# Patient Record
Sex: FEMALE | Race: OTHER | HISPANIC OR LATINO | Employment: FULL TIME | URBAN - METROPOLITAN AREA
[De-identification: names, ages, dates, MRNs, and addresses within clinical notes are randomized per-mention and may not be internally consistent; named-entity substitution may affect disease eponyms.]

---

## 2020-12-14 ENCOUNTER — HOSPITAL ENCOUNTER (EMERGENCY)
Facility: HOSPITAL | Age: 35
Discharge: HOME | End: 2020-12-15
Attending: EMERGENCY MEDICINE | Admitting: EMERGENCY MEDICINE
Payer: COMMERCIAL

## 2020-12-14 ENCOUNTER — APPOINTMENT (EMERGENCY)
Dept: RADIOLOGY | Facility: HOSPITAL | Age: 35
End: 2020-12-14
Payer: COMMERCIAL

## 2020-12-14 VITALS
DIASTOLIC BLOOD PRESSURE: 92 MMHG | SYSTOLIC BLOOD PRESSURE: 144 MMHG | HEART RATE: 78 BPM | RESPIRATION RATE: 20 BRPM | WEIGHT: 170 LBS | OXYGEN SATURATION: 99 % | TEMPERATURE: 98.9 F

## 2020-12-14 DIAGNOSIS — R06.02 SHORTNESS OF BREATH: ICD-10-CM

## 2020-12-14 DIAGNOSIS — R07.89 ATYPICAL CHEST PAIN: Primary | ICD-10-CM

## 2020-12-14 DIAGNOSIS — U07.1 COVID-19: ICD-10-CM

## 2020-12-14 LAB
ALBUMIN SERPL BCP-MCNC: 3.8 G/DL (ref 3.5–5)
ALP SERPL-CCNC: 108 U/L (ref 46–116)
ALT SERPL W P-5'-P-CCNC: 29 U/L (ref 12–78)
ANION GAP SERPL CALCULATED.3IONS-SCNC: 7 MMOL/L (ref 4–13)
AST SERPL W P-5'-P-CCNC: 13 U/L (ref 5–45)
BASOPHILS # BLD AUTO: 0.04 THOUSANDS/ΜL (ref 0–0.1)
BASOPHILS NFR BLD AUTO: 0 % (ref 0–1)
BILIRUB SERPL-MCNC: 0.24 MG/DL (ref 0.2–1)
BUN SERPL-MCNC: 11 MG/DL (ref 5–25)
CALCIUM SERPL-MCNC: 9.2 MG/DL (ref 8.3–10.1)
CHLORIDE SERPL-SCNC: 107 MMOL/L (ref 100–108)
CO2 SERPL-SCNC: 27 MMOL/L (ref 21–32)
CREAT SERPL-MCNC: 0.69 MG/DL (ref 0.6–1.3)
D DIMER PPP FEU-MCNC: 0.47 UG/ML FEU
EOSINOPHIL # BLD AUTO: 0.06 THOUSAND/ΜL (ref 0–0.61)
EOSINOPHIL NFR BLD AUTO: 1 % (ref 0–6)
ERYTHROCYTE [DISTWIDTH] IN BLOOD BY AUTOMATED COUNT: 12.7 % (ref 11.6–15.1)
GFR SERPL CREATININE-BSD FRML MDRD: 115 ML/MIN/1.73SQ M
GLUCOSE SERPL-MCNC: 91 MG/DL (ref 65–140)
HCT VFR BLD AUTO: 37.3 % (ref 34.8–46.1)
HGB BLD-MCNC: 12.7 G/DL (ref 11.5–15.4)
IMM GRANULOCYTES # BLD AUTO: 0.03 THOUSAND/UL (ref 0–0.2)
IMM GRANULOCYTES NFR BLD AUTO: 0 % (ref 0–2)
LYMPHOCYTES # BLD AUTO: 2.66 THOUSANDS/ΜL (ref 0.6–4.47)
LYMPHOCYTES NFR BLD AUTO: 25 % (ref 14–44)
MCH RBC QN AUTO: 29.7 PG (ref 26.8–34.3)
MCHC RBC AUTO-ENTMCNC: 34 G/DL (ref 31.4–37.4)
MCV RBC AUTO: 87 FL (ref 82–98)
MONOCYTES # BLD AUTO: 0.94 THOUSAND/ΜL (ref 0.17–1.22)
MONOCYTES NFR BLD AUTO: 9 % (ref 4–12)
NEUTROPHILS # BLD AUTO: 7.05 THOUSANDS/ΜL (ref 1.85–7.62)
NEUTS SEG NFR BLD AUTO: 65 % (ref 43–75)
NRBC BLD AUTO-RTO: 0 /100 WBCS
PLATELET # BLD AUTO: 391 THOUSANDS/UL (ref 149–390)
PMV BLD AUTO: 10 FL (ref 8.9–12.7)
POTASSIUM SERPL-SCNC: 3.5 MMOL/L (ref 3.5–5.3)
PROT SERPL-MCNC: 7.5 G/DL (ref 6.4–8.2)
RBC # BLD AUTO: 4.27 MILLION/UL (ref 3.81–5.12)
SODIUM SERPL-SCNC: 141 MMOL/L (ref 136–145)
TROPONIN I SERPL-MCNC: <0.02 NG/ML
WBC # BLD AUTO: 10.78 THOUSAND/UL (ref 4.31–10.16)

## 2020-12-14 PROCEDURE — 84484 ASSAY OF TROPONIN QUANT: CPT | Performed by: EMERGENCY MEDICINE

## 2020-12-14 PROCEDURE — 93005 ELECTROCARDIOGRAM TRACING: CPT

## 2020-12-14 PROCEDURE — 36415 COLL VENOUS BLD VENIPUNCTURE: CPT | Performed by: EMERGENCY MEDICINE

## 2020-12-14 PROCEDURE — 99285 EMERGENCY DEPT VISIT HI MDM: CPT

## 2020-12-14 PROCEDURE — 71045 X-RAY EXAM CHEST 1 VIEW: CPT

## 2020-12-14 PROCEDURE — 99285 EMERGENCY DEPT VISIT HI MDM: CPT | Performed by: EMERGENCY MEDICINE

## 2020-12-14 PROCEDURE — 85025 COMPLETE CBC W/AUTO DIFF WBC: CPT | Performed by: EMERGENCY MEDICINE

## 2020-12-14 PROCEDURE — 80053 COMPREHEN METABOLIC PANEL: CPT | Performed by: EMERGENCY MEDICINE

## 2020-12-14 PROCEDURE — 85379 FIBRIN DEGRADATION QUANT: CPT | Performed by: EMERGENCY MEDICINE

## 2020-12-14 RX ORDER — OMEPRAZOLE 40 MG/1
CAPSULE, DELAYED RELEASE ORAL DAILY
COMMUNITY
End: 2021-06-17

## 2020-12-14 RX ORDER — ACETAMINOPHEN 325 MG/1
975 TABLET ORAL ONCE
Status: DISCONTINUED | OUTPATIENT
Start: 2020-12-14 | End: 2020-12-15 | Stop reason: HOSPADM

## 2020-12-14 RX ORDER — RIMEGEPANT SULFATE 75 MG/75MG
TABLET, ORALLY DISINTEGRATING ORAL
COMMUNITY
Start: 2020-07-14 | End: 2022-03-11

## 2020-12-14 RX ORDER — MULTIVITAMIN,THER AND MINERALS
1 TABLET ORAL
COMMUNITY
End: 2021-06-17

## 2020-12-14 RX ORDER — PREDNISONE 10 MG/1
TABLET ORAL
COMMUNITY
Start: 2020-12-11 | End: 2021-06-17

## 2020-12-15 LAB
ATRIAL RATE: 61 BPM
P AXIS: 44 DEGREES
PR INTERVAL: 158 MS
QRS AXIS: 15 DEGREES
QRSD INTERVAL: 86 MS
QT INTERVAL: 410 MS
QTC INTERVAL: 412 MS
T WAVE AXIS: 34 DEGREES
VENTRICULAR RATE: 61 BPM

## 2020-12-15 PROCEDURE — 93010 ELECTROCARDIOGRAM REPORT: CPT | Performed by: INTERNAL MEDICINE

## 2021-04-30 ENCOUNTER — TELEPHONE (OUTPATIENT)
Dept: GASTROENTEROLOGY | Facility: CLINIC | Age: 36
End: 2021-04-30

## 2021-05-07 ENCOUNTER — OFFICE VISIT (OUTPATIENT)
Dept: OBGYN CLINIC | Facility: CLINIC | Age: 36
End: 2021-05-07
Payer: COMMERCIAL

## 2021-05-07 VITALS
DIASTOLIC BLOOD PRESSURE: 66 MMHG | SYSTOLIC BLOOD PRESSURE: 124 MMHG | HEIGHT: 59 IN | WEIGHT: 178 LBS | BODY MASS INDEX: 35.88 KG/M2

## 2021-05-07 DIAGNOSIS — Z11.51 SCREENING FOR HPV (HUMAN PAPILLOMAVIRUS): ICD-10-CM

## 2021-05-07 DIAGNOSIS — N94.6 DYSMENORRHEA: ICD-10-CM

## 2021-05-07 DIAGNOSIS — R10.2 PELVIC PAIN IN FEMALE: ICD-10-CM

## 2021-05-07 DIAGNOSIS — Z12.4 SCREENING FOR MALIGNANT NEOPLASM OF CERVIX: ICD-10-CM

## 2021-05-07 DIAGNOSIS — Z01.419 WELL FEMALE EXAM WITH ROUTINE GYNECOLOGICAL EXAM: Primary | ICD-10-CM

## 2021-05-07 DIAGNOSIS — E28.2 PCOS (POLYCYSTIC OVARIAN SYNDROME): ICD-10-CM

## 2021-05-07 PROCEDURE — 99385 PREV VISIT NEW AGE 18-39: CPT | Performed by: OBSTETRICS & GYNECOLOGY

## 2021-05-07 RX ORDER — PANTOPRAZOLE SODIUM 40 MG/1
40 TABLET, DELAYED RELEASE ORAL DAILY
COMMUNITY
Start: 2021-01-13 | End: 2022-08-01 | Stop reason: SDUPTHER

## 2021-05-07 NOTE — PROGRESS NOTES
ASSESSMENT & PLAN: Jose Jacinto is a 37 y o  [id number] with normal gynecologic exam     1   Routine well woman exam done today  2    Pap and HPV:Pap with HPV was done today  Current ASCCP Guidelines reviewed  3   The patient declined STD testing  4  Contraception: bilateral tubal ligation  5  The following were reviewed in today's visit: adequate intake of calcium and vitamin D, exercise, healthy diet and PCOS and pelvic pain  6  Patient to return to office in 12 months for annual gyn exam   7  Pelvic pain -- tenderness on examination  No fullness palpated  Recommended pelvic US  Will follow up ultrasound results  8  PCOS   - irregular menses likely secondary to PCOS  Discussed that with her hx of PE's, she should not be on any estrogen containing medication  OCPs are typically first line treatment for PCOS however these are contraindicated in this patient  Discussed that progesterone may be used but is unlikely to regulate her menses  May help to reduce pain associated with ovulatory cycles  All questions have been answered to her satisfaction  CC:  Annual Gynecologic Examination    HPI: Jose Jacinto is a 37 y o  [id number] who presents for annual gynecologic examination  She has the following concerns:    - establish care  - no gyn concerns  She reports abnormal pap smear with her prior pregnancy but was lost to follow up  She reports hx of PCOS and conceived her children via IVF  She reports pelvic pain mainly on the R  She states that her prior gyn did recommend an ultrasound be performed  She reports irregular menses secondary to her PCOS and R pelvic pain that is sporadic  Patient's recent [address] significant for COVID infection in 11/2020 followed by dx of pericardial effusion and cyst for which she follows with [facility] cardiology  She has a PMH of lupus and pulmonary emboli  Health Maintenance:    She exercises 0 days per week  She wears her seatbelt routinely      She is practicing breast self awareness  She feels safe at home  Patients does follow a balanced diet  Past Medical History:   Diagnosis Date    Abnormal Pap smear of cervix     Chiari I malformation ([address]     COVID-19     Female infertility     IVF pregnancy    Fibromyalgia     Lupus ([address]     Pericardial cyst     Pituitary tumor     Polycystic ovary syndrome     Pulmonary edema     Pulmonary emboli (HCC)        Past Surgical History:   Procedure Laterality Date     SECTION      x 2    CHOLECYSTECTOMY      GASTRECTOMY SLEEVE LAPAROSCOPIC      GASTRIC BYPASS      POLYPECTOMY      TONSILLECTOMY      TUBAL LIGATION         Past OB/Gyn History:  Period Cycle (Days): 26  Period Duration (Days): 6 days  Period Pattern: Regular  Menstrual Flow: Heavy  Menstrual Control: Maxi pad, Tampon  Dysmenorrhea: (!) Severe  Dysmenorrhea Symptoms: Cramping, Headache, Throbbing, Other (Comment)(back pain)Patient's last menstrual period was 2021 (exact date)  History of sexually transmitted infection No  Patient is currently sexually active     Birth control:bilateral tubal ligation    Last Pap  Unavailable for review    Family History  Family History   Problem Relation Age of Onset    Heart disease Mother     Hypertension Mother     Heart attack Father     No Known Problems Brother     No Known Problems Daughter     No Known Problems Son     Heart disease Maternal Grandmother     Hypertension Maternal Grandmother     Diabetes Maternal Grandmother     Early death Maternal Grandfather     No Known Problems Paternal Grandmother     No Known Problems Paternal Grandfather     No Known Problems Brother        Social History:  Social History     Socioeconomic History    Marital status: Unknown     Spouse name: Not on file    Number of children: Not on file    Years of education: Not on file    Highest education level: Not on file   Occupational History    Not on file   Social Needs    Financial resource strain: Not on file    Food insecurity     Worry: Not on file     Inability: Not on file    Transportation needs     Medical: Not on file     Non-medical: Not on file   Tobacco Use    Smoking status: Never Smoker    Smokeless tobacco: Never Used   Substance and Sexual Activity    Alcohol use: Yes     Frequency: Never     Comment: occ  social rare    Drug use: Never    Sexual activity: Yes     Partners: Male     Birth control/protection: Female Sterilization   Lifestyle    Physical activity     Days per week: Not on file     Minutes per session: Not on file    Stress: Not on file   Relationships    Social connections     Talks on phone: Not on file     Gets together: Not on file     Attends Rastafari service: Not on file     Active member of club or organization: Not on file     Attends meetings of clubs or organizations: Not on file     Relationship status: Not on file    Intimate partner violence     Fear of current or ex partner: Not on file     Emotionally abused: Not on file     Physically abused: Not on file     Forced sexual activity: Not on file   Other Topics Concern    Not on file   Social History Narrative    Not on file     Presently lives with hsuband and children  Patient is   Patient is currently employed  Allergies: Allergies   Allergen Reactions    Codeine        Medications:    Current Outpatient Medications:     pantoprazole (PROTONIX) 40 mg tablet, Take 40 mg by mouth daily, Disp: , Rfl:     omeprazole (PriLOSEC) 40 MG capsule, Daily, Disp: , Rfl:     predniSONE 10 mg tablet, Six tablets day one then decrease by one tablet each day until off Indications: rebound headache, Disp: , Rfl:     Rimegepant Sulfate (Nurtec) 75 MG TBDP, Take by mouth, Disp: , Rfl:     Vitamins/Minerals TABS, Take 1 tablet by mouth, Disp: , Rfl:     Review of Systems:  Review of Systems   Constitutional: Negative for activity change, appetite change and unexpected weight change  Respiratory: Negative for cough and shortness of breath  Cardiovascular: Negative for chest pain  Gastrointestinal: Negative for abdominal pain, constipation, diarrhea, nausea and vomiting  Genitourinary: Negative for difficulty urinating, dyspareunia, frequency, menstrual problem, pelvic pain, vaginal bleeding, vaginal discharge and vaginal pain  Neurological: Negative for dizziness, weakness, light-headedness and headaches  Psychiatric/Behavioral: Negative  Physical Exam:  /66   Ht 4' 11" (1 499 m)   Wt 80 7 kg (178 lb)   LMP 05/01/2021 (Exact Date)   Breastfeeding No   BMI 35 95 kg/m²    Physical Exam  Constitutional:       General: She is not in acute distress  Appearance: Normal appearance  She is well-developed  She is not diaphoretic  Genitourinary:      Pelvic exam was performed with patient in the lithotomy position  Vulva, urethra, bladder, vagina and uterus normal       No vulval lesion, tenderness, ulcerations or rash noted  No posterior fourchette tenderness or injury present  No inguinal adenopathy present in the right or left side  No urethral prolapse or mass present  Bladder is not tender  No lesions in the vagina  Vaginal rugosity present  No vaginal discharge, erythema, tenderness or bleeding  Cervical pinkness present  No cervical motion tenderness, discharge, friability, lesion or polyp  Uterus is mobile  Uterus is not enlarged or tender  No uterine mass detected  Uterus is anteverted and regular  No right or left adnexal mass present  Right adnexa tender (generalized tenderness during bimanual exam)  Right adnexa not full  Left adnexa tender  Left adnexa not full  Genitourinary Comments: Urethra midline, no masses  Urethral meatus normal in appearance  Bladder nontender to palpation   Perineum normal in appearance, no lacerations, no ulcerations, no lesions visualized  Rectum:      No tenderness or external hemorrhoid  HENT:      Head: Normocephalic and atraumatic  Cardiovascular:      Rate and Rhythm: Normal rate and regular rhythm  Heart sounds: Normal heart sounds  No murmur  No friction rub  Pulmonary:      Effort: Pulmonary effort is normal  No respiratory distress  Breath sounds: Normal breath sounds  No wheezing or rales  Chest:      Breasts: Breasts are symmetrical          Right: Normal  No swelling, mass, skin change or tenderness  Left: Normal  No swelling, mass, skin change or tenderness  Abdominal:      Palpations: Abdomen is soft  There is no mass  Tenderness: There is no abdominal tenderness  There is no guarding  Musculoskeletal: Normal range of motion  General: No tenderness  Lymphadenopathy:      Lower Body: No right inguinal adenopathy  No left inguinal adenopathy  Neurological:      Mental Status: She is alert and oriented to person, place, and time  Skin:     General: Skin is warm and dry  Coloration: Skin is not pale  Findings: No erythema  Psychiatric:         Mood and Affect: Mood normal          Behavior: Behavior normal          Thought Content: Thought content normal          Judgment: Judgment normal    Vitals signs and nursing note reviewed

## 2021-05-10 LAB
CYTOLOGIST CVX/VAG CYTO: NORMAL
DX ICD CODE: NORMAL
OTHER STN SPEC: NORMAL
OTHER STN SPEC: NORMAL
PATH REPORT.FINAL DX SPEC: NORMAL
SL AMB NOTE:: NORMAL
SL AMB SPECIMEN ADEQUACY: NORMAL
SL AMB TEST METHODOLOGY: NORMAL

## 2021-05-10 NOTE — RESULT ENCOUNTER NOTE
Normal pap  No HPV performed (reflex instead of cotesting ordered)  Repeat pap in 3 years  [employer] message sent

## 2021-05-12 ENCOUNTER — TELEPHONE (OUTPATIENT)
Dept: GASTROENTEROLOGY | Facility: CLINIC | Age: 36
End: 2021-05-12

## 2021-05-12 ENCOUNTER — OFFICE VISIT (OUTPATIENT)
Dept: GASTROENTEROLOGY | Facility: CLINIC | Age: 36
End: 2021-05-12
Payer: COMMERCIAL

## 2021-05-12 DIAGNOSIS — K21.9 GASTROESOPHAGEAL REFLUX DISEASE WITHOUT ESOPHAGITIS: Primary | ICD-10-CM

## 2021-05-12 PROCEDURE — 99204 OFFICE O/P NEW MOD 45 MIN: CPT | Performed by: INTERNAL MEDICINE

## 2021-05-12 NOTE — TELEPHONE ENCOUNTER
----- Message from Cyndy Caraballo MD sent at 5/12/2021 10:05 AM EDT -----  Regarding: virtual visit  Tried to connect to the patient for virtual visit using [employer] but she did not connect  Looks like she may have connected using [employer]  I tried to use this, but [employer] teams would not connect   Tried calling patient at listed number, but she did not

## 2021-05-12 NOTE — TELEPHONE ENCOUNTER
Clari Veras spoke to pt whom was quite upset  I informed Clari Veras to add pt to the end of Dr Magdalena Clemons' schedule today

## 2021-05-12 NOTE — PROGRESS NOTES
[facility] - Outpatient Consultation  Clayton Jennings 37 y o  female MRN: [id number]  Encounter: [id number]       this was a 14 min telephone visit  My office door was closed and no one else was in the room  ASSESSMENT AND PLAN:      1  Gastroesophageal reflux disease without esophagitis     We discussed Bravo pH testing  We discussed placement of the pH probe and study general   This may or may not give us an answer as to why she has persistent symptoms  If she does have evidence of significant acid reflux, options might include laparoscopic fundoplication, stretta procedure or continued medication  She will need to hold her PPI for 7 days prior to the procedure and during the study itself     -96hr pH testing; Future  - EGD; Future    ______________________________________________________________________    HPI:  Patient with a history of prior sleeve gastrectomy for treatment of morbid obesity had severe reflux symptoms following this and eventually underwent conversion to Bhavya-en-Y gastric bypass but has had persistent heartburn and regurgitation  EGD previously revealed a small marginal ulcer and small hiatal hernia by her report  Has been on PPI  With incomplete resolution  Patient presents to discuss further evaluation with esophageal pH monitoring        REVIEW OF SYSTEMS:    ROS     Historical Information   Past Medical History:   Diagnosis Date    Abnormal Pap smear of cervix     [facility] malformation ([facility]     COVID-19     Female infertility     IVF pregnancy    Fibromyalgia     [facility]     Pericardial cyst     Pituitary tumor     Polycystic ovary syndrome     Pulmonary edema     Pulmonary emboli (HCC)      Past Surgical History:   Procedure Laterality Date     SECTION      x 2    CHOLECYSTECTOMY      GASTRECTOMY SLEEVE LAPAROSCOPIC      GASTRIC BYPASS      POLYPECTOMY      TONSILLECTOMY      TUBAL LIGATION       Social History   Social History     Substance and Sexual Activity   Alcohol Use Yes    Frequency: Never    Comment: occ  social rare     Social History     Substance and Sexual Activity   Drug Use Never     Social History     Tobacco Use   Smoking Status Never Smoker   Smokeless Tobacco Never Used     Family History   Problem Relation Age of Onset    Heart disease Mother     Hypertension Mother     Heart attack Father     No Known Problems Brother     No Known Problems Daughter     No Known Problems Son     Heart disease Maternal Grandmother     Hypertension Maternal Grandmother     Diabetes Maternal Grandmother     Early death Maternal Grandfather     No Known Problems Paternal Grandmother     No Known Problems Paternal Grandfather     No Known Problems Brother        Meds/Allergies       Current Outpatient Medications:     omeprazole (PriLOSEC) 40 MG capsule    pantoprazole (PROTONIX) 40 mg tablet    predniSONE 10 mg tablet    propranolol (INDERAL) 20 mg tablet    Rimegepant Sulfate (Nurtec) 75 MG TBDP    Vitamins/Minerals TABS    Allergies   Allergen Reactions    Codeine            Objective     Last menstrual period 05/01/2021, not currently breastfeeding  There is no height or weight on file to calculate BMI  PHYSICAL EXAM:      Physical Exam         Lab Results:   No visits with results within 1 Day(s) from this visit  Latest known visit with results is:   Office Visit on 05/07/2021   Component Date Value    Diagnosis: 05/07/2021 Comment     Specimen Adequacy 05/07/2021 Comment     Clinician Provided ICD10 05/07/2021 Comment     Performed by: 05/07/2021 Comment     MARQUITA BHATTI   05/07/2021    Note: 05/07/2021 Comment     Test Methodology: 05/07/2021 Comment     MARQUITA BHATTI   05/07/2021 Comment          Radiology Results:   No results found

## 2021-05-16 ENCOUNTER — HOSPITAL ENCOUNTER (OUTPATIENT)
Dept: RADIOLOGY | Facility: HOSPITAL | Age: 36
Discharge: HOME | End: 2021-05-16
Attending: OBSTETRICS & GYNECOLOGY
Payer: COMMERCIAL

## 2021-05-16 DIAGNOSIS — N94.6 DYSMENORRHEA: ICD-10-CM

## 2021-05-16 DIAGNOSIS — R10.2 PELVIC PAIN IN FEMALE: ICD-10-CM

## 2021-05-16 DIAGNOSIS — E28.2 PCOS (POLYCYSTIC OVARIAN SYNDROME): ICD-10-CM

## 2021-05-16 PROCEDURE — 76856 US EXAM PELVIC COMPLETE: CPT

## 2021-05-16 PROCEDURE — 76830 TRANSVAGINAL US NON-OB: CPT

## 2021-05-18 ENCOUNTER — TELEPHONE (OUTPATIENT)
Dept: GASTROENTEROLOGY | Facility: CLINIC | Age: 36
End: 2021-05-18

## 2021-05-18 NOTE — TELEPHONE ENCOUNTER
Pt is scheduled for 2 tests  A 24 and a 96 hour study  Per our conversation I will cancel out the 24 hour study and keep the 96 hour study  I will reach out to pt and inform her that only one test needs to be done  Thank you

## 2021-05-20 ENCOUNTER — TELEPHONE (OUTPATIENT)
Dept: OBGYN CLINIC | Facility: CLINIC | Age: 36
End: 2021-05-20

## 2021-05-20 NOTE — TELEPHONE ENCOUNTER
Pt calling requesting recent US results  Informed pt that results are still processing with radiology  Can take 1 to 2 weeks to receive results  Once results are reviewed by Dr Bishop Chase then a [employer] message or phone call will occur to update pt  Pt verbalized understanding

## 2021-05-24 ENCOUNTER — PATIENT MESSAGE (OUTPATIENT)
Dept: OBGYN CLINIC | Facility: CLINIC | Age: 36
End: 2021-05-24

## 2021-05-24 ENCOUNTER — TELEPHONE (OUTPATIENT)
Dept: OBGYN CLINIC | Facility: CLINIC | Age: 36
End: 2021-05-24

## 2021-05-24 NOTE — TELEPHONE ENCOUNTER
Called and spoke with patient  Advised patient "Retroverted uterus is a description of the position of the uterus and is within the range of normal  It means that her uterus is positioned towards her back  Despite her history of blood clots there are options for hormonal medication that do not increase her risk of clot but may help with her painful periods  If she wants to discuss these options, she should be seen for an appt"  Pt states she does not have time to come in for an appointment  She states she will deal with her irregular periods and make an appointment when she has time

## 2021-05-24 NOTE — TELEPHONE ENCOUNTER
Retroverted uterus is a description of the position of the uterus and is within the range of normal  It means that her uterus is positioned towards her back  Despite her history of blood clots there are options for hormonal medication that do not increase her risk of clot but may help with her painful periods  If she wants to discuss these options, she should be seen for an appt

## 2021-05-24 NOTE — TELEPHONE ENCOUNTER
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

## 2021-06-15 ENCOUNTER — TELEPHONE (OUTPATIENT)
Dept: GASTROENTEROLOGY | Facility: CLINIC | Age: 36
End: 2021-06-15

## 2021-06-16 ENCOUNTER — TELEPHONE (OUTPATIENT)
Dept: GASTROENTEROLOGY | Facility: CLINIC | Age: 36
End: 2021-06-16

## 2021-06-16 NOTE — TELEPHONE ENCOUNTER
Faxed urgent cardiac clearance to Dr Stefani Muro office [phone]  I lmom on Dr Andrew Kirk that I would be faxing urgent clearance and I would call back in about in hour to make sure that it was received  If she does not answer again it does state on message if need to talk to triage nurse today to call [phone] (main number) and ask for any available triage nurse

## 2021-06-16 NOTE — TELEPHONE ENCOUNTER
Called and spoke to pt to try to r/s procedure due to cardiac clearance not being able to be obtained  Pt informed that she does not need clearance as she just had one of these in January  I told her that when I spoke to Dr Brandon Wright office that she needed to be seen within 3 mos for clearance  Pt will be contacting Dr Brandon linder as she does not believe she evens needs clearance  She will then call back

## 2021-06-16 NOTE — TELEPHONE ENCOUNTER
I called and lmom for Jyoti Garcia at Dr Caesra Rizo office and informed to disregard cardiac clearance request

## 2021-06-16 NOTE — TELEPHONE ENCOUNTER
Called and spoke to Lucas Colón at [facility] whom reviewed pt's chart and states does not need cardiac clearance  Pt is ok to have BRAVO tomorrow  I lmom informing pt of this and if has any questions to please call me back

## 2021-06-16 NOTE — TELEPHONE ENCOUNTER
Received call back from Shania Cha, nurse at Dr Ynes Kwok office whom informed to fax to backline [phone] and that she does not believe that the doctor will clear her as has not been seen since January  She will see what she can do  Will call her in an hour or two to obtain status if pt needs to be rescheduled

## 2021-06-17 ENCOUNTER — ANESTHESIA (OUTPATIENT)
Dept: GASTROENTEROLOGY | Facility: AMBULATORY SURGERY CENTER | Age: 36
End: 2021-06-17

## 2021-06-17 ENCOUNTER — ANESTHESIA EVENT (OUTPATIENT)
Dept: GASTROENTEROLOGY | Facility: AMBULATORY SURGERY CENTER | Age: 36
End: 2021-06-17

## 2021-06-17 ENCOUNTER — HOSPITAL ENCOUNTER (OUTPATIENT)
Dept: GASTROENTEROLOGY | Facility: AMBULATORY SURGERY CENTER | Age: 36
Discharge: HOME | End: 2021-06-17
Payer: COMMERCIAL

## 2021-06-17 VITALS
HEART RATE: 83 BPM | OXYGEN SATURATION: 100 % | TEMPERATURE: 97.2 F | RESPIRATION RATE: 18 BRPM | SYSTOLIC BLOOD PRESSURE: 112 MMHG | WEIGHT: 175 LBS | DIASTOLIC BLOOD PRESSURE: 68 MMHG | HEIGHT: 59 IN | BODY MASS INDEX: 35.28 KG/M2

## 2021-06-17 DIAGNOSIS — K21.9 GASTROESOPHAGEAL REFLUX DISEASE WITHOUT ESOPHAGITIS: ICD-10-CM

## 2021-06-17 LAB
EXT PREGNANCY TEST URINE: NEGATIVE
EXT. CONTROL: NORMAL

## 2021-06-17 PROCEDURE — 43235 EGD DIAGNOSTIC BRUSH WASH: CPT | Performed by: INTERNAL MEDICINE

## 2021-06-17 PROCEDURE — 00731 ANES UPR GI NDSC PX NOS: CPT | Performed by: NURSE ANESTHETIST, CERTIFIED REGISTERED

## 2021-06-17 RX ORDER — PROPOFOL 10 MG/ML
INJECTION, EMULSION INTRAVENOUS AS NEEDED
Status: DISCONTINUED | OUTPATIENT
Start: 2021-06-17 | End: 2021-06-17

## 2021-06-17 RX ORDER — SODIUM CHLORIDE 9 MG/ML
20 INJECTION, SOLUTION INTRAVENOUS CONTINUOUS
Status: DISCONTINUED | OUTPATIENT
Start: 2021-06-17 | End: 2021-06-21 | Stop reason: HOSPADM

## 2021-06-17 RX ORDER — SODIUM CHLORIDE 9 MG/ML
30 INJECTION, SOLUTION INTRAVENOUS CONTINUOUS
Status: DISCONTINUED | OUTPATIENT
Start: 2021-06-17 | End: 2021-06-21 | Stop reason: HOSPADM

## 2021-06-17 RX ORDER — NORTRIPTYLINE HYDROCHLORIDE 25 MG/1
25 CAPSULE ORAL
COMMUNITY
Start: 2021-06-14 | End: 2021-06-22

## 2021-06-17 RX ADMIN — PROPOFOL 150 MG: 10 INJECTION, EMULSION INTRAVENOUS at 10:43

## 2021-06-17 RX ADMIN — PROPOFOL 150 MG: 10 INJECTION, EMULSION INTRAVENOUS at 10:47

## 2021-06-17 RX ADMIN — PROPOFOL 100 MG: 10 INJECTION, EMULSION INTRAVENOUS at 10:50

## 2021-06-17 RX ADMIN — SODIUM CHLORIDE: 9 INJECTION, SOLUTION INTRAVENOUS at 10:29

## 2021-06-17 NOTE — ANESTHESIA PREPROCEDURE EVALUATION
Procedure:  EGD    Relevant Problems   No relevant active problems        Physical Exam    Airway    Mallampati score: II  TM Distance: >3 FB  Neck ROM: full     Dental       Cardiovascular  Cardiovascular exam normal    Pulmonary  Pulmonary exam normal     Other Findings        Anesthesia Plan  ASA Score- 2     Anesthesia Type- IV sedation with anesthesia with ASA Monitors  Additional Monitors:   Airway Plan:           Plan Factors-Exercise tolerance (METS): >4 METS  Chart reviewed  Existing labs reviewed  Patient summary reviewed  Patient is not a current smoker  Patient not instructed to abstain from smoking on day of procedure  Patient did not smoke on day of surgery  Induction-     Postoperative Plan-     Informed Consent-   I personally reviewed this patient with the CRNA  Discussed and agreed on the Anesthesia Plan with the CRNA  Zoraida Sidhu

## 2021-06-17 NOTE — H&P
History and Physical - [facility]  Daniel Cheney 37 y o  female MRN: [id number]                  HPI: Daniel Cheney is a 37y o  year old female who presents for abdominal pain, s/p sleeve gastrectomy      REVIEW OF SYSTEMS: Per the HPI, and otherwise unremarkable      Historical Information   Past Medical History:   Diagnosis Date    Abnormal Pap smear of cervix     Anemia     gets iron infusions    Anxiety     Chiari I malformation (HCC)     Chronic pain disorder     during lupus flairs    COVID-19     Disease of thyroid gland     pt off meds    Female infertility     IVF pregnancy    Fibromyalgia     Fibromyalgia, primary     Gastric ulcer     GERD (gastroesophageal reflux disease)     Hiatal hernia     Lupus ([address]     Muscle weakness     Pericardial cyst     Pituitary tumor     Pleural effusion associated with pulmonary infection     Polycystic ovary syndrome     Pulmonary edema     Pulmonary emboli (HCC)     Spinal headache     with c section     Past Surgical History:   Procedure Laterality Date     SECTION      x 2    CHOLECYSTECTOMY      GASTRECTOMY SLEEVE LAPAROSCOPIC      GASTRIC BYPASS      POLYPECTOMY      TONSILLECTOMY      TUBAL LIGATION       Social History   Social History     Substance and Sexual Activity   Alcohol Use Yes    Comment: occ  social rare     Social History     Substance and Sexual Activity   Drug Use Never     Social History     Tobacco Use   Smoking Status Never Smoker   Smokeless Tobacco Never Used     Family History   Problem Relation Age of Onset    Heart disease Mother     Hypertension Mother     Heart attack Father     No Known Problems Brother     No Known Problems Daughter     No Known Problems Son     Heart disease Maternal Grandmother     Hypertension Maternal Grandmother     Diabetes Maternal Grandmother     Early death Maternal Grandfather     No Known Problems Paternal Grandmother     No Known Problems Paternal Grandfather     No Known Problems Brother        Meds/Allergies       Current Outpatient Medications:     nortriptyline (PAMELOR) 25 mg capsule    pantoprazole (PROTONIX) 40 mg tablet    Rimegepant Sulfate (Nurtec) 75 MG TBDP    Current Facility-Administered Medications:     sodium chloride 0 9 % infusion, 30 mL/hr, Intravenous, Continuous, Continue from Pre-op at 06/17/21 1037    sodium chloride 0 9 % infusion, 20 mL/hr, Intravenous, Continuous    Allergies   Allergen Reactions    Codeine Tremor    Morphine Tremor       Objective     /73   Pulse 71   Temp (!) 97 2 °F (36 2 °C) (Skin)   Resp 18   Ht 4' 11" (1 499 m)   Wt 79 4 kg (175 lb)   LMP 06/12/2021 (Exact Date)   SpO2 95%   Breastfeeding No   BMI 35 35 kg/m²       PHYSICAL EXAM    Gen: NAD  Head: NCAT  CV: RRR  CHEST: Clear  ABD: soft, NT/ND  EXT: no edema      ASSESSMENT/PLAN:  This is a 37y o  year old female with abd pain here for EGH/bravo pH test, and she is stable and optimized for her procedure

## 2021-06-17 NOTE — DISCHARGE INSTRUCTIONS
Upper Endoscopy   WHAT YOU NEED TO KNOW:   An upper endoscopy is also called an upper gastrointestinal (GI) endoscopy, or an esophagogastroduodenoscopy (EGD)  It is a procedure to examine the inside of your esophagus, stomach, and duodenum (first part of the small intestine) with a scope  You may feel bloated, gassy, or have some abdominal discomfort after your procedure  Your throat may be sore for 24 to 36 hours  You may burp or pass gas from air that is still inside your body  DISCHARGE INSTRUCTIONS:   Seek care immediately if:    You have sudden, severe abdominal pain   You have problems swallowing   You have a large amount of black, sticky bowel movements or blood in your bowel movements   You have sudden trouble breathing   You feel weak, lightheaded, or faint or your heart beats faster than normal for you  Contact your healthcare provider if:    You have a fever and chills   You have nausea or are vomiting   Your abdomen is bloated or feels full and hard   You have abdominal pain   You have black, sticky bowel movements or blood in your bowel movements   You have not had a bowel movement for 3 days after your procedure   You have rash or hives   You have questions or concerns about your procedure  Activity:    Do not lift, strain, or run for 24 hours after your procedure   Rest after your procedure  You have been given medicine to relax you  Do not drive or make important decisions until the day after your procedure  Return to your normal activity as directed   Relieve gas and discomfort from bloating by lying on your right side with a heating pad on your abdomen  You may need to take short walks to help the gas move out  Eat small meals until bloating is relieved  Follow up with your healthcare provider as directed: Write down your questions so you remember to ask them during your visits       If you take a blood thinner, please review the specific instructions from your endoscopist about when you should resume it  These can be found in the Recommendation and Your Medication list sections of this After Visit Summary  Gastroesophageal Reflux Disease   AMBULATORY CARE:   Gastroesophageal reflux disease (GERD)  is reflux that occurs more than twice a week for a few weeks  Reflux means acid and food in the stomach back up into the esophagus  It usually causes heartburn and other symptoms  GERD can cause other health problems over time if it is not treated  Signs and symptoms:   · Heartburn (burning pain in your chest)    · Pain after meals that spreads to your neck, jaw, or shoulder    · Pain that gets better when you change positions    · Bitter or acid taste in your mouth    · A dry cough    · Trouble swallowing or pain with swallowing    · Hoarseness or a sore throat    · Burping or hiccups    · Feeling full soon after you start eating    Call your local emergency number (911 in the [address] [facility]) if:   · You have severe chest pain and sudden trouble breathing  Seek care immediately if:   · You have trouble breathing after you vomit  · You have trouble swallowing, or pain with swallowing  · Your bowel movements are black, bloody, or tarry-looking  · Your vomit looks like coffee grounds or has blood in it  Call your doctor or gastroenterologist if:   · You feel full and cannot burp or vomit  · You vomit large amounts, or you vomit often  · You are losing weight without trying  · Your symptoms get worse or do not improve with treatment  · You have questions or concerns about your condition or care  Treatment for GERD:   · Medicines  are used to decrease stomach acid  Medicine may also be used to help your lower esophageal sphincter and stomach contract (tighten) more  · Surgery  is done to wrap the upper part of the stomach around the esophageal sphincter   This will strengthen the sphincter and prevent reflux  Manage GERD:   · Do not have foods or drinks that may increase heartburn  These include chocolate, peppermint, fried or fatty foods, drinks that contain caffeine, or carbonated drinks (soda)  Other foods include spicy foods, onions, tomatoes, and tomato-based foods  Do not have foods or drinks that can irritate your esophagus, such as citrus fruits, juices, and alcohol  · Do not eat large meals  When you eat a lot of food at one time, your stomach needs more acid to digest it  Eat 6 small meals each day instead of 3 large meals, and eat slowly  Do not eat meals 2 to 3 hours before bedtime  · Elevate the head of your bed  Place 6-inch blocks under the head of your bed frame  You may also use more than one pillow under your head and shoulders while you sleep  · Maintain a healthy weight  If you are overweight, weight loss may help relieve symptoms of GERD  · Do not smoke  Smoking weakens the lower esophageal sphincter and increases the risk of GERD  Ask your healthcare provider for information if you currently smoke and need help to quit  E-cigarettes or smokeless tobacco still contain nicotine  Talk to your healthcare provider before you use these products  · Do not wear clothing that is tight around your waist   Tight clothing can put pressure on your stomach and cause or worsen GERD symptoms  Follow up with your doctor or gastroenterologist as directed:  Write down your questions so you remember to ask them during your visits  © Copyright [address] Information is for End User's use only and may not be sold, redistributed or otherwise used for commercial purposes  All illustrations and images included in CareNotes® are the copyrighted property of [employer]  or [address] [place], [place] [address]suzie Carmona   The above information is an  only  It is not intended as medical advice for individual conditions or treatments   Talk to your doctor, nurse or pharmacist before following any medical regimen to see if it is safe and effective for you  Chronic Dysphagia   WHAT YOU NEED TO KNOW:   What is chronic dysphagia? Chronic dysphagia is trouble swallowing  It occurs when you have trouble moving food or liquid down your esophagus to your stomach  It may occur when you eat, drink, or any time you try to swallow  What causes chronic dysphagia? · Narrowing of your esophagus caused by acid reflux or tumors    · Nerve or muscle problems that slow the movement of food    · Brain injury, or conditions that affect the nervous system such as stroke, dementia, or Parkinson disease    · Radiation therapy or head and neck surgery    · Certain medicines such as antihistamines, diuretics, antidepressants, and blood pressure or antinausea medicines    What other signs and symptoms may occur with chronic dysphagia? · Drooling, coughing after swallowing, or spitting up food    · Hoarse or wet-sounding voice while eating or drinking     · Feeling like food is stuck in your throat or pressure in your chest after you eat    How is chronic dysphagia diagnosed? Your healthcare provider may ask if you only have trouble swallowing when you eat or drink, or any time you try to swallow  You may also need any of the following tests:  · A water swallow screening test  will show how well you swallow thinner liquids, such as water  Thinner liquids can make you choke more easily than thicker liquids  This test may show signs of dysphagia and aspiration (movement of liquid into your lungs)  It can be used to help healthcare providers decide if you need other tests  · Other swallow tests  may show which parts of your throat or esophagus are not working well  These tests may include x-rays of your throat and esophagus  You may be given a thick liquid called barium to help your esophagus show up better on x-rays  These tests may also show if the position of your head affects the way you swallow      · Endoscopy  is a procedure that may show narrowing or inflammation in your esophagus  · Manometry  measures the pressure within the esophagus and stomach  · pH monitoring  is used to check your throat for acid reflux  How is chronic dysphagia treated? Treatment will depend on the cause of your dysphagia  You may need medicine to reduce acid reflux or muscle spasms in your throat  You may also need any of the following:  · Diet changes  may reduce choking problems  Your healthcare provider may show you how to thicken liquids or soften foods to make them easier to swallow  · Swallowing therapy  can teach you different ways of swallowing by using different head and body positions  You may be taught exercises to strengthen the muscles that help you swallow  · Surgery  may be needed to widen your esophagus or treat other medical conditions that cause dysphagia  When should I contact my healthcare provider? · You lose weight without trying  · Your signs and symptoms get worse, or you have new signs or symptoms  · You have signs or symptoms of dehydration, such as increased thirst, dark yellow urine, or little or no urine  · You get colds often    · You have questions or concerns about your condition or care  When should I seek care immediately or call 911? · You cannot eat or drink liquids at all  · You have chest pain  · You have shortness of breath  CARE AGREEMENT:   You have the right to help plan your care  Learn about your health condition and how it may be treated  Discuss treatment options with your healthcare providers to decide what care you want to receive  You always have the right to refuse treatment  The above information is an  only  It is not intended as medical advice for individual conditions or treatments  Talk to your doctor, nurse or pharmacist before following any medical regimen to see if it is safe and effective for you    © [address] Information is for End User's use only and may not be sold, redistributed or otherwise used for commercial purposes  All illustrations and images included in CareNotes® are the copyrighted property of [employer]  or Aiden Grande  Hiatal Hernia   WHAT YOU NEED TO KNOW:   What is a hiatal hernia? A hiatal hernia is a condition that causes part of your stomach to bulge through the hiatus (small opening) in your diaphragm  The part of the stomach may move up and down, or it may get trapped above the diaphragm  What increases my risk for a hiatal hernia? The exact cause of a hiatal hernia is not known  You may have been born with a large hiatus  The following may increase your risk of a hiatal hernia:  · Obesity    · Older age    · Medical conditions such as diverticulosis or esophagitis    · Previous surgery of the esophagus or stomach or trauma such as from a motor vehicle accident    What are the types of hiatal hernia? · Type I (sliding hiatal hernia): A portion of the stomach slides in and out of the hiatus  This type is the most common and usually causes gastroesophageal reflux disease (GERD)  GERD occurs when the esophageal sphincter does not close properly and causes acid reflux  The esophageal sphincter is the lower muscle of the esophagus  · Type II (paraesophageal hiatal hernia):  Type II hiatal hernia forms when a part of the stomach squeezes through the hiatus and lies next to the esophagus  · Type III (combined):  Type III hiatal hernia is a combination of a sliding and a paraesophageal hiatal hernia  · Type IV (complex paraesophageal hiatal hernia): The whole stomach, the small and large bowels, spleen, pancreas, or liver is pushed up into the chest     What are the signs and symptoms of a hiatal hernia? The most common symptom is heartburn  This usually occurs after meals and spreads to your neck, jaw, or shoulder   You may have no signs or symptoms, or you may have any of the following:  · Abdominal pain, especially in the area just above your navel    · Bitter or acid taste in your mouth    · Trouble swallowing    · Coughing or hoarseness    · Chest pain or shortness of breath that occurs after eating    · Frequent burping or hiccups    · Uncomfortable feeling of fullness after eating    How is a hiatal hernia diagnosed? · An upper GI series test  includes x-rays of your esophagus, stomach, and your small intestines  It is also called a barium swallow test  You will be given barium (a chalky liquid) to drink before the pictures are taken  This liquid helps your stomach and intestines show up better on the x-rays  An upper GI series can show if you have an ulcer, a blocked intestine, or other problems  · An endoscopy  uses a scope to see the inside of your digestive tract  A scope is a long, bendable tube with a light on the end of it  A camera may be hooked to the scope to take pictures  How is a hiatal hernia treated? Treatment depends on the type of hiatal hernia you have and on your symptoms  You may not need any treatment  You may need any of the following:  · Medicines  may be given to relieve heartburn symptoms  These medicines help to decrease or block stomach acid  You may also be given medicines that help to tighten the esophageal sphincter  · Surgery  may be done when medicines cannot control your symptoms, or other problems are present  Your healthcare provider may also suggest surgery depending on the type of hernia you have  Your healthcare provider can put your stomach back into its normal location  He may make the hiatus (hole) smaller and anchor your stomach in your abdomen  Fundoplication is a surgery that wraps the upper part of the stomach around the esophageal sphincter to strengthen it  How can I manage symptoms? The following nutrition and lifestyle changes may be recommended to relieve symptoms of heartburn    · Avoid foods that make your symptoms worse   These may include spicy foods, fruit juices, alcohol, caffeine, chocolate, and mint  · Eat several small meals during the day  Small meals give your stomach less food to digest     · Avoid lying down and bending forward after you eat  Do not eat meals 2 to 3 hours before bedtime  This decreases your risk for reflux  · Maintain a healthy weight  If you are overweight, weight loss may help relieve your symptoms  · Sleep with your head elevated  at least 6 inches  · Do not smoke  Smoking can increase your symptoms of heartburn  When should I seek immediate care? · You have severe abdominal pain  · You try to vomit but nothing comes out (retching)  · You have severe chest pain and sudden trouble breathing  · Your bowel movements are black or bloody  · Your vomit looks like coffee grounds or has blood in it  When should I contact my healthcare provider? · Your symptoms are getting worse  · You have nausea, and you are vomiting  · You are losing weight without trying  · You have questions or concerns about your condition or care  CARE AGREEMENT:   You have the right to help plan your care  Learn about your health condition and how it may be treated  Discuss treatment options with your healthcare providers to decide what care you want to receive  You always have the right to refuse treatment  The above information is an  only  It is not intended as medical advice for individual conditions or treatments  Talk to your doctor, nurse or pharmacist before following any medical regimen to see if it is safe and effective for you  © Copyright [address] Information is for End User's use only and may not be sold, redistributed or otherwise used for commercial purposes   All illustrations and images included in CareNotes® are the copyrighted property of [employer]  or [address] [place] [address] [place]

## 2021-06-17 NOTE — ANESTHESIA POSTPROCEDURE EVALUATION
Post-Op Assessment Note    CV Status:  Stable  Pain Score: 0    Pain management: adequate     Mental Status:  Arousable   Hydration Status:  Stable   PONV Controlled:  None   Airway Patency:  Patent      Post Op Vitals Reviewed: Yes      Staff: CRNA         No complications documented      BP      Temp     Pulse     Resp      SpO2

## 2021-06-18 ENCOUNTER — TELEPHONE (OUTPATIENT)
Dept: GASTROENTEROLOGY | Facility: CLINIC | Age: 36
End: 2021-06-18

## 2021-06-18 DIAGNOSIS — R13.10 ODYNOPHAGIA: Primary | ICD-10-CM

## 2021-06-18 RX ORDER — LIDOCAINE HYDROCHLORIDE 20 MG/ML
15 SOLUTION OROPHARYNGEAL 3 TIMES DAILY PRN
Qty: 100 ML | Refills: 0 | Status: SHIPPED | OUTPATIENT
Start: 2021-06-18 | End: 2022-03-11

## 2021-06-22 ENCOUNTER — ANESTHESIA (OUTPATIENT)
Dept: GASTROENTEROLOGY | Facility: AMBULATORY SURGERY CENTER | Age: 36
End: 2021-06-22

## 2021-06-22 ENCOUNTER — HOSPITAL ENCOUNTER (OUTPATIENT)
Dept: GASTROENTEROLOGY | Facility: AMBULATORY SURGERY CENTER | Age: 36
Discharge: HOME | End: 2021-06-22
Payer: COMMERCIAL

## 2021-06-22 ENCOUNTER — ANESTHESIA EVENT (OUTPATIENT)
Dept: GASTROENTEROLOGY | Facility: AMBULATORY SURGERY CENTER | Age: 36
End: 2021-06-22

## 2021-06-22 VITALS
OXYGEN SATURATION: 100 % | BODY MASS INDEX: 35.28 KG/M2 | WEIGHT: 175 LBS | HEIGHT: 59 IN | RESPIRATION RATE: 18 BRPM | SYSTOLIC BLOOD PRESSURE: 116 MMHG | DIASTOLIC BLOOD PRESSURE: 82 MMHG | TEMPERATURE: 96.8 F | HEART RATE: 69 BPM

## 2021-06-22 DIAGNOSIS — K22.10 ULCER OF ESOPHAGUS WITHOUT BLEEDING: Primary | ICD-10-CM

## 2021-06-22 DIAGNOSIS — T18.108A: ICD-10-CM

## 2021-06-22 LAB
EXT PREGNANCY TEST URINE: NEGATIVE
EXT. CONTROL: NORMAL

## 2021-06-22 PROCEDURE — 00731 ANES UPR GI NDSC PX NOS: CPT | Performed by: NURSE ANESTHETIST, CERTIFIED REGISTERED

## 2021-06-22 PROCEDURE — 43247 EGD REMOVE FOREIGN BODY: CPT | Performed by: INTERNAL MEDICINE

## 2021-06-22 RX ORDER — GLYCOPYRROLATE 0.2 MG/ML
INJECTION INTRAMUSCULAR; INTRAVENOUS AS NEEDED
Status: DISCONTINUED | OUTPATIENT
Start: 2021-06-22 | End: 2021-06-22

## 2021-06-22 RX ORDER — SUCRALFATE ORAL 1 G/10ML
1 SUSPENSION ORAL 4 TIMES DAILY
Qty: 420 ML | Refills: 1 | Status: SHIPPED | OUTPATIENT
Start: 2021-06-22 | End: 2022-03-11

## 2021-06-22 RX ORDER — TOPIRAMATE 50 MG/1
TABLET, FILM COATED ORAL
COMMUNITY
Start: 2021-06-18 | End: 2022-03-11 | Stop reason: SDUPTHER

## 2021-06-22 RX ORDER — LIDOCAINE HYDROCHLORIDE 10 MG/ML
INJECTION, SOLUTION EPIDURAL; INFILTRATION; INTRACAUDAL; PERINEURAL AS NEEDED
Status: DISCONTINUED | OUTPATIENT
Start: 2021-06-22 | End: 2021-06-22

## 2021-06-22 RX ORDER — PROPOFOL 10 MG/ML
INJECTION, EMULSION INTRAVENOUS AS NEEDED
Status: DISCONTINUED | OUTPATIENT
Start: 2021-06-22 | End: 2021-06-22

## 2021-06-22 RX ORDER — SODIUM CHLORIDE 9 MG/ML
30 INJECTION, SOLUTION INTRAVENOUS CONTINUOUS
Status: DISCONTINUED | OUTPATIENT
Start: 2021-06-22 | End: 2021-06-26 | Stop reason: HOSPADM

## 2021-06-22 RX ADMIN — SODIUM CHLORIDE: 9 INJECTION, SOLUTION INTRAVENOUS at 12:19

## 2021-06-22 RX ADMIN — PROPOFOL 200 MG: 10 INJECTION, EMULSION INTRAVENOUS at 12:38

## 2021-06-22 RX ADMIN — PROPOFOL 50 MG: 10 INJECTION, EMULSION INTRAVENOUS at 12:41

## 2021-06-22 RX ADMIN — PROPOFOL 100 MG: 10 INJECTION, EMULSION INTRAVENOUS at 12:40

## 2021-06-22 RX ADMIN — LIDOCAINE HYDROCHLORIDE 80 MG: 10 INJECTION, SOLUTION EPIDURAL; INFILTRATION; INTRACAUDAL; PERINEURAL at 12:38

## 2021-06-22 RX ADMIN — GLYCOPYRROLATE 0.1 MG: 0.2 INJECTION INTRAMUSCULAR; INTRAVENOUS at 12:26

## 2021-06-22 NOTE — ANESTHESIA PREPROCEDURE EVALUATION
Procedure:  EGD    Relevant Problems   ANESTHESIA (within normal limits)      CARDIO (within normal limits)      MUSCULOSKELETAL (within normal limits)      PULMONARY (within normal limits)        Physical Exam    Airway    Mallampati score: I  TM Distance: >3 FB  Neck ROM: full     Dental   No notable dental hx     Cardiovascular  Rhythm: regular, Rate: normal, Cardiovascular exam normal    Pulmonary  Pulmonary exam normal Breath sounds clear to auscultation,     Other Findings        Anesthesia Plan  ASA Score- 2     Anesthesia Type- IV sedation with anesthesia with ASA Monitors  Additional Monitors:   Airway Plan:     Comment: Nasal cannula  Plan Factors-Exercise tolerance (METS): >4 METS  Chart reviewed  EKG reviewed  Imaging results reviewed  Existing labs reviewed  Patient summary reviewed  Patient is not a current smoker  Patient not instructed to abstain from smoking on day of procedure  Patient did not smoke on day of surgery  Obstructive sleep apnea risk education given perioperatively  Induction- intravenous  Postoperative Plan-     Informed Consent- Anesthetic plan and risks discussed with patient  I personally reviewed this patient with the CRNA  Discussed and agreed on the Anesthesia Plan with the CRNA  Rosette Booth

## 2021-06-22 NOTE — H&P
History and Physical -  [facility]  Sherre Runner 37 y o  female MRN: [id number]                  HPI: Sherre Runner is a 37y o  year old female who presents for EGD for removal of  Bravo capsule  Had a bravo placed for reflux symptoms   Has been having painful swallowing since  Also significant retrosternal pain since capsule insertion  Capsule has not been expelled spontaneously  REVIEW OF SYSTEMS: Per the HPI, and otherwise unremarkable      Historical Information   Past Medical History:   Diagnosis Date    Abnormal Pap smear of cervix     Anemia     gets iron infusions    Anxiety     Chiari I malformation (HCC)     Chronic pain disorder     during lupus flairs    COVID-19     Disease of thyroid gland     pt off meds    Female infertility     IVF pregnancy    Fibromyalgia     Fibromyalgia, primary     Gastric ulcer     GERD (gastroesophageal reflux disease)     Hiatal hernia     Lupus (HCC)     Muscle weakness     Pericardial cyst     Pituitary tumor     Pleural effusion associated with pulmonary infection     Polycystic ovary syndrome     Pulmonary edema     Pulmonary emboli (HCC)     Spinal headache     with c section     Past Surgical History:   Procedure Laterality Date     SECTION      x 2    CHOLECYSTECTOMY      GASTRECTOMY SLEEVE LAPAROSCOPIC      GASTRIC BYPASS      POLYPECTOMY      TONSILLECTOMY      TUBAL LIGATION       Social History   Social History     Substance and Sexual Activity   Alcohol Use Yes    Comment: occ  social rare     Social History     Substance and Sexual Activity   Drug Use Never     Social History     Tobacco Use   Smoking Status Never Smoker   Smokeless Tobacco Never Used     Family History   Problem Relation Age of Onset    Heart disease Mother     Hypertension Mother     Heart attack Father     No Known Problems Brother     No Known Problems Daughter     No Known Problems Son     Heart disease Maternal Grandmother     Hypertension Maternal Grandmother     Diabetes Maternal Grandmother     Early death Maternal Grandfather     No Known Problems Paternal Grandmother     No Known Problems Paternal Grandfather     No Known Problems Brother        Meds/Allergies       Current Outpatient Medications:     Lidocaine Viscous HCl (XYLOCAINE) 2 % mucosal solution    pantoprazole (PROTONIX) 40 mg tablet    Rimegepant Sulfate (Nurtec) 75 MG TBDP    topiramate (TOPAMAX) 50 MG tablet    Current Facility-Administered Medications:     sodium chloride 0 9 % infusion, 30 mL/hr, Intravenous, Continuous    Allergies   Allergen Reactions    Codeine Tremor    Morphine Tremor       Objective     /87   Pulse 70   Temp (!) 96 8 °F (36 °C) (Temporal)   Resp 18   Ht 4' 11" (1 499 m)   Wt 79 4 kg (175 lb)   LMP 06/12/2021 (Exact Date)   SpO2 99%   BMI 35 35 kg/m²       PHYSICAL EXAM    Gen: NAD  Head: NCAT  CV: RRR  CHEST: Clear  ABD: soft, NT/ND  EXT: no edema      ASSESSMENT/PLAN:  This is a 37y o  year old female here for EGD, and she is stable and optimized for her procedure

## 2021-06-22 NOTE — ANESTHESIA POSTPROCEDURE EVALUATION
Post-Op Assessment Note    CV Status:  Stable  Pain Score: 0    Pain management: adequate     Mental Status:  Sleepy   Hydration Status:  Stable   PONV Controlled:  None   Airway Patency:  Patent      Post Op Vitals Reviewed: Yes      Staff: CRNA         No complications documented

## 2021-06-22 NOTE — DISCHARGE INSTRUCTIONS
Allergic Esophagitis   WHAT YOU NEED TO KNOW:   What is allergic esophagitis? Allergic esophagitis is a condition that causes your esophagus to swell and narrow when your body reacts to allergens  An allergen is anything you are allergic to, such as certain foods, dust, or pollen  What increases the risk for allergic esophagitis? Anyone can get allergic esophagitis  The following increase the risk:  · Asthma or seasonal allergies    · Being male    · A family history of allergic esophagitis    What are the signs and symptoms of allergic esophagitis? · Trouble swallowing    · Throat pain during swallowing    · Food stuck in the esophagus    · Heartburn or chest pain    How is allergic esophagitis diagnosed? Your healthcare provider will ask about your symptoms and when they began  Tell him or her if you know certain foods cause your symptoms  Tell him or her if you have any medical conditions or a family history of allergic esophagitis  You may also need any of the following:  · Allergy tests  are used to see how your body reacts to certain allergens  The tests may show what is causing your allergic esophagitis  · A barium swallow x-ray  is used to take pictures inside your esophagus  You will swallow barium in a thick liquid before you have the x-ray  The barium helps any injuries show up better on the x-rays  · Endoscopy  is used to find any tissue changes  A scope is used to see inside the esophagus  A scope is a long, bendable tube with a light on the end  The scope is placed in your mouth and passed down your throat and esophagus  A camera may be hooked to the scope to take pictures  · A biopsy  is used to take tissue samples from your esophagus to be tested  The samples may also be checked for any other problems with your esophagus  How is allergic esophagitis treated? Allergic esophagitis may not go away completely  Treatment may help relieve your symptoms    · Steroid medicine  may help decrease swelling in your esophagus  You will swallow the medicine so it coats your esophagus  · Stomach acid medicine  helps keep heartburn symptoms under control  · Dilatation  is a procedure used when the esophagus narrows from swelling  An endoscope is placed into your mouth and down your throat  Tools on the endoscope press against the tissues to widen your esophagus  Dilatation can improve your symptoms but will not stop allergic esophagitis from happening  What food changes may be needed for allergic esophagitis? You may need to stop eating certain foods for a while to see if your symptoms improve  Start eating these foods again one at a time as directed  If certain foods cause your symptoms, do not eat them  Some common examples are dairy, nuts, eggs, and seafood  You may need to change what you eat to relieve your symptoms  You may need to see a dietitian to help you get the right amount of nutrients  Call your local emergency number [phone] in the [facility]) if:   · Food is stuck in your throat  · You have chest pain  When should I seek immediate care? · You vomit blood  · Your bowel movements are black and sticky  · You feel weak or dizzy  When should I call my doctor? · You have a fever  · You have white patches on your tongue and inside your mouth  · You lose weight without trying  · It is hard to swallow or it hurts to swallow, even after treatment  · You have questions or concerns about your condition or care  CARE AGREEMENT:   You have the right to help plan your care  Learn about your health condition and how it may be treated  Discuss treatment options with your healthcare providers to decide what care you want to receive  You always have the right to refuse treatment  The above information is an  only  It is not intended as medical advice for individual conditions or treatments   Talk to your doctor, nurse or pharmacist before following any medical regimen to see if it is safe and effective for you  © Copyright [address] Information is for End User's use only and may not be sold, redistributed or otherwise used for commercial purposes  All illustrations and images included in CareNotes® are the copyrighted property of [employer] D [employer] M [employer]  or Aiden Carmona   Gastroesophageal Reflux Disease   WHAT YOU NEED TO KNOW:   What is gastroesophageal reflux disease (GERD)? GERD is reflux that occurs more than twice a week for a few weeks  Reflux means acid and food in the stomach back up into the esophagus  It usually causes heartburn and other symptoms  GERD can cause other health problems over time if it is not treated  What causes GERD? GERD often occurs when the lower muscle (sphincter) of the esophagus does not close properly  The sphincter normally opens to let food into the stomach  It then closes to keep food and stomach acid in the stomach  If the sphincter does not close properly, stomach acid and food back up (reflux) into the esophagus  The following may increase your risk for GERD:  · Certain foods such as spicy foods, chocolate, foods that contain caffeine, peppermint, and fried foods    · Hiatal hernia    · Certain medicines such as calcium channel blockers (used to treat high blood pressure), allergy medicines, sedatives, or antidepressants    · Pregnancy or obesity    · Lying down after a meal    · Drinking alcohol or smoking    What are the signs and symptoms of GERD? · Heartburn (burning pain in your chest)    · Pain after meals that spreads to your neck, jaw, or shoulder    · Pain that gets better when you change positions    · Bitter or acid taste in your mouth    · A dry cough    · Trouble swallowing or pain with swallowing    · Hoarseness or a sore throat    · Burping or hiccups    · Feeling full soon after you start eating    How is GERD diagnosed?   Your healthcare provider will ask about your symptoms and when they started  Tell him or her about other medical conditions you have, your eating habits, and your activities  You may also need any of the following:  · The amount of acid  in your esophagus and stomach may be checked  A small probe is used to check the amount  · An endoscopy  is a procedure used to look at the inside of your esophagus and stomach  An endoscope is a bendable tube with a light and camera on the end  Your healthcare provider may remove a small sample of tissue and send it to a lab for tests  · X-ray  pictures may be taken of your stomach and intestines (bowel)  You may be given a chalky liquid to drink before the pictures are taken  This liquid helps your stomach and intestines show up better on the x-rays  · Pressure and function  tests of your esophagus can help find problems such as a hiatal hernia  How is GERD treated? · Medicines  are used to decrease stomach acid  Medicine may also be used to help your lower esophageal sphincter and stomach contract (tighten) more  · Surgery  is done to wrap the upper part of the stomach around the esophageal sphincter  This will strengthen the sphincter and prevent reflux  How can I manage GERD? · Do not have foods or drinks that may increase heartburn  These include chocolate, peppermint, fried or fatty foods, drinks that contain caffeine, or carbonated drinks (soda)  Other foods include spicy foods, onions, tomatoes, and tomato-based foods  Do not have foods or drinks that can irritate your esophagus, such as citrus fruits, juices, and alcohol  · Do not eat large meals  When you eat a lot of food at one time, your stomach needs more acid to digest it  Eat 6 small meals each day instead of 3 large ones, and eat slowly  Do not eat meals 2 to 3 hours before bedtime  · Elevate the head of your bed  Place 6-inch blocks under the head of your bed frame   You may also use more than one pillow under your head and shoulders while you sleep     · Maintain a healthy weight  If you are overweight, weight loss may help relieve symptoms of GERD  · Do not smoke  Smoking weakens the lower esophageal sphincter and increases the risk of GERD  Ask your healthcare provider for information if you currently smoke and need help to quit  E-cigarettes or smokeless tobacco still contain nicotine  Talk to your healthcare provider before you use these products  · Do not wear clothing that is tight around your waist   Tight clothing can put pressure on your stomach and cause or worsen GERD symptoms  Call your local emergency number ([phone] in the [address] [facility] [facility]) if:   · You have severe chest pain and sudden trouble breathing  When should I seek immediate care? · You have trouble breathing after you vomit  · You have trouble swallowing, or pain with swallowing  · Your bowel movements are black, bloody, or tarry-looking  · Your vomit looks like coffee grounds or has blood in it  When should I call my doctor? · You feel full and cannot burp or vomit  · You vomit large amounts, or you vomit often  · You are losing weight without trying  · Your symptoms get worse or do not improve with treatment  · You have questions or concerns about your condition or care  CARE AGREEMENT:   You have the right to help plan your care  Learn about your health condition and how it may be treated  Discuss treatment options with your healthcare providers to decide what care you want to receive  You always have the right to refuse treatment  The above information is an  only  It is not intended as medical advice for individual conditions or treatments  Talk to your doctor, nurse or pharmacist before following any medical regimen to see if it is safe and effective for you  © Copyright [address] Information is for End User's use only and may not be sold, redistributed or otherwise used for commercial purposes   All illustrations and images included in CareNotes® are the copyrighted property of A D A M , Inc  or Aiden Grande

## 2021-06-25 ENCOUNTER — TELEPHONE (OUTPATIENT)
Dept: GASTROENTEROLOGY | Facility: CLINIC | Age: 36
End: 2021-06-25

## 2021-06-25 NOTE — TELEPHONE ENCOUNTER
Pt called asking if she needs to f/u with you, or should she just f/u with her bariatric surgeon  Please advise so I can call her back   She is also asking if she needs to see you if it can be virtual?

## 2021-06-28 NOTE — TELEPHONE ENCOUNTER
It would probably be best to f/u with me at some point, but it depends on the final result of Bravo pH study    Will let her know the results, then decide if it makes sense for her to be seen

## 2021-07-13 ENCOUNTER — TELEPHONE (OUTPATIENT)
Dept: GASTROENTEROLOGY | Facility: CLINIC | Age: 36
End: 2021-07-13

## 2021-08-26 NOTE — TELEPHONE ENCOUNTER
PT S/P EGD WITH  PALMA CAPSULE YESTERDAY, C/O SHARP PAIN LEVEL 8-10 WITH SWALLOWING, EVEN WITH HER SALIVA  SHE HAS TRIED TYLENOL WITHOUT RELIEF 
Patient reports discomfort and pain when swallowing, she had her Bravo placed yesterday  She is eating soft diet but this pain happens even with soft foods or liquids  Told pt it's normal to have some discomfort like something is there and she can take tylenol as needed  She should present to ED if pain becomes severe  Offered lidocaine viscous but pt will try tylenol first  Will send to pharmacy in case needed 
normal...
normal...

## 2021-11-27 ENCOUNTER — APPOINTMENT (OUTPATIENT)
Dept: URGENT CARE | Facility: CLINIC | Age: 36
End: 2021-11-27

## 2021-11-27 DIAGNOSIS — Z02.1 PRE-EMPLOYMENT EXAMINATION: ICD-10-CM

## 2021-11-27 LAB — RUBV IGG SERPL IA-ACNC: >175 IU/ML

## 2021-11-27 PROCEDURE — 86762 RUBELLA ANTIBODY: CPT

## 2021-11-27 PROCEDURE — 86735 MUMPS ANTIBODY: CPT

## 2021-11-27 PROCEDURE — 86480 TB TEST CELL IMMUN MEASURE: CPT

## 2021-11-27 PROCEDURE — 86787 VARICELLA-ZOSTER ANTIBODY: CPT

## 2021-11-27 PROCEDURE — 86765 RUBEOLA ANTIBODY: CPT

## 2021-11-29 LAB
GAMMA INTERFERON BACKGROUND BLD IA-ACNC: 0.03 IU/ML
M TB IFN-G BLD-IMP: NEGATIVE
M TB IFN-G CD4+ BCKGRND COR BLD-ACNC: 0 IU/ML
M TB IFN-G CD4+ BCKGRND COR BLD-ACNC: 0.01 IU/ML
MITOGEN IGNF BCKGRD COR BLD-ACNC: >10 IU/ML

## 2021-11-30 ENCOUNTER — IMMUNIZATIONS (OUTPATIENT)
Dept: FAMILY MEDICINE CLINIC | Facility: HOSPITAL | Age: 36
End: 2021-11-30

## 2021-11-30 DIAGNOSIS — Z23 ENCOUNTER FOR IMMUNIZATION: Primary | ICD-10-CM

## 2021-11-30 LAB
MEV IGG SER QL: NORMAL
MUV IGG SER QL: NORMAL
VZV IGG SER IA-ACNC: NORMAL

## 2021-11-30 PROCEDURE — 91300 COVID-19 PFIZER VACC 0.3 ML: CPT

## 2021-11-30 PROCEDURE — 0001A COVID-19 PFIZER VACC 0.3 ML: CPT

## 2021-12-31 ENCOUNTER — OFFICE VISIT (OUTPATIENT)
Dept: URGENT CARE | Age: 36
End: 2021-12-31
Payer: COMMERCIAL

## 2021-12-31 VITALS
BODY MASS INDEX: 35.28 KG/M2 | OXYGEN SATURATION: 98 % | HEIGHT: 59 IN | WEIGHT: 175 LBS | TEMPERATURE: 97.9 F | HEART RATE: 78 BPM | RESPIRATION RATE: 18 BRPM

## 2021-12-31 DIAGNOSIS — J02.0 STREP PHARYNGITIS: ICD-10-CM

## 2021-12-31 DIAGNOSIS — B34.9 VIRAL SYNDROME: Primary | ICD-10-CM

## 2021-12-31 LAB — S PYO AG THROAT QL: POSITIVE

## 2021-12-31 PROCEDURE — 87880 STREP A ASSAY W/OPTIC: CPT

## 2021-12-31 PROCEDURE — G0382 LEV 3 HOSP TYPE B ED VISIT: HCPCS

## 2021-12-31 PROCEDURE — 87636 SARSCOV2 & INF A&B AMP PRB: CPT

## 2021-12-31 RX ORDER — PENICILLIN V POTASSIUM 500 MG/1
500 TABLET ORAL EVERY 12 HOURS
Qty: 20 TABLET | Refills: 0 | Status: SHIPPED | OUTPATIENT
Start: 2021-12-31 | End: 2022-01-05 | Stop reason: CLARIF

## 2022-01-05 ENCOUNTER — TELEPHONE (OUTPATIENT)
Dept: URGENT CARE | Age: 37
End: 2022-01-05

## 2022-01-05 DIAGNOSIS — J01.10 ACUTE NON-RECURRENT FRONTAL SINUSITIS: Primary | ICD-10-CM

## 2022-01-05 RX ORDER — AMOXICILLIN AND CLAVULANATE POTASSIUM 875; 125 MG/1; MG/1
1 TABLET, FILM COATED ORAL EVERY 12 HOURS SCHEDULED
Qty: 14 TABLET | Refills: 0 | Status: SHIPPED | OUTPATIENT
Start: 2022-01-05 | End: 2022-01-12

## 2022-01-05 NOTE — TELEPHONE ENCOUNTER
Patient called stating she now has a headache, sinus pressure and pain, a dry cough, and congestion  She is concerned she has a sinus infection  She started taking PCN on 12/31 for a positive rapid strep  She is requesting a change in medication to cover for a sinus infection  She is concerned she took the abx for 6 days and feels worse  I instructed patient to stop taking PCN and that I will send over Augmentin  Educated patient on GI risks with taking antibiotics  Educated patient on same drug class of PCN and Amoxicillin in Augmentin with additional clavulanate coverage  Patient's flu/COVID swab still pending from 12/31  She insists that she has had COVID in the past and that this is not COVID

## 2022-01-06 LAB
FLUAV RNA RESP QL NAA+PROBE: NEGATIVE
FLUBV RNA RESP QL NAA+PROBE: NEGATIVE
SARS-COV-2 RNA RESP QL NAA+PROBE: NEGATIVE

## 2022-01-08 ENCOUNTER — IMMUNIZATIONS (OUTPATIENT)
Dept: FAMILY MEDICINE CLINIC | Facility: HOSPITAL | Age: 37
End: 2022-01-08

## 2022-01-08 DIAGNOSIS — Z23 ENCOUNTER FOR IMMUNIZATION: Primary | ICD-10-CM

## 2022-01-08 PROCEDURE — 0002A COVID-19 PFIZER VACC 0.3 ML: CPT

## 2022-01-08 PROCEDURE — 91300 COVID-19 PFIZER VACC 0.3 ML: CPT

## 2022-02-11 ENCOUNTER — CLINICAL SUPPORT (OUTPATIENT)
Dept: URGENT CARE | Facility: MEDICAL CENTER | Age: 37
End: 2022-02-11
Payer: COMMERCIAL

## 2022-02-11 DIAGNOSIS — Z20.828 EXPOSURE TO SARS-ASSOCIATED CORONAVIRUS: Primary | ICD-10-CM

## 2022-02-11 LAB
SARS-COV-2 AG UPPER RESP QL IA: POSITIVE
VALID CONTROL: ABNORMAL

## 2022-02-11 PROCEDURE — 87811 SARS-COV-2 COVID19 W/OPTIC: CPT

## 2022-03-11 ENCOUNTER — OFFICE VISIT (OUTPATIENT)
Dept: INTERNAL MEDICINE CLINIC | Facility: CLINIC | Age: 37
End: 2022-03-11
Payer: COMMERCIAL

## 2022-03-11 VITALS
OXYGEN SATURATION: 98 % | RESPIRATION RATE: 17 BRPM | TEMPERATURE: 98 F | BODY MASS INDEX: 34.47 KG/M2 | HEART RATE: 80 BPM | HEIGHT: 59 IN | WEIGHT: 171 LBS

## 2022-03-11 DIAGNOSIS — E03.8 OTHER SPECIFIED HYPOTHYROIDISM: ICD-10-CM

## 2022-03-11 DIAGNOSIS — E55.9 VITAMIN D DEFICIENCY: ICD-10-CM

## 2022-03-11 DIAGNOSIS — Z86.69 HISTORY OF CHIARI MALFORMATION: ICD-10-CM

## 2022-03-11 DIAGNOSIS — U09.9 POST-COVID SYNDROME: ICD-10-CM

## 2022-03-11 DIAGNOSIS — Z98.84 HISTORY OF GASTRIC BYPASS: ICD-10-CM

## 2022-03-11 DIAGNOSIS — D35.2 PROLACTINOMA (HCC): ICD-10-CM

## 2022-03-11 DIAGNOSIS — L93.0 LUPUS ERYTHEMATOSUS, UNSPECIFIED FORM: ICD-10-CM

## 2022-03-11 DIAGNOSIS — N92.6 IRREGULAR PERIODS: ICD-10-CM

## 2022-03-11 DIAGNOSIS — Z13.220 SCREENING FOR HYPERLIPIDEMIA: ICD-10-CM

## 2022-03-11 DIAGNOSIS — M32.9 LUPUS (HCC): ICD-10-CM

## 2022-03-11 DIAGNOSIS — Z13.1 SCREENING FOR DIABETES MELLITUS: ICD-10-CM

## 2022-03-11 DIAGNOSIS — R51.9 NONINTRACTABLE EPISODIC HEADACHE, UNSPECIFIED HEADACHE TYPE: Primary | ICD-10-CM

## 2022-03-11 DIAGNOSIS — E03.9 HYPOTHYROIDISM, UNSPECIFIED TYPE: ICD-10-CM

## 2022-03-11 DIAGNOSIS — Q24.8 PERICARDIAL CYST: ICD-10-CM

## 2022-03-11 DIAGNOSIS — R79.89 ELEVATED FERRITIN: ICD-10-CM

## 2022-03-11 DIAGNOSIS — D50.9 IRON DEFICIENCY ANEMIA, UNSPECIFIED IRON DEFICIENCY ANEMIA TYPE: ICD-10-CM

## 2022-03-11 PROBLEM — Z86.018 HISTORY OF PROLACTINOMA: Status: ACTIVE | Noted: 2017-10-04

## 2022-03-11 PROCEDURE — 99205 OFFICE O/P NEW HI 60 MIN: CPT | Performed by: INTERNAL MEDICINE

## 2022-03-11 RX ORDER — TOPIRAMATE 50 MG/1
100 TABLET, FILM COATED ORAL
Qty: 60 TABLET | Refills: 5 | Status: SHIPPED | OUTPATIENT
Start: 2022-03-11 | End: 2022-07-26 | Stop reason: SDUPTHER

## 2022-03-11 NOTE — ASSESSMENT & PLAN NOTE
A diagnosis of lupus has been made on this patient previously currently she does not seem to be under any treatment for it    A sed rate has been requested she does not seem to have any significant symptoms of lupus at the present time

## 2022-03-11 NOTE — ASSESSMENT & PLAN NOTE
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

## 2022-03-11 NOTE — ASSESSMENT & PLAN NOTE
History of prolactinoma recommend assay of prolactin level referral on to endocrinology for further evaluation  She relates that change in her ovulatory cycle with heavier bleeding than in the past   Question whether not there may be other endocrine issues secondary to the prolactinoma

## 2022-03-11 NOTE — ASSESSMENT & PLAN NOTE
History of gastric bypass reviewed with the patient  Lab work has been requested including vitamin levels  She does not seem to be taking any gastric bypass vitamins    Will assess current vitamin status with blood work

## 2022-03-11 NOTE — ASSESSMENT & PLAN NOTE
Patient has chronic headaches secondary to [address] malformation  She is currently utilizing Topamax prescribed from previous neurologist for management of these headaches  She is on 100 mg at bedtime

## 2022-03-11 NOTE — ASSESSMENT & PLAN NOTE
History of elevated ferritin values question whether not she may actually have some time a hemochromatosis condition involving her pancreas and liver  Will obtain updated iron and ferritin values if they are elevated would consider further testing for hemochromatosis

## 2022-03-11 NOTE — PROGRESS NOTES
Assessment/Plan:    Other specified hypothyroidism  Patient is currently not on any thyroid replacement therapy she does indicate that she has been told in the past that she has an under active thyroid gland  A free T4 and TSH have been requested to clarify this condition further  Pericardial cyst  Will review of previous records to determine the size of the patient's pericardial cyst   Most likely she will require a cardiology consultation for follow-up of this condition  Headache  Patient has chronic headaches secondary to [address] malformation  She is currently utilizing Topamax prescribed from previous neurologist for management of these headaches  She is on 100 mg at bedtime  Lupus erythematosus  A diagnosis of lupus has been made on this patient previously currently she does not seem to be under any treatment for it  A sed rate has been requested she does not seem to have any significant symptoms of lupus at the present time    Elevated ferritin  History of elevated ferritin values question whether not she may actually have some time a hemochromatosis condition involving her pancreas and liver  Will obtain updated iron and ferritin values if they are elevated would consider further testing for hemochromatosis  History of Chiari malformation  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  She does have headaches secondary to the GRE for malformation  She does not wish to have any surgery on this condition currently she is using Topamax 100 mg daily at bedtime to help control her headaches  Will consider referral on to neuro surgery of headache intensity increases    History of gastric bypass  History of gastric bypass reviewed with the patient  Lab work has been requested including vitamin levels  She does not seem to be taking any gastric bypass vitamins    Will assess current vitamin status with blood work    History of prolactinoma  History of prolactinoma recommend assay of prolactin level referral on to endocrinology for further evaluation  She relates that change in her ovulatory cycle with heavier bleeding than in the past   Question whether not there may be other endocrine issues secondary to the prolactinoma  Irregular periods  Irregular periods refer on to Gynecology for further evaluation    Post-COVID syndrome  The patient refers to the post COVID time frame when she indicates she began experiencing shaky hot episodes with near-syncope she feels that the symptoms started to occur after COVID-19 infection  The exact cause of these is unclear at this point  She does have somewhat of a near syncopal sensation  Of the duration of the episodes are anywhere from several minutes to 30 or 40 minutes  She feels better when she gets into cool air or drinks a cool beverage  Will start with evaluation of blood work  Diagnoses and all orders for this visit:    Nonintractable episodic headache, unspecified headache type  -     topiramate (TOPAMAX) 50 MG tablet; Take 2 tablets (100 mg total) by mouth daily at bedtime    Irregular periods  -     Ambulatory Referral to Gynecology; Future    Prolactinoma [facility])  -     Ambulatory Referral to Endocrinology; Future  -     Prolactin; Future  -     Prolactin    Lupus (HCC)  -     Sedimentation rate, automated; Future  -     Sedimentation rate, automated    Iron deficiency anemia, unspecified iron deficiency anemia type  -     CBC and differential; Future  -     Ferritin; Future  -     Iron; Future  -     CBC and differential  -     Ferritin  -     Iron    Hypothyroidism, unspecified type  -     T4, free; Future  -     T4, free    Screening for diabetes mellitus  -     Comprehensive metabolic panel; Future  -     Comprehensive metabolic panel    Screening for hyperlipidemia  -     Lipid panel; Future  -     Lipid panel    Vitamin D deficiency  -     Vitamin D 25 hydroxy;  Future  -     Vitamin D 25 hydroxy        Subjective: Patient ID: Daniel Cheney is a 31 y o  female  This patient presents today to establish medical care with our practice  She has employee of [employer]  During today's visit with the patient we have reviewed her current symptoms and concerns as well as a review of her medical history  The patient has experienced 2 COVID infections and she indicates that since the COVID infection she has been experiencing episodes where she feels shaky hot and near syncopal   She feels better when she can get in to cool air or drink a cool beverage  She also indicates that the duration of these episodes could be anywhere from several minutes to 30 or 40 minutes  She occasionally has a mild aura when this is about to happen  These episodes can occur at nighttime as well as during the day  She denies any headache or change in vision associated with the episodes and also indicates that she has no palpitations or chest pain or shortness of breath associated with the episodes  On review of symptoms patient indicates that she has had irregular periods sometimes heavy and with severe cramping which she never used to experience  Past medical history is significant for status post gastric bypass surgery initially with sleeve and then subsequently surgical revision when the sleeve of moved out of position  A history of hypothyroid condition as well as prolactinoma iron deficiency anemia GRE malformation with headaches associated fibromyalgia , lupus, history of acid reflux, history of pericardial cyst    The patient denies any tobacco use and has an occasional glass of wine she has 2 children ages 11 and 8         The following portions of the patient's history were reviewed and updated as appropriate:   She  has a past medical history of Abnormal Pap smear of cervix, Anemia, Anxiety, Chiari I malformation ([address] ), Chronic pain disorder, COVID-19, Disease of thyroid gland, Female infertility, Fibromyalgia, Fibromyalgia, primary, Gastric ulcer, GERD (gastroesophageal reflux disease), Hiatal hernia, Lupus ([address] ), Muscle weakness, Pericardial cyst, Pituitary tumor, Pleural effusion associated with pulmonary infection, Polycystic ovary syndrome, Pulmonary edema, Pulmonary emboli ([address] ), and Spinal headache  She   Patient Active Problem List    Diagnosis Date Noted    Other specified hypothyroidism 2022    History of gastric bypass 2022    Irregular periods 2022    Post-COVID syndrome 2022    Elevated ferritin 10/29/2021    Pericardial cyst 12/10/2020    History of Chiari malformation 2020    Headache 10/04/2017    Lupus erythematosus 10/04/2017    History of prolactinoma 10/04/2017     She  has a past surgical history that includes Polypectomy;  section; Cholecystectomy; Tubal ligation; GASTRECTOMY SLEEVE LAPAROSCOPIC; Gastric bypass; and Tonsillectomy  Her family history includes Diabetes in her maternal grandmother; Early death in her maternal grandfather; Heart attack in her father; Heart disease in her maternal grandmother and mother; Hypertension in her maternal grandmother and mother; No Known Problems in her brother, brother, daughter, paternal grandfather, paternal grandmother, and son  She  reports that she has never smoked  She has never used smokeless tobacco  She reports current alcohol use  She reports that she does not use drugs  Current Outpatient Medications   Medication Sig Dispense Refill    topiramate (TOPAMAX) 50 MG tablet Take 2 tablets (100 mg total) by mouth daily at bedtime 60 tablet 5    pantoprazole (PROTONIX) 40 mg tablet Take 40 mg by mouth daily       No current facility-administered medications for this visit       Review of Systems   Endocrine: Positive for heat intolerance  Genitourinary: Positive for menstrual problem  Neurological: Positive for headaches           Objective:      Pulse 80   Temp 98 °F (36 7 °C) (Tympanic)   Resp 17   Ht 4' 11" (1 499 m)   Wt 77 6 kg (171 lb)   SpO2 98%   BMI 34 54 kg/m²          Physical Exam  Vitals reviewed  Constitutional:       General: She is not in acute distress  Appearance: Normal appearance  She is well-developed  She is not ill-appearing  HENT:      Head: Normocephalic  Right Ear: Hearing, tympanic membrane, ear canal and external ear normal       Left Ear: Hearing, tympanic membrane, ear canal and external ear normal       Nose: Nose normal  No mucosal edema  Mouth/Throat:      Mouth: Mucous membranes are moist       Pharynx: Oropharynx is clear  Uvula midline  Eyes:      General: Lids are normal       Conjunctiva/sclera: Conjunctivae normal       Pupils: Pupils are equal, round, and reactive to light  Neck:      Thyroid: No thyromegaly  Vascular: No carotid bruit or JVD  Cardiovascular:      Rate and Rhythm: Normal rate and regular rhythm  Heart sounds: Normal heart sounds  No murmur heard  Pulmonary:      Effort: Pulmonary effort is normal  No respiratory distress  Breath sounds: Normal breath sounds  No wheezing, rhonchi or rales  Abdominal:      General: Bowel sounds are normal  There is no distension  Palpations: Abdomen is soft  There is no mass  Tenderness: There is no abdominal tenderness  There is no guarding  Musculoskeletal:         General: No swelling or tenderness  Normal range of motion  Lymphadenopathy:      Cervical: No cervical adenopathy  Skin:     General: Skin is warm and dry  Coloration: Skin is not jaundiced  Neurological:      General: No focal deficit present  Mental Status: She is alert and oriented to person, place, and time  Deep Tendon Reflexes: Reflexes are normal and symmetric  Reflexes normal    Psychiatric:         Mood and Affect: Mood normal          Speech: Speech normal          Behavior: Behavior normal  Behavior is cooperative  Thought Content:  Thought content normal  Judgment: Judgment normal

## 2022-03-11 NOTE — ASSESSMENT & PLAN NOTE
The patient refers to the post COVID time frame when she indicates she began experiencing shaky hot episodes with near-syncope she feels that the symptoms started to occur after COVID-19 infection  The exact cause of these is unclear at this point  She does have somewhat of a near syncopal sensation  Of the duration of the episodes are anywhere from several minutes to 30 or 40 minutes  She feels better when she gets into cool air or drinks a cool beverage  Will start with evaluation of blood work

## 2022-03-11 NOTE — ASSESSMENT & PLAN NOTE
Will review of previous records to determine the size of the patient's pericardial cyst   Most likely she will require a cardiology consultation for follow-up of this condition

## 2022-03-11 NOTE — ASSESSMENT & PLAN NOTE
Patient is currently not on any thyroid replacement therapy she does indicate that she has been told in the past that she has an under active thyroid gland  A free T4 and TSH have been requested to clarify this condition further

## 2022-03-12 ENCOUNTER — APPOINTMENT (OUTPATIENT)
Dept: LAB | Facility: HOSPITAL | Age: 37
End: 2022-03-12
Attending: INTERNAL MEDICINE
Payer: COMMERCIAL

## 2022-03-12 DIAGNOSIS — E55.9 VITAMIN D DEFICIENCY: ICD-10-CM

## 2022-03-12 DIAGNOSIS — D35.2 BENIGN NEOPLASM OF PITUITARY GLAND AND CRANIOPHARYNGEAL DUCT (POUCH) (HCC): ICD-10-CM

## 2022-03-12 DIAGNOSIS — D35.3 BENIGN NEOPLASM OF PITUITARY GLAND AND CRANIOPHARYNGEAL DUCT (POUCH) (HCC): ICD-10-CM

## 2022-03-12 DIAGNOSIS — E03.9 MYXEDEMA HEART DISEASE: ICD-10-CM

## 2022-03-12 DIAGNOSIS — I51.9 MYXEDEMA HEART DISEASE: ICD-10-CM

## 2022-03-12 DIAGNOSIS — Z98.84 HISTORY OF BARIATRIC SURGERY: ICD-10-CM

## 2022-03-12 DIAGNOSIS — M32.9 SYSTEMIC LUPUS ERYTHEMATOSUS, UNSPECIFIED SLE TYPE, UNSPECIFIED ORGAN INVOLVEMENT STATUS (HCC): ICD-10-CM

## 2022-03-12 DIAGNOSIS — Z13.1 SCREENING FOR DIABETES MELLITUS: ICD-10-CM

## 2022-03-12 DIAGNOSIS — D50.9 IRON DEFICIENCY ANEMIA, UNSPECIFIED IRON DEFICIENCY ANEMIA TYPE: ICD-10-CM

## 2022-03-12 DIAGNOSIS — Z13.220 SCREENING FOR LIPOID DISORDERS: ICD-10-CM

## 2022-03-12 LAB
25(OH)D3 SERPL-MCNC: 26.2 NG/ML (ref 30–100)
ALBUMIN SERPL BCP-MCNC: 3.6 G/DL (ref 3.5–5)
ALP SERPL-CCNC: 98 U/L (ref 46–116)
ALT SERPL W P-5'-P-CCNC: 18 U/L (ref 12–78)
ANION GAP SERPL CALCULATED.3IONS-SCNC: 5 MMOL/L (ref 4–13)
AST SERPL W P-5'-P-CCNC: 11 U/L (ref 5–45)
BASOPHILS # BLD AUTO: 0.06 THOUSANDS/ΜL (ref 0–0.1)
BASOPHILS NFR BLD AUTO: 1 % (ref 0–1)
BILIRUB SERPL-MCNC: 0.46 MG/DL (ref 0.2–1)
BUN SERPL-MCNC: 7 MG/DL (ref 5–25)
CALCIUM SERPL-MCNC: 8.7 MG/DL (ref 8.3–10.1)
CHLORIDE SERPL-SCNC: 113 MMOL/L (ref 100–108)
CHOLEST SERPL-MCNC: 129 MG/DL
CO2 SERPL-SCNC: 24 MMOL/L (ref 21–32)
CREAT SERPL-MCNC: 0.59 MG/DL (ref 0.6–1.3)
EOSINOPHIL # BLD AUTO: 0.65 THOUSAND/ΜL (ref 0–0.61)
EOSINOPHIL NFR BLD AUTO: 11 % (ref 0–6)
ERYTHROCYTE [DISTWIDTH] IN BLOOD BY AUTOMATED COUNT: 13.2 % (ref 11.6–15.1)
ERYTHROCYTE [SEDIMENTATION RATE] IN BLOOD: 9 MM/HOUR (ref 0–19)
FERRITIN SERPL-MCNC: 179 NG/ML (ref 8–388)
FOLATE SERPL-MCNC: 17.9 NG/ML (ref 3.1–17.5)
GFR SERPL CREATININE-BSD FRML MDRD: 119 ML/MIN/1.73SQ M
GLUCOSE P FAST SERPL-MCNC: 94 MG/DL (ref 65–99)
HCT VFR BLD AUTO: 36.1 % (ref 34.8–46.1)
HDLC SERPL-MCNC: 48 MG/DL
HGB BLD-MCNC: 13 G/DL (ref 11.5–15.4)
IMM GRANULOCYTES # BLD AUTO: 0.01 THOUSAND/UL (ref 0–0.2)
IMM GRANULOCYTES NFR BLD AUTO: 0 % (ref 0–2)
IRON SERPL-MCNC: 91 UG/DL (ref 50–170)
LDLC SERPL CALC-MCNC: 71 MG/DL (ref 0–100)
LYMPHOCYTES # BLD AUTO: 2.12 THOUSANDS/ΜL (ref 0.6–4.47)
LYMPHOCYTES NFR BLD AUTO: 36 % (ref 14–44)
MCH RBC QN AUTO: 29.7 PG (ref 26.8–34.3)
MCHC RBC AUTO-ENTMCNC: 36 G/DL (ref 31.4–37.4)
MCV RBC AUTO: 83 FL (ref 82–98)
MONOCYTES # BLD AUTO: 0.4 THOUSAND/ΜL (ref 0.17–1.22)
MONOCYTES NFR BLD AUTO: 7 % (ref 4–12)
NEUTROPHILS # BLD AUTO: 2.62 THOUSANDS/ΜL (ref 1.85–7.62)
NEUTS SEG NFR BLD AUTO: 45 % (ref 43–75)
NONHDLC SERPL-MCNC: 81 MG/DL
NRBC BLD AUTO-RTO: 0 /100 WBCS
PLATELET # BLD AUTO: 340 THOUSANDS/UL (ref 149–390)
PMV BLD AUTO: 9.7 FL (ref 8.9–12.7)
POTASSIUM SERPL-SCNC: 3.8 MMOL/L (ref 3.5–5.3)
PROLACTIN SERPL-MCNC: 32.3 NG/ML
PROT SERPL-MCNC: 6.9 G/DL (ref 6.4–8.2)
RBC # BLD AUTO: 4.37 MILLION/UL (ref 3.81–5.12)
SODIUM SERPL-SCNC: 142 MMOL/L (ref 136–145)
T4 FREE SERPL-MCNC: 1.03 NG/DL (ref 0.76–1.46)
TRIGL SERPL-MCNC: 52 MG/DL
VIT B12 SERPL-MCNC: 499 PG/ML (ref 100–900)
WBC # BLD AUTO: 5.86 THOUSAND/UL (ref 4.31–10.16)

## 2022-03-12 PROCEDURE — 84439 ASSAY OF FREE THYROXINE: CPT

## 2022-03-12 PROCEDURE — 85025 COMPLETE CBC W/AUTO DIFF WBC: CPT

## 2022-03-12 PROCEDURE — 82728 ASSAY OF FERRITIN: CPT

## 2022-03-12 PROCEDURE — 84146 ASSAY OF PROLACTIN: CPT

## 2022-03-12 PROCEDURE — 83540 ASSAY OF IRON: CPT

## 2022-03-12 PROCEDURE — 82746 ASSAY OF FOLIC ACID SERUM: CPT

## 2022-03-12 PROCEDURE — 80061 LIPID PANEL: CPT

## 2022-03-12 PROCEDURE — 85652 RBC SED RATE AUTOMATED: CPT

## 2022-03-12 PROCEDURE — 82607 VITAMIN B-12: CPT

## 2022-03-12 PROCEDURE — 80053 COMPREHEN METABOLIC PANEL: CPT

## 2022-03-12 PROCEDURE — 36415 COLL VENOUS BLD VENIPUNCTURE: CPT

## 2022-03-12 PROCEDURE — 82306 VITAMIN D 25 HYDROXY: CPT

## 2022-04-26 ENCOUNTER — TELEPHONE (OUTPATIENT)
Dept: INTERNAL MEDICINE CLINIC | Facility: CLINIC | Age: 37
End: 2022-04-26

## 2022-04-26 NOTE — TELEPHONE ENCOUNTER
Patient called and asked for a prior auth for her migraine medication  She can be reached at [phone]    Thank you

## 2022-04-27 ENCOUNTER — HOSPITAL ENCOUNTER (EMERGENCY)
Facility: HOSPITAL | Age: 37
Discharge: HOME | End: 2022-04-28
Attending: EMERGENCY MEDICINE
Payer: COMMERCIAL

## 2022-04-27 VITALS
TEMPERATURE: 97.6 F | OXYGEN SATURATION: 99 % | HEART RATE: 85 BPM | RESPIRATION RATE: 18 BRPM | SYSTOLIC BLOOD PRESSURE: 128 MMHG | DIASTOLIC BLOOD PRESSURE: 84 MMHG

## 2022-04-27 DIAGNOSIS — G43.801 OTHER MIGRAINE WITH STATUS MIGRAINOSUS, NOT INTRACTABLE: Primary | ICD-10-CM

## 2022-04-27 PROCEDURE — 99283 EMERGENCY DEPT VISIT LOW MDM: CPT

## 2022-04-27 PROCEDURE — 96365 THER/PROPH/DIAG IV INF INIT: CPT

## 2022-04-27 PROCEDURE — 96367 TX/PROPH/DG ADDL SEQ IV INF: CPT

## 2022-04-27 PROCEDURE — 96375 TX/PRO/DX INJ NEW DRUG ADDON: CPT

## 2022-04-27 PROCEDURE — 99284 EMERGENCY DEPT VISIT MOD MDM: CPT | Performed by: EMERGENCY MEDICINE

## 2022-04-27 RX ORDER — MAGNESIUM SULFATE HEPTAHYDRATE 40 MG/ML
2 INJECTION, SOLUTION INTRAVENOUS ONCE
Status: COMPLETED | OUTPATIENT
Start: 2022-04-27 | End: 2022-04-27

## 2022-04-27 RX ORDER — DIPHENHYDRAMINE HYDROCHLORIDE 50 MG/ML
25 INJECTION INTRAMUSCULAR; INTRAVENOUS ONCE
Status: COMPLETED | OUTPATIENT
Start: 2022-04-27 | End: 2022-04-27

## 2022-04-27 RX ORDER — METOCLOPRAMIDE HYDROCHLORIDE 5 MG/ML
10 INJECTION INTRAMUSCULAR; INTRAVENOUS ONCE
Status: COMPLETED | OUTPATIENT
Start: 2022-04-27 | End: 2022-04-27

## 2022-04-27 RX ORDER — KETOROLAC TROMETHAMINE 30 MG/ML
30 INJECTION, SOLUTION INTRAMUSCULAR; INTRAVENOUS ONCE
Status: COMPLETED | OUTPATIENT
Start: 2022-04-27 | End: 2022-04-27

## 2022-04-27 RX ORDER — DEXAMETHASONE SODIUM PHOSPHATE 10 MG/ML
10 INJECTION, SOLUTION INTRAMUSCULAR; INTRAVENOUS ONCE
Status: COMPLETED | OUTPATIENT
Start: 2022-04-27 | End: 2022-04-27

## 2022-04-27 RX ADMIN — VALPROATE SODIUM 1000 MG: 100 INJECTION, SOLUTION INTRAVENOUS at 23:40

## 2022-04-27 RX ADMIN — SODIUM CHLORIDE 1000 ML: 0.9 INJECTION, SOLUTION INTRAVENOUS at 20:37

## 2022-04-27 RX ADMIN — DEXAMETHASONE SODIUM PHOSPHATE 10 MG: 10 INJECTION, SOLUTION INTRAMUSCULAR; INTRAVENOUS at 22:52

## 2022-04-27 RX ADMIN — MAGNESIUM SULFATE HEPTAHYDRATE 2 G: 40 INJECTION, SOLUTION INTRAVENOUS at 20:30

## 2022-04-27 RX ADMIN — METOCLOPRAMIDE HYDROCHLORIDE 10 MG: 5 INJECTION INTRAMUSCULAR; INTRAVENOUS at 20:24

## 2022-04-27 RX ADMIN — KETOROLAC TROMETHAMINE 30 MG: 30 INJECTION, SOLUTION INTRAMUSCULAR at 20:22

## 2022-04-27 RX ADMIN — DIPHENHYDRAMINE HYDROCHLORIDE 25 MG: 50 INJECTION, SOLUTION INTRAMUSCULAR; INTRAVENOUS at 20:24

## 2022-04-27 NOTE — Clinical Note
Lyle Sarabia was seen and treated in our emergency department on 4/27/2022  Diagnosis: Migraine headache    Laisha Search  may return to work on return date  She may return on this date: 04/29/2022         If you have any questions or concerns, please don't hesitate to call        Mariano Abernathy MD    ______________________________           _______________          _______________  Hospital Representative                              Date                                Time

## 2022-04-27 NOTE — ED PROVIDER NOTES
History  Chief Complaint   Patient presents with    Migraine     Pt c/o migraine since Sunday  Pt reports taking pain meds with no relief  +nausea     31 YOF with history of Chiari I malformation, pituitary microadenoma, migraines, who presents for evaluation of headache  Patient reports that her headache has been ongoing for the past 4 days  It is concentrated behind both of her eyes and around the back of her head  This is typical of her migraines but they do not usually last this long and are not usually this intense  She had a similar headache about 2 years ago  She has associated nausea, photophobia, and phonophobia  No vision changes, fever, numbness, or weakness  She has tried topamax, nurtec, tylenol, and motrin without relief  Prior to Admission Medications   Prescriptions Last Dose Informant Patient Reported? Taking?    Rimegepant Sulfate (NURTEC) 75 MG TBDP   No No   Sig: Take 75 mg by mouth daily as needed (migraine)   pantoprazole (PROTONIX) 40 mg tablet  Self Yes No   Sig: Take 40 mg by mouth daily   topiramate (TOPAMAX) 50 MG tablet   No No   Sig: Take 2 tablets (100 mg total) by mouth daily at bedtime      Facility-Administered Medications: None       Past Medical History:   Diagnosis Date    Abnormal Pap smear of cervix     Anemia     gets iron infusions    Anxiety     Chiari I malformation (HCC)     Chronic pain disorder     during lupus flairs    COVID-19     Disease of thyroid gland     pt off meds    Female infertility     IVF pregnancy    Fibromyalgia     Fibromyalgia, primary     Gastric ulcer     GERD (gastroesophageal reflux disease)     Hiatal hernia     Lupus (HCC)     Muscle weakness     Pericardial cyst     Pituitary tumor     Pleural effusion associated with pulmonary infection     Polycystic ovary syndrome     Pulmonary edema     Pulmonary emboli (HCC)     Spinal headache     with c section       Past Surgical History:   Procedure Laterality Date     SECTION      x 2    CHOLECYSTECTOMY      GASTRECTOMY SLEEVE LAPAROSCOPIC      GASTRIC BYPASS      POLYPECTOMY      TONSILLECTOMY      TUBAL LIGATION         Family History   Problem Relation Age of Onset    Heart disease Mother     Hypertension Mother     Heart attack Father     No Known Problems Brother     No Known Problems Daughter     No Known Problems Son     Heart disease Maternal Grandmother     Hypertension Maternal Grandmother     Diabetes Maternal Grandmother     Early death Maternal Grandfather     No Known Problems Paternal Grandmother     No Known Problems Paternal Grandfather     No Known Problems Brother      I have reviewed and agree with the history as documented  E-Cigarette/Vaping    E-Cigarette Use Never User      E-Cigarette/Vaping Substances    Nicotine No     THC No     CBD No     Flavoring No     Other No     Unknown No      Social History     Tobacco Use    Smoking status: Never Smoker    Smokeless tobacco: Never Used   Vaping Use    Vaping Use: Never used   Substance Use Topics    Alcohol use: Yes     Comment: occ  social rare    Drug use: Never        Review of Systems   Constitutional: Negative for chills and fever  Eyes: Positive for photophobia  Negative for visual disturbance  Respiratory: Negative for chest tightness and shortness of breath  Cardiovascular: Negative for chest pain  Gastrointestinal: Positive for nausea  Negative for abdominal pain, diarrhea and vomiting  Genitourinary: Negative for flank pain  Musculoskeletal: Negative for back pain and gait problem  Skin: Negative for pallor and rash  Neurological: Positive for headaches  Negative for syncope, weakness, light-headedness and numbness  All other systems reviewed and are negative        Physical Exam  ED Triage Vitals [22 1858]   Temperature Pulse Respirations Blood Pressure SpO2   97 6 °F (36 4 °C) 97 18 130/92 99 %      Temp Source Heart Rate Source Patient Position - Orthostatic VS BP Location FiO2 (%)   Temporal Monitor Lying Right arm --      Pain Score       10 - Worst Possible Pain             Orthostatic Vital Signs  Vitals:    04/27/22 1858 04/27/22 2149   BP: 130/92 128/84   Pulse: 97 85   Patient Position - Orthostatic VS: Lying        Physical Exam  Vitals and nursing note reviewed  Constitutional:       General: She is not in acute distress  Appearance: She is well-developed  She is not ill-appearing  HENT:      Head: Normocephalic and atraumatic  Nose: Nose normal       Mouth/Throat:      Mouth: Mucous membranes are moist    Eyes:      Extraocular Movements: Extraocular movements intact  Pupils: Pupils are equal, round, and reactive to light  Cardiovascular:      Rate and Rhythm: Normal rate and regular rhythm  Heart sounds: No murmur heard  No friction rub  No gallop  Pulmonary:      Effort: Pulmonary effort is normal       Breath sounds: Normal breath sounds  No wheezing, rhonchi or rales  Abdominal:      General: There is no distension  Palpations: Abdomen is soft  Tenderness: There is no abdominal tenderness  Musculoskeletal:         General: Normal range of motion  Cervical back: Normal range of motion and neck supple  No rigidity  Skin:     General: Skin is warm and dry  Coloration: Skin is not pale  Findings: No rash  Neurological:      General: No focal deficit present  Mental Status: She is alert and oriented to person, place, and time  Comments: CN 2-12 intact  5/5 strength and sensation intact to all 4 extremities  Gait normal  No ataxia      Psychiatric:         Behavior: Behavior normal          ED Medications  Medications   diphenhydrAMINE (BENADRYL) injection 25 mg (25 mg Intravenous Given 4/27/22 2024)   metoclopramide (REGLAN) injection 10 mg (10 mg Intravenous Given 4/27/22 2024)   ketorolac (TORADOL) injection 30 mg (30 mg Intravenous Given 4/27/22 2022) magnesium sulfate 2 g/50 mL IVPB (premix) 2 g (0 g Intravenous Stopped 4/27/22 2149)   sodium chloride 0 9 % bolus 1,000 mL (0 mL Intravenous Stopped 4/27/22 2137)   valproate (DEPACON) 1,000 mg in sodium chloride 0 9 % 50 mL for headache (0 g Intravenous Stopped 4/28/22 0000)   dexamethasone (PF) (DECADRON) injection 10 mg (10 mg Intravenous Given 4/27/22 2252)       Diagnostic Studies  Results Reviewed     None                 No orders to display         Procedures  Procedures      ED Course                                       MDM  Number of Diagnoses or Management Options  Other migraine with status migrainosus, not intractable: new and does not require workup  Diagnosis management comments: 31 YOF who presents for evaluation of headache  Normal neuro exam, no red flag signs or symptoms  Will treat symptomatically with migraine cocktail  Patient with minimal improvement after cocktail  Will trial depakote  Signed out to Dr Shana Boykin pending re-evaluation  On chart review, patient discharged  Amount and/or Complexity of Data Reviewed  Review and summarize past medical records: yes    Risk of Complications, Morbidity, and/or Mortality  Presenting problems: high  Diagnostic procedures: minimal  Management options: moderate    Patient Progress  Patient progress: stable      Disposition  Final diagnoses:   Other migraine with status migrainosus, not intractable     Time reflects when diagnosis was documented in both MDM as applicable and the Disposition within this note     Time User Action Codes Description Comment    4/27/2022 10:38 PM Eual Monday Add [G43 077] Other migraine with status migrainosus, not intractable       ED Disposition     ED Disposition Condition Date/Time Comment    Discharge Stable Thu Apr 28, 2022  1:03 AM Rikki Pavon discharge to home/self care              Follow-up Information     Follow up With Specialties Details Why Aurelia Britton MD Internal Medicine In 2 days  [address]  [address], [address] [place] [address] [facility] [address]      Majo Jensen MD Neurology  For headaches [address]  [phone]            Discharge Medication List as of 4/28/2022  1:03 AM      CONTINUE these medications which have NOT CHANGED    Details   pantoprazole (PROTONIX) 40 mg tablet Take 40 mg by mouth daily, Starting Wed 1/13/2021, Until Wed 4/6/2022, Historical Med      Rimegepant Sulfate (NURTEC) 75 MG TBDP Take 75 mg by mouth daily as needed (migraine), Starting Mon 4/25/2022, Normal      topiramate (TOPAMAX) 50 MG tablet Take 2 tablets (100 mg total) by mouth daily at bedtime, Starting Fri 3/11/2022, Normal           No discharge procedures on file  PDMP Review     None           ED Provider  Attending physically available and evaluated Willa Jorge I managed the patient along with the ED Attending      Electronically Signed by         Nurys Lucas MD  04/28/22 6804

## 2022-04-28 NOTE — DISCHARGE INSTRUCTIONS
Continue taking tylenol and motrin every 6 hours as needed for headaches  Follow up with neurology and your primary care physician  Please return to the emergency department if you develop worsening symptoms, severe headache, vomiting, or anything else concerning to you

## 2022-04-28 NOTE — ED ATTENDING ATTESTATION
4/27/2022  IElie DO, saw and evaluated the patient  I have discussed the patient with the resident/non-physician practitioner and agree with the resident's/non-physician practitioner's findings, Plan of Care, and MDM as documented in the resident's/non-physician practitioner's note, except where noted  All available labs and Radiology studies were reviewed  I was present for key portions of any procedure(s) performed by the resident/non-physician practitioner and I was immediately available to provide assistance  At this point I agree with the current assessment done in the Emergency Department  I have conducted an independent evaluation of this patient a history and physical is as follows:    31 yof with migraine  Started 3 days ago  Typical for previous migraine but hasnt had severe one like this in a while  + photophobia  No f/c, neck pain  Took her migraine medications  Diffuse in location  Normal neuro exam, normal vitals  A/P: migraine, typical for previous migraines   Plan for migraine cocktail    ED Course         Critical Care Time  Procedures

## 2022-04-30 ENCOUNTER — HOSPITAL ENCOUNTER (EMERGENCY)
Facility: HOSPITAL | Age: 37
Discharge: HOME | End: 2022-05-01
Attending: EMERGENCY MEDICINE
Payer: COMMERCIAL

## 2022-04-30 DIAGNOSIS — R51.9 HEADACHE: Primary | ICD-10-CM

## 2022-04-30 PROCEDURE — 99284 EMERGENCY DEPT VISIT MOD MDM: CPT

## 2022-05-01 ENCOUNTER — APPOINTMENT (EMERGENCY)
Dept: CT IMAGING | Facility: HOSPITAL | Age: 37
End: 2022-05-01
Payer: COMMERCIAL

## 2022-05-01 VITALS
SYSTOLIC BLOOD PRESSURE: 92 MMHG | HEIGHT: 59 IN | BODY MASS INDEX: 31.85 KG/M2 | WEIGHT: 158 LBS | TEMPERATURE: 97.8 F | DIASTOLIC BLOOD PRESSURE: 47 MMHG | HEART RATE: 73 BPM | RESPIRATION RATE: 18 BRPM | OXYGEN SATURATION: 100 %

## 2022-05-01 LAB
ALBUMIN SERPL BCP-MCNC: 3.5 G/DL (ref 3.5–5)
ALP SERPL-CCNC: 85 U/L (ref 46–116)
ALT SERPL W P-5'-P-CCNC: 17 U/L (ref 12–78)
ANION GAP SERPL CALCULATED.3IONS-SCNC: 9 MMOL/L (ref 4–13)
AST SERPL W P-5'-P-CCNC: 9 U/L (ref 5–45)
BASOPHILS # BLD AUTO: 0.09 THOUSANDS/ΜL (ref 0–0.1)
BASOPHILS NFR BLD AUTO: 1 % (ref 0–1)
BILIRUB SERPL-MCNC: 0.22 MG/DL (ref 0.2–1)
BILIRUB UR QL STRIP: NEGATIVE
BUN SERPL-MCNC: 14 MG/DL (ref 5–25)
CALCIUM SERPL-MCNC: 8.1 MG/DL (ref 8.3–10.1)
CHLORIDE SERPL-SCNC: 108 MMOL/L (ref 100–108)
CLARITY UR: CLEAR
CO2 SERPL-SCNC: 23 MMOL/L (ref 21–32)
COLOR UR: YELLOW
CREAT SERPL-MCNC: 0.66 MG/DL (ref 0.6–1.3)
EOSINOPHIL # BLD AUTO: 1.33 THOUSAND/ΜL (ref 0–0.61)
EOSINOPHIL NFR BLD AUTO: 13 % (ref 0–6)
ERYTHROCYTE [DISTWIDTH] IN BLOOD BY AUTOMATED COUNT: 13.2 % (ref 11.6–15.1)
GFR SERPL CREATININE-BSD FRML MDRD: 115 ML/MIN/1.73SQ M
GLUCOSE SERPL-MCNC: 96 MG/DL (ref 65–140)
GLUCOSE UR STRIP-MCNC: NEGATIVE MG/DL
HCT VFR BLD AUTO: 38.7 % (ref 34.8–46.1)
HGB BLD-MCNC: 12.6 G/DL (ref 11.5–15.4)
HGB UR QL STRIP.AUTO: NEGATIVE
IMM GRANULOCYTES # BLD AUTO: 0.02 THOUSAND/UL (ref 0–0.2)
IMM GRANULOCYTES NFR BLD AUTO: 0 % (ref 0–2)
KETONES UR STRIP-MCNC: NEGATIVE MG/DL
LEUKOCYTE ESTERASE UR QL STRIP: NEGATIVE
LYMPHOCYTES # BLD AUTO: 3.69 THOUSANDS/ΜL (ref 0.6–4.47)
LYMPHOCYTES NFR BLD AUTO: 36 % (ref 14–44)
MCH RBC QN AUTO: 29 PG (ref 26.8–34.3)
MCHC RBC AUTO-ENTMCNC: 32.6 G/DL (ref 31.4–37.4)
MCV RBC AUTO: 89 FL (ref 82–98)
MONOCYTES # BLD AUTO: 0.94 THOUSAND/ΜL (ref 0.17–1.22)
MONOCYTES NFR BLD AUTO: 9 % (ref 4–12)
NEUTROPHILS # BLD AUTO: 4.21 THOUSANDS/ΜL (ref 1.85–7.62)
NEUTS SEG NFR BLD AUTO: 41 % (ref 43–75)
NITRITE UR QL STRIP: NEGATIVE
NRBC BLD AUTO-RTO: 0 /100 WBCS
PH UR STRIP.AUTO: 7 [PH] (ref 4.5–8)
PLATELET # BLD AUTO: 372 THOUSANDS/UL (ref 149–390)
PMV BLD AUTO: 9.8 FL (ref 8.9–12.7)
POTASSIUM SERPL-SCNC: 4.1 MMOL/L (ref 3.5–5.3)
PROT SERPL-MCNC: 6.8 G/DL (ref 6.4–8.2)
PROT UR STRIP-MCNC: NEGATIVE MG/DL
RBC # BLD AUTO: 4.34 MILLION/UL (ref 3.81–5.12)
SODIUM SERPL-SCNC: 140 MMOL/L (ref 136–145)
SP GR UR STRIP.AUTO: 1.02 (ref 1–1.03)
TSH SERPL DL<=0.05 MIU/L-ACNC: 8.06 UIU/ML (ref 0.45–4.5)
UROBILINOGEN UR QL STRIP.AUTO: 1 E.U./DL
WBC # BLD AUTO: 10.28 THOUSAND/UL (ref 4.31–10.16)

## 2022-05-01 PROCEDURE — 36415 COLL VENOUS BLD VENIPUNCTURE: CPT | Performed by: EMERGENCY MEDICINE

## 2022-05-01 PROCEDURE — 96374 THER/PROPH/DIAG INJ IV PUSH: CPT

## 2022-05-01 PROCEDURE — 80053 COMPREHEN METABOLIC PANEL: CPT | Performed by: EMERGENCY MEDICINE

## 2022-05-01 PROCEDURE — 84443 ASSAY THYROID STIM HORMONE: CPT | Performed by: EMERGENCY MEDICINE

## 2022-05-01 PROCEDURE — 81003 URINALYSIS AUTO W/O SCOPE: CPT

## 2022-05-01 PROCEDURE — 70498 CT ANGIOGRAPHY NECK: CPT

## 2022-05-01 PROCEDURE — 85025 COMPLETE CBC W/AUTO DIFF WBC: CPT | Performed by: EMERGENCY MEDICINE

## 2022-05-01 PROCEDURE — 96375 TX/PRO/DX INJ NEW DRUG ADDON: CPT

## 2022-05-01 PROCEDURE — 99285 EMERGENCY DEPT VISIT HI MDM: CPT | Performed by: EMERGENCY MEDICINE

## 2022-05-01 PROCEDURE — G1004 CDSM NDSC: HCPCS

## 2022-05-01 PROCEDURE — 70496 CT ANGIOGRAPHY HEAD: CPT

## 2022-05-01 RX ORDER — NAPROXEN 250 MG/1
250 TABLET ORAL 2 TIMES DAILY WITH MEALS
COMMUNITY
End: 2022-05-04 | Stop reason: SDUPTHER

## 2022-05-01 RX ORDER — DIAZEPAM 5 MG/ML
2.5 INJECTION, SOLUTION INTRAMUSCULAR; INTRAVENOUS ONCE
Status: COMPLETED | OUTPATIENT
Start: 2022-05-01 | End: 2022-05-01

## 2022-05-01 RX ORDER — HALOPERIDOL 2 MG/1
2 TABLET ORAL 2 TIMES DAILY PRN
Qty: 10 TABLET | Refills: 0 | Status: SHIPPED | OUTPATIENT
Start: 2022-05-01 | End: 2022-05-04

## 2022-05-01 RX ORDER — DIAZEPAM 2 MG/1
2 TABLET ORAL 2 TIMES DAILY PRN
Qty: 20 TABLET | Refills: 0 | Status: SHIPPED | OUTPATIENT
Start: 2022-05-01 | End: 2022-05-04

## 2022-05-01 RX ORDER — HALOPERIDOL 5 MG/ML
2 INJECTION INTRAMUSCULAR ONCE
Status: COMPLETED | OUTPATIENT
Start: 2022-05-01 | End: 2022-05-01

## 2022-05-01 RX ORDER — HALOPERIDOL 5 MG/ML
5 INJECTION INTRAMUSCULAR ONCE
Status: DISCONTINUED | OUTPATIENT
Start: 2022-05-01 | End: 2022-05-01

## 2022-05-01 RX ADMIN — DIAZEPAM 2.5 MG: 10 INJECTION, SOLUTION INTRAMUSCULAR; INTRAVENOUS at 00:37

## 2022-05-01 RX ADMIN — HALOPERIDOL LACTATE 2 MG: 5 INJECTION, SOLUTION INTRAMUSCULAR at 00:37

## 2022-05-01 RX ADMIN — IOHEXOL 85 ML: 350 INJECTION, SOLUTION INTRAVENOUS at 01:35

## 2022-05-01 NOTE — DISCHARGE INSTRUCTIONS
Please make sure to follow-up with her primary care doctor in terms of reassessing her thyroid as your TSH was notably elevated  Please make sure to follow-up with neurology for further management of your chronic headaches  If you develop any worsening symptoms including intractable headache, visual changes, nausea, vomiting, gait instability, numbness or tingling, or any other concerning symptoms, please return to the emergency department as easily signs of worsening illness

## 2022-05-04 ENCOUNTER — OFFICE VISIT (OUTPATIENT)
Dept: INTERNAL MEDICINE CLINIC | Facility: CLINIC | Age: 37
End: 2022-05-04
Payer: COMMERCIAL

## 2022-05-04 VITALS
OXYGEN SATURATION: 99 % | HEART RATE: 75 BPM | TEMPERATURE: 97.7 F | BODY MASS INDEX: 32.34 KG/M2 | HEIGHT: 59 IN | WEIGHT: 160.4 LBS

## 2022-05-04 DIAGNOSIS — G44.229 CHRONIC TENSION-TYPE HEADACHE, NOT INTRACTABLE: Primary | ICD-10-CM

## 2022-05-04 DIAGNOSIS — M62.838 MUSCLE SPASM: ICD-10-CM

## 2022-05-04 DIAGNOSIS — E03.9 HYPOTHYROIDISM, UNSPECIFIED TYPE: ICD-10-CM

## 2022-05-04 PROBLEM — E03.8 OTHER SPECIFIED HYPOTHYROIDISM: Status: RESOLVED | Noted: 2022-03-11 | Resolved: 2022-05-04

## 2022-05-04 PROCEDURE — 99214 OFFICE O/P EST MOD 30 MIN: CPT | Performed by: INTERNAL MEDICINE

## 2022-05-04 RX ORDER — NAPROXEN 500 MG/1
250 TABLET ORAL 2 TIMES DAILY WITH MEALS
Qty: 30 TABLET | Refills: 0 | Status: SHIPPED | OUTPATIENT
Start: 2022-05-04 | End: 2022-05-04 | Stop reason: SDUPTHER

## 2022-05-04 RX ORDER — LEVOTHYROXINE SODIUM 0.05 MG/1
50 TABLET ORAL
Qty: 90 TABLET | Refills: 3 | Status: SHIPPED | OUTPATIENT
Start: 2022-05-04

## 2022-05-04 RX ORDER — NAPROXEN 500 MG/1
500 TABLET ORAL 2 TIMES DAILY WITH MEALS
Qty: 30 TABLET | Refills: 0 | Status: SHIPPED | OUTPATIENT
Start: 2022-05-04

## 2022-05-04 RX ORDER — CYCLOBENZAPRINE HCL 5 MG
5 TABLET ORAL
Qty: 10 TABLET | Refills: 0 | Status: SHIPPED | OUTPATIENT
Start: 2022-05-04 | End: 2022-07-15 | Stop reason: ALTCHOICE

## 2022-05-04 NOTE — ASSESSMENT & PLAN NOTE
TSH approaching 2 times normal value will initiate levothyroxine at 50 mcg daily follow-up blood work T4 TSH requested in 6 weeks  Proper dosing the medication reviewed with the patient today

## 2022-05-04 NOTE — PROGRESS NOTES
Assessment/Plan:    Hypothyroidism  TSH approaching 2 times normal value will initiate levothyroxine at 50 mcg daily follow-up blood work T4 TSH requested in 6 weeks  Proper dosing the medication reviewed with the patient today  Headache  Headaches which I believe are not migraine in nature but rather muscle tension as they come up the back of the neck and she has stiffness and decreased range of motion in her neck  CT scan performed in the ER shows no evidence of any brain tumors strokes or abnormal circulation in the brain  She does have Flattening and straightening of the cervical spine which I believe is consistent with muscle spasm of the neck  Will embark on muscle relaxer Flexeril 5 mg at bedtime and naproxen 500 mg twice a day  Physical therapy for massage and relaxation of the muscle spasm in the neck has also been requested  Muscle spasm  Muscle spasm of the neck will initiate for physical therapy follow-up in 2 weeks       Diagnoses and all orders for this visit:    Hypothyroidism, unspecified type  -     US thyroid; Future  -     levothyroxine (Synthroid) 50 mcg tablet; Take 1 tablet (50 mcg total) by mouth daily in the early morning  -     T4, free; Future  -     TSH, 3rd generation with Free T4 reflex; Future  -     T4, free  -     TSH, 3rd generation with Free T4 reflex    Muscle spasm  -     cyclobenzaprine (FLEXERIL) 5 mg tablet; Take 1 tablet (5 mg total) by mouth daily at bedtime  -     Discontinue: naproxen (NAPROSYN) 500 mg tablet; Take 0 5 tablets (250 mg total) by mouth 2 (two) times a day with meals  -     Ambulatory Referral to Physical Therapy; Future  -     naproxen (NAPROSYN) 500 mg tablet; Take 1 tablet (500 mg total) by mouth 2 (two) times a day with meals    Chronic tension-type headache, not intractable  -     Ambulatory Referral to Physical Therapy; Future        Subjective:      Patient ID: Yulia Alcala is a 31 y o  female      This pleasant 53-year-old female presents today for follow-up visit  She has been in the emergency room on 2 occasions for sit severe persistent headache  She has been evaluated and treated both times for presumed migraine headache  She continues to have headaches which seem to come up the back of her neck there associated with feeling woozy her neck feels tense and she has discomfort when she bends down and stands stands back up  She does not have any light sensitivity does not have any nausea sensation but does have loud noise sensitivity  She describes intermittent sharp stabbing pain in her neck and head posteriorly  During her last visit to the emergency room she did have a CTA scan which showed no aneurysmal enlargement of the arteries  Examination of the brain matter showed no evidence of tumor or infarction  Blood work performed during her most recent visit to the emergency room indicates a elevated TSH  She does have a history of hypothyroid condition in the past but currently has not been taking any thyroid medications supplementation  We discussed how this may be a factor adding to her headache syndrome and we have started her on thyroid supplementation today  After reviewing the patient's CT scan I see Flattening in straightening of the patient's cervical spine I believe this is consistent with her headaches most likely being from muscle spasm I believe she has muscle tension headaches  We reviewed this diagnosis and I have embarked on treatment specifically for this diagnosis  Will start her on Flexeril 5 mg at bedtime for muscle relaxation and I have started her on Naprosyn 500 mg twice a day  I have prescribed physical therapy to work on muscle spasm in the neck and across the trapezius muscles bilaterally        The following portions of the patient's history were reviewed and updated as appropriate:   She  has a past medical history of Abnormal Pap smear of cervix, Anemia, Anxiety, Chiari I malformation ([address] ), Chronic pain disorder, COVID-19, Disease of thyroid gland, Female infertility, Fibromyalgia, Fibromyalgia, primary, Gastric ulcer, GERD (gastroesophageal reflux disease), Hiatal hernia, Lupus ([address] ), Muscle weakness, Pericardial cyst, Pituitary tumor, Pleural effusion associated with pulmonary infection, Polycystic ovary syndrome, Pulmonary edema, Pulmonary emboli ([address] ), and Spinal headache  She   Patient Active Problem List    Diagnosis Date Noted    Hypothyroidism 2022    Muscle spasm 2022    History of gastric bypass 2022    Irregular periods 2022    Post-COVID syndrome 2022    Elevated ferritin 10/29/2021    Pericardial cyst 12/10/2020    History of Chiari malformation 2020    Headache 10/04/2017    Lupus erythematosus 10/04/2017    History of prolactinoma 10/04/2017     She  has a past surgical history that includes Polypectomy;  section; Cholecystectomy; Tubal ligation; GASTRECTOMY SLEEVE LAPAROSCOPIC; Gastric bypass; and Tonsillectomy  Her family history includes Diabetes in her maternal grandmother; Early death in her maternal grandfather; Heart attack in her father; Heart disease in her maternal grandmother and mother; Hypertension in her maternal grandmother and mother; No Known Problems in her brother, brother, daughter, paternal grandfather, paternal grandmother, and son  She  reports that she has never smoked  She has never used smokeless tobacco  She reports current alcohol use  She reports that she does not use drugs    Current Outpatient Medications   Medication Sig Dispense Refill    naproxen (NAPROSYN) 500 mg tablet Take 1 tablet (500 mg total) by mouth 2 (two) times a day with meals 30 tablet 0    Rimegepant Sulfate (NURTEC) 75 MG TBDP Take 75 mg by mouth daily as needed (migraine) 8 tablet 2    topiramate (TOPAMAX) 50 MG tablet Take 2 tablets (100 mg total) by mouth daily at bedtime 60 tablet 5    cyclobenzaprine (FLEXERIL) 5 mg tablet Take 1 tablet (5 mg total) by mouth daily at bedtime 10 tablet 0    levothyroxine (Synthroid) 50 mcg tablet Take 1 tablet (50 mcg total) by mouth daily in the early morning 90 tablet 3    pantoprazole (PROTONIX) 40 mg tablet Take 40 mg by mouth daily       No current facility-administered medications for this visit       Review of Systems   Constitutional: Positive for fatigue  Musculoskeletal: Positive for neck pain  Neurological: Positive for headaches  All other systems reviewed and are negative  Objective:      Pulse 75   Temp 97 7 °F (36 5 °C) (Tympanic)   Ht 4' 11" (1 499 m)   Wt 72 8 kg (160 lb 6 4 oz)   SpO2 99%   BMI 32 40 kg/m²          Physical Exam  Vitals reviewed  Constitutional:       General: She is not in acute distress  Appearance: Normal appearance  She is well-developed  She is not ill-appearing  HENT:      Head: Normocephalic  Right Ear: Hearing, tympanic membrane, ear canal and external ear normal       Left Ear: Hearing, tympanic membrane, ear canal and external ear normal       Nose: Nose normal  No mucosal edema  Mouth/Throat:      Pharynx: Uvula midline  Eyes:      General: Lids are normal       Conjunctiva/sclera: Conjunctivae normal       Pupils: Pupils are equal, round, and reactive to light  Neck:      Thyroid: No thyromegaly  Vascular: No carotid bruit or JVD  Cardiovascular:      Rate and Rhythm: Normal rate and regular rhythm  Heart sounds: Normal heart sounds  No murmur heard  Pulmonary:      Effort: Pulmonary effort is normal  No respiratory distress  Breath sounds: Normal breath sounds  No wheezing, rhonchi or rales  Abdominal:      General: Bowel sounds are normal       Palpations: Abdomen is soft  Musculoskeletal:         General: Normal range of motion        Comments: Muscles in the posterior neck region are tight with muscle tension across the trapezius muscles bilaterally   Lymphadenopathy:      Cervical: No cervical adenopathy  Skin:     General: Skin is warm and dry  Coloration: Skin is not jaundiced or pale  Neurological:      General: No focal deficit present  Mental Status: She is alert and oriented to person, place, and time  Deep Tendon Reflexes: Reflexes are normal and symmetric  Reflexes normal    Psychiatric:         Mood and Affect: Mood normal          Speech: Speech normal          Behavior: Behavior normal  Behavior is cooperative  Thought Content:  Thought content normal          Judgment: Judgment normal

## 2022-05-04 NOTE — ASSESSMENT & PLAN NOTE
Headaches which I believe are not migraine in nature but rather muscle tension as they come up the back of the neck and she has stiffness and decreased range of motion in her neck  CT scan performed in the ER shows no evidence of any brain tumors strokes or abnormal circulation in the brain  She does have Flattening and straightening of the cervical spine which I believe is consistent with muscle spasm of the neck  Will embark on muscle relaxer Flexeril 5 mg at bedtime and naproxen 500 mg twice a day  Physical therapy for massage and relaxation of the muscle spasm in the neck has also been requested

## 2022-05-06 ENCOUNTER — HOSPITAL ENCOUNTER (OUTPATIENT)
Dept: RADIOLOGY | Facility: HOSPITAL | Age: 37
Discharge: HOME | End: 2022-05-06
Attending: INTERNAL MEDICINE
Payer: COMMERCIAL

## 2022-05-06 DIAGNOSIS — E03.9 HYPOTHYROIDISM, UNSPECIFIED TYPE: ICD-10-CM

## 2022-05-06 PROCEDURE — 76536 US EXAM OF HEAD AND NECK: CPT

## 2022-05-17 ENCOUNTER — OFFICE VISIT (OUTPATIENT)
Dept: URGENT CARE | Age: 37
End: 2022-05-17
Payer: COMMERCIAL

## 2022-05-17 VITALS
OXYGEN SATURATION: 98 % | TEMPERATURE: 98.2 F | HEART RATE: 77 BPM | RESPIRATION RATE: 16 BRPM | DIASTOLIC BLOOD PRESSURE: 71 MMHG | SYSTOLIC BLOOD PRESSURE: 109 MMHG

## 2022-05-17 DIAGNOSIS — J06.9 URI WITH COUGH AND CONGESTION: Primary | ICD-10-CM

## 2022-05-17 PROCEDURE — 99213 OFFICE O/P EST LOW 20 MIN: CPT

## 2022-05-17 RX ORDER — BENZONATATE 200 MG/1
200 CAPSULE ORAL 3 TIMES DAILY PRN
Qty: 20 CAPSULE | Refills: 0 | Status: SHIPPED | OUTPATIENT
Start: 2022-05-17 | End: 2022-06-29 | Stop reason: ALTCHOICE

## 2022-05-17 NOTE — PROGRESS NOTES
[facility]        NAME: Nany Zhou is a 31 y o  female  : 1985    MRN: [id number]  DATE: May 17, 2022  TIME: 11:53 AM    Assessment and Plan   URI with cough and congestion [J06 9]  1  URI with cough and congestion  benzonatate (TESSALON) 200 MG capsule         Patient Instructions     Please take Tessalon every 8 hours as needed for cough  Follow up with PCP in 3-5 days  Proceed to  ER if symptoms worsen  Chief Complaint     Chief Complaint   Patient presents with    Headache     Cough, sore throat, runny nose, bilateral ear pain, for 3 days, home covid negative         History of Present Illness       Patient presenting for evaluation of bilateral ear pain, cough, congestion, headache and sore throat over the past 4 days  Patient describes her ear pain as intermittent, and states her headache is also intermittent and is exacerbated with coughing  She states her cough is mainly dry, but she has a productive cough at times  She states her phlegm is yellow  Patient states that she is vaccinated against COVID, and denies any recent sick contacts  She states that she took [employer]  home test, with negative results  Patient states that she has been taking Mucinex and over-the-counter cough and congestion medicine, with mild relief  She denies any fevers, chills, chest pain or shortness of breath  Patient states that she had a positive COVID test 3 months ago  Review of Systems   Review of Systems   Constitutional: Negative for chills and fever  HENT: Positive for ear pain, rhinorrhea and sore throat  Eyes: Negative for pain and visual disturbance  Respiratory: Positive for cough  Negative for shortness of breath  Cardiovascular: Negative for chest pain and palpitations  Gastrointestinal: Negative for abdominal pain and vomiting  Genitourinary: Negative for dysuria and hematuria  Musculoskeletal: Negative for arthralgias and back pain     Skin: Negative for color change and rash  Neurological: Positive for headaches  Negative for seizures and syncope  All other systems reviewed and are negative  Current Medications       Current Outpatient Medications:     benzonatate (TESSALON) 200 MG capsule, Take 1 capsule (200 mg total) by mouth as needed in the morning and 1 capsule (200 mg total) as needed at noon and 1 capsule (200 mg total) as needed in the evening for cough  , Disp: 20 capsule, Rfl: 0    cyclobenzaprine (FLEXERIL) 5 mg tablet, Take 1 tablet (5 mg total) by mouth daily at bedtime, Disp: 10 tablet, Rfl: 0    levothyroxine (Synthroid) 50 mcg tablet, Take 1 tablet (50 mcg total) by mouth daily in the early morning, Disp: 90 tablet, Rfl: 3    naproxen (NAPROSYN) 500 mg tablet, Take 1 tablet (500 mg total) by mouth 2 (two) times a day with meals, Disp: 30 tablet, Rfl: 0    Rimegepant Sulfate (NURTEC) 75 MG TBDP, Take 75 mg by mouth daily as needed (migraine), Disp: 8 tablet, Rfl: 2    topiramate (TOPAMAX) 50 MG tablet, Take 2 tablets (100 mg total) by mouth daily at bedtime, Disp: 60 tablet, Rfl: 5    pantoprazole (PROTONIX) 40 mg tablet, Take 40 mg by mouth daily, Disp: , Rfl:     Current Allergies     Allergies as of 05/17/2022 - Reviewed 05/17/2022   Allergen Reaction Noted    Codeine Tremor 12/14/2020    Morphine Tremor 06/16/2021            The following portions of the patient's history were reviewed and updated as appropriate: allergies, current medications, past family history, past medical history, past social history, past surgical history and problem list      Past Medical History:   Diagnosis Date    Abnormal Pap smear of cervix     Anemia     gets iron infusions    Anxiety     Chiari I malformation (HCC)     Chronic pain disorder     during lupus flairs    COVID-19     Disease of thyroid gland     pt off meds    Female infertility     IVF pregnancy    Fibromyalgia     Fibromyalgia, primary     Gastric ulcer     GERD (gastroesophageal reflux disease)     Hiatal hernia     Lupus (HCC)     Muscle weakness     Pericardial cyst     Pituitary tumor     Pleural effusion associated with pulmonary infection     Polycystic ovary syndrome     Pulmonary edema     Pulmonary emboli (HCC)     Spinal headache     with c section       Past Surgical History:   Procedure Laterality Date     SECTION      x 2    CHOLECYSTECTOMY      GASTRECTOMY SLEEVE LAPAROSCOPIC      GASTRIC BYPASS      POLYPECTOMY      TONSILLECTOMY      TUBAL LIGATION         Family History   Problem Relation Age of Onset    Heart disease Mother     Hypertension Mother     Heart attack Father     No Known Problems Brother     No Known Problems Daughter     No Known Problems Son     Heart disease Maternal Grandmother     Hypertension Maternal Grandmother     Diabetes Maternal Grandmother     Early death Maternal Grandfather     No Known Problems Paternal Grandmother     No Known Problems Paternal Grandfather     No Known Problems Brother          Medications have been verified  Objective   /71   Pulse 77   Temp 98 2 °F (36 8 °C)   Resp 16   SpO2 98%        Physical Exam     Physical Exam  Vitals and nursing note reviewed  Constitutional:       General: She is not in acute distress  Appearance: Normal appearance  She is not ill-appearing or toxic-appearing  HENT:      Head: Normocephalic and atraumatic  Right Ear: Tympanic membrane normal       Left Ear: Tympanic membrane normal       Nose: Nose normal  No congestion or rhinorrhea  Mouth/Throat:      Mouth: Mucous membranes are moist       Pharynx: Oropharynx is clear  No oropharyngeal exudate or posterior oropharyngeal erythema  Eyes:      Extraocular Movements: Extraocular movements intact  Conjunctiva/sclera: Conjunctivae normal       Pupils: Pupils are equal, round, and reactive to light  Cardiovascular:      Rate and Rhythm: Normal rate and regular rhythm  Pulses: Normal pulses  Heart sounds: Normal heart sounds  No murmur heard  No friction rub  No gallop  Pulmonary:      Effort: Pulmonary effort is normal  No respiratory distress  Breath sounds: Normal breath sounds  No stridor  No wheezing, rhonchi or rales  Chest:      Chest wall: No tenderness  Abdominal:      General: Abdomen is flat  Bowel sounds are normal       Palpations: Abdomen is soft  Tenderness: There is no abdominal tenderness  There is no guarding or rebound  Musculoskeletal:         General: No tenderness  Normal range of motion  Cervical back: Normal range of motion and neck supple  Skin:     General: Skin is warm and dry  Capillary Refill: Capillary refill takes less than 2 seconds  Neurological:      General: No focal deficit present  Mental Status: She is alert and oriented to person, place, and time     Psychiatric:         Mood and Affect: Mood normal          Behavior: Behavior normal

## 2022-05-18 ENCOUNTER — EVALUATION (OUTPATIENT)
Dept: PHYSICAL THERAPY | Age: 37
End: 2022-05-18
Payer: COMMERCIAL

## 2022-05-18 DIAGNOSIS — G44.229 CHRONIC TENSION-TYPE HEADACHE, NOT INTRACTABLE: ICD-10-CM

## 2022-05-18 DIAGNOSIS — M62.838 MUSCLE SPASM: Primary | ICD-10-CM

## 2022-05-18 PROCEDURE — 97162 PT EVAL MOD COMPLEX 30 MIN: CPT | Performed by: PHYSICAL THERAPIST

## 2022-05-18 PROCEDURE — 97140 MANUAL THERAPY 1/> REGIONS: CPT | Performed by: PHYSICAL THERAPIST

## 2022-05-18 NOTE — PROGRESS NOTES
PT Evaluation     Today's date: 2022  Patient name: Willa Jorge  : 1985  MRN: [id number]  Referring provider: Sheyla Esquivel, *  Dx:   Encounter Diagnosis     ICD-10-CM    1  Muscle spasm  M62 838 Ambulatory Referral to Physical Therapy   2  Chronic tension-type headache, not intractable  G44 [id number] Ambulatory Referral to Physical Therapy                  Assessment  Assessment details: PT IE: 2022  Patient reported onset of HA for years  Patient noted for the past 3 weeks she has intense HA and muscle spasms  Patient noted she went to the ED and they provided her a migraine cocktail  Patient noted she has constant neck pain, and she has constant cervical spine tightness and muscle guarding  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, which she 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 lifting activities with her 11year old  Plus, she noted she sits all day at work  Patient noted she feels she sits with a forward protracted cervical spine  Patient denies bilateral ue symptoms  Patient noted her cervical spine, bilateral UT muscles are tender, and tight  Patient noted the following activities aggravate her cervical spine pain: bending and looking down, prolonged sitting at work, sleep deprived, looking over either shoulder during functional activities, lifting activities  Patient noted she will get intermittent dizziness  Patient noted the following as pain reduction: pressure, and massaging  Patient noted she had trial of botox injection which made her head ache more intense  Patient noted she started a neurotec injection  Impairments: abnormal or restricted ROM, abnormal movement, activity intolerance, lacks appropriate home exercise program and pain with function  Understanding of Dx/Px/POC: excellent   Prognosis: good  Prognosis details: Patient is a 31y o  year old female seen for outpatient PT evaluation with muscle spasms and chronic tension type headache   Patient presents to PT IE with the following problems, concerns, deficits and impairments: cervical and bilateral UT pain, decreased cervical spine range of motion, decreased bilateral UE ROM and  strength, + TTP, + muscle guarding, decrease in postural awareness, functional limitations and decreased tolerance to activity  Patient would benefit from skilled PT services under the following PT treatment plan to address the above noted deficits: therapeutic exercises and activities to facilitate cervical spine and bilateral ue rom and strength, modalities, manual therapy techniques, Kinesio taping techniques, postural reeducation and strengthening, traction, IASTM techniques and a hep  Thank you for the referral      Goals  Short Term goals - 4 weeks  1  Patient will be independent HEP  2   Patient will report a 25 - 50% decrease in pain complaints  3   Increase strength 1/2 grade  4   Increase ROM 5-10 degrees  Long Term goals - 8 weeks  1  Patient will report elimination of pain complaints  2   Patient will return to all work related activities without restriction  3   Patient will return to all recreational activities without restriction  4   ROM WFL  5   Strength 5/5   6   Patient will report bending based work and house hold activities improved by > 50 % prior to cervical spine symptoms or limitations  7   Patient will report prolonged sitting at work improved by > 30 minutes prior to cervical spine symptoms or limitations  8   Patient will report sleep improved by > 30 minutes prior to cervical spine symptoms or limitations  9   Patient will report ability to perform house hold lifting activities without cervical spine symptoms or limitations      Plan  Patient would benefit from: skilled physical therapy  Planned modality interventions: cryotherapy, TENS, thermotherapy: hydrocollator packs, traction, ultrasound and unattended electrical stimulation  Planned therapy interventions: joint mobilization, manual therapy, massage, neuromuscular re-education, patient education, postural training, body mechanics training, self care, strengthening, stretching, therapeutic activities, therapeutic exercise, therapeutic training, compression, flexibility, functional ROM exercises, graded activity, graded exercise, graded motor and home exercise program  Frequency: 2x week  Duration in weeks: 8  Treatment plan discussed with: patient        Subjective Evaluation    History of Present Illness  Mechanism of injury: Patient's PMHx is remarkable for Hypothyroidism, Lupus, Chiari 1 Malformation, Gastric Bypass, FM and GERD  CTA NECK AND BRAIN WITH AND WITHOUT CONTRAST     INDICATION: headache     COMPARISON:   None      TECHNIQUE:  Routine CT imaging of the Brain without contrast   Post contrast imaging was performed after administration of iodinated contrast through the neck and brain  Post contrast axial 0 625 mm images timed to opacify the arterial system        3D rendering was performed on an independent workstation  MIP reconstructions performed  Coronal reconstructions were performed of the noncontrast portion of the brain        Radiation dose length product (DLP) for this visit:  1281 mGy-cm   This examination, like all CT scans performed in the [facility], was performed utilizing techniques to minimize radiation dose exposure, including the use of iterative   reconstruction and automated exposure control         IV Contrast:  85 mL of iohexol (OMNIPAQUE)     IMAGE QUALITY:   Diagnostic     FINDINGS:  NONCONTRAST BRAIN  PARENCHYMA:  No intracranial mass, mass effect or midline shift  No CT signs of acute infarction  No acute parenchymal hemorrhage      VENTRICLES AND EXTRA-AXIAL SPACES:  Normal for the patient's age      VISUALIZED ORBITS AND PARANASAL SINUSES:  Unremarkable      CERVICAL VASCULATURE  AORTIC ARCH AND GREAT VESSELS:  Normal aortic arch and great vessel origins   Normal visualized subclavian vessels      RIGHT VERTEBRAL ARTERY CERVICAL SEGMENT:  Normal origin  The vessel is normal in caliber throughout the neck      LEFT VERTEBRAL ARTERY CERVICAL SEGMENT:  Normal origin  The vessel is normal in caliber throughout the neck  Dominant left vertebral artery      RIGHT EXTRACRANIAL CAROTID SEGMENT:  Normal caliber common carotid artery  Normal bifurcation and cervical internal carotid artery  No stenosis or dissection      LEFT EXTRACRANIAL CAROTID SEGMENT:  Normal caliber common carotid artery  Normal bifurcation and cervical internal carotid artery  No stenosis or dissection      NASCET criteria was used to determine the degree of internal carotid artery diameter stenosis        INTRACRANIAL VASCULATURE   INTERNAL CAROTID ARTERIES:  Normal enhancement of the intracranial portions of the internal carotid arteries  Normal ophthalmic artery origins  Normal ICA terminus       ANTERIOR CIRCULATION:  Symmetric A1 segments and anterior cerebral arteries with normal enhancement  Normal anterior communicating artery      MIDDLE CEREBRAL ARTERY CIRCULATION:  M1 segment and middle cerebral artery branches demonstrate normal enhancement bilaterally      DISTAL VERTEBRAL ARTERIES:  Dominant left vertebral artery  Posterior inferior cerebellar artery origins are normal  Normal vertebral basilar junction      BASILAR ARTERY:  Basilar artery is normal in caliber  Normal superior cerebellar arteries      POSTERIOR CEREBRAL ARTERIES: Both posterior cerebral arteries arises from the basilar tip  Both arteries demonstrate normal enhancement  Hypoplastic or absent posterior communicating arteries      VENOUS STRUCTURES:  Normal         NON VASCULAR ANATOMY  BONY STRUCTURES:  No acute osseous abnormality      SOFT TISSUES OF THE NECK: A few small right-sided thyroid nodules measure up to 3 mm    Incidental discovery of one or more thyroid nodule(s) measuring less than 1 cm and without suspicious features is noted in this patient who is younger than 28 years   old; according to guidelines published in the 2015 white paper on incidental thyroid nodules in the Journal of the [employer] of Radiology [facility]), no further evaluation is recommended       THORACIC INLET:  Normal         IMPRESSION:     1  No acute intracranial hemorrhage, midline shift, or mass effect  2   No hemodynamically significant stenosis of the arteries of the neck  3   No high-grade proximal stenosis of the visualized [place] of [place]  4   No significant intracranial arteriovenous malformation or aneurysm      Pain  At best pain ratin  At worst pain rating: 10  Location: cervical spine and bilateral UT    Patient Goals  Patient goals for therapy: decreased pain, increased motion, increased strength, independence with ADLs/IADLs and return to sport/leisure activities          Objective     Tenderness     Additional Tenderness Details  Patient is + TTP at bilateral cervical spine paraspinal musculature at minimal to moderate levels and + moderate to severe muscle guarding at bilateral cervical spine paraspinal muscle guarding  Patient exhibits protracted cervical spine at minimal to moderate levels        Active Range of Motion   Cervical/Thoracic Spine       Cervical    Flexion: 16 degrees   Extension: 20 degrees      Left lateral flexion: 24 degrees     with pain  Right lateral flexion: 20 degrees     with pain  Left rotation: 54 degrees  Right rotation: 40 degrees         Left Shoulder   Flexion: 160 degrees   Abduction: 134 degrees     Right Shoulder   Flexion: 164 degrees   Abduction: 146 degrees     Strength/Myotome Testing     Left Shoulder     Planes of Motion   Flexion: 4+   Abduction: 4   External rotation at 0°: 4   Internal rotation at 0°: 4+     Right Shoulder     Planes of Motion   Flexion: 4+   Abduction: 4   External rotation at 0°: 4   Internal rotation at 0°: 4+              Precautions:  Patient's PMHx is remarkable for Hypothyroidism, Lupus, Chiari 1 Malformation, Gastric Bypass, FM and GERD            Manuals 5/18            STM to cervical and bilateral UT while prone 15 min total            Bilateral UT Kinesio taping and postural X taping 15 min total                                      Neuro Re-Ed                                                                                                        Ther Ex                          UBE             Corner pec stretch with bilateral ue at 90 degrees             Scapular squeezes             Seated postural correction slough over correct hep            Cervical retraction             UT stretch:B:             LS stretch:B:                                       Shoulder scaption and flexion:B:             tband rows and extension:B: green                         Foam roll lying                          Supine shoulder flexion:B:             Scapular punches:B:             Supine triceps extension:B:             Side lying ER and abduction:B:                          Prone I, T and Y:B:                                       Ther Activity                                       Gait Training                                       Modalities

## 2022-05-27 ENCOUNTER — OFFICE VISIT (OUTPATIENT)
Dept: PHYSICAL THERAPY | Age: 37
End: 2022-05-27
Payer: COMMERCIAL

## 2022-05-27 DIAGNOSIS — M62.838 MUSCLE SPASM: Primary | ICD-10-CM

## 2022-05-27 DIAGNOSIS — G44.229 CHRONIC TENSION-TYPE HEADACHE, NOT INTRACTABLE: ICD-10-CM

## 2022-05-27 PROCEDURE — 97140 MANUAL THERAPY 1/> REGIONS: CPT | Performed by: PHYSICAL THERAPIST

## 2022-05-27 PROCEDURE — 97110 THERAPEUTIC EXERCISES: CPT | Performed by: PHYSICAL THERAPIST

## 2022-05-27 NOTE — PROGRESS NOTES
Daily Note     Today's date: 2022  Patient name: Christine Peralta  : 1985  MRN: [id number]  Referring provider: Sukhwinder Kam, *  Dx:   Encounter Diagnosis     ICD-10-CM    1  Muscle spasm  M62 838    2  Chronic tension-type headache, not intractable  G44 [id number]                   Subjective: Patient noted she has constant burning cervical spine pain and head ache pain as well as intermittent bilateral ue pain that will radiate into bilateral hands and fingers  Plus, she noted weakness in bilateral hands as well  Objective: See treatment diary below  Assessment: Tolerated treatment well  Patient exhibited good technique with therapeutic exercises  Patient presents with manual cervical traction and SOR as reducing thoracic spine pain and left ue radicular symptoms  But, prolonged sitting and bilateral ue functional activities continue to aggravate cervical spine and unilateral ue radicular pain thus new hep of cervical spine retraction provided with both written and verbal instruction provided  Thus, PT is warranted to facilitate cervical and thoracic spine and bilateral ue symptom reduction and promote full symptom free functional activities  Plan: Continue per plan of care  Precautions:  Patient's PMHx is remarkable for Hypothyroidism, Lupus, Chiari 1 Malformation, Gastric Bypass, FM and GERD            Manuals            STM to cervical and bilateral UT while prone 15 min total 15 min supine sor and manual traction and prone           Bilateral UT Kinesio taping and postural X taping 15 min total Hold today due to symptom aggravation on skin                                     Neuro Re-Ed                                                                                                        Ther Ex                          UBE  3/3 minutes           Corner pec stretch with bilateral ue at 90 degrees  10 sec x 10           Scapular squeezes  3 sec x 20           Seated postural correction slough over correct hep 3 sec x 20           Cervical retraction  2 x 10           UT stretch:B:  20 sec x 5           LS stretch:B:  20 sec x 5                                     Shoulder scaption and flexion:B:  2 x 10           tband rows and extension:B: green NT                        Foam roll lying  5 min                        Supine shoulder flexion:B:  NT           Scapular punches:B:  NT           Supine triceps extension:B:  NT           Side lying ER and abduction:B:  NT                        Prone I, T and Y:B:  NT                                     Ther Activity                                       Gait Training                                       Modalities

## 2022-06-04 ENCOUNTER — HOSPITAL ENCOUNTER (OUTPATIENT)
Dept: RADIOLOGY | Facility: HOSPITAL | Age: 37
Discharge: HOME | End: 2022-06-04
Attending: INTERNAL MEDICINE
Payer: COMMERCIAL

## 2022-06-04 ENCOUNTER — APPOINTMENT (OUTPATIENT)
Dept: LAB | Facility: HOSPITAL | Age: 37
End: 2022-06-04
Payer: COMMERCIAL

## 2022-06-04 ENCOUNTER — APPOINTMENT (OUTPATIENT)
Dept: LAB | Facility: HOSPITAL | Age: 37
End: 2022-06-04
Attending: INTERNAL MEDICINE
Payer: COMMERCIAL

## 2022-06-04 DIAGNOSIS — Z00.8 HEALTH EXAMINATION IN POPULATION SURVEYS: ICD-10-CM

## 2022-06-04 DIAGNOSIS — Z86.69 HISTORY OF CHIARI MALFORMATION: ICD-10-CM

## 2022-06-04 DIAGNOSIS — D35.2 PROLACTINOMA (HCC): ICD-10-CM

## 2022-06-04 DIAGNOSIS — E03.9 HYPOTHYROIDISM, UNSPECIFIED TYPE: Primary | ICD-10-CM

## 2022-06-04 LAB
CHOLEST SERPL-MCNC: 144 MG/DL
EST. AVERAGE GLUCOSE BLD GHB EST-MCNC: 97 MG/DL
HBA1C MFR BLD: 5 %
HDLC SERPL-MCNC: 55 MG/DL
LDLC SERPL CALC-MCNC: 79 MG/DL (ref 0–100)
NONHDLC SERPL-MCNC: 89 MG/DL
T4 FREE SERPL-MCNC: 0.97 NG/DL (ref 0.76–1.46)
TRIGL SERPL-MCNC: 51 MG/DL
TSH SERPL DL<=0.05 MIU/L-ACNC: 2.44 UIU/ML (ref 0.45–4.5)

## 2022-06-04 PROCEDURE — 83036 HEMOGLOBIN GLYCOSYLATED A1C: CPT

## 2022-06-04 PROCEDURE — G1004 CDSM NDSC: HCPCS

## 2022-06-04 PROCEDURE — 36415 COLL VENOUS BLD VENIPUNCTURE: CPT

## 2022-06-04 PROCEDURE — 80061 LIPID PANEL: CPT

## 2022-06-04 PROCEDURE — 70553 MRI BRAIN STEM W/O & W/DYE: CPT

## 2022-06-04 PROCEDURE — 84443 ASSAY THYROID STIM HORMONE: CPT

## 2022-06-04 PROCEDURE — 84439 ASSAY OF FREE THYROXINE: CPT

## 2022-06-04 PROCEDURE — A9585 GADOBUTROL INJECTION: HCPCS | Performed by: RADIOLOGY

## 2022-06-04 RX ADMIN — GADOBUTROL 7 ML: 604.72 INJECTION INTRAVENOUS at 09:47

## 2022-06-10 ENCOUNTER — OFFICE VISIT (OUTPATIENT)
Dept: INTERNAL MEDICINE CLINIC | Facility: CLINIC | Age: 37
End: 2022-06-10
Payer: COMMERCIAL

## 2022-06-10 VITALS
HEART RATE: 90 BPM | BODY MASS INDEX: 32.17 KG/M2 | SYSTOLIC BLOOD PRESSURE: 116 MMHG | TEMPERATURE: 98.2 F | HEIGHT: 59 IN | OXYGEN SATURATION: 99 % | DIASTOLIC BLOOD PRESSURE: 76 MMHG | WEIGHT: 159.6 LBS

## 2022-06-10 DIAGNOSIS — R51.9 NONINTRACTABLE HEADACHE, UNSPECIFIED CHRONICITY PATTERN, UNSPECIFIED HEADACHE TYPE: ICD-10-CM

## 2022-06-10 DIAGNOSIS — D35.2 PROLACTINOMA (HCC): ICD-10-CM

## 2022-06-10 DIAGNOSIS — Z86.69 HISTORY OF CHIARI MALFORMATION: ICD-10-CM

## 2022-06-10 DIAGNOSIS — J02.9 PHARYNGITIS, UNSPECIFIED ETIOLOGY: Primary | ICD-10-CM

## 2022-06-10 DIAGNOSIS — E03.9 HYPOTHYROIDISM, UNSPECIFIED TYPE: ICD-10-CM

## 2022-06-10 PROCEDURE — 99215 OFFICE O/P EST HI 40 MIN: CPT | Performed by: INTERNAL MEDICINE

## 2022-06-10 RX ORDER — COVID-19 ANTIGEN TEST
KIT MISCELLANEOUS
COMMUNITY
Start: 2022-05-09 | End: 2022-07-15 | Stop reason: ALTCHOICE

## 2022-06-10 RX ORDER — AMOXICILLIN 500 MG/1
500 CAPSULE ORAL EVERY 8 HOURS SCHEDULED
Qty: 21 CAPSULE | Refills: 0 | Status: SHIPPED | OUTPATIENT
Start: 2022-06-10 | End: 2022-06-17

## 2022-06-10 NOTE — PROGRESS NOTES
Assessment/Plan:    Pharyngitis  Patient presents with acute pharyngitis symptoms she did take a couple pills of amoxicillin yesterday which may interfere with the accuracy of our strep test today  Given her daughter's history of being positive for pharyngitis in the appearance of her posterior pharynx I have elected to initiate her on therapy at this time  Will place her on amoxicillin 500 mg 3 times a day for period of 10 days she is encouraged to hydrate aggressively and gargle with salt water 2 to 3 times a day  Hypothyroidism  Thyroid blood work confirms adequate replacement with 50 mcg of levothyroxine daily continue current therapy follow-up in 6 months    Prolactinoma ([facility] [address] )  History of prolactinoma imaging of the brain reviewed with the patient does confirm the presence of a pituitary adenoma  Most recent prolactin level reviewed will repeat repeat PT in 6 months    Headache  Headache history of for this patient reviewed in detail during today's visit certainly she may have headaches associated with her Chiari malformation  I reviewed this with the patient  I have recommended that she have a neuro surgical consultation to evaluate further whether any intervention is appropriate  She also may have some degree of migraine headache  History of Chiari malformation  MRI scan of the brain reviewed with the patient today does confirm the presence of a Chiari malformation discussed with the patient that this could be a cause of her headaches and I have recommended a neuro surgical evaluation  She is agreeable and will pursue this evaluation we provided her with a referral to a [facility]  Diagnoses and all orders for this visit:    Pharyngitis, unspecified etiology  -     amoxicillin (AMOXIL) 500 mg capsule; Take 1 capsule (500 mg total) by mouth every 8 (eight) hours for 7 days    History of Chiari malformation  -     Ambulatory Referral to Neurosurgery;  Future    Prolactinoma ([address] )  -     Prolactin; Future  -     Prolactin    Hypothyroidism, unspecified type  -     T4, free; Future  -     TSH, 3rd generation; Future  -     T4, free  -     TSH, 3rd generation    Other orders  -     Flowflex COVID-19 Ag Home Test KIT; As directed        Subjective:      Patient ID: Keily Salinas is a 31 y o  female  This pleasant 68-year-old female patient presents today to review her recent CT scan of the brain to evaluate her pituitary prolactinoma and her previous diagnosis of Chiari malformation  In addition the patient indicates that she has had a sore throat over the past day  Her daughter was diagnosed with strep infection by her physician  She has swollen lymph nodes in her neck and also nasal congestion noted on today's review of symptoms  The following portions of the patient's history were reviewed and updated as appropriate:   She  has a past medical history of Abnormal Pap smear of cervix, Anemia, Anxiety, Chiari I malformation ([address] ), Chronic pain disorder, COVID-19, Disease of thyroid gland, Female infertility, Fibromyalgia, Fibromyalgia, primary, Gastric ulcer, GERD (gastroesophageal reflux disease), Hiatal hernia, Lupus ([address] ), Muscle weakness, Pericardial cyst, Pituitary tumor, Pleural effusion associated with pulmonary infection, Polycystic ovary syndrome, Pulmonary edema, Pulmonary emboli ([address] ), and Spinal headache  She   Patient Active Problem List    Diagnosis Date Noted    Pharyngitis 06/10/2022    Prolactinoma ([address] ) 06/10/2022    Hypothyroidism 2022    Muscle spasm 2022    History of gastric bypass 2022    Irregular periods 2022    Post-COVID syndrome 2022    Elevated ferritin 10/29/2021    Pericardial cyst 12/10/2020    History of Chiari malformation 2020    Headache 10/04/2017    Lupus erythematosus 10/04/2017    History of prolactinoma 10/04/2017     She  has a past surgical history that includes Polypectomy;   section; Cholecystectomy; Tubal ligation; GASTRECTOMY SLEEVE LAPAROSCOPIC; Gastric bypass; and Tonsillectomy  Her family history includes Diabetes in her maternal grandmother; Early death in her maternal grandfather; Heart attack in her father; Heart disease in her maternal grandmother and mother; Hypertension in her maternal grandmother and mother; No Known Problems in her brother, brother, daughter, paternal grandfather, paternal grandmother, and son  She  reports that she has never smoked  She has never used smokeless tobacco  She reports current alcohol use  She reports that she does not use drugs  Current Outpatient Medications   Medication Sig Dispense Refill    amoxicillin (AMOXIL) 500 mg capsule Take 1 capsule (500 mg total) by mouth every 8 (eight) hours for 7 days 21 capsule 0    benzonatate (TESSALON) 200 MG capsule Take 1 capsule (200 mg total) by mouth as needed in the morning and 1 capsule (200 mg total) as needed at noon and 1 capsule (200 mg total) as needed in the evening for cough  20 capsule 0    cyclobenzaprine (FLEXERIL) 5 mg tablet Take 1 tablet (5 mg total) by mouth daily at bedtime 10 tablet 0    Flowflex COVID-19 Ag Home Test KIT As directed      levothyroxine (Synthroid) 50 mcg tablet Take 1 tablet (50 mcg total) by mouth daily in the early morning 90 tablet 3    naproxen (NAPROSYN) 500 mg tablet Take 1 tablet (500 mg total) by mouth 2 (two) times a day with meals 30 tablet 0    Rimegepant Sulfate (NURTEC) 75 MG TBDP Take 75 mg by mouth daily as needed (migraine) 8 tablet 2    topiramate (TOPAMAX) 50 MG tablet Take 2 tablets (100 mg total) by mouth daily at bedtime 60 tablet 5    pantoprazole (PROTONIX) 40 mg tablet Take 40 mg by mouth daily       No current facility-administered medications for this visit       Review of Systems   HENT: Positive for sore throat  Neurological: Positive for headaches  All other systems reviewed and are negative          Objective:      BP 116/76   Pulse 90   Temp 98 2 °F (36 8 °C)   Ht 4' 11" (1 499 m)   Wt 72 4 kg (159 lb 9 6 oz)   SpO2 99%   BMI 32 24 kg/m²          Physical Exam  Vitals reviewed  Constitutional:       General: She is not in acute distress  Appearance: Normal appearance  She is well-developed  She is not ill-appearing  HENT:      Head: Normocephalic  Right Ear: Hearing, tympanic membrane, ear canal and external ear normal       Left Ear: Hearing, tympanic membrane, ear canal and external ear normal       Nose: Congestion present  No mucosal edema  Mouth/Throat:      Pharynx: Uvula midline  Posterior oropharyngeal erythema present  Eyes:      General: Lids are normal       Conjunctiva/sclera: Conjunctivae normal       Pupils: Pupils are equal, round, and reactive to light  Neck:      Thyroid: No thyromegaly  Vascular: No carotid bruit or JVD  Cardiovascular:      Rate and Rhythm: Normal rate and regular rhythm  Heart sounds: Normal heart sounds  No murmur heard  Pulmonary:      Effort: Pulmonary effort is normal  No respiratory distress  Breath sounds: Normal breath sounds  No wheezing, rhonchi or rales  Abdominal:      General: Bowel sounds are normal       Palpations: Abdomen is soft  Musculoskeletal:         General: Normal range of motion  Lymphadenopathy:      Cervical: No cervical adenopathy  Skin:     General: Skin is warm and dry  Neurological:      Mental Status: She is alert and oriented to person, place, and time  Deep Tendon Reflexes: Reflexes are normal and symmetric  Psychiatric:         Speech: Speech normal          Behavior: Behavior normal  Behavior is cooperative  Thought Content:  Thought content normal          Judgment: Judgment normal

## 2022-06-10 NOTE — ASSESSMENT & PLAN NOTE
Thyroid blood work confirms adequate replacement with 50 mcg of levothyroxine daily continue current therapy follow-up in 6 months

## 2022-06-10 NOTE — ASSESSMENT & PLAN NOTE
History of prolactinoma imaging of the brain reviewed with the patient does confirm the presence of a pituitary adenoma    Most recent prolactin level reviewed will repeat repeat PT in 6 months

## 2022-06-10 NOTE — ASSESSMENT & PLAN NOTE
MRI scan of the brain reviewed with the patient today does confirm the presence of a Chiari malformation discussed with the patient that this could be a cause of her headaches and I have recommended a neuro surgical evaluation  She is agreeable and will pursue this evaluation we provided her with a referral to a [facility]

## 2022-06-10 NOTE — ASSESSMENT & PLAN NOTE
Headache history of for this patient reviewed in detail during today's visit certainly she may have headaches associated with her Chiari malformation  I reviewed this with the patient  I have recommended that she have a neuro surgical consultation to evaluate further whether any intervention is appropriate  She also may have some degree of migraine headache

## 2022-06-10 NOTE — ASSESSMENT & PLAN NOTE
Patient presents with acute pharyngitis symptoms she did take a couple pills of amoxicillin yesterday which may interfere with the accuracy of our strep test today  Given her daughter's history of being positive for pharyngitis in the appearance of her posterior pharynx I have elected to initiate her on therapy at this time  Will place her on amoxicillin 500 mg 3 times a day for period of 10 days she is encouraged to hydrate aggressively and gargle with salt water 2 to 3 times a day

## 2022-06-14 DIAGNOSIS — J18.9 PNEUMONITIS: Primary | ICD-10-CM

## 2022-06-14 RX ORDER — AZITHROMYCIN 250 MG/1
TABLET, FILM COATED ORAL
Qty: 6 TABLET | Refills: 0 | Status: SHIPPED | OUTPATIENT
Start: 2022-06-14 | End: 2022-06-19

## 2022-06-29 ENCOUNTER — CONSULT (OUTPATIENT)
Dept: NEUROSURGERY | Facility: CLINIC | Age: 37
End: 2022-06-29
Payer: COMMERCIAL

## 2022-06-29 VITALS
WEIGHT: 159 LBS | RESPIRATION RATE: 16 BRPM | TEMPERATURE: 97.3 F | HEART RATE: 70 BPM | HEIGHT: 59 IN | DIASTOLIC BLOOD PRESSURE: 78 MMHG | SYSTOLIC BLOOD PRESSURE: 110 MMHG | BODY MASS INDEX: 32.05 KG/M2

## 2022-06-29 DIAGNOSIS — D35.2 PITUITARY MICROADENOMA (HCC): ICD-10-CM

## 2022-06-29 DIAGNOSIS — G93.5 CHIARI I MALFORMATION (HCC): ICD-10-CM

## 2022-06-29 DIAGNOSIS — Z86.69 HISTORY OF CHIARI MALFORMATION: Primary | ICD-10-CM

## 2022-06-29 PROCEDURE — 99243 OFF/OP CNSLTJ NEW/EST LOW 30: CPT | Performed by: PHYSICIAN ASSISTANT

## 2022-06-29 NOTE — ASSESSMENT & PLAN NOTE
· Reports she has known about the pituitary microadenoma since she was about 27years old  · Per report initially in 2003 diagnosed during work up for amenorrhea with an elevated prolactin level  She was initially placed on bromocriptine and successfully conceived and taken off for surgery in 2016  ·  Reports it has been followed by neurology in [place] at [facility]  She reports it has been stable in size from prior imaging though reports that there was previous concern for possible small hemorrhage around the lesion that was noted on imaging in 2020  · Pt moved from [place] to [place] in 2021  · Imaging  · Mri Brain pituitary w wo 6/4/22: 3 x 4 mm focus of relative under enhancement in the pituitary gland is compatible with pituitary microadenoma  This appears similar to prior imaging noted on [facility] for MRI brain pituitary w wo 5/21/21  Per the reports, the Pituitary microadenoma measurement was read as (4 6 x 4 3 x 3 1 mm)    Plan   · Pt continues to be monitored and treated for hypothyroidism  · Also has monitoring of prolactin  Recently 3/12/22 Prolactin was normal    · Pt has some pituitary labs done  Will order additional pituitary labs to be done at the next follow up  · At this time, given the pituitary microadenoma is similar in size to prior read, will recommend follow up in 1 year with repeat MRI brain pituitary w wo

## 2022-06-29 NOTE — PROGRESS NOTES
Neurosurgery Office Note  Kaleb Bar 31 y o  female MRN: [id number]      Assessment/Plan     Chiari I malformation ([facility] [address] )  · Pt presents for consultation for Chiari I Malformation and Pituitary Microadenoma  · Reports she has known about the Chiari I Malformation since 2020  · Per pt, she was offered surgery for the Chiari malformation before, but she wanted to try conservative measures first  She reports that her sx have persisted and seem worse, despite conservative measures  Imaging reviewed with patient  · MRI brain pituitary w wo 6/4/22 : Chiari I malformation noted with cerebellar tonsils descending 6 mm below the foramen magnum  · Plan   · Pt's sx are concerning that pt may be symptomatic from the Chiari malformation  Will recommend further evaluation with MRI brain wo to include CINE flow studies to be done within the next 2-4 weeks  · Pt to f/u with neurosurgery after obtaining the imaging for further evaluation  Pituitary microadenoma ([facility] )  · Reports she has known about the pituitary microadenoma since she was about 27years old  · Per report initially in 2003 diagnosed during work up for amenorrhea with an elevated prolactin level  She was initially placed on bromocriptine and successfully conceived and taken off for surgery in 2016  ·  Reports it has been followed by neurology in [place] at [facility]  She reports it has been stable in size from prior imaging though reports that there was previous concern for possible small hemorrhage around the lesion that was noted on imaging in 2020  · Pt moved from [place] to [place] in 2021  · Imaging  · Mri Brain pituitary w wo 6/4/22: 3 x 4 mm focus of relative under enhancement in the pituitary gland is compatible with pituitary microadenoma  This appears similar to prior imaging noted on [facility]Everywhere for MRI brain pituitary w wo 5/21/21   Per the reports, the Pituitary microadenoma measurement was read as (4 6 x 4 3 x 3 1 mm)    Plan   · Pt continues to be monitored and treated for hypothyroidism  · Also has monitoring of prolactin  Recently 3/12/22 Prolactin was normal    · Pt has some pituitary labs done  Will order additional pituitary labs to be done at the next follow up  · At this time, given the pituitary microadenoma is similar in size to prior read, will recommend follow up in 1 year with repeat MRI brain pituitary w wo  Diagnoses and all orders for this visit:    History of Chiari malformation  -     Ambulatory Referral to Neurosurgery  -     MRI brain without contrast; Future    Chiari I malformation ([facility] [address] )    Pituitary microadenoma ([facility] [address] )  -     MRI brain pituitary wo and w contrast; Future  -     Cortisol Level, AM Specimen; Future  -     C-reactive protein; Future  -     Follicle stimulating hormone; Future  -     Luteinizing hormone; Future  -     Testosterone, Free; Future  -     ACTH; Future  -     Alpha Subunit, Free; Future  -     Growth hormone; Future  -     Insulin-like growth factor 1 (IGF-1); Future  -     Cortisol Level, AM Specimen  -     C-reactive protein  -     Follicle stimulating hormone  -     Luteinizing hormone  -     ACTH  -     Alpha Subunit, Free  -     Growth hormone  -     Insulin-like growth factor 1 (IGF-1)          I spent 45 minutes with the patient today in which >50% of the time was spent counseling/coordination of care regarding diagnosis, imaging review, symptoms and treatment plan  CHIEF COMPLAINT    Chief Complaint   Patient presents with    Consult       HISTORY    History of Present Illness     31y o  year old female     Pt is a 31year old female right handed female with PMhx of  hypothyroidism, pericardial cyst, lupus, hiatal hernia, pulmonary embolism in 2011 during pregnancy, history of prolactinoma, headaches and anxiety who presents for consultation/to establish care for 3 mm pituitary microadenoma and chiari I malformation   Pt reports that she has known about the pituitary microadenoma since she was 27years old  She reports that she was followed by neurosurgery in [place] with annual serial imaging for years  She reports the pituitary microadenoma has been stable  Pt reports that she found out about the chiari malformation in recent years, also while still living in [place]  She reports that previously she was offered Chiari Malformation decompressive surgery due to her symptoms, however at the time she wanted to continue various conservative measures particularly for her headache  Pt reports that she has been seeing neurology for her headaches  She reports that she has tried various medications for headache and has had botox injections  She reports that these interventions have not been helpful for her sx  She reports that she thinks her headaches are getting worse  She reports that she was recently in the Emergency room on 4/30/22 for severe headache that was not relieved by her home HA medications including Topamax, neither was it relieved by the headache 'cocktail' that she was given in the ED  Today patient reports she has a headache that she rates as 7/10  She reports that it is a pressure headache in the occipital region and back of her neck  She also feels the pressure headache behind her eyes  Pt reports that her headaches are worse with coughing or bearing down  Pt report she usually get dizzy, lightheadedness and unsteady gait, particularly when she gets the headache, but reports that there are times when she gets dizzy/lightheaded without significant headache  She reports she feels like she looses her balance as times  She feels like when she looks down or when she looks to the left or right she gets dizzy  Pt denies nausea/vomiting  She denies visual disturbance  Pt reports ringing in her ears  Pt reports numbness in the hands  She denies weakness in BUE/BLE  She reports urinary urgency  She denies bowel or bladder incontinence      She reports she works  with Physical therapy  REVIEW OF SYSTEMS    Review of Systems   HENT: Positive for tinnitus and trouble swallowing (at times feels as if choking on her food)  Eyes: Positive for pain (pressure behind eyes)  Cardiovascular:        Pericardio cyst   Gastrointestinal: Negative  Genitourinary: Positive for urgency  Musculoskeletal: Positive for gait problem (unsteady loosing balance)  Neurological: Positive for dizziness, light-headedness, numbness (Bi/hand, more so finger tips) and headaches (HA, occipital and neck, with pressure )  Right handed         Meds/Allergies     Current Outpatient Medications   Medication Sig Dispense Refill    levothyroxine (Synthroid) 50 mcg tablet Take 1 tablet (50 mcg total) by mouth daily in the early morning 90 tablet 3    naproxen (NAPROSYN) 500 mg tablet Take 1 tablet (500 mg total) by mouth 2 (two) times a day with meals 30 tablet 0    pantoprazole (PROTONIX) 40 mg tablet Take 40 mg by mouth daily      topiramate (TOPAMAX) 50 MG tablet Take 2 tablets (100 mg total) by mouth daily at bedtime 60 tablet 5    cyclobenzaprine (FLEXERIL) 5 mg tablet Take 1 tablet (5 mg total) by mouth daily at bedtime (Patient not taking: Reported on 6/29/2022) 10 tablet 0    Flowflex COVID-19 Ag Home Test KIT As directed      Rimegepant Sulfate (NURTEC) 75 MG TBDP Take 75 mg by mouth daily as needed (migraine) 8 tablet 2     No current facility-administered medications for this visit         Allergies   Allergen Reactions    Codeine Tremor    Morphine Tremor       PAST HISTORY    Past Medical History:   Diagnosis Date    Abnormal Pap smear of cervix     Anemia     gets iron infusions    Anxiety     Chiari I malformation (HCC)     Chronic pain disorder     during lupus flairs    COVID-19     Disease of thyroid gland     pt off meds    Female infertility     IVF pregnancy    Fibromyalgia     Fibromyalgia, primary     Gastric ulcer     GERD (gastroesophageal reflux disease)     Hiatal hernia     Lupus (HCC)     Muscle weakness     Pericardial cyst     Pituitary tumor     Pleural effusion associated with pulmonary infection     Polycystic ovary syndrome     Pulmonary edema     Pulmonary emboli (HCC)     Spinal headache     with c section       Past Surgical History:   Procedure Laterality Date     SECTION      x 2    CHOLECYSTECTOMY      GASTRECTOMY SLEEVE LAPAROSCOPIC      GASTRIC BYPASS      POLYPECTOMY      TONSILLECTOMY      TUBAL LIGATION         Social History     Tobacco Use    Smoking status: Never Smoker    Smokeless tobacco: Never Used   Vaping Use    Vaping Use: Never used   Substance Use Topics    Alcohol use: Yes     Comment: occ  social rare    Drug use: Never       Family History   Problem Relation Age of Onset    Heart disease Mother     Hypertension Mother     Heart attack Father     No Known Problems Brother     No Known Problems Daughter     No Known Problems Son     Heart disease Maternal Grandmother     Hypertension Maternal Grandmother     Diabetes Maternal Grandmother     Early death Maternal Grandfather     No Known Problems Paternal Grandmother     No Known Problems Paternal Grandfather     No Known Problems Brother          Above history personally reviewed  EXAM    Vitals:Blood pressure 110/78, pulse 70, temperature (!) 97 3 °F (36 3 °C), temperature source Tympanic, resp  rate 16, height 4' 11" (1 499 m), weight 72 1 kg (159 lb), not currently breastfeeding  ,Body mass index is 32 11 kg/m²  Physical Exam    Neurologic Exam      MEDICAL DECISION MAKING    Imaging Studies:     MRI brain pituitary wo and w contrast    Result Date: 2022  Narrative: MRI BRAIN AND SELLA  WITH AND WITHOUT CONTRAST INDICATION:  D35 2: Benign neoplasm of pituitary gland Z86 69: Personal history of other diseases of the nervous system and sense organs COMPARISON: None  TECHNIQUE: Brain: Axial diffusion-weighted imaging  Axial FLAIR and axial T2  Axial gradient  Axial T1 postcontrast   Axial bravo postcontrast  Sella: Sagittal and coronal T1  Coronal T2  Sagittal and coronal T1 postcontrast with fat suppression  Targeted images of the sella were performed requiring additional time at acquisition and interpretation of approximately 25% IV Contrast:  7 mL of Gadobutrol injection (SINGLE-DOSE) IMAGE QUALITY:  Diagnostic  FINDINGS: BRAIN PARENCHYMA:  There is no discrete mass, mass effect or midline shift  Brainstem and cerebellum demonstrate normal signal  There is no intracranial hemorrhage  There is no evidence of acute infarction and diffusion imaging is unremarkable  There are no white matter changes in the cerebral hemispheres  Normal postcontrast imaging  Cerebellar tonsils terminate 6 mm below the foramen magnum compatible with Chiari I malformation  VENTRICLES:  Normal  SELLA AND PITUITARY GLAND:  There is a 3 x 4 mm focus of relative under enhancement posteriorly and the pituitary gland compatible with the provided history of pituitary microadenoma  Pituitary stalk is midline  The gland is normal in size  ORBITS:  Normal  PARANASAL SINUSES:  Normal  VASCULATURE:  Evaluation of the major intracranial vasculature demonstrates appropriate flow voids  CALVARIUM AND SKULL BASE:  Normal  EXTRACRANIAL SOFT TISSUES:  Normal      Impression: 3 x 4 mm focus of relative under enhancement in the pituitary gland is compatible with pituitary microadenoma  Chiari I malformation noted with cerebellar tonsils descending 6 mm below the foramen magnum  Workstation performed: [id number]       I have personally reviewed pertinent reports     and I have personally reviewed pertinent films in PACS

## 2022-06-29 NOTE — ASSESSMENT & PLAN NOTE
· Pt presents for consultation for Chiari I Malformation and Pituitary Microadenoma  · Reports she has known about the Chiari I Malformation since 2020  · Per pt, she was offered surgery for the Chiari malformation before, but she wanted to try conservative measures first  She reports that her sx have persisted and seem worse, despite conservative measures  Imaging reviewed with patient  · MRI brain pituitary w wo 6/4/22 : Chiari I malformation noted with cerebellar tonsils descending 6 mm below the foramen magnum  · Plan   · Pt's sx are concerning that pt may be symptomatic from the Chiari malformation  Will recommend further evaluation with MRI brain wo to include CINE flow studies to be done within the next 2-4 weeks  · Pt to f/u with neurosurgery after obtaining the imaging for further evaluation

## 2022-07-13 ENCOUNTER — HOSPITAL ENCOUNTER (OUTPATIENT)
Dept: MRI IMAGING | Facility: HOSPITAL | Age: 37
Discharge: HOME | End: 2022-07-13
Payer: COMMERCIAL

## 2022-07-13 DIAGNOSIS — Z86.69 HISTORY OF CHIARI MALFORMATION: ICD-10-CM

## 2022-07-13 PROCEDURE — 70551 MRI BRAIN STEM W/O DYE: CPT

## 2022-07-13 PROCEDURE — G1004 CDSM NDSC: HCPCS

## 2022-07-14 PROBLEM — Z01.419 ENCOUNTER FOR ANNUAL ROUTINE GYNECOLOGICAL EXAMINATION: Status: ACTIVE | Noted: 2022-07-14

## 2022-07-14 PROBLEM — Z98.891 HISTORY OF 2 CESAREAN SECTIONS: Status: ACTIVE | Noted: 2022-07-14

## 2022-07-14 NOTE — PROGRESS NOTES
Assessment/Plan:  NGE  Menorrhagia/dysmenorrhea-chronic with Oligomenorrhea  SLE, Hx of PE - OCs and TXA contraindicated, declines Mirena  Literature on NovaSure given  Would need a an endometrial biopsy beforehand  Hysterectomy briefly discussed  Typically NovaSure is done at a later age but she has extenuating circumstances  BCM- TL  Co Testing q 5 yrs '24  RTO 1yr  SBA monthly  Exercise 3/wk  Calcium 1,000 mg/d with Vit D     Depression Screen: Neg       Diagnoses and all orders for this visit:    Encounter for annual routine gynecological examination    Secondary oligomenorrhea    Menorrhagia with irregular cycle    Dysmenorrhea    History of 2  sections    History of prolactinoma    History of Chiari malformation    History of gastric bypass              Subjective:        Patient ID: Ruby Best is a 31 y o  female  Ms Sushil Garcia presents for a yearly evaluation  She has chronic menorrhagia with dysmenorrhea  She has oligomenorrhea with menses occurring every 2-3 month related to a pituitary adenoma  She has had accidents during menstruation  Pain only is present during menses and it is usually right sided  She passes large clots  She has not missed work  She describes herself as having a high threshold for pain  Her history of lupus and pulmonary embolism limits her therapeutic options  Oral contraceptives and TXA are contraindicated  She is not interested in the Mirena IUD  She already is using Advil  She has a history of chronic dyspareunia ever since coitarche        The following portions of the patient's history were reviewed and updated as appropriate: She  has a past medical history of Abnormal Pap smear of cervix, Anemia, Anxiety, Chiari I malformation ([address] ), Chronic pain disorder, COVID-19, Disease of thyroid gland, Female infertility, Fibromyalgia, Fibromyalgia, primary, Gastric ulcer, GERD (gastroesophageal reflux disease), Hiatal hernia, Lupus ([address] ), Muscle weakness, Pericardial cyst, Pituitary tumor, Pleural effusion associated with pulmonary infection, Polycystic ovary syndrome, Pulmonary edema, Pulmonary emboli ([address] ), and Spinal headache  Patient Active Problem List    Diagnosis Date Noted    Secondary oligomenorrhea 07/15/2022    Menorrhagia with irregular cycle 07/15/2022    Dysmenorrhea 07/15/2022    Encounter for annual routine gynecological examination 2022    History of 2  sections 2022    Chiari I malformation (HCC)     Pituitary microadenoma ([address] )     Pharyngitis 06/10/2022    Prolactinoma ([address] ) 06/10/2022    Hypothyroidism 2022    Muscle spasm 2022    History of gastric bypass 2022    Irregular periods 2022    Post-COVID syndrome 2022    Elevated ferritin 10/29/2021    Pericardial cyst 12/10/2020    History of Chiari malformation 2020    Headache 10/04/2017    Lupus erythematosus 10/04/2017    History of prolactinoma 10/04/2017   PMH:  Menarche 16  Irregular Menses  Pituitary Microadenoma 18  Lifetime # of sexual partners 1  Primary Infertility - PCOS, [address] for removal of Endometrial Polyps '-  SLE  IVF cycle - Hospitalized with Pulmonary Edema, large L Ovarian Cyst from Hyperstimulation, found to be already pregnant '10  G4, P2; C/S x 2 - 1st Pulmonary Embolism 2nd trimester, SLE, IOL at 37-38 wks, C/S day 3 for FTP F 5-6 ', 2nd Repeat C/S with Tubal ligation, M 5-3, '  Cholecystectomy - 2 wk PP '11  Fibromyalgia '14  Oligomenorrhea, Menorrhagia, Dysmenorrhea '16 - improved with wt loss  Obesity - Gastric Sleeve ', Revision/Gastric Bypass '  Covid   Vit D Deficiency 3/22  MRI  4 mm Pituitary microadenoma, Chiari 1 Malformation  She  has a past surgical history that includes Polypectomy;  section; Cholecystectomy; Tubal ligation; GASTRECTOMY SLEEVE LAPAROSCOPIC; Gastric bypass; and Tonsillectomy    Her family history includes Diabetes in her maternal grandmother; Early death in her maternal grandfather; Heart attack in her father; Heart disease in her maternal grandmother and mother; Hypertension in her maternal grandmother and mother; No Known Problems in her brother, brother, daughter, paternal grandfather, paternal grandmother, and son  FH:  CAD, DM - both sides  F - d 42's MI  M - CAD  MA - d 61 Sepsis, CAD, MI  She  reports that she has never smoked  She has never used smokeless tobacco  She reports current alcohol use  She reports that she does not use drugs  SH:   to Rosio Rosado '09  Works for [employer] in [facility]  Current Outpatient Medications   Medication Sig Dispense Refill    levothyroxine (Synthroid) 50 mcg tablet Take 1 tablet (50 mcg total) by mouth daily in the early morning 90 tablet 3    naproxen (NAPROSYN) 500 mg tablet Take 1 tablet (500 mg total) by mouth 2 (two) times a day with meals 30 tablet 0    pantoprazole (PROTONIX) 40 mg tablet Take 40 mg by mouth daily      topiramate (TOPAMAX) 50 MG tablet Take 2 tablets (100 mg total) by mouth daily at bedtime 60 tablet 5    Rimegepant Sulfate (NURTEC) 75 MG TBDP Take 75 mg by mouth daily as needed (migraine) (Patient not taking: Reported on 7/15/2022) 8 tablet 2     No current facility-administered medications for this visit       Current Outpatient Medications on File Prior to Visit   Medication Sig    levothyroxine (Synthroid) 50 mcg tablet Take 1 tablet (50 mcg total) by mouth daily in the early morning    naproxen (NAPROSYN) 500 mg tablet Take 1 tablet (500 mg total) by mouth 2 (two) times a day with meals    pantoprazole (PROTONIX) 40 mg tablet Take 40 mg by mouth daily    topiramate (TOPAMAX) 50 MG tablet Take 2 tablets (100 mg total) by mouth daily at bedtime    Rimegepant Sulfate (NURTEC) 75 MG TBDP Take 75 mg by mouth daily as needed (migraine) (Patient not taking: Reported on 7/15/2022)    [DISCONTINUED] cyclobenzaprine (FLEXERIL) 5 mg tablet Take 1 tablet (5 mg total) by mouth daily at bedtime (Patient not taking: Reported on 6/29/2022)    [DISCONTINUED] Flowflex COVID-19 Ag Home Test KIT As directed     No current facility-administered medications on file prior to visit  She is allergic to codeine and morphine       Review of Systems   Constitutional: Negative for activity change, appetite change, fatigue and unexpected weight change  Eyes: Negative for visual disturbance  Respiratory: Negative for cough, chest tightness, shortness of breath and wheezing  Cardiovascular: Negative for chest pain, palpitations and leg swelling  Breast: Patient denies tenderness, nipple discharge, masses, or erythema  Gastrointestinal: Negative for abdominal distention, abdominal pain, blood in stool, constipation, diarrhea, nausea and vomiting  Endocrine: Negative for cold intolerance and heat intolerance  Genitourinary: Positive for dyspareunia and menstrual problem  Negative for decreased urine volume, difficulty urinating, dysuria, frequency, hematuria, pelvic pain, urgency, vaginal bleeding, vaginal discharge and vaginal pain  Sex 1-2 wk, chronic insertional and entrance dyspareunia always since coitarche  Mild, stable BROOK  Musculoskeletal: Positive for arthralgias (multiple joints) and gait problem  Skin: Positive for rash (On both elbows)  Neurological: Positive for headaches  Negative for weakness, light-headedness and numbness  Hematological: Does not bruise/bleed easily  Psychiatric/Behavioral: Negative for agitation, behavioral problems and sleep disturbance  The patient is not nervous/anxious  Objective:    Vitals:    07/15/22 1105   BP: 120/64   BP Location: Right arm   Patient Position: Sitting   Cuff Size: Standard   Weight: 72 8 kg (160 lb 9 6 oz)   Height: 4' 11 06" (1 5 m)            Physical Exam  Vitals and nursing note reviewed  Exam conducted with a chaperone present  Constitutional:       General: She is not in acute distress  Appearance: Normal appearance  She is well-developed  She is obese  HENT:      Head: Normocephalic and atraumatic  Eyes:      General: No scleral icterus  Right eye: No discharge  Left eye: No discharge  Extraocular Movements: Extraocular movements intact  Conjunctiva/sclera: Conjunctivae normal    Neck:      Thyroid: No thyromegaly  Trachea: No tracheal deviation  Cardiovascular:      Rate and Rhythm: Normal rate and regular rhythm  Heart sounds: Normal heart sounds  No murmur heard  Pulmonary:      Effort: Pulmonary effort is normal  No respiratory distress  Breath sounds: Normal breath sounds  No wheezing  Chest:   Breasts: Breasts are symmetrical       Right: No inverted nipple, mass, nipple discharge, skin change or tenderness  Left: No inverted nipple, mass, nipple discharge, skin change or tenderness  Abdominal:      General: Bowel sounds are normal  There is no distension  Palpations: Abdomen is soft  There is no mass  Tenderness: There is no abdominal tenderness  There is no right CVA tenderness, left CVA tenderness, guarding or rebound  Genitourinary:     General: Normal vulva  Labia:         Right: No rash, tenderness or lesion  Left: No rash, tenderness or lesion  Vagina: Normal       Cervix: No cervical motion tenderness or discharge  Uterus: Not deviated, not enlarged and not tender  Adnexa:         Right: No mass, tenderness or fullness  Left: No mass, tenderness or fullness  Rectum: No external hemorrhoid  Comments: Urethral meatus within normal limits  Perineum within normal limits  Bladder well supported  Pelvic exam was uncomfortable  There is no cervical motion tender  The uterus is mildly tender  It is retroverted  There is no adnexal masses or tenderness  Musculoskeletal:         General: No tenderness  Normal range of motion        Cervical back: Normal range of motion and neck supple  Lymphadenopathy:      Cervical: No cervical adenopathy  Skin:     General: Skin is warm and dry  Neurological:      Mental Status: She is alert and oriented to person, place, and time  Psychiatric:         Mood and Affect: Mood normal          Behavior: Behavior normal          Thought Content:  Thought content normal          Judgment: Judgment normal

## 2022-07-15 ENCOUNTER — OFFICE VISIT (OUTPATIENT)
Dept: OBGYN CLINIC | Facility: CLINIC | Age: 37
End: 2022-07-15
Payer: COMMERCIAL

## 2022-07-15 VITALS
BODY MASS INDEX: 32.38 KG/M2 | DIASTOLIC BLOOD PRESSURE: 64 MMHG | SYSTOLIC BLOOD PRESSURE: 120 MMHG | WEIGHT: 160.6 LBS | HEIGHT: 59 IN

## 2022-07-15 DIAGNOSIS — Z01.419 ENCOUNTER FOR ANNUAL ROUTINE GYNECOLOGICAL EXAMINATION: Primary | ICD-10-CM

## 2022-07-15 DIAGNOSIS — N91.4 SECONDARY OLIGOMENORRHEA: ICD-10-CM

## 2022-07-15 DIAGNOSIS — N94.6 DYSMENORRHEA: ICD-10-CM

## 2022-07-15 DIAGNOSIS — Z86.018 HISTORY OF PROLACTINOMA: ICD-10-CM

## 2022-07-15 DIAGNOSIS — Z86.69 HISTORY OF CHIARI MALFORMATION: ICD-10-CM

## 2022-07-15 DIAGNOSIS — Z98.84 HISTORY OF GASTRIC BYPASS: ICD-10-CM

## 2022-07-15 DIAGNOSIS — Z98.891 HISTORY OF 2 CESAREAN SECTIONS: ICD-10-CM

## 2022-07-15 DIAGNOSIS — N92.1 MENORRHAGIA WITH IRREGULAR CYCLE: ICD-10-CM

## 2022-07-15 PROCEDURE — S0612 ANNUAL GYNECOLOGICAL EXAMINA: HCPCS | Performed by: OBSTETRICS & GYNECOLOGY

## 2022-07-25 ENCOUNTER — TELEPHONE (OUTPATIENT)
Dept: OBGYN CLINIC | Facility: CLINIC | Age: 37
End: 2022-07-25

## 2022-07-25 NOTE — TELEPHONE ENCOUNTER
Left patient a message to schedule an appointment with Dr Bam Bauman for preop appointment if patient wants virtual appointment prior to preop that can be done  Schedule patient at next available appointment  See 07/20/22 My chart message

## 2022-07-26 DIAGNOSIS — R51.9 NONINTRACTABLE EPISODIC HEADACHE, UNSPECIFIED HEADACHE TYPE: ICD-10-CM

## 2022-07-26 RX ORDER — TOPIRAMATE 50 MG/1
100 TABLET, FILM COATED ORAL
Qty: 60 TABLET | Refills: 5 | Status: SHIPPED | OUTPATIENT
Start: 2022-07-26

## 2022-08-01 DIAGNOSIS — Z98.84 HISTORY OF GASTRIC BYPASS: Primary | ICD-10-CM

## 2022-08-01 RX ORDER — PANTOPRAZOLE SODIUM 40 MG/1
40 TABLET, DELAYED RELEASE ORAL DAILY
Qty: 90 TABLET | Refills: 3 | Status: SHIPPED | OUTPATIENT
Start: 2022-08-01 | End: 2024-01-31

## 2022-08-03 ENCOUNTER — OFFICE VISIT (OUTPATIENT)
Dept: NEUROSURGERY | Facility: CLINIC | Age: 37
End: 2022-08-03
Payer: COMMERCIAL

## 2022-08-03 VITALS
HEIGHT: 59 IN | BODY MASS INDEX: 31.04 KG/M2 | HEART RATE: 91 BPM | RESPIRATION RATE: 16 BRPM | SYSTOLIC BLOOD PRESSURE: 108 MMHG | DIASTOLIC BLOOD PRESSURE: 68 MMHG | WEIGHT: 154 LBS | TEMPERATURE: 98.8 F

## 2022-08-03 DIAGNOSIS — G93.5 CHIARI I MALFORMATION (HCC): ICD-10-CM

## 2022-08-03 DIAGNOSIS — Q04.8 CEREBELLAR TONSILLAR ECTOPIA (HCC): Primary | ICD-10-CM

## 2022-08-03 PROCEDURE — 99214 OFFICE O/P EST MOD 30 MIN: CPT | Performed by: NURSE PRACTITIONER

## 2022-08-03 NOTE — PROGRESS NOTES
Assessment/Plan:    Chiari I malformation ([facility] )  As addressed in HPI  Expresses concern the  Chairi malformation is secondary to multiple failed attempts  at performing an epidural block while in labor with her now 3year old child  Reports prior to this she had surveillance brain MRIfor several years and was never diagnosed with a Chairi malformation until after the delivery and had next surveillance brain MRI for pituitary adenoma  Imagining   MRI BRAIN CINE WITHOUT CONTRAST  7/14/2022 Mild cerebellar tonsillar ectopia measuring 5 mm below foramen of magnum suggesting Chiari I malformation  Normal CSF flow study  Plan:   Reviewed imaging with patient     Dr Asia Murray   · Reassurred patient does not think Chairi malformation is iatrogenic  · His review of serial imagining is not convincing she has the dx of chiari malformation but instead a cerebellar ectopia  There is no syrnix on imagining, flow study is normal   · Headache is not related to this cerebellar ectopia but instead DDX to consider is idiopathic intracranial hypertension  or pseudo tumor Cerebri  She reports has never undergone and spinal tap to check pressures , this diagnostic tool should be considered  May need to consider Diamox treatment for intracranial hypertension  She acknowledges the neurosurgeon in [place] previously mentioned this medication as well as considering a spinal tap  · Does not feel surgery will resolve headache symptoms  · [facility]   She reported failing multiple drugs for HA treatment including triptans  She is currently treating with TOPAMAX and dose not like the way it makes her feel   Reports several HA medications cause her to feel tired and she must work FT and take care of childern  Ages 6 and 7,         · She agreed to referral to neurology, headache specialist   · She request for  F/u surviellance imagining and a revisit to neurosurgery for a second opinion from another surgeon  · MRI brain wo ordered for completion in 6 months , and schedule appointment with DKO  If symptoms worsen call office for earlier appointment t address other DDX as discussed above  · Patient will follow-up 2-3 days after  MRI  completed or sooner if symptoms worsen     · Patient advised if she develops any myelopathic symptoms such as arm weakness or numbness/tingling, difficulty with fine motor skills in hands, neck pain, balance problems, or dizziness to follow-up sooner      · Patient made aware to contact Neurosurgery with any further questions or concerns    · Was previously seeing neurologist at [facility] but insurance changed with new employment at [employer] in December 2021  Cerebellar tonsillar ectopia (HCC)  · As per Dr Rigoberto Antonio his review of several brain MRI suspect this  is a more plausible diagnosis and not Chiari malformation, radiologist read is incorrect   · Patient does not have a syrinx  · MRI wo contrast  Including CINE demonstrated normal CSF flow  · As addressed in Chiari malformation        Chiari I malformation ([facility] )        Subjective: f/u appointment for imagining review CSF flow study/MRI wo contrast       Patient ID: Smiley Newton is a 30 y o  female PMH PMhx of  hypothyroidism, pericardial cyst, lupus, hiatal hernia, pulmonary embolism in 2011 during pregnancy, history of prolactinoma, headaches and anxiety who presents for consultation/to establish care for 3 mm pituitary microadenoma and chiari I malformation    HPI   Pt reports that she found out about the chiari malformation in recent years, also while still living in [place]  She reports that previously she was offered Chiari Malformation decompressive surgery due to her symptoms, however at the time she wanted to continue various conservative measures particularly for her headache  Pt reports that recently she has been seeing [facility] neurology for her headaches   She reports that she has tried various medications for headache and has had botox injections with treatment tfailure  She is currently treating with TOPAMAX  She reports that these interventions have not been helpful for her symptom control  She reports that she thinks her headaches are getting worse  She recent emergency room visits, last 4/30/2022,  for headache, prescribed treatment was not efficacious  The severity of headaches she reports today as 10/10  Reports worse headaches during coughing, and with certain positions    Reports multiple symptoms endorses cervicalgia , occipital pain and pressure  a constant throbbing pressure in head, dizziness, lightheadedness  When bending has head pressure or when turning head side to side and has dizziness/lightheadedness  Has occipital pain and in both shoulders a constant throbbig pressure  Feels her balance is off, and looses balance at times  Experiencing intermittent ringing in her ears,numbness in hands  She denies nausea or vomiting   Has urinary urgency and occasional incontinance that she attributed to having children     Social - with 2 children ages 11 and 8  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  As a  at the   She returns today to review updated MRI wo contrast with CSF flow study (CINE)    Dr Manohar Scott and I have spent 30 minutes with the patient today in which greater than 50% of this time was spent in assessment, examination, impressions, reviewing imagining and recommendations for care  All questions were answered to his/her satisfaction, and contact information provided in the event additional questions arise  Patient acknowledged an understanding and agreement with plan                REVIEW OF SYSTEMS  Review of Systems   Constitutional: Negative  HENT: Positive for tinnitus and trouble swallowing (at times feels as if choking on her food  )  Eyes: Positive for pain (pressure behind eyes  )  Respiratory: Positive for shortness of breath      Cardiovascular: Positive for palpitations  Pericardio cyst   Gastrointestinal: Positive for diarrhea (occasional)  Endocrine: Positive for cold intolerance  Genitourinary: Positive for frequency and urgency  Musculoskeletal: Positive for arthralgias, gait problem (unsteady loosing balance ), myalgias, neck pain and neck stiffness  Generalized aches / pains, all over entire body  Lupus  Fibromyalgia   Skin: Negative  Allergic/Immunologic: Negative  Neurological: Positive for dizziness, weakness (hands), light-headedness, numbness (N/T, L>R, shoots from neck to fingertips) and headaches (HA, occipital and neck, with pressure   )  Right handed   Hematological: Bruises/bleeds easily (bruises)  Psychiatric/Behavioral: Positive for confusion, decreased concentration and sleep disturbance  Negative for dysphoric mood  The patient is nervous/anxious  Meds/Allergies     Current Outpatient Medications   Medication Sig Dispense Refill    levothyroxine (Synthroid) 50 mcg tablet Take 1 tablet (50 mcg total) by mouth daily in the early morning 90 tablet 3    naproxen (NAPROSYN) 500 mg tablet Take 1 tablet (500 mg total) by mouth 2 (two) times a day with meals (Patient taking differently: Take 500 mg by mouth as needed) 30 tablet 0    pantoprazole (PROTONIX) 40 mg tablet Take 1 tablet (40 mg total) by mouth daily 90 tablet 3    topiramate (TOPAMAX) 50 MG tablet Take 2 tablets (100 mg total) by mouth daily at bedtime 60 tablet 5    Rimegepant Sulfate (NURTEC) 75 MG TBDP Take 75 mg by mouth daily as needed (migraine) (Patient not taking: No sig reported) 8 tablet 2     No current facility-administered medications for this visit         Allergies   Allergen Reactions    Codeine Tremor    Morphine Tremor       PAST HISTORY    Past Medical History:   Diagnosis Date    Abnormal Pap smear of cervix     Anemia     gets iron infusions    Anxiety     Chiari I malformation (HCC)     Chronic pain disorder during lupus flairs    COVID-19     Disease of thyroid gland     pt off meds    Female infertility     IVF pregnancy    Fibromyalgia     Fibromyalgia, primary     Gastric ulcer     GERD (gastroesophageal reflux disease)     Hiatal hernia     Lupus (HCC)     Muscle weakness     Pericardial cyst     Pituitary tumor     Pleural effusion associated with pulmonary infection     Polycystic ovary syndrome     Pulmonary edema     Pulmonary emboli (HCC)     Spinal headache     with c section       Past Surgical History:   Procedure Laterality Date     SECTION      x 2    CHOLECYSTECTOMY      GASTRECTOMY SLEEVE LAPAROSCOPIC      GASTRIC BYPASS      POLYPECTOMY      TONSILLECTOMY      TUBAL LIGATION         Social History     Tobacco Use    Smoking status: Never Smoker    Smokeless tobacco: Never Used   Vaping Use    Vaping Use: Never used   Substance Use Topics    Alcohol use: Yes     Comment: occ  social rare    Drug use: Never       Family History   Problem Relation Age of Onset    Heart disease Mother     Hypertension Mother     Heart attack Father     No Known Problems Brother     No Known Problems Daughter     No Known Problems Son     Heart disease Maternal Grandmother     Hypertension Maternal Grandmother     Diabetes Maternal Grandmother     Early death Maternal Grandfather     No Known Problems Paternal Grandmother     No Known Problems Paternal Grandfather     No Known Problems Brother        The following portions of the patient's history were reviewed and updated as appropriate: allergies, current medications, past family history, past medical history, past social history, past surgical history and problem list       EXAM    Vitals:Blood pressure 108/68, pulse 91, temperature 98 8 °F (37 1 °C), temperature source Tympanic, resp  rate 16, height 4' 11" (1 499 m), weight 69 9 kg (154 lb), not currently breastfeeding  ,Body mass index is 31 1 kg/m²       Physical Exam  Vitals and nursing note reviewed  Constitutional:       General: She is not in acute distress  Appearance: Normal appearance  She is not ill-appearing, toxic-appearing or diaphoretic  HENT:      Head: Normocephalic and atraumatic  Pulmonary:      Effort: Pulmonary effort is normal  No respiratory distress  Musculoskeletal:      Right lower leg: No edema  Left lower leg: No edema  Skin:     General: Skin is warm and dry  Neurological:      General: No focal deficit present  Mental Status: She is alert and oriented to person, place, and time  Gait: Gait is intact  Psychiatric:         Mood and Affect: Mood normal          Behavior: Behavior normal          Neurologic Exam     Mental Status   Oriented to person, place, and time  Level of consciousness: alert    Motor Exam   Muscle bulk: normal    Strength   Right biceps: 5/5  Left biceps: 5/5  Right triceps: 5/5  Left triceps: 5/5  Right wrist flexion: 5/5  Left wrist flexion: 5/5  Right wrist extension: 5/5  Left wrist extension: 5/5  Right interossei: 5/5  Left interossei: 5/5  Right iliopsoas: 5/5  Left iliopsoas: 5/5  Right quadriceps: 5/5  Left quadriceps: 5/5  Right hamstrin/5  Left hamstrin/5  Right anterior tibial: 5/5  Right gastroc: 5/5  Left gastroc: 5/5    Sensory Exam   Right arm light touch: normal  Left arm light touch: normal  Right leg light touch: normal  Left leg light touch: normal    Gait, Coordination, and Reflexes     Gait  Gait: normal      Imaging Studies  MRI brain without contrast    Result Date: 2022  Narrative: MRI BRAIN WITHOUT CONTRAST INDICATION: Z86 69: Personal history of other diseases of the nervous system and sense organs  COMPARISON:   Brain MRI 2022 TECHNIQUE:  Sagittal T1, axial T2, axial FLAIR, axial T1, axial [place] and axial diffusion imaging  IMAGE QUALITY:  Diagnostic  FINDINGS: BRAIN PARENCHYMA:  There is no discrete mass, mass effect or midline shift   There is no intracranial hemorrhage  Diffusion imaging is unremarkable  There are no white matter changes in the cerebral hemispheres  Cerebellar tonsillar ectopia measuring 5 mm below foramen of magnum on the right  CSF flow study demonstrates normal flow in the skull base/foraminal magnum  VENTRICLES:  Normal for the patient's age  SELLA AND PITUITARY GLAND:  Known pituitary microadenoma cannot be reevaluated due to limited study by protocol and lack of IV contrast  ORBITS:  Normal  PARANASAL SINUSES:  Normal  VASCULATURE:  Evaluation of the major intracranial vasculature demonstrates appropriate flow voids  CALVARIUM AND SKULL BASE:  Normal  EXTRACRANIAL SOFT TISSUES:  Normal      Impression: 1  Mild cerebellar tonsillar ectopia measuring 5 mm below foramen of magnum suggesting Chiari I malformation  Normal CSF flow study  2   Unable to evaluate known pituitary microadenoma since exam is limited by protocol and absent IV contrast  Workstation performed: [id number]       I have personally reviewed pertinent reports     and I have personally reviewed pertinent films in PACS

## 2022-08-03 NOTE — ASSESSMENT & PLAN NOTE
As addressed in HPI  Expresses concern the  Chairi malformation is secondary to multiple failed attempts  at performing an epidural block while in labor with her now 3year old child  Reports prior to this she had surveillance brain MRIfor several years and was never diagnosed with a Chairi malformation until after the delivery and had next surveillance brain MRI for pituitary adenoma  Imagining   MRI BRAIN CINE WITHOUT CONTRAST  7/14/2022 Mild cerebellar tonsillar ectopia measuring 5 mm below foramen of magnum suggesting Chiari I malformation  Normal CSF flow study  Plan:   Reviewed imaging with patient     Dr Tico Leyva   · Reassurred patient does not think Chairi malformation is iatrogenic  · His review of serial imagining is not convincing she has the dx of chiari malformation but instead a cerebellar ectopia  There is no syrnix on imagining, flow study is normal   · Headache is not related to this cerebellar ectopia but instead DDX to consider is idiopathic intracranial hypertension  or pseudo tumor Cerebri  She reports has never undergone and spinal tap to check pressures , this diagnostic tool should be considered  May need to consider Diamox treatment for intracranial hypertension  She acknowledges the neurosurgeon in [place] previously mentioned this medication as well as considering a spinal tap  · Does not feel surgery will resolve headache symptoms  · Recommend she consult with [facility] headache specialist   She reported failing multiple drugs for HA treatment including triptans  She is currently treating with TOPAMAX and dose not like the way it makes her feel  Reports several HA medications cause her to feel tired and she must work FT and take care of childern  Ages 6 and 7,         · She agreed to referral to neurology, headache specialist   · She request for  F/u surviellance imagining and a revisit to neurosurgery for a second opinion from another surgeon    · MRI brain wo ordered for completion in 6 months , and schedule appointment with DKO  If symptoms worsen call office for earlier appointment t address other DDX as discussed above  · Patient will follow-up 2-3 days after  MRI  completed or sooner if symptoms worsen     · Patient advised if she develops any myelopathic symptoms such as arm weakness or numbness/tingling, difficulty with fine motor skills in hands, neck pain, balance problems, or dizziness to follow-up sooner      · Patient made aware to contact Neurosurgery with any further questions or concerns    · Was previously seeing neurologist at [facility] but insurance changed with new employment at [employer] in December 2021

## 2022-08-04 NOTE — ASSESSMENT & PLAN NOTE
· As per Dr Lamont Ramos his review of several brain MRI suspect this  is a more plausible diagnosis and not Chiari malformation, radiologist read is incorrect   · Patient does not have a syrinx  · MRI wo contrast  Including CINE demonstrated normal CSF flow      · As addressed in Chiari malformation

## 2022-08-26 ENCOUNTER — TELEPHONE (OUTPATIENT)
Dept: NEUROLOGY | Facility: CLINIC | Age: 37
End: 2022-08-26

## 2022-08-26 NOTE — TELEPHONE ENCOUNTER
Called patient to offer sooner appt with Dr Sarmiento in [place] and she asked for a Wednesday after 12, I offered her 9-21-22 at 3 pm and she accepted

## 2022-09-13 ENCOUNTER — TELEPHONE (OUTPATIENT)
Dept: OBGYN CLINIC | Facility: CLINIC | Age: 37
End: 2022-09-13

## 2022-09-13 NOTE — TELEPHONE ENCOUNTER
Hi Dr William Castillo, this pt wants to have a phone call or video visit with you prior to coming in for a surgery consult  She saw Dr Ludivina Mckinley in July and he referred her to you  She's currently in for a  Nov  Video visit w/you  She has a few "quick " questions about recovery and everything    She says she has had  A lot of procedures done with ivf in the past  She wants to go ahead with the surgery but first wants to talk to you before coming in

## 2022-09-14 ENCOUNTER — CONSULT (OUTPATIENT)
Dept: ENDOCRINOLOGY | Facility: CLINIC | Age: 37
End: 2022-09-14
Payer: COMMERCIAL

## 2022-09-14 VITALS
HEIGHT: 59 IN | SYSTOLIC BLOOD PRESSURE: 100 MMHG | WEIGHT: 163 LBS | HEART RATE: 73 BPM | DIASTOLIC BLOOD PRESSURE: 72 MMHG | BODY MASS INDEX: 32.86 KG/M2

## 2022-09-14 DIAGNOSIS — D35.2 PITUITARY MICROADENOMA (HCC): Primary | ICD-10-CM

## 2022-09-14 DIAGNOSIS — E66.01 MORBID OBESITY (HCC): ICD-10-CM

## 2022-09-14 DIAGNOSIS — E28.2 PCOS (POLYCYSTIC OVARIAN SYNDROME): ICD-10-CM

## 2022-09-14 DIAGNOSIS — D35.2 PROLACTINOMA (HCC): ICD-10-CM

## 2022-09-14 DIAGNOSIS — E03.9 HYPOTHYROIDISM, UNSPECIFIED TYPE: ICD-10-CM

## 2022-09-14 PROCEDURE — 99244 OFF/OP CNSLTJ NEW/EST MOD 40: CPT | Performed by: INTERNAL MEDICINE

## 2022-09-14 RX ORDER — DEXAMETHASONE 1 MG
TABLET ORAL
Qty: 1 TABLET | Refills: 0 | Status: SHIPPED | OUTPATIENT
Start: 2022-09-14 | End: 2022-09-15

## 2022-09-14 NOTE — PROGRESS NOTES
New consult note      CC:pituitary microadenoma    History of Present Illness:   30 yr female with hx pituitary adenoma/ prolactinoma, hx Lupus, pericarditis , hypothyroidism since , Obesity s/pgastric sleeve 2015 yrs and RYGB 2 yrs ago, PCOS, uterine fibroid surgery, Hx of PE during pregnancy, cholecystectomy in , chiari malformation, cerebellar tonsils, fibromyalgia and vit D deficiency  She [facility]  Previously followed an endocrinologist at [place]  She was originally diagnosed with a prolactinoma and started bromocriptine about age [age]) after finding a pituitary adenoma on MRI associated with irregular menstruation  Records review shows a prolactin level of 5 4ng/mL in  probably on bromocriptine  Periods returned but she needed IVF to conceive  She reportedly came off bromocriptine in   There is reported hx of pituitary hemorrhage that was suspected on imaging in   She was then lost to follow up until 2019  Presently she has a 4mm hypointense pituitary microadenoma on MRI 2022  Menstrual Hx: menarche age 17  Presently irregular periods every few months  Around 5 periods/year  First  was 2011    Highest adult weight: 240 lbs  Lowest adult 156 lbs age  Current 163 lbs      FH: maternal aunt has Lupus    Patient Active Problem List   Diagnosis    Headache    Lupus erythematosus    Elevated ferritin    History of Chiari malformation    History of gastric bypass    History of prolactinoma    Pericardial cyst    Irregular periods    Post-COVID syndrome    Hypothyroidism    Muscle spasm    Pharyngitis    Prolactinoma (HCC)    Chiari I malformation (HCC)    Pituitary microadenoma ([facility] [address] )    Encounter for annual routine gynecological examination    History of 2  sections    Secondary oligomenorrhea    Menorrhagia with irregular cycle    Dysmenorrhea    Cerebellar tonsillar ectopia (HCC)     Past Medical History:   Diagnosis Date    Abnormal Pap smear of cervix     Anemia     gets iron infusions    Anxiety     Chiari I malformation (HCC)     Chronic pain disorder     during lupus flairs    COVID-19     Disease of thyroid gland     pt off meds    Female infertility     IVF pregnancy    Fibromyalgia     Fibromyalgia, primary     Gastric ulcer     GERD (gastroesophageal reflux disease)     Hiatal hernia     Lupus (HCC)     Muscle weakness     Pericardial cyst     Pituitary tumor     Pleural effusion associated with pulmonary infection     Polycystic ovary syndrome     Pulmonary edema     Pulmonary emboli (HCC)     Spinal headache     with c section      Past Surgical History:   Procedure Laterality Date     SECTION      x 2    CHOLECYSTECTOMY      GASTRECTOMY SLEEVE LAPAROSCOPIC      GASTRIC BYPASS      POLYPECTOMY      TONSILLECTOMY      TUBAL LIGATION        Family History   Problem Relation Age of Onset    Heart disease Mother     Hypertension Mother     Heart attack Father     No Known Problems Brother     No Known Problems Daughter     No Known Problems Son     Heart disease Maternal Grandmother     Hypertension Maternal Grandmother     Diabetes Maternal Grandmother     Early death Maternal Grandfather     No Known Problems Paternal Grandmother     No Known Problems Paternal Grandfather     No Known Problems Brother      Social History     Tobacco Use    Smoking status: Never Smoker    Smokeless tobacco: Never Used   Substance Use Topics    Alcohol use: Yes     Comment: occ  social rare     Allergies   Allergen Reactions    Codeine Tremor    Morphine Tremor         Current Outpatient Medications:     levothyroxine (Synthroid) 50 mcg tablet, Take 1 tablet (50 mcg total) by mouth daily in the early morning, Disp: 90 tablet, Rfl: 3    naproxen (NAPROSYN) 500 mg tablet, Take 1 tablet (500 mg total) by mouth 2 (two) times a day with meals (Patient taking differently: Take 500 mg by mouth as needed), Disp: 30 tablet, Rfl: 0    pantoprazole (PROTONIX) 40 mg tablet, Take 1 tablet (40 mg total) by mouth daily, Disp: 90 tablet, Rfl: 3    topiramate (TOPAMAX) 50 MG tablet, Take 2 tablets (100 mg total) by mouth daily at bedtime, Disp: 60 tablet, Rfl: 5    Rimegepant Sulfate (NURTEC) 75 MG TBDP, Take 75 mg by mouth daily as needed (migraine) (Patient not taking: No sig reported), Disp: 8 tablet, Rfl: 2    Review of Systems   Constitutional: Positive for fatigue  HENT: Negative  Eyes: Negative  Respiratory: Negative  Cardiovascular: Negative  Gastrointestinal: Negative  Endocrine: Negative  Musculoskeletal: Negative  Skin: Negative  Allergic/Immunologic: Negative  Neurological: Negative  Hematological: Negative  Psychiatric/Behavioral: Negative  Physical Exam:  Body mass index is 32 92 kg/m²  /72   Pulse 73   Ht 4' 11" (1 499 m)   Wt 73 9 kg (163 lb)   BMI 32 92 kg/m²    Vitals:    09/14/22 1249   Weight: 73 9 kg (163 lb)        Physical Exam  Constitutional:       Appearance: She is well-developed  HENT:      Head: Normocephalic  Eyes:      Pupils: Pupils are equal, round, and reactive to light  Neck:      Thyroid: No thyromegaly  Cardiovascular:      Rate and Rhythm: Normal rate  Heart sounds: Normal heart sounds  Pulmonary:      Effort: Pulmonary effort is normal       Breath sounds: Normal breath sounds  Abdominal:      General: Bowel sounds are normal       Palpations: Abdomen is soft  Musculoskeletal:         General: No deformity  Cervical back: Normal range of motion  Skin:     Capillary Refill: Capillary refill takes less than 2 seconds  Coloration: Skin is not pale  Findings: No rash  Neurological:      Mental Status: She is alert and oriented to person, place, and time           Labs:   Lab Results   Component Value Date    HGBA1C 5 0 06/04/2022       Lab Results   Component Value Date [id number] 2 440 06/04/2022       Lab Results   Component Value Date    CREATININE 0 66 05/01/2022    CREATININE 0 59 (L) 03/12/2022    CREATININE 0 69 12/14/2020    BUN 14 05/01/2022    K 4 1 05/01/2022     05/01/2022    CO2 23 05/01/2022     eGFR   Date Value Ref Range Status   05/01/2022 115 ml/min/1 73sq m Final       Lab Results   Component Value Date    ALT 17 05/01/2022    AST 9 05/01/2022    ALKPHOS 85 05/01/2022       Lab Results   Component Value Date    CHOLESTEROL 144 06/04/2022    CHOLESTEROL 129 03/12/2022     Lab Results   Component Value Date    HDL 55 06/04/2022    HDL 48 (L) 03/12/2022     Lab Results   Component Value Date    TRIG 51 06/04/2022    TRIG 52 03/12/2022     Lab Results   Component Value Date    [place] 89 06/04/2022    [place] 81 03/12/2022         Impression:  1  Pituitary microadenoma ([facility] [address] )    2  Prolactinoma ([facility] [address] )    3  Hypothyroidism, unspecified type    4  Morbid obesity ([facility] [address] )    5  PCOS (polycystic ovarian syndrome)         Plan:    Diagnoses and all orders for this visit:    Pituitary microadenoma ([facility] [address] )  She reports a hx of prolactinoma that was treated for many years with bromocriptine and complicated by a pituitary hemorrhage in 2017 per records  Prolactin levels data not available on record  Presently, she has amenorrhea associated with a 4mm pituitary lesion on MRI  Will check functional status with listed labs first then make management decisions  Follow up in 6 weeks  -     Cortisol Level, AM Specimen; Future  -     Insulin-like growth factor 1 (IGF-1); Future  -     Luteinizing hormone; Future  -     Follicle stimulating hormone; Future  -     Prolactin; Future  -     Prolactin, Dilution Study; Future  -     Testosterone, free, total; Future  -     Estradiol; Future  -     Sex Hormone Binding Globulin  -     ACTH; Future  -     T4, free; Future      Prolactinoma [facility])  -     Ambulatory Referral to Endocrinology    Hypothyroidism, unspecified type   She reports long standing hypothyroidism suspected autoimmune  Advised to continue current levothyroxine 50mcg qdaily and monitor  Note possible malabsorption from RYGB  Will titrate based on next labs  -     T4, free; Future  -     TSH, 3rd generation; Future  -     Thyroid Antibodies Panel; Future  -     T3; Future    Morbid obesity ([address] )  She is s/p gastric sleeve followed by RYGB but has still gained weight  Will address this over future visits but from an endocrine standpoint, will need to r/o cushing's  Will consider a salivary cortisol level after  Above labs are reviewed  -     DHEA-sulfate; Future  -     DHEA-sulfate    PCOS (polycystic ovarian syndrome)  She reports symptoms of hirsutism with facial and body hair that she needs to [facility] periodically  She is not on OCP due to prior PE during pregnancy  She does not have plans for future pregnancies  She is considering hysterectomy  She may benefit from spironolactone  -     Testosterone, free, total; Future  -     Testosterone, free, total        I have spent 60 minutes with patient today in which greater than 50% of this time was spent in counseling/coordination of care  Discussed with the patient and all questioned fully answered  She will call me if any problems arise  Educated/ Counseled patient on diagnostic test results, prognosis, risk vs benefit of treatment options, importance of treatment compliance, healthy life and lifestyle choices        [address]

## 2022-09-17 ENCOUNTER — APPOINTMENT (OUTPATIENT)
Dept: LAB | Facility: CLINIC | Age: 37
End: 2022-09-17
Payer: COMMERCIAL

## 2022-09-17 DIAGNOSIS — D35.2 PITUITARY MICROADENOMA (HCC): ICD-10-CM

## 2022-09-17 LAB
CORTIS AM PEAK SERPL-MCNC: 19.8 UG/DL (ref 4.2–22.4)
ESTRADIOL SERPL-MCNC: 49 PG/ML
FSH SERPL-ACNC: 5.7 MIU/ML
LH SERPL-ACNC: 18.3 MIU/ML
PROLACTIN SERPL-MCNC: 24.4 NG/ML
T3 SERPL-MCNC: 0.9 NG/ML (ref 0.6–1.8)
T4 FREE SERPL-MCNC: 1.03 NG/DL (ref 0.76–1.46)
TSH SERPL DL<=0.05 MIU/L-ACNC: 2.04 UIU/ML (ref 0.45–4.5)

## 2022-09-17 PROCEDURE — 84270 ASSAY OF SEX HORMONE GLOBUL: CPT

## 2022-09-17 PROCEDURE — 82627 DEHYDROEPIANDROSTERONE: CPT

## 2022-09-17 PROCEDURE — 86800 THYROGLOBULIN ANTIBODY: CPT

## 2022-09-17 PROCEDURE — 82024 ASSAY OF ACTH: CPT

## 2022-09-17 PROCEDURE — 84443 ASSAY THYROID STIM HORMONE: CPT

## 2022-09-17 PROCEDURE — 82533 TOTAL CORTISOL: CPT

## 2022-09-17 PROCEDURE — 83001 ASSAY OF GONADOTROPIN (FSH): CPT

## 2022-09-17 PROCEDURE — 84305 ASSAY OF SOMATOMEDIN: CPT

## 2022-09-17 PROCEDURE — 82670 ASSAY OF TOTAL ESTRADIOL: CPT

## 2022-09-17 PROCEDURE — 84439 ASSAY OF FREE THYROXINE: CPT

## 2022-09-17 PROCEDURE — 84403 ASSAY OF TOTAL TESTOSTERONE: CPT

## 2022-09-17 PROCEDURE — 36415 COLL VENOUS BLD VENIPUNCTURE: CPT

## 2022-09-17 PROCEDURE — 83002 ASSAY OF GONADOTROPIN (LH): CPT

## 2022-09-17 PROCEDURE — 84480 ASSAY TRIIODOTHYRONINE (T3): CPT

## 2022-09-17 PROCEDURE — 86376 MICROSOMAL ANTIBODY EACH: CPT

## 2022-09-17 PROCEDURE — 84146 ASSAY OF PROLACTIN: CPT

## 2022-09-17 PROCEDURE — 84402 ASSAY OF FREE TESTOSTERONE: CPT

## 2022-09-18 LAB
DHEA-S SERPL-MCNC: 371 UG/DL (ref 57.3–279.2)
THYROPEROXIDASE AB SERPL-ACNC: <8 IU/ML (ref 0–34)

## 2022-09-19 LAB
SHBG SERPL-SCNC: 50.3 NMOL/L (ref 24.6–122)
TESTOST FREE SERPL-MCNC: 4 PG/ML (ref 0–4.2)
TESTOST SERPL-MCNC: 34 NG/DL (ref 8–60)

## 2022-09-20 LAB
ACTH PLAS-MCNC: 18.4 PG/ML (ref 7.2–63.3)
IGF-I SERPL-MCNC: 161 NG/ML (ref 84–281)
THYROGLOB AB SERPL-ACNC: <1 IU/ML (ref 0–0.9)

## 2022-09-21 ENCOUNTER — TELEMEDICINE (OUTPATIENT)
Dept: OBGYN CLINIC | Facility: CLINIC | Age: 37
End: 2022-09-21
Payer: COMMERCIAL

## 2022-09-21 ENCOUNTER — CONSULT (OUTPATIENT)
Dept: NEUROLOGY | Facility: CLINIC | Age: 37
End: 2022-09-21
Payer: COMMERCIAL

## 2022-09-21 VITALS
SYSTOLIC BLOOD PRESSURE: 110 MMHG | DIASTOLIC BLOOD PRESSURE: 72 MMHG | HEART RATE: 68 BPM | BODY MASS INDEX: 32.86 KG/M2 | WEIGHT: 162.7 LBS

## 2022-09-21 DIAGNOSIS — Q04.8 CEREBELLAR TONSILLAR ECTOPIA (HCC): ICD-10-CM

## 2022-09-21 DIAGNOSIS — G44.89 HEADACHE SYNDROME: Primary | ICD-10-CM

## 2022-09-21 DIAGNOSIS — G47.33 OBSTRUCTIVE SLEEP APNEA (ADULT) (PEDIATRIC): ICD-10-CM

## 2022-09-21 DIAGNOSIS — N92.1 MENORRHAGIA WITH IRREGULAR CYCLE: Primary | ICD-10-CM

## 2022-09-21 DIAGNOSIS — M54.2 CERVICALGIA: ICD-10-CM

## 2022-09-21 DIAGNOSIS — G93.5 CHIARI I MALFORMATION (HCC): ICD-10-CM

## 2022-09-21 PROCEDURE — 99213 OFFICE O/P EST LOW 20 MIN: CPT | Performed by: STUDENT IN AN ORGANIZED HEALTH CARE EDUCATION/TRAINING PROGRAM

## 2022-09-21 PROCEDURE — 99245 OFF/OP CONSLTJ NEW/EST HI 55: CPT | Performed by: STUDENT IN AN ORGANIZED HEALTH CARE EDUCATION/TRAINING PROGRAM

## 2022-09-21 RX ORDER — RIZATRIPTAN BENZOATE 10 MG/1
10 TABLET, ORALLY DISINTEGRATING ORAL ONCE AS NEEDED
Qty: 10 TABLET | Refills: 3 | Status: SHIPPED | OUTPATIENT
Start: 2022-09-21

## 2022-09-21 NOTE — PROGRESS NOTES
Review of Systems   Constitutional: Negative  Negative for appetite change and fever  HENT: Negative  Negative for hearing loss, tinnitus, trouble swallowing and voice change  Eyes: Negative  Negative for photophobia and pain  Respiratory: Negative  Negative for shortness of breath  Cardiovascular: Negative  Negative for palpitations  Gastrointestinal: Negative  Negative for nausea and vomiting  Endocrine: Negative  Negative for cold intolerance  Genitourinary: Negative  Negative for dysuria, frequency and urgency  Musculoskeletal: Positive for back pain (shoulders), gait problem, neck pain and neck stiffness  Negative for myalgias  Skin: Negative  Negative for rash  Neurological: Positive for dizziness, weakness, light-headedness, numbness (tingling in hands) and headaches  Negative for tremors, seizures, syncope, facial asymmetry and speech difficulty  Hematological: Negative  Does not bruise/bleed easily  Psychiatric/Behavioral: Positive for sleep disturbance (trouble staying asleep)  Negative for confusion and hallucinations  All other systems reviewed and are negative

## 2022-09-21 NOTE — PATIENT INSTRUCTIONS
Additional Testing:   - Home sleep study  - Referral to physiatry for trigger point injections    Headache Calendar  Please maintain a headache calendar  Consider using phone applications such as Migraine Gideon or [place] Migraine Tracker    Headache/migraine treatment:   Acute/Rescue medications (for immediate treatment of a headache): It is ok to take ibuprofen, acetaminophen or naproxen (Advil, Tylenol,  Aleve, Excedrin) if they help your headaches you should limit these to No more than 3 times a week to avoid medication overuse/rebound headaches  For your more moderate to severe migraines take this medication early  Maxalt (rizatriptan) 10mg tabs - take one at the onset of headache  May repeat one time after 2 hours if pain has not resolved  (Max 2 a day and 10 a month)     Prescription preventive medications for headaches/migraines   (to take every day to help prevent headaches - not to take at the time of headache):  [x] Aimovig monthly injeections - 140mg    *Typically these types of medications take time until you see the benefit, although some may see improvement in days, often it may take weeks, especially if the medication is being titrated up to a beneficial level  Please contact us if there are any concerns or questions regarding the medication  Sleep and headache prevention:   Sleep Education/Resources  1) Get up at the same time seven days out of the week  2) Only go to bed when feeling sleepy  3) Wind down in the evening without electronics  4) Stimulus Control: If lying in bed for 15-20 minutes (estimated because the clock is turned away so you cannot see it) and you are not asleep get up and do something relaxing in a different room (reading a magazine article, solitaire with a deck of cards)  Do this in the middle of the night as well if awake  Avoid doing work or getting on the computer  5) Bedroom for sleep only    No watching TV or using electronics (computer, phone, tablet etc )  in bed  6) Turn clock away so you cannot see it in bed  7) Exercise regularly but try to avoid exercise within 4 hours of bedtime  Morning exercise is best      8) Avoid caffeine in the afternoon  Considering tapering down on caffeine by decreasing by one beverage with caffeine every 3 days until off  9) Avoid smoking near bedtime     10) Avoid alcohol before bed  If you consume one alcoholic beverage allow 3 hours between that drink and bedtime  If you consume two alcoholic beverages allow 5 hours  Between those drinks and bedtime  Alcohol may lead to waking at night  11) Avoid napping except for driving safety  If you feel to sleepy to drive do not drive  If you get sleepy while driving pull over and nap  You may resume driving once you feel alert  12) Read "No More Sleepless Nights" by Lulú Kinsey PhD      13) There are some on-line resources that do require a fee that can be of help  Two credible websites are as follows:  http://[employer]/cbt-online-insomnia-treatment html  [employer] si  An walt used by the [place] is as follows:  CBT-I   Go! To Sleep by the [facility]  Lifestyle Recommendations:  [x] SLEEP - Maintain a regular sleep schedule: Adults need at least 7-8 hours of uninterrupted a night  Maintain good sleep hygiene:  Going to bed and waking up at consistent times, avoiding excessive daytime naps, avoiding caffeinated beverages in the evening, avoid excessive stimulation in the evening and generally using bed primarily for sleeping  One hour before bedtime would recommend turning lights down lower, decreasing your activity (may read quietly, listen to music at a low volume)  When you get into bed, should eliminate all technology (no texting, emailing, playing with your phone, iPad or tablet in bed)  [x] HYDRATION - Maintain good hydration    Drink  2L of fluid a day (4 typical small water bottles)  [x] DIET - Maintain good nutrition  In particular don't skip meals and try and eat healthy balanced meals regularly  [x] TRIGGERS - Look for other triggers and avoid them: Limit caffeine to 1-2 cups a day or less  Avoid dietary triggers that you have noticed bring on your headaches (this could include aged cheese, peanuts, MSG, aspartame and nitrates)  [x] EXERCISE - physical exercise as we all know is good for you in many ways, and not only is good for your heart, but also is beneficial for your mental health, cognitive health and  chronic pain/headaches  I would encourage at the least 5 days of physical exercise weekly for at least 30 minutes  Education and Follow-up  [x] Please call with any questions or concerns  Of course if any new concerning symptoms go to the emergency department    [x] Follow up in 3 months

## 2022-09-21 NOTE — PROGRESS NOTES
[facility] Consult  PATIENT:  Keith Nugent  MRN:  [id number]  :  1985  DATE OF SERVICE:  2022  REFERRED BY: Usman Rosenthal, *  PMD: Gt Valerio MD    Assessment/Plan:     Keith Nugent is a delightful 30 y o  female with a past medical history that includes hypothyroidism, prolactinoma, Chiari malformation/cerebellar ectopia, history of gastric bypass referred here for evaluation of headache  Initial evaluation 2022    Ms Raf Omalley presents with a longstanding history of headaches since childhood  She has been followed with multiple neurologists for this and was most recently seen at [facility] in September of last year  At that time she underwent Botox without significant improvement in her headaches and in fact noted that the got worse  Throughout the workup of her headaches she was found to have a suspected Chiari malformation versus cerebellar ectopia  She was seen by Neurosurgery here who suspected more of a cerebellar ectopia and was not recommending any surgical intervention at this time  Her headaches appear to have multiple subtypes within them  They are not clearly migrainous but she does endorse sound sensitivity and a preference for being in a quiet room  There is also component of cervicalgia/cervicogenic headache that isn't clearly related to her MRI findings  I suspect that may have been an incidental finding and isn't clearly contributing to her headaches  I do not believe she is suffering from idiopathic intracranial hypertension based on a normal recent ophthalmologic exam and lack of positional component  She also did not have any significant benefit with the use of Topamax  We discussed a variety of options but decided that we would try Aimovig for the migrainous component along with rizatriptan  I have also put in a referral for trigger point injections with Physical Medicine and Rehabilitation    Finally, I would like her to undergo a home sleep study to rule out sleep apnea due to her elevated BMI, high Mallampati score and excessive daytime sleepiness  She has been encouraged to keep me updated throughout the process and reach out if there are any issues or concerns  Headache syndrome  -     Erenumab-aooe 140 MG/ML SOAJ; Inject 140 mg under the skin every 30 (thirty) days  -     rizatriptan (MAXALT-MLT) 10 mg disintegrating tablet; Take 1 tablet (10 mg total) by mouth once as needed for migraine for up to 1 dose May repeat in 2 hours if needed    Cervicalgia  -     Ambulatory Referral to Physiatry; Future    Obstructive sleep apnea (adult) (pediatric)  -     Home Study; Future    Chiari I malformation (HCC)  Cerebellar tonsillar ectopia (HCC)    Workup:  - Neurologic assessment reveals unremarkable neurological exam   - With no new or concerning symptoms, no red flags and an unremarkable neurologic exam, there is no specific indication for further evaluation with MRI brain  However, this could be obtained at any time if indicated  Preventative:  - we discussed headache hygiene and lifestyle factors that may improve headaches  - Aimovig monthly injections  - Currently on through other providers: Topamax 100mg HS (can consider weaning off of this in the future, although she reports a subtle improvement in terms of intensity after starting it)  - Past/ failed/contraindicated:  She has failed propanolol, nortriptyline, ajovy    Unable to tolerate Botox  - future options: CGRP med    Rescue:  - discussed not taking over-the-counter or prescription pain medications more than 3 days per week to prevent medication overuse/rebound headache  - Rizatriptan ODT (prior prescription was a regular tablet)  - Currently on through other providers: None  - Past/ failed/contraindicated: Sumatriptan, Nurtec; avoid NSAID's due to history of bariatric surgery  - future options:  Triptan, prochlorperazine, could consider trial of 5 days of Depakote 500 mg nightly or dexamethasone 2 mg daily for prolonged migraine, Ana Ayala    Patient instructions   Additional Testing:   - Home sleep study  - Referral to physiatry for trigger point injections    Headache Calendar  Please maintain a headache calendar  Consider using phone applications such as Migraine Gideon or [employer] Migraine Tracker    Headache/migraine treatment:   Acute/Rescue medications (for immediate treatment of a headache): It is ok to take ibuprofen, acetaminophen or naproxen (Advil, Tylenol,  Aleve, Excedrin) if they help your headaches you should limit these to No more than 3 times a week to avoid medication overuse/rebound headaches  For your more moderate to severe migraines take this medication early  Maxalt (rizatriptan) 10mg tabs - take one at the onset of headache  May repeat one time after 2 hours if pain has not resolved  (Max 2 a day and 10 a month)     Prescription preventive medications for headaches/migraines   (to take every day to help prevent headaches - not to take at the time of headache):  [x] Aimovig monthly injeections - 140mg    *Typically these types of medications take time until you see the benefit, although some may see improvement in days, often it may take weeks, especially if the medication is being titrated up to a beneficial level  Please contact us if there are any concerns or questions regarding the medication  Sleep and headache prevention:   Sleep Education/Resources  1) Get up at the same time seven days out of the week  2) Only go to bed when feeling sleepy  3) Wind down in the evening without electronics  4) Stimulus Control: If lying in bed for 15-20 minutes (estimated because the clock is turned away so you cannot see it) and you are not asleep get up and do something relaxing in a different room (reading a magazine article, solitaire with a deck of cards)  Do this in the middle of the night as well if awake  Avoid doing work or getting on the computer  5) Bedroom for sleep only  No watching TV or using electronics (computer, phone, tablet etc )  in bed  6) Turn clock away so you cannot see it in bed  7) Exercise regularly but try to avoid exercise within 4 hours of bedtime  Morning exercise is best      8) Avoid caffeine in the afternoon  Considering tapering down on caffeine by decreasing by one beverage with caffeine every 3 days until off  9) Avoid smoking near bedtime     10) Avoid alcohol before bed  If you consume one alcoholic beverage allow 3 hours between that drink and bedtime  If you consume two alcoholic beverages allow 5 hours  Between those drinks and bedtime  Alcohol may lead to waking at night  11) Avoid napping except for driving safety  If you feel to sleepy to drive do not drive  If you get sleepy while driving pull over and nap  You may resume driving once you feel alert  12) Read "No More Sleepless Nights" by Irma Dent PhD      13) There are some on-line resources that do require a fee that can be of help  Two credible websites are as follows:  http://[employer]cbt-online-insomnia-treatment html  [employer] si  An walt used by the [place] is as follows:  CBT-I   Go! To Sleep by the [facility]  Lifestyle Recommendations:  [x] SLEEP - Maintain a regular sleep schedule: Adults need at least 7-8 hours of uninterrupted a night  Maintain good sleep hygiene:  Going to bed and waking up at consistent times, avoiding excessive daytime naps, avoiding caffeinated beverages in the evening, avoid excessive stimulation in the evening and generally using bed primarily for sleeping  One hour before bedtime would recommend turning lights down lower, decreasing your activity (may read quietly, listen to music at a low volume)  When you get into bed, should eliminate all technology (no texting, emailing, playing with your phone, iPad or tablet in bed)    [x] HYDRATION - Maintain good hydration  Drink  2L of fluid a day (4 typical small water bottles)  [x] DIET - Maintain good nutrition  In particular don't skip meals and try and eat healthy balanced meals regularly  [x] TRIGGERS - Look for other triggers and avoid them: Limit caffeine to 1-2 cups a day or less  Avoid dietary triggers that you have noticed bring on your headaches (this could include aged cheese, peanuts, MSG, aspartame and nitrates)  [x] EXERCISE - physical exercise as we all know is good for you in many ways, and not only is good for your heart, but also is beneficial for your mental health, cognitive health and  chronic pain/headaches  I would encourage at the least 5 days of physical exercise weekly for at least 30 minutes  Education and Follow-up  [x] Please call with any questions or concerns  Of course if any new concerning symptoms go to the emergency department  [x] Follow up in 3 months    CC: We had the pleasure of evaluating Louie Maldonado in neurological consultation today  Louie Maldonado is a   right handed female who presents today for evaluation of headaches  History obtained from patient as well as available medical record review  History of Present Illness:   Current medical illnesses  or past medical history include hypothyroidism, prolactinoma, Chiari malformation/cerebellar ectopia, history of gastric bypass    Pertinent history:  - seen in neurosurgery clinic 08/03/2022 for suspected Chiari malformation  Neurosurgery suspects the patient has cerebellar ectopia and not a diagnosis of Chiari  No syrinx visualized on imaging  Flow study was normal   - previously followed with [facility]  Per chart review, patient was last seen for Botox injections in September 2021  - prior office visit reported that she used to follow with a neurologist at [facility] a few years ago  She reported having headaches since 15years old    She also has history of a pituitary adenoma and per chart review had hemorrhage into the pituitary gland in 2017 when she was pregnant  In addition, she used to follow with a neurosurgeon at [facility] who noted some fullness at the level of the foramen magnum and suspected Chiari malformation  This individual suggested a trial of Diamox but she was already on Topamax  Headaches started at what age? Since childhood  How often do the headaches occur?  - as of 9/21/2022: 30/30  What time of the day do the headaches start? No particular time of day  How long do the headaches last? Can last throughout the day  Are you ever headache free? No    Aura? without aura     Where is your headache located and pain quality? Occipital b/l; varies in terms of quality  What is the intensity of pain? Average: 6-7/10, worst 8/10  Associated symptoms:   [x] Stiff or sore neck   [x] Problems with concentration  [x]Phonophobia  [x] Prefer quiet room  [x] Light-headed or dizzy     [x] Tinnitus     - No TVO's (transient visual obscurations)    Things that make the headache worse? Bending forward; no positional component    Headache triggers:  None    Have you seen someone else for headaches or pain? Yes  Have you had trigger point injection performed and how often? No  Have you had Botox injection performed and how often? Yes - only had 1 session with significant increase in pain  Have you had epidural injections or transforaminal injections performed? No  Are you current pregnant or planning on getting pregnant? No; tubal ligation  Have you ever had any Brain imaging? yes    Last eye exam: Few months ago; normal dilated exam     What medications do you take or have you taken for your headaches? ABORTIVE:    OTC medications: Naproxen (1-2 times per week)  Prescription: None    Past/ failed/contraindicated:  OTC medications: Ibuprofen contraindicated due to history of bariatric surgery;  Tylenol  Prescription: Nurtec (did not help), Rizatriptan (did not help), Sumatriptan (did not help; potential side effects)    PREVENTIVE:   Topamax 100mg HS (not helping; memory issues)    Past/ failed/contraindicated:  Ajovy (on it for 1 year without any relief)  Nortriptyline (did not help)  Propranolol (tried, but stopped due to weight gain)  Botox (1 session with significant increase in pain; no plans to resume)    LIFESTYLE  Sleep   - averages: 3-4 hours per night  Problems falling asleep?:   No  Problems staying asleep?:  Yes (wakes up throughout the night)  - No snoring  - No history of DANYELLE  - [place] sleepiness scale total: 14    Physical activity: nothing scheduled    Water: 1 large bottle per day  Caffeine: 1 cup of coffee per day    Mood:   No formal diagnosis of anxiety, but potentially    The following portions of the patient's history were reviewed and updated as appropriate: allergies, current medications, past family history, past medical history, past social history, past surgical history and problem list     Pertinent family history:  Family history of headaches:  no known family members with significant headaches  Any family history of aneurysms - No    Pertinent social history:  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  Education: Some college  Lives with  and 2 kids    Illicit Drugs: denies  Alcohol/tobacco: Denies tobacco use, alcohol intake: social drinker    Past Medical History:     Past Medical History:   Diagnosis Date    Abnormal Pap smear of cervix     Anemia     gets iron infusions    Anxiety     [facility]ari I malformation ([facility] )     Chronic pain disorder     during lupus flairs    COVID-19     Disease of thyroid gland     pt off meds    Female infertility     IVF pregnancy    Fibromyalgia     Fibromyalgia, primary     Gastric ulcer     GERD (gastroesophageal reflux disease)     Hiatal hernia     [facility]     Muscle weakness     Pericardial cyst     Pituitary tumor     Pleural effusion associated with pulmonary infection     Polycystic ovary syndrome     Pulmonary edema     Pulmonary emboli (HCC)     Spinal headache     with c section       Patient Active Problem List   Diagnosis    Headache    Lupus erythematosus    Elevated ferritin    History of Chiari malformation    History of gastric bypass    History of prolactinoma    Pericardial cyst    Irregular periods    Post-COVID syndrome    Hypothyroidism    Muscle spasm    Pharyngitis    Prolactinoma (HCC)    Chiari I malformation (HCC)    [facility] microadenoma ([facility] )    Encounter for annual routine gynecological examination    History of 2  sections    Secondary oligomenorrhea    Menorrhagia with irregular cycle    Dysmenorrhea    Cerebellar tonsillar ectopia (HCC)       Medications:      Current Outpatient Medications   Medication Sig Dispense Refill    pantoprazole (PROTONIX) 40 mg tablet Take 1 tablet (40 mg total) by mouth daily 90 tablet 3    topiramate (TOPAMAX) 50 MG tablet Take 2 tablets (100 mg total) by mouth daily at bedtime 60 tablet 5    levothyroxine (Synthroid) 50 mcg tablet Take 1 tablet (50 mcg total) by mouth daily in the early morning 90 tablet 3     No current facility-administered medications for this visit  Allergies:       Allergies   Allergen Reactions    Codeine Tremor    Morphine Tremor       Family History:     Family History   Problem Relation Age of Onset    Heart disease Mother     Hypertension Mother     Heart attack Father     No Known Problems Brother     No Known Problems Daughter     No Known Problems Son     Heart disease Maternal Grandmother     Hypertension Maternal Grandmother     Diabetes Maternal Grandmother     Early death Maternal Grandfather     No Known Problems Paternal Grandmother     No Known Problems Paternal Grandfather     No Known Problems Brother        Social History:       Social History     Socioeconomic History    Marital status: /Civil Union     Spouse name: Not on file    Number of children: Not on file    Years of education: Not on file    Highest education level: Not on file   Occupational History    Not on file   Tobacco Use    Smoking status: Never Smoker    Smokeless tobacco: Never Used   Vaping Use    Vaping Use: Never used   Substance and Sexual Activity    Alcohol use: Yes     Comment: occ  social rare    Drug use: Never    Sexual activity: Yes     Partners: Male     Birth control/protection: Female Sterilization   Other Topics Concern    Not on file   Social History Narrative    Not on file     Social Determinants of Health     Financial Resource Strain: Not on file   Food Insecurity: Not on file   Transportation Needs: Not on file   Physical Activity: Not on file   Stress: Not on file   Social Connections: Not on file   Intimate Partner Violence: Not on file   Housing Stability: Not on file         Objective:   Physical Exam:                                                                 Vitals:            Constitutional:    /72 (BP Location: Left arm, Patient Position: Sitting, Cuff Size: Adult)   Pulse 68   Wt 73 8 kg (162 lb 11 2 oz)   BMI 32 86 kg/m²   BP Readings from Last 3 Encounters:   09/21/22 110/72   09/14/22 100/72   08/03/22 108/68     Pulse Readings from Last 3 Encounters:   09/21/22 68   09/14/22 73   08/03/22 91         Well developed, well nourished, well groomed  No dysmorphic features  HEENT:  Normocephalic atraumatic  Oropharynx is clear and moist  No oral mucosal lesions  Chest:  Respirations regular and unlabored  Cardiovascular:  Distal extremities warm without palpable edema or tenderness, no observed significant swelling  Musculoskeletal:  (see below under neurologic exam for evaluation of motor function and gait)   Skin:  warm and dry, not diaphoretic  No apparent birthmarks or stigmata of neurocutaneous disease     Psychiatric:  Normal behavior and appropriate affect       Neurological Examination: Mental status/cognitive function:   Orientated to time, place and person  Recent and remote memory intact  Attention span and concentration as well as fund of knowledge are appropriate for age  Normal language and spontaneous speech  Cranial Nerves:  II-visual fields full  Fundi poorly visualized due to pupillary constriction  III, IV, VI-Pupils were equal, round, and reactive to light and accomodation  Extraocular movements were full and conjugate without nystagmus  Conjugate gaze, normal smooth pursuits, normal saccades   V-facial sensation symmetric  VII-facial expression symmetric, intact forehead wrinkle, strong eye closure, symmetric smile    VIII-hearing grossly intact bilaterally   IX, X-palate elevation symmetric, no dysarthria  XI-shoulder shrug strength intact    XII-tongue protrusion midline  Motor Exam: symmetric bulk and tone throughout, no pronator drift  Power/strength 5/5 bilateral upper and lower extremities, no atrophy, fasciculations or abnormal movements noted  Sensory: grossly intact light touch in all extremities  Reflexes: brachioradialis 2+, biceps 2+, knee 2+, ankle 2+ bilaterally  No ankle clonus  Coordination: Finger nose finger intact bilaterally, no apparent dysmetria, ataxia or tremor noted  Gait: steady casual and tandem gait  Pertinent lab results: none     Pertinent Imaging:   MRI brain without contrast 07/13/2022: Mild cerebellar tonsillar ectopia measuring 5 mm below the foramen magnum  Normal CSF flow study  Review of Systems:     Constitutional: Negative  Negative for appetite change and fever  HENT: Negative  Negative for hearing loss, tinnitus, trouble swallowing and voice change  Eyes: Negative  Negative for photophobia and pain  Respiratory: Negative  Negative for shortness of breath  Cardiovascular: Negative  Negative for palpitations  Gastrointestinal: Negative  Negative for nausea and vomiting  Endocrine: Negative  Negative for cold intolerance  Genitourinary: Negative  Negative for dysuria, frequency and urgency  Musculoskeletal: Positive for back pain (shoulders), gait problem, neck pain and neck stiffness  Negative for myalgias  Skin: Negative  Negative for rash  Neurological: Positive for dizziness, weakness, light-headedness, numbness (tingling in hands) and headaches  Negative for tremors, seizures, syncope, facial asymmetry and speech difficulty  Hematological: Negative  Does not bruise/bleed easily  Psychiatric/Behavioral: Positive for sleep disturbance (trouble staying asleep)  Negative for confusion and hallucinations  All other systems reviewed and are negative  I have spent 65 minutes with the patient today in which greater than 50% of this time was spent in counseling/coordination of care regarding Diagnostic results, Prognosis and Impressions  I also spent 15 minutes non face to face for this patient the same day       Activity Minutes   Precharting/reviewing 10   Patient care/counseling  65   Postcharting/care coordination 5       Author:  Enrico Servin DO 9/21/2022 3:39 PM

## 2022-09-21 NOTE — PROGRESS NOTES
Virtual Regular Visit    Verification of patient location:    Patient is located in the following state in which I hold an active license [place]      Assessment/Plan:    Problem List Items Addressed This Visit        Other    Menorrhagia with irregular cycle - Primary      -Reviewed management of AUB, including those therapies contraindicated for her  Reviewed surgical management with ablation and hysterectomy  Reviewed types of hysterectomy, that she would likely be candidate for laparoscopic hysterectomy, but that need for conversion to open is always a possibility  Reviewed risks increased with prior surgery, but likely still feasible laparoscopically  We discussed bilateral salpingectomy and benefit of decreased risk of ovarian cancer  Discussed plan to leave ovaries in situ  She can decide whether she would like a total or a supracervical hysterectomy  Reason for visit is   Chief Complaint   Patient presents with    Consult     Hysterectomy discussion     Virtual Brief Visit    Virtual Regular Visit        Encounter provider Joy Anderson MD    Provider located at [place] [address]  [address] [address]      Recent Visits  No visits were found meeting these conditions  Showing recent visits within past 7 days and meeting all other requirements  Today's Visits  Date Type Provider Dept   09/21/22 Telemedicine Joy Anderson MD [facility] today's visits and meeting all other requirements  Future Appointments  No visits were found meeting these conditions  Showing future appointments within next 150 days and meeting all other requirements       The patient was identified by name and date of birth  Junita  was informed that this is a telemedicine visit and that the visit is being conducted through Telephone  My office door was closed  No one else was in the room    She acknowledged consent and understanding of privacy and security of the video platform  The patient has agreed to participate and understands they can discontinue the visit at any time  Patient is aware this is a billable service  Joanie Holliday is a 30 y o  female  HPI   She presents today for discussion regarding management of her heavy menstrual bleeding  She was previously seen by Dr Gage Reece, counseled regarding options for management  Given her history PE, she is to avoid hormonal management of her heavy bleeding, has a history of BTL and is not in need of contraception  She had discussed endometrial ablation with Dr Gage Reece, but after further consideration, she is wary of this due to the lack of definitive management, as well as the potential for post tubal ablation syndrome  She would like to discuss the process, risks, benefits, recovery, for hysterectomy, today       Past Medical History:   Diagnosis Date    Abnormal Pap smear of cervix     Anemia     gets iron infusions    Anxiety     Chiari I malformation (HCC)     Chronic pain disorder     during lupus flairs    COVID-19     Disease of thyroid gland     pt off meds    Female infertility     IVF pregnancy    Fibromyalgia     Fibromyalgia, primary     Gastric ulcer     GERD (gastroesophageal reflux disease)     Hiatal hernia     Lupus (HCC)     Muscle weakness     Pericardial cyst     Pituitary tumor     Pleural effusion associated with pulmonary infection     Polycystic ovary syndrome     Pulmonary edema     Pulmonary emboli (HCC)     Spinal headache     with c section       Past Surgical History:   Procedure Laterality Date     SECTION      x 2    CHOLECYSTECTOMY      GASTRECTOMY SLEEVE LAPAROSCOPIC      GASTRIC BYPASS      POLYPECTOMY      TONSILLECTOMY      TUBAL LIGATION         Current Outpatient Medications   Medication Sig Dispense Refill    levothyroxine (Synthroid) 50 mcg tablet Take 1 tablet (50 mcg total) by mouth daily in the early morning 90 tablet 3    pantoprazole (PROTONIX) 40 mg tablet Take 1 tablet (40 mg total) by mouth daily 90 tablet 3    topiramate (TOPAMAX) 50 MG tablet Take 2 tablets (100 mg total) by mouth daily at bedtime 60 tablet 5     No current facility-administered medications for this visit          Allergies   Allergen Reactions    Codeine Tremor    Morphine Tremor       Review of Systems +HMB    Video Exam    Vitals:     Physical Exam   Mood and vocal quality normal    I spent 20 minutes with patient today in which greater than 50% of the time was spent in counseling/coordination of care regarding AUB/hysterectomy

## 2022-09-22 ENCOUNTER — TELEPHONE (OUTPATIENT)
Dept: ENDOCRINOLOGY | Facility: CLINIC | Age: 37
End: 2022-09-22

## 2022-09-22 NOTE — TELEPHONE ENCOUNTER
----- Message from Nuris Cui MD sent at 9/21/2022  9:37 PM EDT -----  Review labs next visit   Overall ok

## 2022-09-23 ENCOUNTER — TELEPHONE (OUTPATIENT)
Dept: NEUROLOGY | Facility: CLINIC | Age: 37
End: 2022-09-23

## 2022-09-23 NOTE — TELEPHONE ENCOUNTER
----- Message from Rudy Berumen RN sent at 9/23/2022  2:14 PM EDT -----  Regarding: FW: Prior auth     ----- Message -----  From: Rola Conn  Sent: 9/23/2022   2:05 PM EDT  To: [facility]  Subject: Prior Lexie Faye, I called the pharmacy to check in my prescriptions were ready for  and I was informed that in one table one needs prior authorization  The pharmacist has sent a message to the office  Thank you

## 2022-09-23 NOTE — TELEPHONE ENCOUNTER
Pt left vm stating that injection needs PA  [phone]    [employer] PA completed on CMM  Key: [id number]

## 2022-09-28 ENCOUNTER — TELEPHONE (OUTPATIENT)
Dept: INTERNAL MEDICINE CLINIC | Facility: CLINIC | Age: 37
End: 2022-09-28

## 2022-09-28 DIAGNOSIS — M32.9 LUPUS (HCC): Primary | ICD-10-CM

## 2022-09-28 NOTE — TELEPHONE ENCOUNTER
Patient called and having a lot of pains from lupus and she asked if you have advice and can she also have a rheumatology referral   She is coming in at 2 today with her mother in law to bring her to her appointment  She can be reached at [phone]    Thank you

## 2022-10-03 NOTE — TELEPHONE ENCOUNTER
[employer] Approved per [facility]-  [place] Case: [id number], Status: Approved, Coverage Starts on: 9/23/2022 12:00 AM, Coverage Ends on: 3/23/2023    Pharmacy made aware of approval    mychart message sent to pt

## 2022-10-07 ENCOUNTER — TELEPHONE (OUTPATIENT)
Dept: ENDOCRINOLOGY | Facility: CLINIC | Age: 37
End: 2022-10-07

## 2022-10-07 NOTE — TELEPHONE ENCOUNTER
Lm for pt to call back to see if they wanted to come to [facility] office or stay at [facility] office for Dr Kaushik Orr

## 2022-10-12 ENCOUNTER — TELEPHONE (OUTPATIENT)
Dept: ENDOCRINOLOGY | Facility: CLINIC | Age: 37
End: 2022-10-12

## 2022-10-12 ENCOUNTER — OFFICE VISIT (OUTPATIENT)
Dept: INTERNAL MEDICINE CLINIC | Facility: CLINIC | Age: 37
End: 2022-10-12
Payer: COMMERCIAL

## 2022-10-12 VITALS
BODY MASS INDEX: 32.74 KG/M2 | DIASTOLIC BLOOD PRESSURE: 70 MMHG | OXYGEN SATURATION: 99 % | HEART RATE: 100 BPM | TEMPERATURE: 97.3 F | SYSTOLIC BLOOD PRESSURE: 114 MMHG | HEIGHT: 59 IN | WEIGHT: 162.4 LBS

## 2022-10-12 DIAGNOSIS — E03.9 HYPOTHYROIDISM, UNSPECIFIED TYPE: ICD-10-CM

## 2022-10-12 DIAGNOSIS — L93.0 LUPUS ERYTHEMATOSUS, UNSPECIFIED FORM: ICD-10-CM

## 2022-10-12 DIAGNOSIS — R51.9 NONINTRACTABLE HEADACHE, UNSPECIFIED CHRONICITY PATTERN, UNSPECIFIED HEADACHE TYPE: Primary | ICD-10-CM

## 2022-10-12 PROBLEM — J02.9 PHARYNGITIS: Status: RESOLVED | Noted: 2022-06-10 | Resolved: 2022-10-12

## 2022-10-12 PROBLEM — U09.9 POST-COVID SYNDROME: Status: RESOLVED | Noted: 2022-03-11 | Resolved: 2022-10-12

## 2022-10-12 PROCEDURE — 99214 OFFICE O/P EST MOD 30 MIN: CPT | Performed by: INTERNAL MEDICINE

## 2022-10-12 RX ORDER — RIZATRIPTAN BENZOATE 5 MG/1
5 TABLET, ORALLY DISINTEGRATING ORAL AS NEEDED
Qty: 6 TABLET | Refills: 2 | Status: SHIPPED | OUTPATIENT
Start: 2022-10-12

## 2022-10-12 NOTE — PROGRESS NOTES
Name: Araceli Gonsalez      : 1985      MRN: [id number]  Encounter Provider: Aditya Gomez MD  Encounter Date: 10/12/2022   Encounter department: [facility]     1  Nonintractable headache, unspecified chronicity pattern, unspecified headache type  Assessment & Plan:  Headache does respond to Maxalt medication as recommended by Neurology will decrease dose from 10 mg to 5 mg due to excessive drowsiness induced by the 10 mg dose  Orders:  -     rizatriptan (Maxalt-MLT) 5 mg disintegrating tablet; Take 1 tablet (5 mg total) by mouth as needed for migraine Take at the onset of migraine; if symptoms continue or return, may take another dose at least 2 hours after first dose  Take no more than 2 doses in a day  2  Lupus erythematosus, unspecified form  Assessment & Plan:  History of lupus diagnosis in the past patient has been experiencing an increase in generalized muscle and joint discomfort believed to be secondary to her lupus  She has a consultation with rheumatology upcoming previously treated with Plaquenil and steroids  3  Hypothyroidism, unspecified type  Assessment & Plan:  TSH and T4 values reviewed of the confirm adequate replacement therapy with 50 mcg of levothyroxine continue current therapy is recommended      BMI Counseling: Body mass index is 32 8 kg/m²  The BMI is above normal  Nutrition recommendations include decreasing portion sizes, moderation in carbohydrate intake and reducing intake of cholesterol  No pharmacotherapy was ordered  Rationale for BMI follow-up plan is due to patient being overweight or obese  Subjective      This 44-year-old female patient returns today for follow-up visit  She has been experiencing an increase in body aches and pains especially after a busy day of activity  She finds the next day to be almost intolerable  She has been diagnosed with lupus approximately 6 years ago    In the past she has been on treatment with Plaquenil and steroids of for management of her lupus  She has been referred on to Rheumatology services at [facility] for further evaluation recommendations regarding management of her lupus syndrome  We discussed the patient's headaches and recent consultation with Neurology  They had recommended that she utilize Maxalt for management of her migraine headaches  She has been prescribed previously 10 mg of the Maxalt rapid dissolve discs  She finds that the [address] [place] [address] does resolve her headaches but causes significant fatigue for the rest of the day  I have recommended that she try 5 mg of Maxalt rapid dissolve disc instead to see if it will provide her with relief from the headache without excessive drowsiness  Patient is under evaluation with endocrinology regarding possible polycystic ovarian syndrome  She has a follow-up visit with them coming up shortly to review the results of blood work that they ordered  Review of Systems   Musculoskeletal: Positive for arthralgias and myalgias  Neurological: Positive for headaches  All other systems reviewed and are negative  Current Outpatient Medications on File Prior to Visit   Medication Sig   • Erenumab-aooe 140 MG/ML SOAJ Inject 140 mg under the skin every 30 (thirty) days   • levothyroxine (Synthroid) 50 mcg tablet Take 1 tablet (50 mcg total) by mouth daily in the early morning   • pantoprazole (PROTONIX) 40 mg tablet Take 1 tablet (40 mg total) by mouth daily   • rizatriptan (MAXALT-MLT) 10 mg disintegrating tablet Take 1 tablet (10 mg total) by mouth once as needed for migraine for up to 1 dose May repeat in 2 hours if needed   • topiramate (TOPAMAX) 50 MG tablet Take 2 tablets (100 mg total) by mouth daily at bedtime       Objective     /70   Pulse 100   Temp (!) 97 3 °F (36 3 °C)   Ht 4' 11" (1 499 m)   Wt 73 7 kg (162 lb 6 4 oz)   SpO2 99%   BMI 32 80 kg/m²     Physical Exam  Vitals reviewed  Constitutional:       General: She is not in acute distress  Appearance: Normal appearance  She is well-developed  She is not ill-appearing  HENT:      Right Ear: Hearing and external ear normal       Left Ear: Hearing and external ear normal       Nose: Nose normal  No mucosal edema  Mouth/Throat:      Pharynx: Uvula midline  Eyes:      General: Lids are normal       Conjunctiva/sclera: Conjunctivae normal       Pupils: Pupils are equal, round, and reactive to light  Neck:      Thyroid: No thyromegaly  Vascular: No carotid bruit or JVD  Cardiovascular:      Rate and Rhythm: Normal rate and regular rhythm  Heart sounds: Normal heart sounds  No murmur heard  Pulmonary:      Effort: Pulmonary effort is normal  No respiratory distress  Breath sounds: Normal breath sounds  No wheezing, rhonchi or rales  Abdominal:      General: Bowel sounds are normal       Palpations: Abdomen is soft  Musculoskeletal:         General: Normal range of motion  Lymphadenopathy:      Cervical: No cervical adenopathy  Skin:     General: Skin is warm and dry  Neurological:      Mental Status: She is alert and oriented to person, place, and time  Deep Tendon Reflexes: Reflexes are normal and symmetric  Psychiatric:         Speech: Speech normal          Behavior: Behavior normal  Behavior is cooperative  Thought Content:  Thought content normal          Judgment: Judgment normal        Corinna Halsted, MD

## 2022-10-12 NOTE — ASSESSMENT & PLAN NOTE
Headache does respond to Maxalt medication as recommended by Neurology will decrease dose from 10 mg to 5 mg due to excessive drowsiness induced by the 10 mg dose

## 2022-10-12 NOTE — TELEPHONE ENCOUNTER
Pt called asking if she should take the medication you recommended at her last visit based on the recent lab results? Per the note it mentions Spironolactone  Or should she wait until her next appt w/ you? Please advise  Pt uses [employer]

## 2022-10-12 NOTE — ASSESSMENT & PLAN NOTE
History of lupus diagnosis in the past patient has been experiencing an increase in generalized muscle and joint discomfort believed to be secondary to her lupus  She has a consultation with rheumatology upcoming previously treated with Plaquenil and steroids

## 2022-10-12 NOTE — ASSESSMENT & PLAN NOTE
TSH and T4 values reviewed of the confirm adequate replacement therapy with 50 mcg of levothyroxine continue current therapy is recommended

## 2022-10-13 ENCOUNTER — TELEPHONE (OUTPATIENT)
Dept: OBGYN CLINIC | Facility: CLINIC | Age: 37
End: 2022-10-13

## 2022-10-13 NOTE — TELEPHONE ENCOUNTER
Called and notified pt of Dr Aggie Mancilla message  Transferred pt out front to try and move appt up

## 2022-10-13 NOTE — TELEPHONE ENCOUNTER
----- Message from Na Farah sent at 10/13/2022 12:32 PM EDT -----  Regarding: Painful cycle   Hi Dr Amy Parsons, is there anything besides over the counter meds I can take for these extremely painful menstrual periods? I am extremely nauseous every cycle and these cycles last about 7-8 days go away and something come back a few days later (spotting) for a few more days  I have no appetite at all  The pain is almost unbearable I have to breath through them as if I am having contractions at times  The bleeding and clots I see at times it’s a bit crazy  I did have my consult about the possible hysterectomy but don’t think it’s something I want to do right now  In the mean time is there anything I can take orally to help with these pains and symptoms?

## 2022-10-13 NOTE — TELEPHONE ENCOUNTER
Pt has had gastric bypass - not much she can take  She spoke with EC few weeks ago - knows hyster is way to go  Does not want surgery now  Is there anything else that she can take with her history  Pt aware you are not here - and I would ask you if anything can be done for her   Thank you

## 2022-10-17 NOTE — TELEPHONE ENCOUNTER
Pt said she has not seen a gi provider for a few years, I told her to call her old gi provider to ask about the medication, she now has to use [facility] providers and is asking who to call, she also said she doesn't want to have the hysterectomy at this time

## 2022-10-17 NOTE — TELEPHONE ENCOUNTER
I've reviewed her yearly visit  She declines Mirena and can't use OC's or TXA due to PE Hx  She's been using Advil despite having Gastric Bypass  Have her ask her Neurologist and GI surgeon if Toradol would be acceptable  Let her know it is stronger than 800 mg of Motrin  When did she plan on having a hysterectomy?

## 2022-10-18 ENCOUNTER — OFFICE VISIT (OUTPATIENT)
Dept: ENDOCRINOLOGY | Facility: CLINIC | Age: 37
End: 2022-10-18
Payer: COMMERCIAL

## 2022-10-18 VITALS
HEART RATE: 80 BPM | HEIGHT: 59 IN | SYSTOLIC BLOOD PRESSURE: 116 MMHG | WEIGHT: 164 LBS | DIASTOLIC BLOOD PRESSURE: 76 MMHG | BODY MASS INDEX: 33.06 KG/M2

## 2022-10-18 DIAGNOSIS — E03.9 HYPOTHYROIDISM, UNSPECIFIED TYPE: ICD-10-CM

## 2022-10-18 DIAGNOSIS — L68.0 HIRSUTISM: ICD-10-CM

## 2022-10-18 DIAGNOSIS — D35.2 PITUITARY MICROADENOMA (HCC): ICD-10-CM

## 2022-10-18 DIAGNOSIS — R10.31 RIGHT LOWER QUADRANT ABDOMINAL PAIN: ICD-10-CM

## 2022-10-18 DIAGNOSIS — D35.2 PROLACTINOMA (HCC): Primary | ICD-10-CM

## 2022-10-18 PROCEDURE — 99214 OFFICE O/P EST MOD 30 MIN: CPT | Performed by: INTERNAL MEDICINE

## 2022-10-18 RX ORDER — DEXAMETHASONE 1 MG
TABLET ORAL
Qty: 1 TABLET | Refills: 0 | Status: SHIPPED | OUTPATIENT
Start: 2022-10-18

## 2022-10-18 RX ORDER — DICYCLOMINE HYDROCHLORIDE 10 MG/1
10 CAPSULE ORAL
Qty: 30 CAPSULE | Refills: 0 | Status: SHIPPED | OUTPATIENT
Start: 2022-10-18

## 2022-10-18 NOTE — PROGRESS NOTES
Follow-up Patient Progress Note      CC:pituitary microadenoma     History of Present Illness:   30 yr female with hx pituitary adenoma/ prolactinoma, hx Lupus, pericarditis , hypothyroidism since , Obesity s/p gastric sleeve  and RYGB , PCOS, endometrial polypectomy, Hx of PE during pregnancy, cholecystectomy in , chiari malformation, cerebellar tonsils, fibromyalgia and vit D deficiency  Last visit was 22  No new complaints reported  She continues to report dysmenorrhea      She was originally diagnosed with a prolactinoma and started bromocriptine about age [age]) after finding a pituitary adenoma on MRI associated with irregular menstruation  Records review shows a prolactin level of 5 4ng/mL in  probably on bromocriptine  Periods returned but she needed IVF to conceive  She reportedly came off bromocriptine in   There is reported hx of pituitary hemorrhage that was suspected on imaging in   She was then lost to follow up until 2019  Presently she has a 4mm hypointense pituitary microadenoma on MRI 2022  Repeat MRI 22  Could not locate microadenoma      Menstrual Hx: menarche age 17  Presently irregular periods every few months  Around 5 periods/year  First  was 2011     Highest adult weight: 240 lbs  Lowest adult 156 lbs age   Current 163 lbs      FH: maternal aunt has Lupus    Patient Active Problem List   Diagnosis   • Headache   • Lupus erythematosus   • Elevated ferritin   • History of Chiari malformation   • History of gastric bypass   • History of prolactinoma   • Pericardial cyst   • Irregular periods   • Hypothyroidism   • Muscle spasm   • Prolactinoma (HCC)   • Chiari I malformation (HCC)   • Pituitary microadenoma (HCC)   • Encounter for annual routine gynecological examination   • History of 2  sections   • Secondary oligomenorrhea   • Menorrhagia with irregular cycle   • Dysmenorrhea   • Cerebellar tonsillar ectopia ([facility] [address] ) Past Medical History:   Diagnosis Date   • Abnormal Pap smear of cervix    • Anemia     gets iron infusions   • Anxiety    • Chiari I malformation (HCC)    • Chronic pain disorder     during lupus flairs   • COVID-19    • Disease of thyroid gland     pt off meds   • Female infertility     IVF pregnancy   • Fibromyalgia    • Fibromyalgia, primary    • Gastric ulcer    • GERD (gastroesophageal reflux disease)    • Hiatal hernia    • Lupus (HCC)    • Muscle weakness    • Pericardial cyst    • Pituitary tumor    • Pleural effusion associated with pulmonary infection    • Polycystic ovary syndrome    • Pulmonary edema    • Pulmonary emboli (HCC)    • Spinal headache     with c section      Past Surgical History:   Procedure Laterality Date   •  SECTION      x 2   • CHOLECYSTECTOMY     • GASTRECTOMY SLEEVE LAPAROSCOPIC     • GASTRIC BYPASS     • POLYPECTOMY     • TONSILLECTOMY     • TUBAL LIGATION        Family History   Problem Relation Age of Onset   • Heart disease Mother    • Hypertension Mother    • Heart attack Father    • No Known Problems Brother    • No Known Problems Daughter    • No Known Problems Son    • Heart disease Maternal Grandmother    • Hypertension Maternal Grandmother    • Diabetes Maternal Grandmother    • Early death Maternal Grandfather    • No Known Problems Paternal Grandmother    • No Known Problems Paternal Grandfather    • No Known Problems Brother      Social History     Tobacco Use   • Smoking status: Never Smoker   • Smokeless tobacco: Never Used   Substance Use Topics   • Alcohol use: Yes     Comment: occ  social rare     Allergies   Allergen Reactions   • Codeine Tremor   • Morphine Tremor         Current Outpatient Medications:   •  Erenumab-aooe 140 MG/ML SOAJ, Inject 140 mg under the skin every 30 (thirty) days, Disp: 1 mL, Rfl: 11  •  levothyroxine (Synthroid) 50 mcg tablet, Take 1 tablet (50 mcg total) by mouth daily in the early morning, Disp: 90 tablet, Rfl: 3  • pantoprazole (PROTONIX) 40 mg tablet, Take 1 tablet (40 mg total) by mouth daily, Disp: 90 tablet, Rfl: 3  •  rizatriptan (MAXALT-MLT) 10 mg disintegrating tablet, Take 1 tablet (10 mg total) by mouth once as needed for migraine for up to 1 dose May repeat in 2 hours if needed, Disp: 10 tablet, Rfl: 3  •  rizatriptan (Maxalt-MLT) 5 mg disintegrating tablet, Take 1 tablet (5 mg total) by mouth as needed for migraine Take at the onset of migraine; if symptoms continue or return, may take another dose at least 2 hours after first dose  Take no more than 2 doses in a day , Disp: 6 tablet, Rfl: 2  •  topiramate (TOPAMAX) 50 MG tablet, Take 2 tablets (100 mg total) by mouth daily at bedtime, Disp: 60 tablet, Rfl: 5    Review of Systems   Constitutional: Positive for fatigue  HENT: Negative  Eyes: Negative  Respiratory: Negative  Cardiovascular: Negative  Gastrointestinal: Negative  Endocrine: Negative  Musculoskeletal: Negative  Skin: Negative  Allergic/Immunologic: Negative  Neurological: Negative  Hematological: Negative  Psychiatric/Behavioral: Negative  Physical Exam:  Body mass index is 33 12 kg/m²  /76 (BP Location: Left arm, Patient Position: Sitting, Cuff Size: Standard)   Pulse 80   Ht 4' 11" (1 499 m)   Wt 74 4 kg (164 lb)   BMI 33 12 kg/m²    Vitals:    10/18/22 1346   Weight: 74 4 kg (164 lb)        Physical Exam  Constitutional:       Appearance: She is well-developed  HENT:      Head: Normocephalic  Eyes:      Pupils: Pupils are equal, round, and reactive to light  Neck:      Thyroid: No thyromegaly  Cardiovascular:      Rate and Rhythm: Normal rate  Heart sounds: Normal heart sounds  Pulmonary:      Effort: Pulmonary effort is normal       Breath sounds: Normal breath sounds  Abdominal:      General: Bowel sounds are normal       Palpations: Abdomen is soft  Musculoskeletal:         General: No deformity        Cervical back: Normal range of motion  Skin:     Capillary Refill: Capillary refill takes less than 2 seconds  Coloration: Skin is not pale  Findings: No rash  Neurological:      Mental Status: She is alert and oriented to person, place, and time  Labs:   Lab Results   Component Value Date    HGBA1C 5 0 06/04/2022       Lab Results   Component Value Date    YGN3QZREPOCN 2 040 09/17/2022    P5XPBAC 0 90 09/17/2022       Lab Results   Component Value Date    CREATININE 0 66 05/01/2022    CREATININE 0 59 (L) 03/12/2022    CREATININE 0 69 12/14/2020    BUN 14 05/01/2022    K 4 1 05/01/2022     05/01/2022    CO2 23 05/01/2022     eGFR   Date Value Ref Range Status   05/01/2022 115 ml/min/1 73sq m Final       Lab Results   Component Value Date    ALT 17 05/01/2022    AST 9 05/01/2022    ALKPHOS 85 05/01/2022       Lab Results   Component Value Date    CHOLESTEROL 144 06/04/2022    CHOLESTEROL 129 03/12/2022     Lab Results   Component Value Date    HDL 55 06/04/2022    HDL 48 (L) 03/12/2022     Lab Results   Component Value Date    TRIG 51 06/04/2022    TRIG 52 03/12/2022     Lab Results   Component Value Date    [place] 89 06/04/2022    [place] 81 03/12/2022         Impression:  1  [facility] )    2  Pituitary microadenoma ([facility] )    3  Hypothyroidism, unspecified type         Plan:    Diagnoses and all orders for this visit:    Prolactinoma ([facility] )  Will monitor   -     Prolactin, Dilution Study; Future    Pituitary microadenoma ([facility] )  MRI shows stable lesion  Wait repeat MRI  She does not seem to have a functional adenoma  Will complete workup as listed  Follow up in 6 months  Hypothyroidism, unspecified type    Hirsutism  She has a normal testosterone level  DHEAS was elevated  ACTH was normal with a high normal cortisol  She may have PCOS but no clear hyperandrogenism  Will r/o hypercortisolism  Right lower quadrant abdominal pain  -     dicyclomine (BENTYL) 10 mg capsule;  Take 1 capsule (10 mg total) by mouth 4 (four) times a day (before meals and at bedtime)        I have spent 35 minutes with patient today in which greater than 50% of this time was spent in counseling/coordination of care  Discussed with the patient and all questioned fully answered  She will call me if any problems arise  Educated/ Counseled patient on diagnostic test results, prognosis, risk vs benefit of treatment options, importance of treatment compliance, healthy life and lifestyle choices        Tiesha Crews

## 2022-10-19 ENCOUNTER — HOSPITAL ENCOUNTER (OUTPATIENT)
Dept: SLEEP CENTER | Facility: CLINIC | Age: 37
Discharge: HOME | End: 2022-10-19
Payer: COMMERCIAL

## 2022-10-19 DIAGNOSIS — G47.33 OBSTRUCTIVE SLEEP APNEA (ADULT) (PEDIATRIC): ICD-10-CM

## 2022-10-19 PROCEDURE — G0399 HOME SLEEP TEST/TYPE 3 PORTA: HCPCS

## 2022-10-20 NOTE — PROGRESS NOTES
Home Sleep Study Documentation    HOME STUDY DEVICE: Noxturnal yes                                           Kiki G3 no      Pre-Sleep Home Study:    Set-up and instructions performed by: [facility]    Technician performed demonstration for Patient: yes    Return demonstration performed by Patient: yes    Written instructions provided to Patient: yes    Patient signed consent form: yes        Post-Sleep Home Study:    Additional comments by Patient: pending    Home Sleep Study Failed:pending    Failure reason: pending    Reported or Detected: pending    Scored by: pending

## 2022-10-28 ENCOUNTER — TELEPHONE (OUTPATIENT)
Dept: PAIN MEDICINE | Facility: CLINIC | Age: 37
End: 2022-10-28

## 2022-10-28 NOTE — TELEPHONE ENCOUNTER
Pt saw Dr Latisha Jolly in Neurology on 9/21/22 where referral to [facility] for TPI was suggested  Pt not current SPA pt  Needs consult first?  TPI after of during consult? Pls advise  Thank you!

## 2022-10-28 NOTE — TELEPHONE ENCOUNTER
Caller: patient    Doctor: Veronika Hernandez    Reason for call: patient was referred to [facility] for TPI from neurology  Schedule consult or TPI upfront?     Call back#: [phone]

## 2022-10-31 NOTE — TELEPHONE ENCOUNTER
The patient needs to be seen in consultation 1st   If trigger point injection is indicated, this will be offered on a subsequent date after the consultation

## 2022-11-02 ENCOUNTER — TELEPHONE (OUTPATIENT)
Dept: SLEEP CENTER | Facility: CLINIC | Age: 37
End: 2022-11-02

## 2022-11-02 NOTE — TELEPHONE ENCOUNTER
----- Message from No Cuevas MD sent at 11/1/2022  1:44 PM EDT -----  Approved  ----- Message -----  From: Ilene Perales  Sent: 5/24/3086   9:42 AM EDT  To: Sleep [facility] [place] Provider    This home sleep study needs approval      If approved please sign and return to clerical pool  If denied please include reasons why  Also provide alternative testing if warranted  Please sign and return to clerical pool

## 2022-11-02 NOTE — TELEPHONE ENCOUNTER
Left message for the patient to call back for sleep study results  No DANYELLE may be false negative   Patient needs to be scheduled for a consult with Dr Alexandro Bird

## 2022-12-14 ENCOUNTER — TELEPHONE (OUTPATIENT)
Dept: NEUROLOGY | Facility: CLINIC | Age: 37
End: 2022-12-14

## 2022-12-14 NOTE — TELEPHONE ENCOUNTER
Call complete to pt   Pt requested we cancel appt   Unable to come in outside of 12:30 on Wednesdays   Put pt on waitlist for Wednesdays at 12:30

## 2022-12-16 ENCOUNTER — CONSULT (OUTPATIENT)
Dept: PAIN MEDICINE | Facility: CLINIC | Age: 37
End: 2022-12-16

## 2022-12-16 VITALS
HEIGHT: 59 IN | WEIGHT: 162 LBS | DIASTOLIC BLOOD PRESSURE: 68 MMHG | BODY MASS INDEX: 32.66 KG/M2 | HEART RATE: 80 BPM | SYSTOLIC BLOOD PRESSURE: 93 MMHG

## 2022-12-16 DIAGNOSIS — M54.9 MID BACK PAIN: ICD-10-CM

## 2022-12-16 DIAGNOSIS — M79.18 MYOFASCIAL PAIN SYNDROME: ICD-10-CM

## 2022-12-16 DIAGNOSIS — M54.2 NECK PAIN: Primary | ICD-10-CM

## 2022-12-16 NOTE — PROGRESS NOTES
Assessment  1  Neck pain    2  Mid back pain    3  Myofascial pain syndrome        Plan  29-year-old female with a history of Chiari I malformation, pituitary microadenoma, and chronic headaches, referred by Dr Jerry Portillo, presenting for initial consultation regarding a longstanding history of neck pain radiating into bilateral trapezii and into the scapular and mid thoracic region  He does occasionally feel some numbness and paresthesias into the trapezii  She very rarely feels some weakness in left  strength is very intermittent and self-limited  She has not experienced this in a few months  He denies any trauma or inciting event  She is treated by neurology for chronic headaches  CTA of the head and neck from May 2022 was essentially unremarkable  No significant degenerative changes of the cervical spine noted  MRI of the brain from 2022 demonstrates cerebellar tonsillar ectopia measuring 5 mm below the foramen suggesting Chiari I malformation with normal CSF flow  She has received Botox injections which actually aggravated her headaches  She is currently taking Maxalt and Vyepti for headache   She also takes Topamax 100 mg daily  He has been in physical therapy, chiropractic treatment, tried Tylenol, NSAIDs, and muscle relaxants all without any significant relief  Patient's neck and mid back pain seem to be largely myofascial in nature which is likely causing cervicogenic and tension type headaches or at least contributing in some form to her headache patterns  No evidence of radiculopathy or myelopathy on exam     1   I will schedule the patient for trigger point injections into bilateral cervical paraspinal musculature, bilateral trapezii, and bilateral thoracic paraspinal musculature  2  Patient will continue with her home exercise program  3    I will follow-up with the patient in 6 weeks      Complete risks and benefits including bleeding, infection, tissue reaction, nerve injury and allergic reaction were discussed  The approach was demonstrated using models and literature was provided  Verbal and written consent was obtained  My impressions and treatment recommendations were discussed in detail with the patient who verbalized understanding and had no further questions  Discharge instructions were provided  I personally saw and examined the patient and I agree with the above discussed plan of care  No orders of the defined types were placed in this encounter  No orders of the defined types were placed in this encounter  History of Present Illness    Herlinda Merrill is a 30 y o  female with a history of Chiari I malformation, pituitary microadenoma, and chronic headaches, referred by Dr Hiren Bender, presenting for initial consultation regarding a longstanding history of neck pain radiating into bilateral trapezii and into the scapular and mid thoracic region  He does occasionally feel some numbness and paresthesias into the trapezii  She very rarely feels some weakness in left  strength is very intermittent and self-limited  She has not experienced this in a few months  He denies any trauma or inciting event  She is treated by neurology for chronic headaches  She denies any bladder or bowel incontinence or balance issues  CTA of the head and neck from May 2022 was essentially unremarkable  No significant degenerative changes of the cervical spine noted  MRI of the brain from 2022 demonstrates cerebellar tonsillar ectopia measuring 5 mm below the foramen suggesting Chiari I malformation with normal CSF flow  She has received Botox injections which actually aggravated her headaches  She is currently taking Maxalt and Vyepti for headache   She also takes Topamax 100 mg daily  He has been in physical therapy, chiropractic treatment, tried Tylenol, NSAIDs, and muscle relaxants all without any significant relief  The patient rates the pain is nearly constant  The pain does not follow any plica pattern throughout the day  The pain is described as burning, shooting, numbness, sharp, pins-and-needles, pressure-like, throbbing, dull, and aching  The pain is increased with lying down, standing, bending, and sitting  She does find minimal relief with relaxation and transient relief with massages  Other than as stated above, the patient denies any interval changes in medications, medical condition, mental condition, symptoms, or allergies since the last office visit  I have personally reviewed and/or updated the patient's past medical history, past surgical history, family history, social history, current medications, allergies, and vital signs today       Review of Systems    Patient Active Problem List   Diagnosis   • Headache   • Lupus erythematosus   • Elevated ferritin   • History of Chiari malformation   • History of gastric bypass   • History of prolactinoma   • Pericardial cyst   • Irregular periods   • Hypothyroidism   • Muscle spasm   • Prolactinoma (HCC)   • Chiari I malformation (HCC)   • Pituitary microadenoma (HCC)   • Encounter for annual routine gynecological examination   • History of 2  sections   • Secondary oligomenorrhea   • Menorrhagia with irregular cycle   • Dysmenorrhea   • Cerebellar tonsillar ectopia (HCC)   • Obstructive sleep apnea (adult) (pediatric)       Past Medical History:   Diagnosis Date   • Abnormal Pap smear of cervix    • Anemia     gets iron infusions   • Anxiety    • Chiari I malformation (HCC)    • Chronic pain disorder     during lupus flairs   • COVID-19    • Disease of thyroid gland     pt off meds   • Female infertility     IVF pregnancy   • Fibromyalgia    • Fibromyalgia, primary    • Gastric ulcer    • GERD (gastroesophageal reflux disease)    • Hiatal hernia    • Lupus (HCC)    • Muscle weakness    • Pericardial cyst    • Pituitary tumor    • Pleural effusion associated with pulmonary infection    • Polycystic ovary syndrome    • Pulmonary edema    • Pulmonary emboli (HCC)    • Spinal headache     with c section       Past Surgical History:   Procedure Laterality Date   •  SECTION      x 2   • CHOLECYSTECTOMY     • GASTRECTOMY SLEEVE LAPAROSCOPIC     • GASTRIC BYPASS     • POLYPECTOMY     • TONSILLECTOMY     • TUBAL LIGATION         Family History   Problem Relation Age of Onset   • Heart disease Mother    • Hypertension Mother    • Heart attack Father    • No Known Problems Brother    • No Known Problems Daughter    • No Known Problems Son    • Heart disease Maternal Grandmother    • Hypertension Maternal Grandmother    • Diabetes Maternal Grandmother    • Early death Maternal Grandfather    • No Known Problems Paternal Grandmother    • No Known Problems Paternal Grandfather    • No Known Problems Brother        Social History     Occupational History   • Not on file   Tobacco Use   • Smoking status: Never   • Smokeless tobacco: Never   Vaping Use   • Vaping Use: Never used   Substance and Sexual Activity   • Alcohol use: Yes     Comment: occ  social rare   • Drug use: Never   • Sexual activity: Yes     Partners: Male     Birth control/protection: Female Sterilization       Current Outpatient Medications on File Prior to Visit   Medication Sig   • dexamethasone (DECADRON) 1 mg tablet Take 1 tab at 11pm and get cortisol levels checked between 7:00 a m  and 9:00 a m  on the next morning     • dicyclomine (BENTYL) 10 mg capsule Take 1 capsule (10 mg total) by mouth 4 (four) times a day (before meals and at bedtime)   • Erenumab-aooe 140 MG/ML SOAJ Inject 140 mg under the skin every 30 (thirty) days   • levothyroxine (Synthroid) 50 mcg tablet Take 1 tablet (50 mcg total) by mouth daily in the early morning   • pantoprazole (PROTONIX) 40 mg tablet Take 1 tablet (40 mg total) by mouth daily   • rizatriptan (MAXALT-MLT) 10 mg disintegrating tablet Take 1 tablet (10 mg total) by mouth once as needed for migraine for up to 1 dose May repeat in 2 hours if needed   • rizatriptan (Maxalt-MLT) 5 mg disintegrating tablet Take 1 tablet (5 mg total) by mouth as needed for migraine Take at the onset of migraine; if symptoms continue or return, may take another dose at least 2 hours after first dose  Take no more than 2 doses in a day  • topiramate (TOPAMAX) 50 MG tablet Take 2 tablets (100 mg total) by mouth daily at bedtime     No current facility-administered medications on file prior to visit  Allergies   Allergen Reactions   • Codeine Tremor   • Morphine Tremor       Physical Exam    BP 93/68   Pulse 80   Ht 4' 11" (1 499 m)   Wt 73 5 kg (162 lb)   BMI 32 72 kg/m²     Constitutional: normal, well developed, well nourished, alert, in no distress and non-toxic and no overt pain behavior  Eyes: anicteric  HEENT: grossly intact  Neck: supple, symmetric, trachea midline and no masses   Pulmonary:even and unlabored  Cardiovascular:No edema or pitting edema present  Skin:Normal without rashes or lesions and well hydrated  Psychiatric:Mood and affect appropriate  Neurologic:Cranial Nerves II-XII grossly intact  Musculoskeletal:normal gait  Bilateral cervical paraspinals and trapezii tender to palpation ropey texture  Bilateral thoracic paraspinal musculature and rhomboids tender to palpation and ropey texture  Full range of motion of cervical spine in all planes  Bilateral biceps, triceps, and brachioradialis reflexes were 2 out of 4 and symmetrical   Negative Timo's reflex bilaterally  Bilateral upper extremity strength 5 out of 5 in all muscular's  Sensation tact light touch in C5-T1 dermatomes bilaterally  Negative Spurling's bilaterally      Imaging    Study Result    Narrative & Impression   MRI BRAIN WITHOUT CONTRAST     INDICATION: [id number]: Personal history of other diseases of the nervous system and sense organs      COMPARISON:   Brain MRI 6/4/2022     TECHNIQUE:  Sagittal T1, axial T2, axial FLAIR, axial T1, axial [place]on and axial diffusion imaging      IMAGE QUALITY:  Diagnostic      FINDINGS:     BRAIN PARENCHYMA:  There is no discrete mass, mass effect or midline shift  There is no intracranial hemorrhage  Diffusion imaging is unremarkable  There are no white matter changes in the cerebral hemispheres           Cerebellar tonsillar ectopia measuring 5 mm below foramen of magnum on the right  CSF flow study demonstrates normal flow in the skull base/foraminal magnum      VENTRICLES:  Normal for the patient's age      SELLA AND PITUITARY GLAND:  Known pituitary microadenoma cannot be reevaluated due to limited study by protocol and lack of IV contrast      ORBITS:  Normal      PARANASAL SINUSES:  Normal      VASCULATURE:  Evaluation of the major intracranial vasculature demonstrates appropriate flow voids      CALVARIUM AND SKULL BASE:  Normal      EXTRACRANIAL SOFT TISSUES:  Normal      IMPRESSION:     1  Mild cerebellar tonsillar ectopia measuring 5 mm below foramen of magnum suggesting Chiari I malformation  Normal CSF flow study      2    Unable to evaluate known pituitary microadenoma since exam is limited by protocol and absent IV contrast      Workstation performed: [id number]

## 2022-12-21 ENCOUNTER — TELEPHONE (OUTPATIENT)
Dept: PLASTIC SURGERY | Facility: CLINIC | Age: 37
End: 2022-12-21

## 2022-12-21 NOTE — TELEPHONE ENCOUNTER
Tried to call in regards to her appointment with Dr Rashad Walker on January 20th but voicemail is full and unable to leave voicemail  Emailed patient that I had to cancel her appointment that she made with Dr Rashad Walker due to schedule change and attached criteria for panniculectomy consultation  I did not see any documentation in the chart and will discuss with patient when she returns call

## 2022-12-28 ENCOUNTER — TELEPHONE (OUTPATIENT)
Dept: NEUROLOGY | Facility: CLINIC | Age: 37
End: 2022-12-28

## 2022-12-28 NOTE — TELEPHONE ENCOUNTER
Call complete to pt offering earlier appt time off the waitlist on 12/29 at 4 PM   Discussed time, date, location, and provider   Pt stated they would call back to let us know if spot works for them   Put on hold with patient's name

## 2023-01-03 ENCOUNTER — TELEPHONE (OUTPATIENT)
Dept: PLASTIC SURGERY | Facility: CLINIC | Age: 38
End: 2023-01-03

## 2023-01-03 NOTE — TELEPHONE ENCOUNTER
Lm for patient to call to discuss criteria for panniculectomy consultation  At this time I do not see any documentation, will discuss when patient calls

## 2023-01-04 ENCOUNTER — TELEPHONE (OUTPATIENT)
Dept: PLASTIC SURGERY | Facility: CLINIC | Age: 38
End: 2023-01-04

## 2023-01-04 DIAGNOSIS — B37.9 YEAST INFECTION: Primary | ICD-10-CM

## 2023-01-04 RX ORDER — NYSTATIN 100000 [USP'U]/G
POWDER TOPICAL 2 TIMES DAILY
Qty: 60 G | Refills: 1 | Status: SHIPPED | OUTPATIENT
Start: 2023-01-04 | End: 2023-08-16

## 2023-01-04 NOTE — TELEPHONE ENCOUNTER
Spoke to patient and reviewed criteria for panniculectomy consultation  Patient will let me know when she has 3 months of documentation so we can schedule the consultation

## 2023-01-26 ENCOUNTER — PROCEDURE VISIT (OUTPATIENT)
Dept: PAIN MEDICINE | Facility: CLINIC | Age: 38
End: 2023-01-26

## 2023-01-26 VITALS
BODY MASS INDEX: 32.66 KG/M2 | WEIGHT: 162 LBS | HEART RATE: 74 BPM | DIASTOLIC BLOOD PRESSURE: 78 MMHG | SYSTOLIC BLOOD PRESSURE: 116 MMHG | HEIGHT: 59 IN

## 2023-01-26 DIAGNOSIS — M79.18 MYOFASCIAL PAIN SYNDROME: Primary | ICD-10-CM

## 2023-01-26 RX ORDER — BUPIVACAINE HYDROCHLORIDE 2.5 MG/ML
10 INJECTION, SOLUTION EPIDURAL; INFILTRATION; INTRACAUDAL ONCE
Status: COMPLETED | OUTPATIENT
Start: 2023-01-26 | End: 2023-01-26

## 2023-01-26 RX ORDER — TRIAMCINOLONE ACETONIDE 40 MG/ML
40 INJECTION, SUSPENSION INTRA-ARTICULAR; INTRAMUSCULAR ONCE
Status: COMPLETED | OUTPATIENT
Start: 2023-01-26 | End: 2023-01-26

## 2023-01-26 RX ADMIN — BUPIVACAINE HYDROCHLORIDE 10 ML: 2.5 INJECTION, SOLUTION EPIDURAL; INFILTRATION; INTRACAUDAL at 15:21

## 2023-01-26 RX ADMIN — TRIAMCINOLONE ACETONIDE 40 MG: 40 INJECTION, SUSPENSION INTRA-ARTICULAR; INTRAMUSCULAR at 15:22

## 2023-01-26 NOTE — PROGRESS NOTES
45-year-old female with a history of chronic myofascial pain presenting for trigger point injections into bilateral cervical paraspinal musculature, bilateral thoracic paraspinal musculature, and bilateral rhomboids  After discussing the risks, benefits, and alternatives to the procedure, the patient expressed understanding and wished to proceed  Procedural pause conducted to verify: correct patient identity, procedure to be performed and as applicable, correct side and site, correct patient position, and availability of implants, special equipment and special requirements  The appropriate hyper irritable musculoskeletal tender points were marked with a sterile pen, prepped with alcohol and sterilely draped  After appropriate reproduction of pain at each of the tender points marked, a total of 10 mL of 0 25% ropivacaine and 40 mg of triamcinolone was injected after negative aspiration of air, CSF, heme, or other bodily fluids with a 1 5 inch 25-gauge needle  A total number of 3 muscle groups were injected  The patient was discharged with no apparent complications on their own power after an appropriate observation

## 2023-02-02 ENCOUNTER — TELEPHONE (OUTPATIENT)
Dept: PAIN MEDICINE | Facility: CLINIC | Age: 38
End: 2023-02-02

## 2023-02-20 ENCOUNTER — TELEPHONE (OUTPATIENT)
Dept: ENDOCRINOLOGY | Facility: CLINIC | Age: 38
End: 2023-02-20

## 2023-02-21 ENCOUNTER — OFFICE VISIT (OUTPATIENT)
Dept: ENDOCRINOLOGY | Facility: CLINIC | Age: 38
End: 2023-02-21

## 2023-02-21 ENCOUNTER — TELEPHONE (OUTPATIENT)
Dept: ENDOCRINOLOGY | Facility: CLINIC | Age: 38
End: 2023-02-21

## 2023-02-21 VITALS
DIASTOLIC BLOOD PRESSURE: 72 MMHG | SYSTOLIC BLOOD PRESSURE: 118 MMHG | BODY MASS INDEX: 33.73 KG/M2 | WEIGHT: 167 LBS | HEART RATE: 71 BPM

## 2023-02-21 DIAGNOSIS — D35.2 PITUITARY MICROADENOMA (HCC): ICD-10-CM

## 2023-02-21 DIAGNOSIS — E16.1 REACTIVE HYPOGLYCEMIA: Primary | ICD-10-CM

## 2023-02-21 DIAGNOSIS — E03.9 HYPOTHYROIDISM, UNSPECIFIED TYPE: ICD-10-CM

## 2023-02-21 DIAGNOSIS — D35.2 PROLACTINOMA (HCC): ICD-10-CM

## 2023-02-21 DIAGNOSIS — E16.2 HYPOGLYCEMIA: ICD-10-CM

## 2023-02-21 DIAGNOSIS — Z98.84 HISTORY OF WEIGHT LOSS SURGERY: ICD-10-CM

## 2023-02-21 RX ORDER — ACARBOSE 25 MG/1
25 TABLET ORAL
Qty: 90 TABLET | Refills: 3 | Status: SHIPPED | OUTPATIENT
Start: 2023-02-21

## 2023-02-21 NOTE — TELEPHONE ENCOUNTER
Patient called and said she checked with her ins and her continuous glucose monitor will be covered by her ins  Please call patient   Thank you

## 2023-02-21 NOTE — PROGRESS NOTES
2/21/2023    Assessment/Plan     #Hypoglycemia  [facility] +  Likely reactive hypoglycemia given history of bariatric procedures in the past  Patient advised to keep a food diary, check glucose levels during episodes of hypoglycemia and document meals  Recommend high-protein meals, take peanut butter with each meal  Start on acarbose 25 mg 3 times daily with meals  Check fasting glucose, fasting insulin levels  CGM trial pending insurance approval  Referral to dietitian for CGM placement and diet education    #Hyperprolactinemia  Check prolactin level    Office follow up in 4 weeks    CC: Hypoglycemia    History of Present Illness     HPI: Keith Nugent is a 30y o  year old female with history of sleeve gastrectomy with subsequent RYGB  done 3 years ago complicated by ulcers, reflux and hernia, pituitary adenoma/prolactinoma, history of lupus, pericarditis 2020, hypothyroidism, PCOS, endometrial polypectomy, Chiari I malformation, fibromyalgia and vitamin D deficiency seen at follow-up  Last office visit was in October 2022 by Dr Konrad Stpales  Patient presents to the office today with complaints of frequent episodes of hypoglycemia  Patient reports symptoms dates back to about 2 years ago with reported diaphoresis, jitteriness, dizziness/lightheadedness and severe hunger  Symptoms reportedly occur at any time of the day, with some episodes waking her up from sleep  She recently decided to get a glucometer (as someone suggested symptoms may be related to low sugars), glucose check during an episode of 42 mg/dl and another was 20 mg/dl  Symptoms typically resolve with meals  Denies any prior episodes of loss of consciousness  Last A1c checked A1c was 5 0  Following her repeat bariatric surgery, she lost 60lbs, more recently has noticed fluctuations in weight  Hyperprolactinemia: Last prolactin level was 24 4  Pituitary MRI in 06/2022 showed 3 x 4mm microadenoma   Reports chronic history of irregular periods  She has 2 kids  ROS  Constitutional: Negative for appetite change  Negative for activity change  Respiratory: Negative for shortness of breath, wheezing, cough  Cardiovascular: Negative for chest pain and palpitations  Gastrointestinal: Negative for abdominal pain, nausea and vomiting  Negative for diarrhea or constipation  Musculoskeletal: Negative for arthralgias  Neurological: Negative for dizziness, light-headedness and headaches  All other ROS reviewed and negative      Historical Information   Past Medical History:   Diagnosis Date   • Abnormal Pap smear of cervix    • Anemia     gets iron infusions   • Anxiety    • Chiari I malformation (HCC)    • Chronic pain disorder     during lupus flairs   • COVID-19    • Disease of thyroid gland     pt off meds   • Female infertility     IVF pregnancy   • Fibromyalgia    • Fibromyalgia, primary    • Gastric ulcer    • GERD (gastroesophageal reflux disease)    • Hiatal hernia    • Lupus (HCC)    • Muscle weakness    • Pericardial cyst    • Pituitary tumor    • Pleural effusion associated with pulmonary infection    • Polycystic ovary syndrome    • Pulmonary edema    • Pulmonary emboli (HCC)    • Spinal headache     with c section     Past Surgical History:   Procedure Laterality Date   •  SECTION      x 2   • CHOLECYSTECTOMY     • GASTRECTOMY SLEEVE LAPAROSCOPIC     • GASTRIC BYPASS     • POLYPECTOMY     • TONSILLECTOMY     • TUBAL LIGATION       Social History   Social History     Substance and Sexual Activity   Alcohol Use Yes    Comment: occ  social rare     Social History     Substance and Sexual Activity   Drug Use Never     Social History     Tobacco Use   Smoking Status Never   Smokeless Tobacco Never     Family History:   Family History   Problem Relation Age of Onset   • Heart disease Mother    • Hypertension Mother    • Heart attack Father    • No Known Problems Brother    • No Known Problems Daughter    • No Known Problems Son    • Heart disease Maternal Grandmother    • Hypertension Maternal Grandmother    • Diabetes Maternal Grandmother    • Early death Maternal Grandfather    • No Known Problems Paternal Grandmother    • No Known Problems Paternal Grandfather    • No Known Problems Brother        Meds/Allergies   Current Outpatient Medications   Medication Sig Dispense Refill   • acarbose (PRECOSE) 25 mg tablet Take 1 tablet (25 mg total) by mouth 3 (three) times a day with meals 90 tablet 3   • dexamethasone (DECADRON) 1 mg tablet Take 1 tab at 11pm and get cortisol levels checked between 7:00 a m  and 9:00 a m  on the next morning  1 tablet 0   • dicyclomine (BENTYL) 10 mg capsule Take 1 capsule (10 mg total) by mouth 4 (four) times a day (before meals and at bedtime) 30 capsule 0   • Erenumab-aooe 140 MG/ML SOAJ Inject 140 mg under the skin every 30 (thirty) days 1 mL 11   • levothyroxine (Synthroid) 50 mcg tablet Take 1 tablet (50 mcg total) by mouth daily in the early morning 90 tablet 3   • nystatin (MYCOSTATIN) powder Apply topically 2 (two) times a day 60 g 1   • pantoprazole (PROTONIX) 40 mg tablet Take 1 tablet (40 mg total) by mouth daily 90 tablet 3   • rizatriptan (MAXALT-MLT) 10 mg disintegrating tablet Take 1 tablet (10 mg total) by mouth once as needed for migraine for up to 1 dose May repeat in 2 hours if needed 10 tablet 3   • rizatriptan (Maxalt-MLT) 5 mg disintegrating tablet Take 1 tablet (5 mg total) by mouth as needed for migraine Take at the onset of migraine; if symptoms continue or return, may take another dose at least 2 hours after first dose  Take no more than 2 doses in a day  6 tablet 2   • topiramate (TOPAMAX) 50 MG tablet Take 2 tablets (100 mg total) by mouth daily at bedtime 60 tablet 5     No current facility-administered medications for this visit       Allergies   Allergen Reactions   • Codeine Tremor   • Morphine Tremor       Objective   Vitals: Blood pressure 118/72, pulse 71, weight 75 8 kg (167 lb), not currently breastfeeding  Invasive Devices     None                 Physical Exam   Physical exam:   Constitutional:Oriented to person, place, and time  Appears well-developed and well-nourished  Not in any acute distress  HENT:   Head: Normocephalic and atraumatic  Neck: Normal range of motion  Supple, No thyromegaly  Pulmonary/Chest: Effort normal/ breathing comfortably on room air  CTAB   CVS:  Regular rate and rhythm, S1-S2 +  Abdomen: soft, nondistended, nontender  Musculoskeletal: Normal range of motion  Skin:  Warm, no rash  Extremities:  No pedal edema  Psychiatric: Normal mood and affect  Behavior is normal        The history was obtained from the review of the chart and from the patient      Lab Results:      Recent Results (from the past 80442 hour(s))   COVID/FLU- Office Collect    Collection Time: 12/31/21  9:34 AM    Specimen: Nose; Nares   Result Value Ref Range    SARS-CoV-2 Negative Negative    INFLUENZA A PCR Negative Negative    INFLUENZA B PCR Negative Negative   POCT rapid strepA    Collection Time: 12/31/21  9:57 AM   Result Value Ref Range     RAPID STREP A Positive (A) Negative   Poct Covid 19 Rapid Antigen Test    Collection Time: 02/11/22  5:27 PM   Result Value Ref Range    POCT SARS-CoV-2 Ag Positive (A) Negative    VALID CONTROL Valid    Prolactin    Collection Time: 03/12/22  7:38 AM   Result Value Ref Range    Prolactin 32 3   ng/mL   Sedimentation rate, automated    Collection Time: 03/12/22  7:38 AM   Result Value Ref Range    Sed Rate 9 0 - 19 mm/hour   CBC and differential    Collection Time: 03/12/22  7:38 AM   Result Value Ref Range    WBC 5 86 4 31 - 10 16 Thousand/uL    RBC 4 37 3 81 - 5 12 Million/uL    Hemoglobin 13 0 11 5 - 15 4 g/dL    Hematocrit 36 1 34 8 - 46 1 %    MCV 83 82 - 98 fL    MCH 29 7 26 8 - 34 3 pg    MCHC 36 0 31 4 - 37 4 g/dL    RDW 13 2 11 6 - 15 1 %    MPV 9 7 8 9 - 12 7 fL    Platelets 149 894 - 098 Thousands/uL    nRBC 0 /100 WBCs Neutrophils Relative 45 43 - 75 %    Immat GRANS % 0 0 - 2 %    Lymphocytes Relative 36 14 - 44 %    Monocytes Relative 7 4 - 12 %    Eosinophils Relative 11 (H) 0 - 6 %    Basophils Relative 1 0 - 1 %    Neutrophils Absolute 2 62 1 85 - 7 62 Thousands/µL    Immature Grans Absolute 0 01 0 00 - 0 20 Thousand/uL    Lymphocytes Absolute 2 12 0 60 - 4 47 Thousands/µL    Monocytes Absolute 0 40 0 17 - 1 22 Thousand/µL    Eosinophils Absolute 0 65 (H) 0 00 - 0 61 Thousand/µL    Basophils Absolute 0 06 0 00 - 0 10 Thousands/µL   Comprehensive metabolic panel    Collection Time: 03/12/22  7:38 AM   Result Value Ref Range    Sodium 142 136 - 145 mmol/L    Potassium 3 8 3 5 - 5 3 mmol/L    Chloride 113 (H) 100 - 108 mmol/L    CO2 24 21 - 32 mmol/L    ANION GAP 5 4 - 13 mmol/L    BUN 7 5 - 25 mg/dL    Creatinine 0 59 (L) 0 60 - 1 30 mg/dL    Glucose, Fasting 94 65 - 99 mg/dL    Calcium 8 7 8 3 - 10 1 mg/dL    AST 11 5 - 45 U/L    ALT 18 12 - 78 U/L    Alkaline Phosphatase 98 46 - 116 U/L    Total Protein 6 9 6 4 - 8 2 g/dL    Albumin 3 6 3 5 - 5 0 g/dL    Total Bilirubin 0 46 0 20 - 1 00 mg/dL    eGFR 119 ml/min/1 73sq m   Lipid panel    Collection Time: 03/12/22  7:38 AM   Result Value Ref Range    Cholesterol 129 See Comment mg/dL    Triglycerides 52 See Comment mg/dL    HDL, Direct 48 (L) >=50 mg/dL    LDL Calculated 71 0 - 100 mg/dL    Non-HDL-Chol (CHOL-HDL) 81 mg/dl   T4, free    Collection Time: 03/12/22  7:38 AM   Result Value Ref Range    Free T4 1 03 0 76 - 1 46 ng/dL   Ferritin    Collection Time: 03/12/22  7:38 AM   Result Value Ref Range    Ferritin 179 8 - 388 ng/mL   Iron    Collection Time: 03/12/22  7:38 AM   Result Value Ref Range    Iron 91 50 - 170 ug/dL   Vitamin D 25 hydroxy    Collection Time: 03/12/22  7:38 AM   Result Value Ref Range    Vit D, 25-Hydroxy 26 2 (L) 30 0 - 100 0 ng/mL   Folate    Collection Time: 03/12/22  7:38 AM   Result Value Ref Range    Folate 17 9 (H) 3 1 - 17 5 ng/mL   Vitamin B12 Collection Time: 03/12/22  7:38 AM   Result Value Ref Range    Vitamin B-12 499 100 - 900 pg/mL   CBC and differential    Collection Time: 05/01/22 12:34 AM   Result Value Ref Range    WBC 10 28 (H) 4 31 - 10 16 Thousand/uL    RBC 4 34 3 81 - 5 12 Million/uL    Hemoglobin 12 6 11 5 - 15 4 g/dL    Hematocrit 38 7 34 8 - 46 1 %    MCV 89 82 - 98 fL    MCH 29 0 26 8 - 34 3 pg    MCHC 32 6 31 4 - 37 4 g/dL    RDW 13 2 11 6 - 15 1 %    MPV 9 8 8 9 - 12 7 fL    Platelets 212 549 - 952 Thousands/uL    nRBC 0 /100 WBCs    Neutrophils Relative 41 (L) 43 - 75 %    Immat GRANS % 0 0 - 2 %    Lymphocytes Relative 36 14 - 44 %    Monocytes Relative 9 4 - 12 %    Eosinophils Relative 13 (H) 0 - 6 %    Basophils Relative 1 0 - 1 %    Neutrophils Absolute 4 21 1 85 - 7 62 Thousands/µL    Immature Grans Absolute 0 02 0 00 - 0 20 Thousand/uL    Lymphocytes Absolute 3 69 0 60 - 4 47 Thousands/µL    Monocytes Absolute 0 94 0 17 - 1 22 Thousand/µL    Eosinophils Absolute 1 33 (H) 0 00 - 0 61 Thousand/µL    Basophils Absolute 0 09 0 00 - 0 10 Thousands/µL   Comprehensive metabolic panel    Collection Time: 05/01/22 12:34 AM   Result Value Ref Range    Sodium 140 136 - 145 mmol/L    Potassium 4 1 3 5 - 5 3 mmol/L    Chloride 108 100 - 108 mmol/L    CO2 23 21 - 32 mmol/L    ANION GAP 9 4 - 13 mmol/L    BUN 14 5 - 25 mg/dL    Creatinine 0 66 0 60 - 1 30 mg/dL    Glucose 96 65 - 140 mg/dL    Calcium 8 1 (L) 8 3 - 10 1 mg/dL    AST 9 5 - 45 U/L    ALT 17 12 - 78 U/L    Alkaline Phosphatase 85 46 - 116 U/L    Total Protein 6 8 6 4 - 8 2 g/dL    Albumin 3 5 3 5 - 5 0 g/dL    Total Bilirubin 0 22 0 20 - 1 00 mg/dL    eGFR 115 ml/min/1 73sq m   TSH    Collection Time: 05/01/22 12:34 AM   Result Value Ref Range    TSH 3RD GENERATON 8 065 (H) 0 450 - 4 500 uIU/mL   Urine Macroscopic, POC    Collection Time: 05/01/22 12:47 AM   Result Value Ref Range    Color, UA Yellow     Clarity, UA Clear     pH, UA 7 0 4 5 - 8 0    Leukocytes, UA Negative Negative    Nitrite, UA Negative Negative    Protein, UA Negative Negative mg/dl    Glucose, UA Negative Negative mg/dl    Ketones, UA Negative Negative mg/dl    Urobilinogen, UA 1 0 0 2, 1 0 E U /dl E U /dl    Bilirubin, UA Negative Negative    Occult Blood, UA Negative Negative    Specific Gravity, UA 1 025 1 003 - 1 030   TSH, 3rd generation with Free T4 reflex    Collection Time: 06/04/22  7:47 AM   Result Value Ref Range    TSH 3RD GENERATON 2 440 0 450 - 4 500 uIU/mL   T4, free    Collection Time: 06/04/22  7:47 AM   Result Value Ref Range    Free T4 0 97 0 76 - 1 46 ng/dL   Lipid panel    Collection Time: 06/04/22  7:47 AM   Result Value Ref Range    Cholesterol 144 See Comment mg/dL    Triglycerides 51 See Comment mg/dL    HDL, Direct 55 >=50 mg/dL    LDL Calculated 79 0 - 100 mg/dL    Non-HDL-Chol (CHOL-HDL) 89 mg/dl   Hemoglobin A1C    Collection Time: 06/04/22  7:47 AM   Result Value Ref Range    Hemoglobin A1C 5 0 Normal 3 8-5 6%; PreDiabetic 5 7-6 4%;  Diabetic >=6 5%; Glycemic control for adults with diabetes <7 0% %    EAG 97 mg/dl   Estradiol    Collection Time: 09/17/22  8:06 AM   Result Value Ref Range    Estradiol 49 0   pg/mL   Cortisol Level, AM Specimen    Collection Time: 09/17/22  8:06 AM   Result Value Ref Range    Cortisol - AM 19 8 4 2 - 22 4 ug/dL   Insulin-like growth factor 1 (IGF-1)    Collection Time: 09/17/22  8:06 AM   Result Value Ref Range    Insulin-Like GF-1 161 84 - 281 ng/mL   Luteinizing hormone    Collection Time: 09/17/22  8:06 AM   Result Value Ref Range    LH 16 2   mIU/mL   Follicle stimulating hormone    Collection Time: 09/17/22  8:06 AM   Result Value Ref Range    FSH 5 7   mIU/mL   Prolactin    Collection Time: 09/17/22  8:06 AM   Result Value Ref Range    Prolactin 24 4   ng/mL   Testosterone, free, total    Collection Time: 09/17/22  8:06 AM   Result Value Ref Range    Testosterone, Free 4 0 0 0 - 4 2 pg/mL    TESTOSTERONE TOTAL 34 8 - 60 ng/dL   ACTH    Collection Time: 09/17/22  8:06 AM   Result Value Ref Range    ACTH 18 4 7 2 - 63 3 pg/mL   T4, free    Collection Time: 09/17/22  8:06 AM   Result Value Ref Range    Free T4 1 03 0 76 - 1 46 ng/dL   TSH, 3rd generation    Collection Time: 09/17/22  8:06 AM   Result Value Ref Range    TSH 3RD GENERATON 2 040 0 450 - 4 500 uIU/mL   DHEA-sulfate    Collection Time: 09/17/22  8:06 AM   Result Value Ref Range    DHEA-SO4 371 0 (H) 57 3 - 279 2 ug/dL   T3    Collection Time: 09/17/22  8:06 AM   Result Value Ref Range    T3, Total 0 90 0 60 - 1 80 ng/mL   Sex Hormone Binding Globulin    Collection Time: 09/17/22  8:06 AM   Result Value Ref Range    Sex Hormone Binding 50 3 24 6 - 122 0 nmol/L   Anti-microsomal antibody    Collection Time: 09/17/22  8:06 AM   Result Value Ref Range    THYROID MICROSOMAL ANTIBODY <8 0 - 34 IU/mL   Anti-thyroglobulin antibody    Collection Time: 09/17/22  8:06 AM   Result Value Ref Range    Thyroglobulin Ab <1 0 0 0 - 0 9 IU/mL         Future Appointments   Date Time Provider Yessenia Earnestine   2/24/2023 11:30 AM SEPIDE[facility]   3/2/2023  2:00 PM Vicki Gonzáles DO NEURO [facility]   3/15/2023 11:00 AM Ketan Fernandez MD [address] [facility] +   3/30/2023  9:30 AM Yomaira Santos MD [facility]   6/24/2023  8:45 AM BE MRI 1 BE MRI [address] [facility]   6/30/2023  3:00 PM Cristina Valentine PA-C [facility]       Portions of the record may have been created with voice recognition software  Occasional wrong word or "sound a like" substitutions may have occurred due to the inherent limitations of voice recognition software  Read the chart carefully and recognize, using context, where substitutions have occurred

## 2023-02-21 NOTE — TELEPHONE ENCOUNTER
Please inform pt that she will get a call to schedule appt from office , I have ordered the referral     Brennen Culver

## 2023-02-21 NOTE — PATIENT INSTRUCTIONS
Symptoms suggestive of reactive hypoglycemia likely from history of bariatric surgery  Please keep a food diary  Check glucose level when having symptoms of low sugar  Recommend high protein meals; peanut butter with each meal, cornstarch bars  CGM trial (pending insurance authorization)  Start on Acarbose 25 mg tid with meals  Referral to dietitian  Follow up in 4 weeks  Non-diabetic Hypoglycemia   WHAT YOU NEED TO KNOW:   What is non-diabetic hypoglycemia? Non-diabetic hypoglycemia is a condition that causes the sugar (glucose) in your blood to drop too low  This can happen in people who do not have diabetes  The 2 types of non-diabetic hypoglycemia are fasting hypoglycemia and reactive hypoglycemia  Fasting hypoglycemia often happens after the person goes without food for 8 hours or longer  Reactive hypoglycemia usually happens about 2 to 4 hours after a meal  When your blood sugar level is low, your muscles and brain cells do not have enough energy to work well  What causes non-diabetic hypoglycemia? Fasting hypoglycemia:      Certain medicines or herbal supplements such as fenugreek, ginseng, or cinnamon    Alcohol     Exercise    Medical conditions such as liver disease, hypothyroidism, and tumors    Eating disorders or malnutrition    Stomach surgery or hemodialysis    Reactive hypoglycemia:  The causes of reactive hypoglycemia may be unknown  Hyperinsulinism    Meals high in refined carbohydrates such as white bread or foods high in sugar    Prediabetes    Any surgery of the digestive system    What are the signs and symptoms of non-diabetic hypoglycemia? Blurred vision or changes in vision    Dizziness, lightheadedness, or shakiness    Fatigue and weakness    Fast or pounding heartbeat    Sweating more than usual    Headache     Nausea or hunger    Anxiety, Irritability, or confusion    How is non-diabetic hypoglycemia diagnosed? Blood tests  are done to measure your blood sugar levels  These tests may also be done to find the cause of your hypoglycemia  Fasting tests  may be done  You may have an overnight fasting test or a 72-hour fasting test  After you have fasted overnight, your blood sugar levels will be tested 2 times  For a 72-hour fasting test, you will not be given food for a period of up to 72 hours  During this time, healthcare providers will check to see if your blood sugar drops to a certain level  An oral glucose tolerance test  may be done  After you have fasted for 8 hours, your blood sugar level is tested  You are then given a glucose drink  Your blood sugar level is checked after 1 hour and again after 2 hours  Healthcare providers look at how much your blood sugar level increases from the first check  How is non-diabetic hypoglycemia treated? Keep food or drinks that contain carbohydrates on hand  Carbohydrates will raise your blood sugar level when you have hypoglycemia  Carbohydrates are found in bread, rice, cereal, fruits, juice, and milk  If you cannot eat or drink, your healthcare provider will give you glucose through an IV  An IV is a small tube placed in your vein  You may also receive a hormone called glucagon that helps raise your blood sugar level  Treatment will depend on the cause of the hypoglycemia  For example, if a medicine you take is causing hypoglycemia, healthcare providers may change or stop giving you the medicine  If hypoglycemia is caused by low hormone levels, you may need to take hormones  How can I prevent hypoglycemia? You may need to change what and when you eat to prevent low blood sugar levels  Follow the meal plan that you and the dietitian have planned  The following guidelines may help you keep your blood sugar levels under control  Eat 5 to 6 small meals each day instead of 3 large meals  Eat the same amount of carbohydrate at meals and snacks each day   Most people need about 3 to 4 servings of carbohydrate at meals and 1 to 2 servings for snacks  Do not skip meals  Carbohydrate counting can be used plan your meals  Ask your healthcare provider or dietitian for information about carbohydrate counting  Limit refined carbohydrates  Examples are white bread, pastries (pies and cakes), regular sodas, syrups, and candy  Do not have drinks or foods that contain caffeine  Examples are coffee, tea, and certain types of sodas  Caffeine may cause you to have the same symptoms as hypoglycemia, and may cause you to feel worse  Limit or do not drink alcohol  Women should limit alcohol to 1 drink a day  Men should limit alcohol to 2 drinks a day  A drink of alcohol is 12 ounces of beer, 5 ounces of wine, or 1½ ounces of liquor  Do not drink alcohol on an empty stomach  Drink alcohol with meals to avoid hypoglycemia  Include protein foods and vegetables in your meals  Some foods that are high in protein include beef, pork, fish, poultry (chicken and turkey), beans, and nuts  Eat a variety of vegetables with your meals  When should I contact my healthcare provider? You have blurred vision or vision changes  You feel very tired and weak  You are sweating more than usual for you  You have a fast heartbeat  You feel dizzy, lightheaded, and shaky  You have questions about your condition or care  When should I seek immediate care or call 911? You have symptoms of hypoglycemia and cannot eat  You have trouble thinking clearly  You have a seizure or faint  CARE AGREEMENT:   You have the right to help plan your care  Learn about your health condition and how it may be treated  Discuss treatment options with your healthcare providers to decide what care you want to receive  You always have the right to refuse treatment  The above information is an  only  It is not intended as medical advice for individual conditions or treatments   Talk to your doctor, nurse or pharmacist before following any medical regimen to see if it is safe and effective for you  © Copyright Luke Roth 2022 Information is for End User's use only and may not be sold, redistributed or otherwise used for commercial purposes

## 2023-02-22 ENCOUNTER — OFFICE VISIT (OUTPATIENT)
Dept: DIABETES SERVICES | Facility: CLINIC | Age: 38
End: 2023-02-22

## 2023-02-22 DIAGNOSIS — E16.1 REACTIVE HYPOGLYCEMIA: Primary | ICD-10-CM

## 2023-02-22 NOTE — PATIENT INSTRUCTIONS
Return for Dexcom removal on March 3rd at 1:00 PM  Keep food record for each day sensor is worn  If sensor falls off prior to appointment, place sensor and transmitter in bag and return it to the office

## 2023-02-22 NOTE — PROGRESS NOTES
Dexcom Professional Training    Met with Keith Nugent for [employer]  Training today included:     - Explained procedure to Onit  - Patient agreement signed  - Checked sensor expiration date; Sensor lot number: [id number], Transmitter Id: [id number]  - Patient has a blood glucose meter and strips  - Transmitter has been cleaned with alcohol and dried  - Discussed site selection; identified insertion site: right abdomen  - Cleansed the skin with alcohol  - Inserted sensor per instructions: smooth tape on the skin, insert sensor, withdraw needle, remove insertion tool  - Snapped in grey transmitter  - Verified transmitter fully snapped in on both sides (2 clicks)  - Removed transmitter latch  - Disposed insertion tool in sharps container  - Started Sensor and verified communication with phone  Patient instructions reviewed with patient:    - Fill out log sheets, one for each day  - Sensor is waterproof for showering and swimming, remove for hot tub, MRI  - Remove if redness, bleeding, swelling, discomfort at site     Sensor inserted by: Ronna Calhoun MS, RD, LDN    Onit will return on Friday, March 3rd at 1:00 PM for sensor removal and data download        Sirena Oswald [address] [place] [address], [address] [id number]

## 2023-02-23 ENCOUNTER — TELEPHONE (OUTPATIENT)
Dept: DIABETES SERVICES | Facility: CLINIC | Age: 38
End: 2023-02-23

## 2023-02-27 ENCOUNTER — TELEPHONE (OUTPATIENT)
Dept: NEUROLOGY | Facility: CLINIC | Age: 38
End: 2023-02-27

## 2023-02-28 ENCOUNTER — TELEPHONE (OUTPATIENT)
Dept: DIABETES SERVICES | Facility: CLINIC | Age: 38
End: 2023-02-28

## 2023-02-28 NOTE — TELEPHONE ENCOUNTER
Pt is having issue with her Dexcom pro sensor, it is giving her an error message  Can you please return patient's call to troubleshoot   Thanks

## 2023-03-02 ENCOUNTER — TELEPHONE (OUTPATIENT)
Dept: DIABETES SERVICES | Facility: CLINIC | Age: 38
End: 2023-03-02

## 2023-03-02 NOTE — TELEPHONE ENCOUNTER
Patient had questions regarding Dexcom pro sensor losing connectivity  Patient states that connectivity is on and off  Instructed Sal to continue to wear sensor until 10 days is up and we will retrieve whatever data we can

## 2023-03-03 ENCOUNTER — TREATMENT (OUTPATIENT)
Dept: ENDOCRINOLOGY | Facility: CLINIC | Age: 38
End: 2023-03-03

## 2023-03-03 ENCOUNTER — TELEPHONE (OUTPATIENT)
Dept: ENDOCRINOLOGY | Facility: CLINIC | Age: 38
End: 2023-03-03

## 2023-03-03 ENCOUNTER — TELEPHONE (OUTPATIENT)
Dept: DIABETES SERVICES | Facility: CLINIC | Age: 38
End: 2023-03-03

## 2023-03-03 ENCOUNTER — TELEPHONE (OUTPATIENT)
Dept: BARIATRICS | Facility: CLINIC | Age: 38
End: 2023-03-03

## 2023-03-03 DIAGNOSIS — E16.1 REACTIVE HYPOGLYCEMIA: Primary | ICD-10-CM

## 2023-03-03 DIAGNOSIS — E16.2 HYPOGLYCEMIA: ICD-10-CM

## 2023-03-03 DIAGNOSIS — Z98.84 HISTORY OF GASTRIC BYPASS: ICD-10-CM

## 2023-03-03 NOTE — TELEPHONE ENCOUNTER
Dexcom Pro data complete  Dexcom Pro download and food log was scanned into media tab for your review  Thank you!

## 2023-03-03 NOTE — TELEPHONE ENCOUNTER
Spoke with pt and discussed the results of Dexcom report,     She is eating 3 meals and snack at bedtime     Discussed As CGM report, no hypoglycemia noted < 70 mg/dl   Average blood sugar was in 130 mg/dl, which is assuring, no fasting hypoglycemia was noted in last 10 days, not suggestive of endogenous insulin production ( insulinoma)   She questioned about the spikes after meal and explained it could be from high carb food with high glycemic index  Discussed it is very important to see the dietitian for reactive hypoglycemia diet      Also discussed that she should start Acarabose,25 mg wtth meals to avoid spike in blood sugars after eating and to prevent reactive hypoglycemia     Valerie Rivas

## 2023-03-03 NOTE — TELEPHONE ENCOUNTER
Transfer patient who had the sleeve in the year of 2015  Patient also had a revision to the Gastric Bypass in the year of 2020  Still need the operative note and pcp records  E-mailed patient a medical release form to obtain her records   Placed paperwork in filing cabinet under the letter R

## 2023-03-09 ENCOUNTER — APPOINTMENT (OUTPATIENT)
Dept: LAB | Age: 38
End: 2023-03-09

## 2023-03-09 DIAGNOSIS — D35.2 PROLACTINOMA (HCC): ICD-10-CM

## 2023-03-09 DIAGNOSIS — E16.1 REACTIVE HYPOGLYCEMIA: ICD-10-CM

## 2023-03-09 DIAGNOSIS — E16.2 HYPOGLYCEMIA: ICD-10-CM

## 2023-03-09 DIAGNOSIS — D35.2 PITUITARY MICROADENOMA (HCC): ICD-10-CM

## 2023-03-09 DIAGNOSIS — E03.9 HYPOTHYROIDISM, UNSPECIFIED TYPE: ICD-10-CM

## 2023-03-09 LAB
GLUCOSE P FAST SERPL-MCNC: 92 MG/DL (ref 65–99)
INSULIN SERPL-ACNC: 19.9 MU/L (ref 3–25)

## 2023-03-10 ENCOUNTER — TELEPHONE (OUTPATIENT)
Dept: ENDOCRINOLOGY | Facility: CLINIC | Age: 38
End: 2023-03-10

## 2023-03-10 ENCOUNTER — OFFICE VISIT (OUTPATIENT)
Dept: PAIN MEDICINE | Facility: CLINIC | Age: 38
End: 2023-03-10

## 2023-03-10 VITALS
HEART RATE: 83 BPM | WEIGHT: 167 LBS | BODY MASS INDEX: 33.67 KG/M2 | SYSTOLIC BLOOD PRESSURE: 124 MMHG | DIASTOLIC BLOOD PRESSURE: 86 MMHG | HEIGHT: 59 IN

## 2023-03-10 DIAGNOSIS — R73.03 PREDIABETES: ICD-10-CM

## 2023-03-10 DIAGNOSIS — G89.29 CHRONIC BILATERAL LOW BACK PAIN, UNSPECIFIED WHETHER SCIATICA PRESENT: ICD-10-CM

## 2023-03-10 DIAGNOSIS — G44.89 HEADACHE SYNDROME: ICD-10-CM

## 2023-03-10 DIAGNOSIS — M54.2 NECK PAIN: ICD-10-CM

## 2023-03-10 DIAGNOSIS — E03.9 HYPOTHYROIDISM, UNSPECIFIED TYPE: Primary | ICD-10-CM

## 2023-03-10 DIAGNOSIS — M54.50 CHRONIC BILATERAL LOW BACK PAIN, UNSPECIFIED WHETHER SCIATICA PRESENT: ICD-10-CM

## 2023-03-10 DIAGNOSIS — M79.18 MYOFASCIAL PAIN SYNDROME: Primary | ICD-10-CM

## 2023-03-10 DIAGNOSIS — M54.9 MID BACK PAIN: ICD-10-CM

## 2023-03-10 RX ORDER — TIZANIDINE 2 MG/1
2 TABLET ORAL
Qty: 30 TABLET | Refills: 1 | Status: SHIPPED | OUTPATIENT
Start: 2023-03-10

## 2023-03-10 NOTE — PROGRESS NOTES
Assessment:  1  Myofascial pain syndrome    2  Neck pain    3  Mid back pain    4  Chronic bilateral low back pain, unspecified whether sciatica present        Plan:  1  Patient will initiate in physical therapy for cervical, mid back and low back complaints  If no relief after 6 weeks of conservative treatment, will order MRI of the lumbar, thoracic and cervical spine without contrast  2  I will trial tizanidine 2 mg nightly as needed myofascial pain  I advised the patient that they should not drive or operate machinery while on this medication until they see how it affects them, as it could cause lethargy and mental cloudyness  I advised the patient to call our office if they experience any side effects or issues with the medication changes  The patient verbalized an understanding  3   We will avoid NSAIDs secondary to history of bariatric surgery  4  Patient wishes to call for a follow-up at this time    History of Present Illness: The patient is a 30 y o  female history of Chiari I malformation, pituitary microadenoma, bariatric surgery, lupus, fibromyalgia, chronic headaches last seen on 01/26/2023 who presents for a follow up office visit in regards to chronic neck pain that radiates toward the bilateral upper extremities, mid back pain, and low back pain that will radiate into the left lower extremit  She denies bowel or bladder incontinence, balance issues or saddle anesthesia  She is treated by neurology for chronic headaches  She is status post trigger point injections on January 26, 2023 with relief only lasting for about 5 days  Has not found relief in the past with chiropractic therapy or physical therapy for her headaches  She has received Botox injections in the past which only worsened her pain  She is unable to take NSAIDs secondary to bariatric surgery  She has not found relief with cyclobenzaprine or methocarbamol   CTA of the head and neck from May 2022 was essentially unremarkable  No significant degenerative changes of the cervical spine noted  MRI of the brain from 2022 demonstrates cerebellar tonsillar ectopia measuring 5 mm below the foramen suggesting Chiari I malformation with normal CSF flow  The patient rates her pain an 8-9 out of 10 on the numeric pain rating scale  She constantly has pain throughout the day which is described as burning, dull aching, sharp, throbbing, cramping, pressure-like, shooting, numbness and pins-and-needles    I have personally reviewed and/or updated the patient's past medical history, past surgical history, family history, social history, current medications, allergies, and vital signs today         Review of Systems:    Review of Systems      Past Medical History:   Diagnosis Date   • Abnormal Pap smear of cervix    • Anemia     gets iron infusions   • Anxiety    • Chiari I malformation (HCC)    • Chronic pain disorder     during lupus flairs   • COVID-19    • Disease of thyroid gland     pt off meds   • Female infertility     IVF pregnancy   • Fibromyalgia    • Fibromyalgia, primary    • Gastric ulcer    • GERD (gastroesophageal reflux disease)    • Hiatal hernia    • Lupus (HCC)    • Muscle weakness    • Pericardial cyst    • Pituitary tumor    • Pleural effusion associated with pulmonary infection    • Polycystic ovary syndrome    • Pulmonary edema    • Pulmonary emboli (HCC)    • Spinal headache     with c section       Past Surgical History:   Procedure Laterality Date   •  SECTION      x 2   • CHOLECYSTECTOMY     • GASTRECTOMY SLEEVE LAPAROSCOPIC     • GASTRIC BYPASS     • POLYPECTOMY     • TONSILLECTOMY     • TUBAL LIGATION         Family History   Problem Relation Age of Onset   • Heart disease Mother    • Hypertension Mother    • Heart attack Father    • No Known Problems Brother    • No Known Problems Daughter    • No Known Problems Son    • Heart disease Maternal Grandmother    • Hypertension Maternal Grandmother • Diabetes Maternal Grandmother    • Early death Maternal Grandfather    • No Known Problems Paternal Grandmother    • No Known Problems Paternal Grandfather    • No Known Problems Brother        Social History     Occupational History   • Not on file   Tobacco Use   • Smoking status: Never   • Smokeless tobacco: Never   Vaping Use   • Vaping Use: Never used   Substance and Sexual Activity   • Alcohol use: Yes     Comment: occ  social rare   • Drug use: Never   • Sexual activity: Yes     Partners: Male     Birth control/protection: Female Sterilization         Current Outpatient Medications:   •  acarbose (PRECOSE) 25 mg tablet, Take 1 tablet (25 mg total) by mouth 3 (three) times a day with meals, Disp: 90 tablet, Rfl: 3  •  dexamethasone (DECADRON) 1 mg tablet, Take 1 tab at 11pm and get cortisol levels checked between 7:00 a m  and 9:00 a m  on the next morning , Disp: 1 tablet, Rfl: 0  •  dicyclomine (BENTYL) 10 mg capsule, Take 1 capsule (10 mg total) by mouth 4 (four) times a day (before meals and at bedtime), Disp: 30 capsule, Rfl: 0  •  Erenumab-aooe 140 MG/ML SOAJ, Inject 140 mg under the skin every 30 (thirty) days, Disp: 1 mL, Rfl: 11  •  levothyroxine (Synthroid) 50 mcg tablet, Take 1 tablet (50 mcg total) by mouth daily in the early morning, Disp: 90 tablet, Rfl: 3  •  nystatin (MYCOSTATIN) powder, Apply topically 2 (two) times a day, Disp: 60 g, Rfl: 1  •  pantoprazole (PROTONIX) 40 mg tablet, Take 1 tablet (40 mg total) by mouth daily, Disp: 90 tablet, Rfl: 3  •  rizatriptan (MAXALT-MLT) 10 mg disintegrating tablet, Take 1 tablet (10 mg total) by mouth once as needed for migraine for up to 1 dose May repeat in 2 hours if needed, Disp: 10 tablet, Rfl: 3  •  rizatriptan (Maxalt-MLT) 5 mg disintegrating tablet, Take 1 tablet (5 mg total) by mouth as needed for migraine Take at the onset of migraine; if symptoms continue or return, may take another dose at least 2 hours after first dose   Take no more than 2 doses in a day , Disp: 6 tablet, Rfl: 2  •  tiZANidine (ZANAFLEX) 2 mg tablet, Take 1 tablet (2 mg total) by mouth daily at bedtime as needed for muscle spasms, Disp: 30 tablet, Rfl: 1  •  topiramate (TOPAMAX) 50 MG tablet, Take 2 tablets (100 mg total) by mouth daily at bedtime, Disp: 60 tablet, Rfl: 5    Allergies   Allergen Reactions   • Codeine Tremor   • Morphine Tremor       Physical Exam:    /86   Pulse 83   Ht 4' 11" (1 499 m)   Wt 75 8 kg (167 lb)   BMI 33 73 kg/m²     Constitutional:normal, well developed, well nourished, alert, in no distress and non-toxic and no overt pain behavior    Eyes:anicteric  HEENT:grossly intact  Neck:supple, symmetric, trachea midline and no masses   Pulmonary:even and unlabored  Cardiovascular:No edema or pitting edema present  Skin:Normal without rashes or lesions and well hydrated  Psychiatric:Mood and affect appropriate  Neurologic:Cranial Nerves II-XII grossly intact  Musculoskeletal:normal gait      Imaging  No orders to display         Orders Placed This Encounter   Procedures   • Ambulatory referral to Physical Therapy

## 2023-03-10 NOTE — TELEPHONE ENCOUNTER
Apt needs all labs that are due now changed to [facility]  PT DOESN'T GO TO [facility] AND [facility] WONT ACCEPT THEM SHE IS GOING TOMORROW AND WANTS THEM FIXED

## 2023-03-11 ENCOUNTER — LAB (OUTPATIENT)
Dept: LAB | Facility: CLINIC | Age: 38
End: 2023-03-11

## 2023-03-11 DIAGNOSIS — D35.3 BENIGN NEOPLASM OF PITUITARY GLAND AND CRANIOPHARYNGEAL DUCT (POUCH) (HCC): ICD-10-CM

## 2023-03-11 DIAGNOSIS — D35.2 BENIGN NEOPLASM OF PITUITARY GLAND AND CRANIOPHARYNGEAL DUCT (POUCH) (HCC): ICD-10-CM

## 2023-03-11 DIAGNOSIS — E03.9 HYPOTHYROIDISM, UNSPECIFIED TYPE: ICD-10-CM

## 2023-03-11 DIAGNOSIS — R73.03 PREDIABETES: ICD-10-CM

## 2023-03-11 LAB
CORTIS AM PEAK SERPL-MCNC: 9.9 UG/DL (ref 4.2–22.4)
EST. AVERAGE GLUCOSE BLD GHB EST-MCNC: 103 MG/DL
HBA1C MFR BLD: 5.2 %
PROLACTIN SERPL-MCNC: 20.6 NG/ML
PROLACTIN SERPL-MCNC: 20.7 NG/ML
T3FREE SERPL-MCNC: 2.04 PG/ML (ref 2.3–4.2)
T4 FREE SERPL-MCNC: 1.03 NG/DL (ref 0.76–1.46)
TSH SERPL DL<=0.05 MIU/L-ACNC: 2.31 UIU/ML (ref 0.45–4.5)

## 2023-03-13 RX ORDER — RIZATRIPTAN BENZOATE 10 MG/1
10 TABLET, ORALLY DISINTEGRATING ORAL ONCE AS NEEDED
Qty: 10 TABLET | Refills: 6 | Status: SHIPPED | OUTPATIENT
Start: 2023-03-13

## 2023-03-17 ENCOUNTER — TELEMEDICINE (OUTPATIENT)
Dept: DIABETES SERVICES | Facility: CLINIC | Age: 38
End: 2023-03-17

## 2023-03-17 ENCOUNTER — TELEPHONE (OUTPATIENT)
Dept: OTHER | Facility: HOSPITAL | Age: 38
End: 2023-03-17

## 2023-03-17 DIAGNOSIS — E16.1 REACTIVE HYPOGLYCEMIA: Primary | ICD-10-CM

## 2023-03-17 NOTE — PATIENT INSTRUCTIONS
Eat 3 regular meals ~4-5 hours apart with 1-2 snacks per day as needed  Keep carbohydrate intake consistent  Choose high fiber carbohydrates  Aim for 30-45 grams of carbohydrate per meal and ~15 grams of carbohydrate per snack  Make sure to have a good source of protein with all meals and snacks

## 2023-03-17 NOTE — PROGRESS NOTES
Virtual Regular Visit    Verification of patient location:    Patient is located in the following state in which I hold an active license [place]  Assessment/Plan:    Problem List Items Addressed This Visit    None  Visit Diagnoses     Reactive hypoglycemia    -  Primary        Reason for visit is   Chief Complaint   Patient presents with   • Hypoglycemia     Reactive hypoglycemia   • Virtual Regular Visit      Encounter provider Tierra Ling    Provider located at [address] [address]    Recent Visits  No visits were found meeting these conditions  Showing recent visits within past 7 days and meeting all other requirements  Today's Visits  Date Type Provider Dept   03/17/23 Telemedicine Tierra [facility]   Showing today's visits and meeting all other requirements  Future Appointments  No visits were found meeting these conditions  Showing future appointments within next 150 days and meeting all other requirements     The patient was identified by name and date of birth  Bishop Decent was informed that this is a telemedicine visit and that the visit is being conducted through the [email or web address]  She agrees to proceed     My office door was closed  No one else was in the room  She acknowledged consent and understanding of privacy and security of the video platform  The patient has agreed to participate and understands they can discontinue the visit at any time  Patient is aware this is a billable service  Asad Genao is a 30 y o  female with reactive hypoglycemia      HPI     Past Medical History:   Diagnosis Date   • Abnormal Pap smear of cervix    • Anemia     gets iron infusions   • Anxiety    • Chiari I malformation (HCC)    • Chronic pain disorder     during lupus flairs   • COVID-19    • Disease of thyroid gland     pt off meds   • Female infertility     IVF pregnancy   • Fibromyalgia    • Fibromyalgia, primary    • Gastric ulcer    • GERD (gastroesophageal reflux disease)    • Hiatal hernia    • Lupus (HCC)    • Muscle weakness    • Pericardial cyst    • Pituitary tumor    • Pleural effusion associated with pulmonary infection    • Polycystic ovary syndrome    • Pulmonary edema    • Pulmonary emboli (HCC)    • Spinal headache     with c section     Past Surgical History:   Procedure Laterality Date   •  SECTION      x 2   • CHOLECYSTECTOMY     • GASTRECTOMY SLEEVE LAPAROSCOPIC     • GASTRIC BYPASS     • POLYPECTOMY     • TONSILLECTOMY     • TUBAL LIGATION       Current Outpatient Medications   Medication Sig Dispense Refill   • rizatriptan (MAXALT-MLT) 10 mg disintegrating tablet Take 1 tablet (10 mg total) by mouth once as needed for migraine for up to 1 dose May repeat in 2 hours if needed 10 tablet 6   • acarbose (PRECOSE) 25 mg tablet Take 1 tablet (25 mg total) by mouth 3 (three) times a day with meals (Patient not taking: Reported on 3/17/2023) 90 tablet 3   • dexamethasone (DECADRON) 1 mg tablet Take 1 tab at 11pm and get cortisol levels checked between 7:00 a m  and 9:00 a m  on the next morning   1 tablet 0   • dicyclomine (BENTYL) 10 mg capsule Take 1 capsule (10 mg total) by mouth 4 (four) times a day (before meals and at bedtime) 30 capsule 0   • Erenumab-aooe 140 MG/ML SOAJ Inject 140 mg under the skin every 30 (thirty) days 1 mL 11   • levothyroxine (Synthroid) 50 mcg tablet Take 1 tablet (50 mcg total) by mouth daily in the early morning 90 tablet 3   • nystatin (MYCOSTATIN) powder Apply topically 2 (two) times a day 60 g 1   • pantoprazole (PROTONIX) 40 mg tablet Take 1 tablet (40 mg total) by mouth daily 90 tablet 3   • tiZANidine (ZANAFLEX) 2 mg tablet Take 1 tablet (2 mg total) by mouth daily at bedtime as needed for muscle spasms 30 tablet 1   • topiramate (TOPAMAX) 50 MG tablet Take 2 tablets (100 mg total) by mouth daily at bedtime 60 tablet 5     No current facility-administered medications for this visit  Allergies   Allergen Reactions   • Codeine Tremor   • Morphine Tremor     Review of Systems    Video Exam    There were no vitals filed for this visit  Physical Exam     I spent 60 minutes directly with the patient during this visit  Medical Nutrition Therapy    Assessment    Visit Type: Initial visit  Chief complaint/Medical Diagnosis/reason for visit: E16 1 (ICD-10-CM) - Reactive hypoglycemia    HPI Ankur Young was seen today via video visit unaccompanied regarding reactive hypoglycemia  History of gastric bypass surgery  Derian Bonds was recently prescribed Acarbose with meals, but states she has stopped taking it as she believed it was exacerbating her existing GI issues such as gas and bloating  She does not notice a particular time of day that the hypoglycemia occurs  States occurrence is sporadic  She does check her blood sugar when she feels low blood sugar symptoms and states her blood sugar has been as low as 20 mg/dL before, but usually in the 50's when she feels hypoglycemic  She treats low blood sugars with juice or skittles  Discussed proper hypoglycemic treatment and encouraged Sal to follow hypoglycemic treatment with a balanced snack of ~15 grams of carbohydrate along with a good source of protein  Problems identified in food recall include inconsistent carbohydrate intake and inadequate consumption of fiber and protein at meals  Explained impact of diet on blood glucose levels  Together we discussed what foods contain carbohydrates, reading a food label, timing of meals and snacks, serving sizes, the role of fiber, the importance of consistent carbohydrate intake in the appropriate amounts  Used the portion booklet to teach Derian Bonds more about food groups and basic carbohydrate counting  Encouraged 3 regular meals ~4-5 hours apart with 1-2 snacks per day as needed  Discussed keeping carbohydrate intake consistent   Goal is 45-60 grams of carbohydrate per meal and ~15 grams of carbohydrate per snack  Also encouraged intake of high-fiber carbohydrates with >/=3 grams of carbohydrate per serving  Advised Sal to consume a good source of protein with all meals and snacks and discussed some examples of protein sources  Yury Ewing demonstrated good understanding and will call with any questions or if he would like to schedule a follow-up visit  Ht Readings from Last 1 Encounters:   03/10/23 4' 11" (1 499 m)     Wt Readings from Last 3 Encounters:   03/10/23 75 8 kg (167 lb)   02/21/23 75 8 kg (167 lb)   01/26/23 73 5 kg (162 lb)     Weight Change: Weight is relatively stable over the past 6 months per EMR review fluctuating between 162 and 167 pounds      Barriers to Learning: no barriers    Do you follow any special diet presently?: No  Who shops: patient  Who cooks: patient    Food Log: Completed via the method of usual daily food recall    Breakfast: Oatmeal mixed with water or milk (Pure[employer] brand) - ~35 g CHO, 5 g fiber and 9 g protein per serving, coffee with cream OR a wrap with egg and cheese or a banana with peanut butter  Morning Snack: none  Lunch: leftovers from dinner - see dinner above  Afternoon Snack: a few pringles, lolipop or jolly ranchers or fruit - apple or banana with peanut butter or Greek yogurt  Dinner: chicken with rice and beans or pasta with tomato sauce and beef or grilled chicken and broccoli  Evening Snack: usually none, may have a cookie before bed sometimes  Beverages: water, "Ice" water (0 g CHO), coffee with cream, sips of juice (but only to treat low blood sugar)  Exercise: no regular physical activity at present    Estimated calorie needs: 1,200 kcals/day   Carbohydrate: 30-45 g/meal, 0-15 g/snack       Fat: 3 servings/day      Protein: 5 ounces/day    Nutrition Diagnosis:  Inconsistent carbohydrate intake along with inadequate consumption of protein and fiber related to food and nutrition related knowledge deficit concerning appropriate amount and timing of carbohydrate intake as evidenced by dietary recall and reports of post-prandial hypoglycemia  Intervention: increased fiber intake, label reading, behavior modification strategies, carbohydrate counting, increased protein intake, meal timing, meal planning and monitoring portion control     Treatment Goals: Patient understands education and recommendations, Patient will consume 3 meals a day, Patient will monitor portion control, Patient will increase their intake of plant based foods and Patient will count carbohydrates, Patient will consume high-fiber carbohydrate sources paired with a good source of protein    Monitoring and evaluation:    Term code indicator  FH 4 4 Mealtime Behavior Criteria: Eat 3 regular meals ~4-5 hours apart with 1-2 snacks per day as needed  Term code indicator  FH 1 6 3 Carbohydrate Intake and FH 1 6 4 Fiber Intake Criteria: Keep carbohydrate intake consistent  Choose high fiber carbohydrates  Aim for 30-45 grams of carbohydrate per meal and ~15 grams of carbohydrate per snack  Term code indicator  FH 1 6 2 Protein Intake Criteria: Make sure to have a good source of protein with all meals and snacks  Materials Provided: Portion booklet - emailed to patient due to video visit    Patient’s Response to Instruction:  Comprehension: good  Motivation: good  Expected Compliance: good    Start- Stop: 11:45-12:45  Total Minutes: 60 Minutes  Group or Individual Instruction: MNT-I  Other: Mariano Mcgowan MD    Thank you for coming to the [place] [id number] for education today  Please feel free to call with any questions or concerns      Sirena Melendez [address], [address] [place] [address], [address] [address]

## 2023-03-22 DIAGNOSIS — E16.1 REACTIVE HYPOGLYCEMIA: Primary | ICD-10-CM

## 2023-03-22 RX ORDER — BLOOD-GLUCOSE SENSOR
1 EACH MISCELLANEOUS 4 TIMES DAILY
Qty: 9 EACH | Refills: 1 | Status: SHIPPED | OUTPATIENT
Start: 2023-03-22

## 2023-03-22 RX ORDER — BLOOD-GLUCOSE,RECEIVER,CONT
EACH MISCELLANEOUS
COMMUNITY
End: 2023-03-22 | Stop reason: SDUPTHER

## 2023-03-22 RX ORDER — BLOOD-GLUCOSE,RECEIVER,CONT
1 EACH MISCELLANEOUS 4 TIMES DAILY
Qty: 1 EACH | Refills: 0 | Status: SHIPPED | OUTPATIENT
Start: 2023-03-22

## 2023-03-22 RX ORDER — BLOOD-GLUCOSE SENSOR
EACH MISCELLANEOUS
COMMUNITY
End: 2023-03-22 | Stop reason: SDUPTHER

## 2023-03-24 ENCOUNTER — TELEPHONE (OUTPATIENT)
Dept: ENDOCRINOLOGY | Facility: CLINIC | Age: 38
End: 2023-03-24

## 2023-03-28 NOTE — TELEPHONE ENCOUNTER
Pt called asking what is going on  I looked on cover my meds and they haven't responded yet and it says it can take up to 15 days

## 2023-03-31 ENCOUNTER — EVALUATION (OUTPATIENT)
Dept: PHYSICAL THERAPY | Age: 38
End: 2023-03-31

## 2023-03-31 DIAGNOSIS — M54.9 MID BACK PAIN: ICD-10-CM

## 2023-03-31 DIAGNOSIS — M79.18 MYOFASCIAL PAIN SYNDROME: Primary | ICD-10-CM

## 2023-03-31 DIAGNOSIS — M54.50 CHRONIC BILATERAL LOW BACK PAIN, UNSPECIFIED WHETHER SCIATICA PRESENT: ICD-10-CM

## 2023-03-31 DIAGNOSIS — G89.29 CHRONIC BILATERAL LOW BACK PAIN, UNSPECIFIED WHETHER SCIATICA PRESENT: ICD-10-CM

## 2023-03-31 DIAGNOSIS — M54.2 NECK PAIN: ICD-10-CM

## 2023-03-31 NOTE — PROGRESS NOTES
"PT Evaluation     Today's date: 3/31/2023  Patient name: James Zaman  : 1985  MRN: [id number]  Referring provider: SEPIDEH Flor  Dx:   Encounter Diagnosis     ICD-10-CM    1  Myofascial pain syndrome  M79 18       2  Neck pain  M54 2       3  Mid back pain  M54 9       4  Chronic bilateral low back pain, unspecified whether sciatica present  M54 50     G89 29                      Assessment  Assessment details: PT IE: 3/31/2023  Patient reported she was referred to PT for cervical, thoracic and lumbar spine pain  Patient noted she has had cervical and thoracic spine injections in her past history, which only provided 5 days of relief  Patient noted she thoracic and lumbar spine muscle spasms, which she feels as if her \" back will go out\"  Patient noted lumbar spine to left hip and thigh region pain  Patient noted also when she reads with her head flexed she had bilateral hands were weak, distal upper extremity paresthesias along with upper arm pain  Plus, she noted her pain persists on left side > right  Patient noted she will switch between sitting and standing while at work but she has to change her posture through out her day  Patient noted she burning thoracic spine pain  Patient noted she is trying to stretch her lumbar and thoracic spine  Patient noted sitting, standing, walking are all limited by pain aggravation  Patient noted giving her children a bath is pain aggravation  Patient noted prolonged house hold chores are pain aggravating, which the more she does the longer she is in pain  Patient noted bending based functional activities is pain limiting  Patient noted prolonged weight baring activities remain pain aggravating  Patient noted her bilateral ankles remain weak  Patient noted her bilateral lower extremity distal weakness limits stair climbing  Patient noted she is college student as well and thus she is exhibiting prolonged sitting during the weekend as well    " Patient noted she is taking prerequist classes at [employer] [facility] [employer] , with future nursing school at [place] [address]  Patient denies any recent diagnostic testing  Patient noted she wakes up in pain, and her pain is aggravated with activity  Patient noted she has multiple areas on her bilateral upper extremity and lower extremity  Patient noted she has crepitus that persists  Patient noted sleep is deprived but does sleep in prone or side lying  Patient noted use of STM is pain reducing as well as heat  Impairments: abnormal gait, abnormal or restricted ROM, abnormal movement, activity intolerance, impaired physical strength, lacks appropriate home exercise program and pain with function  Understanding of Dx/Px/POC: excellent   Prognosis: good  Prognosis details: Patient is a 30y o  year old female seen for outpatient PT evaluation with pain, mobility and functional deficits due to myofascial pain syndrome, neck pain, mid back pain and low back pain  Patient presents to PT IE with the following problems, concerns, deficits and impairments: lumbar, thoracic and cervical spine pain, decreased cervical and lumbar spine range of motion, decreased bilateral upper extremity and lower extremity strength, + TTP, + muscle guarding, decrease in postural awareness, + special tests, functional limitations and decreased tolerance to activity  Patient would benefit from skilled PT services under the following PT treatment plan to address the above noted deficits: therapeutic exercises and activities to facilitate lumbar spine and cervical spine mobility and bilateral upper and lower extremity mobility and strength, postural reeducation and strengthening, DLS and abdominal strengthening, IASTM techniques, Kinesio taping techniques, modalities, manual therapy techniques, traction and a hep  Thank you for the referral      Goals  Short Term goals - 4 weeks  1  Patient will be independent HEP     2   Patient will report a 25 - 50% decrease in pain complaints  3   Increase strength 1/2 grade  4   Increase ROM 5-10 degrees  Long Term goals - 8 weeks  1  Patient will report elimination of pain complaints  2   Patient will return to all work related activities without restriction  3   Patient will return to all recreational activities without restriction  4   ROM WFL  5   Strength 5/5   6   Patient will report sleep improved by > 30 minutes prior to cervical and lumbar spine symptoms or limitations  7   Patient will report reaching and lifting functional activities are improved by > 25 % prior to cervical and lumbar spine symptoms or limitations  8   Patient will report house hold chores and activities are improved by > 25 %prior to cervical and lumbar spine symptoms or limitations  9   Patient will report sitting improved by > 15 minutes prior to changing positions  10   Patient will report static standing activities improved by > 15 minutes prior changing positions  Plan  Patient would benefit from: skilled physical therapy  Planned modality interventions: cryotherapy, TENS, thermotherapy: hydrocollator packs, traction, ultrasound and unattended electrical stimulation  Planned therapy interventions: joint mobilization, manual therapy, massage, aquatic therapy, balance, balance/weight bearing training, neuromuscular re-education, patient education, postural training, self care, body mechanics training, compression, strengthening, stretching, therapeutic activities, therapeutic exercise, therapeutic training, transfer training, flexibility, functional ROM exercises, graded exercise, gait training, graded activity, graded motor and home exercise program  Frequency: 2x week  Treatment plan discussed with: patient        Subjective Evaluation    History of Present Illness  Mechanism of injury: Patient's PMHx is remarkable for Hypothyroidism, Lupus, Chiari 1 Malformation, Gastric Bypass, FM and GERD    Pain  At best pain ratin  At worst pain ratin  Location: Cervical, thoracic and lumbar spine     Patient Goals  Patient goals for therapy: decreased pain, increased motion, increased strength, independence with ADLs/IADLs and return to sport/leisure activities          Objective     Tenderness     Additional Tenderness Details  Patient is + TTP at cervical spine and bilateral upper trapezius musculature and + for severe muscle guarding  Patient exhibited cervical spine protracted cervical spine posture at moderate levels  Patient is + TTP at left lumbar spine and PSIS at moderate to severe levels  Patient is + for muscle guarding at minimal to moderate levels  Active Range of Motion   Cervical/Thoracic Spine       Cervical    Flexion: 20 degrees  with pain  Extension: 26 degrees     with pain  Left lateral flexion: 20 degrees     with pain  Right lateral flexion: 20 degrees     with pain  Left rotation: 45 degrees with pain  Right rotation: 50 degrees    with pain  Left Shoulder   Flexion: 140 degrees   Abduction: 155 degrees     Right Shoulder   Flexion: 134 degrees   Abduction: 140 degrees     Lumbar   Flexion: 64 degrees  with pain  Extension: 24 degrees  with pain  Left rotation: 40 degrees  with pain  Right rotation: 38 degrees  with pain  Left Hip   Flexion: 90 degrees   Abduction: 16 degrees with pain    Right Hip   Flexion: 90 degrees   Abduction: 22 degrees     Additional Active Range of Motion Details  Hamstring mobility on right at 56 degrees and left at 52 degrees      Strength/Myotome Testing     Left Shoulder     Planes of Motion   Flexion: 4   Abduction: 4-   External rotation at 0°: 4-   Internal rotation at 0°: 4     Right Shoulder     Planes of Motion   Flexion: 4   Abduction: 4-   External rotation at 0°: 4-   Internal rotation at 0°: 4     Left Hip   Planes of Motion   Flexion: 4-  Extension: 4-  Abduction: 4-  Adduction: 4    Right Hip   Planes of Motion   Flexion: 4-  Extension: 4-  Abduction: 4-  Adduction: 4    Left Knee   Flexion: 4-  Extension: 4-    Right Knee   Flexion: 4-  Extension: 4-    Left Ankle/Foot   Dorsiflexion: 4-  Plantar flexion: 4    Right Ankle/Foot   Dorsiflexion: 4-  Plantar flexion: 4    Tests     Lumbar     Left   Positive passive SLR and slump test      Right   Negative crossed SLR  Left Hip   Positive TREVOR  Right Hip   Negative TREVOR  Precautions: Patient's PMHx is remarkable for Hypothyroidism, Lupus, Chiari 1 Malformation, Gastric Bypass, FM and GERD  URL: [email or web address]  Date: 03/31/2023  Prepared by: Lydia Sing Due    Exercises  - Lying Prone  - 2-3 x daily - 7 x weekly - 1 sets - 1 reps - 2-5 minutes hold  - Prone Static Back Extension on Pillows  - 2-3 x daily - 7 x weekly - 1 sets - 1 reps - 2-5 minutes hold  - Seated Cervical Sidebending AROM  - 4-5 x daily - 7 x weekly - 2 sets - 5 reps    Manuals 3/31            Supine SOR and manual traction 15 min total            Prone cervical, thoracic and lumbar spine PA mobilization 15 min total                                      Neuro Re-Ed                                                                                                        Ther Ex                          Nu step                          LAQ:B:             Cervical side bending left 10 x 2 hep            Seated postural correction slouch over correct             Scapular squeeze             Corner pec stretch:B:at 45 degrees             Standing shoulder scaption:B:                          Supine shoulder flexion with spc:B:             Supine cervical retraction             Supine hamstring stretch:B:             LTR:B:             Piriformis stretch:B:             DLS abdominal bracing in hook lying             DLS abdominal bracing in hook lying with hip flexion:B:             DLS abdominal bracing in hook lying with slr flexion:B:             Bridges                          Prone lying 2 min            Prone on pillows Prone press ups 5 x                                                    Ther Activity                                       Gait Training                                       Modalities             MHP to cervical, thoracic and lumbar spine seated 10 min

## 2023-04-05 ENCOUNTER — TELEPHONE (OUTPATIENT)
Dept: ENDOCRINOLOGY | Facility: CLINIC | Age: 38
End: 2023-04-05

## 2023-04-05 NOTE — TELEPHONE ENCOUNTER
Pt asking how to go about  Getting her dexcom    She stated her sugars went down to 20's last night

## 2023-04-05 NOTE — TELEPHONE ENCOUNTER
Please inform patient that she should take acarbose as suggested and follow diet, is very important  Dexcom is not covered by her insurance, she can pay out of pocket, for Dexcom      Naa Freire

## 2023-04-06 ENCOUNTER — OFFICE VISIT (OUTPATIENT)
Dept: OBGYN CLINIC | Facility: CLINIC | Age: 38
End: 2023-04-06

## 2023-04-06 ENCOUNTER — TELEPHONE (OUTPATIENT)
Dept: NEUROLOGY | Facility: CLINIC | Age: 38
End: 2023-04-06

## 2023-04-06 ENCOUNTER — TELEPHONE (OUTPATIENT)
Dept: OBGYN CLINIC | Facility: HOSPITAL | Age: 38
End: 2023-04-06

## 2023-04-06 VITALS
HEIGHT: 59 IN | SYSTOLIC BLOOD PRESSURE: 128 MMHG | BODY MASS INDEX: 33.87 KG/M2 | DIASTOLIC BLOOD PRESSURE: 84 MMHG | HEART RATE: 109 BPM | WEIGHT: 168 LBS

## 2023-04-06 DIAGNOSIS — R20.2 NUMBNESS AND TINGLING IN LEFT HAND: ICD-10-CM

## 2023-04-06 DIAGNOSIS — R20.2 NUMBNESS AND TINGLING IN RIGHT HAND: ICD-10-CM

## 2023-04-06 DIAGNOSIS — M25.532 LEFT WRIST PAIN: Primary | ICD-10-CM

## 2023-04-06 DIAGNOSIS — R20.0 NUMBNESS AND TINGLING IN RIGHT HAND: ICD-10-CM

## 2023-04-06 DIAGNOSIS — M25.531 RIGHT WRIST PAIN: ICD-10-CM

## 2023-04-06 DIAGNOSIS — R20.0 NUMBNESS AND TINGLING IN LEFT HAND: ICD-10-CM

## 2023-04-06 NOTE — PROGRESS NOTES
Orthopaedic Surgery - Office Note  Fabien Pike (80 y o  female)   : 1985   MRN: [id number]  Encounter Date: 2023    Chief Complaint   Patient presents with   • Right Wrist - Pain   • Left Wrist - Pain         Assessment/Plan  Diagnoses and all orders for this visit:    Left wrist pain  -     US MSK limited; Future  -     Durable Medical Equipment  -     Ambulatory Referral to Hand Surgery; Future    Right wrist pain  -     US MSK limited; Future  -     Durable Medical Equipment  -     Ambulatory Referral to Hand Surgery; Future    Numbness and tingling in left hand  -     US MSK limited; Future  -     Durable Medical Equipment  -     Ambulatory Referral to Hand Surgery; Future    Numbness and tingling in right hand  -     US MSK limited; Future  -     Durable Medical Equipment  -     Ambulatory Referral to Hand Surgery; Future    The carpal tunnel syndrome diagnosis was reviewed with the patient in the office today  Initial treatment recommendations would consist of nighttime carpal tunnel splinting  Oral anti-inflammatory such as Aleve cannot be recommended secondary to gastric bypass history  She may consider topical anti-inflammatory gel  She may use Tylenol as needed for pain but I would not recommend oral narcotics for this condition  Patient will be provided a physician directed home exercise program for carpal tunnel syndrome and will be available in the after visit summary  In addition patient will be sent for an ultrasound of the wrist to confirm the diagnosis  Patient will follow-up with a hand surgeon to discuss the success/failure of the conservative treatments as well as the ultrasound results  All question concerns were answered in the office today      I reviewed with Steve Roberts she no doubt has symptoms related to her neck as well for which she should continue treatment with her spine and pain specialist   She likely has underlying carpal tunnel syndrome as well and we will begin "conservative treatment measures for this and order the ultrasounds  Return for Recheck with Dr Roberto Sorenson to discuss u/s  History of Present Illness  This is a new patient with right and left wrist pain  Patient reports she has worsening numbness and tingling in the thumb index and middle finger  She reports pain associated with the numbness and tingling as well as difficulty opening lids and with dropping things  She reports she has pain at night when sleeping and is worse in the morning when she awakes  No trauma to the wrists are reported  She has not had any treatment  She does have a history of contributing chronic neck pathology and states she does have pain shooting down her right left arm which was exacerbated by leaning her head forward reading an iPad  She reports she is currently being treated for this and and therapy for her neck  Patient has a history of gastric bypass  She is treating in physical therapy for myofascial pain syndrome and has a history of chronic neck and back pathology for which she is received injections in the past   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  She has a contributing medical history of fibromyalgia and lupus  Review of Systems  Pertinent items are noted in HPI  All other systems were reviewed and are negative  Physical Exam  /84 (BP Location: Left arm, Patient Position: Sitting, Cuff Size: Standard)   Pulse (!) 109   Ht 4' 11\" (1 499 m)   Wt 76 2 kg (168 lb)   BMI 33 93 kg/m²   Cons: Appears well  No apparent distress  Psych: Alert  Oriented x3  Mood and affect normal   Eyes: PERRLA, EOMI  Resp: Normal effort  No audible wheezing or stridor  CV: Palpable pulse  No discernable arrhythmia  Lymph:  No palpable cervical, axillary, or inguinal lymphadenopathy  Skin: Warm  No palpable masses  No visible lesions  Neuro: Normal muscle tone  Normal and symmetric DTR's       Patient's right " and left wrist have no skin breakdown lesion or signs of infection  There is no thenar atrophy or wasting  She has positive Phalen's at approximately 10 seconds  She has a positive Tinel's at the carpal tunnel  She has a negative Tinel's at the cubital tunnel  She has well-maintained active and passive range of motion of the right and left wrist   Patient's  strength is decreased to 4-5  Pinch strength is decreased to 4 out of 5 at all digits  Intrinsic strength is 5 out of 5  Sensation is intact to light touch  She has normal capillary refill  Bilateral distal radial and ulnar pulses are +2  Studies Reviewed  X-rays not indicated at this time    Procedures  No procedures today  Medical, Surgical, Family, and Social History  The patient's medical history, family history, and social history, were reviewed and updated as appropriate      Past Medical History:   Diagnosis Date   • Abnormal Pap smear of cervix    • Anemia     gets iron infusions   • Anxiety    • Chiari I malformation (HCC)    • Chronic pain disorder     during lupus flairs   • COVID-19    • Disease of thyroid gland     pt off meds   • Female infertility     IVF pregnancy   • Fibromyalgia    • Fibromyalgia, primary    • Gastric ulcer    • GERD (gastroesophageal reflux disease)    • Hiatal hernia    • Lupus (HCC)    • Muscle weakness    • Pericardial cyst    • Pituitary tumor    • Pleural effusion associated with pulmonary infection    • Polycystic ovary syndrome    • Pulmonary edema    • Pulmonary emboli (HCC)    • Spinal headache     with c section       Past Surgical History:   Procedure Laterality Date   •  SECTION      x 2   • CHOLECYSTECTOMY     • GASTRECTOMY SLEEVE LAPAROSCOPIC     • GASTRIC BYPASS     • POLYPECTOMY     • TONSILLECTOMY     • TUBAL LIGATION         Family History   Problem Relation Age of Onset   • Heart disease Mother    • Hypertension Mother    • Heart attack Father    • No Known Problems Brother • No Known Problems Daughter    • No Known Problems Son    • Heart disease Maternal Grandmother    • Hypertension Maternal Grandmother    • Diabetes Maternal Grandmother    • Early death Maternal Grandfather    • No Known Problems Paternal Grandmother    • No Known Problems Paternal Grandfather    • No Known Problems Brother        Social History     Occupational History   • Not on file   Tobacco Use   • Smoking status: Never   • Smokeless tobacco: Never   Vaping Use   • Vaping Use: Never used   Substance and Sexual Activity   • Alcohol use: Yes     Comment: occ  social rare   • Drug use: Never   • Sexual activity: Yes     Partners: Male     Birth control/protection: Female Sterilization       Allergies   Allergen Reactions   • Codeine Tremor   • Morphine Tremor         Current Outpatient Medications:   •  Continuous Blood Gluc  (Dexcom G7 ) CARMINE, Use 1 Device 4 (four) times a day, Disp: 1 each, Rfl: 0  •  Continuous Blood Gluc Sensor (Dexcom G7 Sensor) MISC, Use 1 Device 4 (four) times a day, Disp: 9 each, Rfl: 1  •  dexamethasone (DECADRON) 1 mg tablet, Take 1 tab at 11pm and get cortisol levels checked between 7:00 a m  and 9:00 a m  on the next morning , Disp: 1 tablet, Rfl: 0  •  dicyclomine (BENTYL) 10 mg capsule, Take 1 capsule (10 mg total) by mouth 4 (four) times a day (before meals and at bedtime), Disp: 30 capsule, Rfl: 0  •  Erenumab-aooe 140 MG/ML SOAJ, Inject 140 mg under the skin every 30 (thirty) days, Disp: 1 mL, Rfl: 11  •  levothyroxine (Synthroid) 50 mcg tablet, Take 1 tablet (50 mcg total) by mouth daily in the early morning, Disp: 90 tablet, Rfl: 3  •  nystatin (MYCOSTATIN) powder, Apply topically 2 (two) times a day, Disp: 60 g, Rfl: 1  •  pantoprazole (PROTONIX) 40 mg tablet, Take 1 tablet (40 mg total) by mouth daily, Disp: 90 tablet, Rfl: 3  •  rizatriptan (MAXALT-MLT) 10 mg disintegrating tablet, Take 1 tablet (10 mg total) by mouth once as needed for migraine for up to 1 dose May repeat in 2 hours if needed, Disp: 10 tablet, Rfl: 6  •  tiZANidine (ZANAFLEX) 2 mg tablet, Take 1 tablet (2 mg total) by mouth daily at bedtime as needed for muscle spasms, Disp: 30 tablet, Rfl: 1  •  topiramate (TOPAMAX) 50 MG tablet, Take 2 tablets (100 mg total) by mouth daily at bedtime, Disp: 60 tablet, Rfl: 5  •  acarbose (PRECOSE) 25 mg tablet, Take 1 tablet (25 mg total) by mouth 3 (three) times a day with meals (Patient not taking: Reported on 3/17/2023), Disp: 90 tablet, Rfl: 3      Anthony Longoria PA-C

## 2023-04-06 NOTE — PATIENT INSTRUCTIONS
Carpal Tunnel Syndrome Exercises   WHAT YOU NEED TO KNOW:   What do I need to know about carpal tunnel syndrome (CTS) exercises? CTS exercises can help relieve pain and increase your range of motion  Your healthcare provider or physical therapist can tell you how often to do the exercises  How do I perform CTS exercises? Finger extensions:  Hold your fingers and thumb close together  Keep them straight  Put a rubber band around the outside of your fingers and thumb  Spread your fingers apart  Then slowly bring them together without letting the rubber band fall off  Repeat 40 times  Wrist flexor stretch:  Hold your arm out straight  Grasp your fingers with your other hand  Slowly bend the fingers back (palm facing away) until you feel a stretch in your wrist  Hold for 10 seconds  Repeat 5 times  Wrist extensor stretch:  Hold your arm out straight  Grasp your fingers with your other hand  Slowly bend the fingers and hand down (palm facing you) until you feel a stretch on top of your hand  Hold for 10 seconds  Repeat 5 times  Wrist curls without weights:  Sit in a chair with your forearm resting on your thigh or a table  With your palm facing down, flex your wrist up 3 inches and slowly lower it  Turn your forearm over and repeat with your palm facing up  Do each exercise 20 times  Shrugs:  Stand with your arms by your side  Lift your shoulders up to your ears and hold for 1 second  Then pull your shoulders back, pinching your shoulder blades together  Hold for 1 second  Then relax your shoulders  Repeat 20 times  CARE AGREEMENT:   You have the right to help plan your care  Learn about your health condition and how it may be treated  Discuss treatment options with your healthcare providers to decide what care you want to receive  You always have the right to refuse treatment  The above information is an  only   It is not intended as medical advice for individual conditions or treatments  Talk to your doctor, nurse or pharmacist before following any medical regimen to see if it is safe and effective for you  © Copyright Bl[employer] 2022 Information is for End User's use only and may not be sold, redistributed or otherwise used for commercial purposes  Carpal Tunnel Syndrome   WHAT YOU NEED TO KNOW:   What is carpal tunnel syndrome (CTS)? CTS is a condition that causes pressure to build in the carpal tunnel  The carpal tunnel is a small area between bones and tissues in your wrist  Swelling in this area puts pressure on the median nerve  The median nerve controls muscles and feeling in the hand  What increases my risk for CTS? CTS is more common in women than in men  Any of the following may also increase the risk for CTS:  Older age    A family history of CTS    Activities that use forceful or repetitive movement of your wrist and hand    A past or current wrist injury    Pregnancy or obesity that puts pressure on the area from swelling    A health condition, such as diabetes, arthritis, or hypothyroidism    What are the signs and symptoms of CTS? Dull, sharp, or shooting pain in your hand    Numb, tingling, or burning feeling in your thumb and first, middle, and ring fingers    Arm pain or tingling that may move up your arm toward your shoulder    Weakness in your hand    Swelling in your hand    Not being able to control how your hand moves, or not being able to use your fingers easily    How is CTS diagnosed? Your healthcare provider will examine your hand and arm  He or she will ask how long you have had symptoms and what makes them worse  Tell your provider if you have a family history of CTS  You may also need any of the following:  Tests  may be done to check for pressure on your nerve or to test how well your nerves are working  Your healthcare provider will tap, squeeze, press on, and gently move your wrist in different ways      X-ray, ultrasound, or MRI pictures may be used to look at the bones in your wrist and hand  You may be given contrast liquid to help your wrist show up better in the pictures  Tell the healthcare provider if you have ever had an allergic reaction to contrast liquid  Do not enter the MRI room with anything metal  Metal can cause serious injury  Tell healthcare providers if you have any metal in or on your body  How is CTS treated? Your symptoms may get better without treatment  You may need any of the following if your symptoms continue or are severe:  NSAIDs  help decrease swelling and pain or fever  This medicine is available with or without a doctor's order  NSAIDs can cause stomach bleeding or kidney problems in certain people  If you take blood thinner medicine, always ask your healthcare provider if NSAIDs are safe for you  Always read the medicine label and follow directions  Steroid injections  may help decrease pain and swelling  Steroids are injected into the carpal tunnel  Transcutaneous electric nerve stimulation (TENS)  uses mild electrical impulses to help decrease your wrist pain  Surgery  called decompression may be used to take pressure off of the median nerve in your wrist     How can I manage my symptoms? Apply ice to your wrist   Ice helps decrease swelling and pain in your wrist  Ice may also help prevent tissue damage  Use an ice pack, or put crushed ice in a plastic bag  Cover the ice pack with a towel  Place it on your wrist for 15 to 20 minutes every hour, or as directed  Rest your hands  Let your hands rest for a short time between repetitive motions, such as typing  If you feel pain, stop what you are doing and gently massage your wrist and hand  Go to physical and occupational therapy, if directed  Physical therapists will show you ways to exercise and strengthen your wrist  Occupational therapists will show you safe ways to use your wrist while you do your usual activities      Use a wrist splint as directed  A splint will keep your wrist straight or in a slightly bent position  A wrist splint decreases pressure on the median nerve by letting your wrist rest  You may need to wear the splint for up to 8 weeks  You may need to wear it at night  When should I seek immediate care? You suddenly lose feeling in your hand or fingers and you cannot move them  Your hand suddenly changes color  When should I call my doctor? Your symptoms get worse  Your hand and fingers are so weak that you cannot grab, squeeze, or lift items  You have questions or concerns about your condition or care  CARE AGREEMENT:   You have the right to help plan your care  Learn about your health condition and how it may be treated  Discuss treatment options with your healthcare providers to decide what care you want to receive  You always have the right to refuse treatment  The above information is an  only  It is not intended as medical advice for individual conditions or treatments  Talk to your doctor, nurse or pharmacist before following any medical regimen to see if it is safe and effective for you  © Copyright Elizebe Gaviota 2022 Information is for End User's use only and may not be sold, redistributed or otherwise used for commercial purposes

## 2023-04-06 NOTE — TELEPHONE ENCOUNTER
Received fax from [facility]  [employer] requires PA   Key [id number]    Per enc 22-PA will  3/23/23

## 2023-04-07 ENCOUNTER — TELEPHONE (OUTPATIENT)
Dept: ENDOCRINOLOGY | Facility: CLINIC | Age: 38
End: 2023-04-07

## 2023-04-11 NOTE — TELEPHONE ENCOUNTER
Called and advised pt of the below  Pt states that she has seen improvement in intensity since starting [employer]       PA initiated on [facility]    Awaiting determination

## 2023-04-14 ENCOUNTER — APPOINTMENT (OUTPATIENT)
Dept: PHYSICAL THERAPY | Age: 38
End: 2023-04-14

## 2023-04-28 ENCOUNTER — OFFICE VISIT (OUTPATIENT)
Dept: PHYSICAL THERAPY | Age: 38
End: 2023-04-28

## 2023-04-28 DIAGNOSIS — M54.50 CHRONIC BILATERAL LOW BACK PAIN, UNSPECIFIED WHETHER SCIATICA PRESENT: ICD-10-CM

## 2023-04-28 DIAGNOSIS — M79.18 MYOFASCIAL PAIN SYNDROME: ICD-10-CM

## 2023-04-28 DIAGNOSIS — G89.29 CHRONIC BILATERAL LOW BACK PAIN, UNSPECIFIED WHETHER SCIATICA PRESENT: ICD-10-CM

## 2023-04-28 DIAGNOSIS — M54.2 NECK PAIN: Primary | ICD-10-CM

## 2023-04-28 DIAGNOSIS — M54.9 MID BACK PAIN: ICD-10-CM

## 2023-04-28 NOTE — PROGRESS NOTES
Daily Note     Today's date: 2023  Patient name: Carey Alberts  : 1985  MRN: [id number]  Referring provider: SEPIDEH Robertson  Dx:   Encounter Diagnosis     ICD-10-CM    1  Neck pain  M54 2       2  Myofascial pain syndrome  M79 18       3  Mid back pain  M54 9       4  Chronic bilateral low back pain, unspecified whether sciatica present  M54 50     G89 29                      Subjective: Patient reported she continues to have bilateral hand upper arm and hand paresthesias and weakness, but reported paresthesias are less intense this week  Patient noted have an [address] on May 1 st and she will follow up with Dr Moise May on that same date  Patient noted her bilateral hand symptoms are aggravated with work activities  Patient noted her pain is a 4 of 10 with burning and tingling symptoms associated with her pain  Objective: See treatment diary below  Assessment: Patient present with use of manual therapy techniques as the most beneficial pain reduction factor in PT  Patient presents with cervical spine retraction aggravating bilateral upper extremity symptoms  Patient presents with prone lying and prone lumbar spine extension with pillows the most pain reducing factor of lumbar spine pain presentation  But, all arom and prom of both cervical spine and lumbar spine is limited by pain aggravation and thus all self, work and house hold activities are limited by pain aggravation  Thus, PT is warranted to facilitate lumbar spine and cervical spine pain reduction to promote functional progress  Plan: Continue per plan of care  Precautions: Patient's PMHx is remarkable for Hypothyroidism, Lupus, Chiari 1 Malformation, Gastric Bypass, FM and GERD        Manuals 3/31 4/21 4/28          Supine SOR and manual traction 15 min total 15 min  NT          Prone cervical, thoracic and lumbar spine PA mobilization 15 min total 15 min 15 min                                    Neuro Re-Ed Ther Ex                          Nu step  8 min NT                       LAQ:B:  2 x 10 2 x 10          Cervical side bending left 10 x 2 hep NT NT          Seated postural correction slouch over correct  2 x 10 2 x 10          Scapular squeeze  2 x 10 2 x 10          Seated cervical retraction  2 x 10 2 x 10                       Corner pec stretch:B:at 45 degrees  NT NT          Standing shoulder scaption:B:  NT 10 x 2                       Supine shoulder flexion with spc:B:  NT NT          Supine hamstring stretch:B:  NT NT          LTR:B:  NT 10 sec x 5          Piriformis stretch:B:  NT 10 sec x 5          DLS abdominal bracing in hook lying  NT 3 sec x 20          DLS abdominal bracing in hook lying with hip flexion:B:  NT NT          DLS abdominal bracing in hook lying with slr flexion:B:  NT NT          Bridges                          Prone lying 2 min 2 min 2 min          Prone on pillows             Prone press ups 5 x  10 x 2 NT                                                 Ther Activity                                       Gait Training                                       Modalities             MHP to cervical, thoracic and lumbar spine seated 10 min NT

## 2023-05-01 ENCOUNTER — HOSPITAL ENCOUNTER (OUTPATIENT)
Dept: RADIOLOGY | Facility: HOSPITAL | Age: 38
Discharge: HOME | End: 2023-05-01

## 2023-05-01 ENCOUNTER — APPOINTMENT (OUTPATIENT)
Dept: RADIOLOGY | Facility: HOSPITAL | Age: 38
End: 2023-05-01

## 2023-05-01 DIAGNOSIS — R20.0 NUMBNESS AND TINGLING IN LEFT HAND: ICD-10-CM

## 2023-05-01 DIAGNOSIS — M25.532 LEFT WRIST PAIN: ICD-10-CM

## 2023-05-01 DIAGNOSIS — R20.2 NUMBNESS AND TINGLING IN RIGHT HAND: ICD-10-CM

## 2023-05-01 DIAGNOSIS — M25.531 RIGHT WRIST PAIN: ICD-10-CM

## 2023-05-01 DIAGNOSIS — R20.2 NUMBNESS AND TINGLING IN LEFT HAND: ICD-10-CM

## 2023-05-01 DIAGNOSIS — R20.0 NUMBNESS AND TINGLING IN RIGHT HAND: ICD-10-CM

## 2023-05-09 ENCOUNTER — TELEPHONE (OUTPATIENT)
Dept: ENDOCRINOLOGY | Facility: CLINIC | Age: 38
End: 2023-05-09

## 2023-05-10 ENCOUNTER — TELEPHONE (OUTPATIENT)
Dept: ENDOCRINOLOGY | Facility: CLINIC | Age: 38
End: 2023-05-10

## 2023-05-10 NOTE — TELEPHONE ENCOUNTER
L/m for pt  Notified her it was ok to remove sensor and dispose of it since this was just a temporary thing  We have all the info needed and report was sent to provider for review  We will call her back once we have a response from the provider  Asked to call back if any questions

## 2023-05-10 NOTE — TELEPHONE ENCOUNTER
Pt called said her kera 2 sensor ended she got an alert  Is she to change it ??? Says someone told her not to? ???

## 2023-05-11 ENCOUNTER — TELEPHONE (OUTPATIENT)
Dept: ENDOCRINOLOGY | Facility: CLINIC | Age: 38
End: 2023-05-11

## 2023-05-11 DIAGNOSIS — E03.9 HYPOTHYROIDISM, UNSPECIFIED TYPE: ICD-10-CM

## 2023-05-11 DIAGNOSIS — E16.2 HYPOGLYCEMIA: Primary | ICD-10-CM

## 2023-05-11 RX ORDER — LEVOTHYROXINE SODIUM 0.05 MG/1
50 TABLET ORAL
Qty: 90 TABLET | Refills: 3 | Status: SHIPPED | OUTPATIENT
Start: 2023-05-11

## 2023-05-11 NOTE — TELEPHONE ENCOUNTER
Spoke with pt regarding the CGMS report , reviewed CGM, by [facility]       April 26 to May 9, 2023, 14 days overview reviewed  94% blood sugars are in target range  4% blood sugars are low  2% blood sugars are high  Average  glucose is 104 mg per DL  BMI is 5 8%    Patient has blood sugars range from 54 to 110 mg per DL  When blood sugar was 54-60, she did not have any symptoms  She did not check with glucometer, as she did not have strips  Patient was started on acarbose 25 mg with meals, she is not able to tolerate acarbose, does not want to continue taking it  Discussed that she should eat proteins with each meal to avoid hypoglycemia, also she can use 2 teaspoons of cornstarch mixed with water or yogurt with dinner, which can  help with reactive  Hypoglycemia    I also offered to do 72-hour fasting protocol in hospital setting, to R/o endogenous hyperinsulinemia  She is agreeable      We will find out from insurance company if hospital admission will be covered,    Team,   Please can you find if this ,72-hour fasting protocol in hospital setting,will be covered by insurance, thanks     [employer]

## 2023-05-16 ENCOUNTER — TELEPHONE (OUTPATIENT)
Dept: ENDOCRINOLOGY | Facility: CLINIC | Age: 38
End: 2023-05-16

## 2023-05-23 NOTE — TELEPHONE ENCOUNTER
Spoke with pt and put the request for direct admission, for Friday June 2 nd at 12 PM     Pt is agreeable for this date for 72 hour fast   We will put the order for 72-hour fast once patient gets admitted  Angela Navarro

## 2023-05-31 ENCOUNTER — TELEPHONE (OUTPATIENT)
Dept: ENDOCRINOLOGY | Facility: CLINIC | Age: 38
End: 2023-05-31

## 2023-05-31 NOTE — TELEPHONE ENCOUNTER
Pt called and wants to know she had spoke to someone about a denial of the dexcom G7?    I didn't see anything and wanted to see if I missed something

## 2023-05-31 NOTE — TELEPHONE ENCOUNTER
Pt called left message on voicemail at 9:12am that she is getting admitting on Friday and wants to make sure everything is good to go  And if we know what time she is to report to the hospital    Called pt back, explained that in the chart is is good to go but we are not sure what time she has to report to the hospital  Most likely they will call her the night before

## 2023-06-01 NOTE — TELEPHONE ENCOUNTER
Called patient and discussed that once we get 72-hour fast results, we will put the appeal letter together for Dexcom for personal use for hypoglycemia  Patient verbalized understanding, she will be admitted tomorrow for 72-hour fast monitoring  Valerie Rivas

## 2023-06-02 ENCOUNTER — HOSPITAL ENCOUNTER (INPATIENT)
Facility: HOSPITAL | Age: 38
LOS: 3 days | Discharge: HOME | DRG: 643 | End: 2023-06-05
Attending: GENERAL PRACTICE | Admitting: GENERAL PRACTICE
Payer: COMMERCIAL

## 2023-06-02 ENCOUNTER — TELEPHONE (OUTPATIENT)
Dept: ENDOCRINOLOGY | Facility: CLINIC | Age: 38
End: 2023-06-02

## 2023-06-02 DIAGNOSIS — E16.2 HYPOGLYCEMIA: Primary | ICD-10-CM

## 2023-06-02 LAB
ALBUMIN SERPL BCP-MCNC: 4 G/DL (ref 3.5–5)
ALP SERPL-CCNC: 87 U/L (ref 34–104)
ALT SERPL W P-5'-P-CCNC: 13 U/L (ref 7–52)
ANION GAP SERPL CALCULATED.3IONS-SCNC: 7 MMOL/L (ref 4–13)
AST SERPL W P-5'-P-CCNC: 13 U/L (ref 13–39)
BETA-HYDROXYBUTYRATE: 0.1 MMOL/L
BILIRUB SERPL-MCNC: 0.26 MG/DL (ref 0.2–1)
BUN SERPL-MCNC: 11 MG/DL (ref 5–25)
CALCIUM SERPL-MCNC: 8.7 MG/DL (ref 8.4–10.2)
CHLORIDE SERPL-SCNC: 108 MMOL/L (ref 96–108)
CO2 SERPL-SCNC: 21 MMOL/L (ref 21–32)
CREAT SERPL-MCNC: 0.61 MG/DL (ref 0.6–1.3)
GFR SERPL CREATININE-BSD FRML MDRD: 117 ML/MIN/1.73SQ M
GLUCOSE SERPL-MCNC: 103 MG/DL (ref 65–140)
GLUCOSE SERPL-MCNC: 149 MG/DL (ref 65–140)
GLUCOSE SERPL-MCNC: 76 MG/DL (ref 65–140)
GLUCOSE SERPL-MCNC: 83 MG/DL (ref 65–140)
GLUCOSE SERPL-MCNC: 85 MG/DL (ref 65–140)
GLUCOSE SERPL-MCNC: 90 MG/DL (ref 65–140)
POTASSIUM SERPL-SCNC: 4.1 MMOL/L (ref 3.5–5.3)
PROT SERPL-MCNC: 7.1 G/DL (ref 6.4–8.4)
SODIUM SERPL-SCNC: 136 MMOL/L (ref 135–147)

## 2023-06-02 PROCEDURE — 80053 COMPREHEN METABOLIC PANEL: CPT | Performed by: GENERAL PRACTICE

## 2023-06-02 PROCEDURE — 83525 ASSAY OF INSULIN: CPT | Performed by: GENERAL PRACTICE

## 2023-06-02 PROCEDURE — 82948 REAGENT STRIP/BLOOD GLUCOSE: CPT

## 2023-06-02 PROCEDURE — 82010 KETONE BODYS QUAN: CPT | Performed by: GENERAL PRACTICE

## 2023-06-02 PROCEDURE — 86337 INSULIN ANTIBODIES: CPT | Performed by: GENERAL PRACTICE

## 2023-06-02 PROCEDURE — 84681 ASSAY OF C-PEPTIDE: CPT | Performed by: GENERAL PRACTICE

## 2023-06-02 PROCEDURE — 99223 1ST HOSP IP/OBS HIGH 75: CPT | Performed by: GENERAL PRACTICE

## 2023-06-02 PROCEDURE — 84206 ASSAY OF PROINSULIN: CPT | Performed by: GENERAL PRACTICE

## 2023-06-02 PROCEDURE — 80377 DRUG/SUBSTANCE NOS 7/MORE: CPT | Performed by: GENERAL PRACTICE

## 2023-06-02 RX ORDER — ACETAMINOPHEN 325 MG/1
650 TABLET ORAL EVERY 6 HOURS PRN
Status: DISCONTINUED | OUTPATIENT
Start: 2023-06-02 | End: 2023-06-05 | Stop reason: HOSPADM

## 2023-06-02 RX ORDER — LEVOTHYROXINE SODIUM 0.05 MG/1
50 TABLET ORAL
Status: DISCONTINUED | OUTPATIENT
Start: 2023-06-03 | End: 2023-06-05 | Stop reason: HOSPADM

## 2023-06-02 RX ORDER — PANTOPRAZOLE SODIUM 40 MG/1
40 TABLET, DELAYED RELEASE ORAL
Status: DISCONTINUED | OUTPATIENT
Start: 2023-06-03 | End: 2023-06-05 | Stop reason: HOSPADM

## 2023-06-02 RX ORDER — TOPIRAMATE 100 MG/1
100 TABLET, FILM COATED ORAL
Status: DISCONTINUED | OUTPATIENT
Start: 2023-06-02 | End: 2023-06-05 | Stop reason: HOSPADM

## 2023-06-02 RX ADMIN — TOPIRAMATE 100 MG: 100 TABLET, FILM COATED ORAL at 21:44

## 2023-06-02 NOTE — ASSESSMENT & PLAN NOTE
· Admitted for hypoglycemia testing by Dr Amber Kate  · Dr Amber Kate is on call and any questions can be forwarded to her  · Stat blood work ordered  · Patient okay to have dinner  She then will be n p o  except sips of water and have blood sugars checked every hour  When blood sugar less than 60, patient will need her blood sugars checked every 30 minutes  · Follow protocol as per Dr Amber Kate

## 2023-06-02 NOTE — H&P
[facility]  H&P  Name: Ken Hoyos 30 y o  female I MRN: [id number]  Unit/Bed#: ICU 09 I Date of Admission: 6/2/2023   Date of Service: 6/2/2023 I Hospital Day: 0      Assessment/Plan   * Hypoglycemia  Assessment & Plan  · Admitted for hypoglycemia testing by Dr Delmi Latif  · Dr Delmi Latif is on call and any questions can be forwarded to her  · Stat blood work ordered  · Patient okay to have dinner  She then will be n p o  except sips of water and have blood sugars checked every hour  When blood sugar less than 60, patient will need her blood sugars checked every 30 minutes  · Follow protocol as per Dr Delmi Latif  Hypothyroidism  Assessment & Plan  · C/w Synthroid    History of gastric bypass  Assessment & Plan  · Noted    Headache  Assessment & Plan  · Topamax qhs  · Tylenol prn           VTE Pharmacologic Prophylaxis: VTE Score: 1 Low Risk (Score 0-2) - Encourage Ambulation  Code Status: Level 1 - Full Code   Discussion with family: Patient declined call to   Anticipated Length of Stay: Patient will be admitted on an inpatient basis with an anticipated length of stay of greater than 2 midnights secondary to need for q1h BSs  Total Time Spent on Date of Encounter in care of patient: 55 minutes This time was spent on one or more of the following: performing physical exam; counseling and coordination of care; obtaining or reviewing history; documenting in the medical record; reviewing/ordering tests, medications or procedures; communicating with other healthcare professionals and discussing with patient's family/caregivers  Chief Complaint: low BS    History of Present Illness:  Ken Hoyos is a 30 y o  female with a PMH of gastric bypass who presents with episodes of hypoglycemia over the past 2 years  Patient was getting sweaty and when they checked her blood sugar and saw it was 20  Patient went to see endocrinology    Dr Rivas  patient at this time VIRTUAL, 3 months offers no acute complaints  Requested the patient be admitted for 72-hour fast to diagnose the etiology of her hypoglycemia  No pain  No fevers  No nausea or vomiting  Review of Systems:  Review of Systems   Constitutional: Negative  HENT: Negative  Eyes: Negative  Respiratory: Negative  Cardiovascular: Negative  Gastrointestinal: Negative  Endocrine: Negative  Genitourinary: Negative  Musculoskeletal: Negative  Skin: Negative  Allergic/Immunologic: Negative  Neurological: Negative  Hematological: Negative  Psychiatric/Behavioral: Negative  Past Medical and Surgical History:   Past Medical History:   Diagnosis Date   • Abnormal Pap smear of cervix    • Anemia     gets iron infusions   • Anxiety    • Chiari I malformation (HCC)    • Chronic pain disorder     during lupus flairs   • COVID-19    • Disease of thyroid gland     pt off meds   • Female infertility     IVF pregnancy   • Fibromyalgia    • Fibromyalgia, primary    • Gastric ulcer    • GERD (gastroesophageal reflux disease)    • Hiatal hernia    • Lupus (HCC)    • Muscle weakness    • Pericardial cyst    • Pituitary tumor    • Pleural effusion associated with pulmonary infection    • Polycystic ovary syndrome    • Pulmonary edema    • Pulmonary emboli (HCC)    • Spinal headache     with c section       Past Surgical History:   Procedure Laterality Date   •  SECTION      x 2   • CHOLECYSTECTOMY     • GASTRECTOMY SLEEVE LAPAROSCOPIC     • GASTRIC BYPASS     • POLYPECTOMY     • TONSILLECTOMY     • TUBAL LIGATION         Meds/Allergies:  Prior to Admission medications    Medication Sig Start Date End Date Taking?  Authorizing Provider   acarbose (PRECOSE) 25 mg tablet Take 1 tablet (25 mg total) by mouth 3 (three) times a day with meals  Patient not taking: Reported on 3/17/2023 2/21/23   Jeny Aparicio MD   Continuous Blood Gluc  (Dexcom G7 ) CARMINE Use 1 Device 4 (four) times a day 3/22/23   Aracely Weiss PA-C   Continuous Blood Gluc Sensor (Dexcom G7 Sensor) MISC Use 1 Device 4 (four) times a day 3/22/23   Aracely Weiss PA-C   dexamethasone (DECADRON) 1 mg tablet Take 1 tab at 11pm and get cortisol levels checked between 7:00 a m  and 9:00 a m  on the next morning  10/18/22   [address] MD   dicyclomine (BENTYL) 10 mg capsule Take 1 capsule (10 mg total) by mouth 4 (four) times a day (before meals and at bedtime) 10/18/22   [address] MD   Erenumab-aooe 140 MG/ML SOAJ Inject 140 mg under the skin every 30 (thirty) days 9/21/22   Gertrude Brito DO   levothyroxine (Synthroid) 50 mcg tablet Take 1 tablet (50 mcg total) by mouth daily in the early morning 5/11/23   Roslyn Rinaldi MD   nystatin (MYCOSTATIN) powder Apply topically 2 (two) times a day 1/4/23   Roslyn Rinaldi MD   pantoprazole (PROTONIX) 40 mg tablet Take 1 tablet (40 mg total) by mouth daily 8/1/22 1/31/24  Roslyn Rinaldi MD   rizatriptan (MAXALT-MLT) 10 mg disintegrating tablet Take 1 tablet (10 mg total) by mouth once as needed for migraine for up to 1 dose May repeat in 2 hours if needed 3/13/23   Gertrude Brito DO   tiZANidine (ZANAFLEX) 2 mg tablet Take 1 tablet (2 mg total) by mouth daily at bedtime as needed for muscle spasms 3/10/23   SEPIDEH Cruz   topiramate (TOPAMAX) 50 MG tablet Take 2 tablets (100 mg total) by mouth daily at bedtime 7/26/22   Roslyn Rinaldi MD     I have reviewed home medications with patient personally  Allergies:    Allergies   Allergen Reactions   • Codeine Tremor   • Morphine Tremor       Social History:  Marital Status: /Civil Union     Patient Pre-hospital Living Situation: Home  Patient Pre-hospital Level of Mobility: walks    Substance Use History:   Social History     Substance and Sexual Activity   Alcohol Use Yes    Comment: occ  social rare     Social History     Tobacco Use   Smoking Status Never   Smokeless Tobacco Never Social History     Substance and Sexual Activity   Drug Use Never       Family History:  Family History   Problem Relation Age of Onset   • Heart disease Mother    • Hypertension Mother    • Heart attack Father    • No Known Problems Brother    • No Known Problems Daughter    • No Known Problems Son    • Heart disease Maternal Grandmother    • Hypertension Maternal Grandmother    • Diabetes Maternal Grandmother    • Early death Maternal Grandfather    • No Known Problems Paternal Grandmother    • No Known Problems Paternal Grandfather    • No Known Problems Brother        Physical Exam:     Vitals:   Blood Pressure: 108/68 (06/02/23 1700)  Pulse: 87 (06/02/23 1700)  SpO2: 98 % (06/02/23 1700)    Physical Exam  HENT:      Head: Normocephalic and atraumatic  Nose: Nose normal       Mouth/Throat:      Mouth: Mucous membranes are moist    Eyes:      Extraocular Movements: Extraocular movements intact  Conjunctiva/sclera: Conjunctivae normal    Cardiovascular:      Rate and Rhythm: Normal rate and regular rhythm  Pulmonary:      Effort: Pulmonary effort is normal       Breath sounds: Normal breath sounds  Abdominal:      General: Bowel sounds are normal       Palpations: Abdomen is soft  Musculoskeletal:         General: Normal range of motion  Cervical back: Normal range of motion and neck supple  Right lower leg: No edema  Left lower leg: No edema  Skin:     General: Skin is warm and dry  Neurological:      Mental Status: She is alert and oriented to person, place, and time  Additional Data:     Lab Results:                            Lines/Drains:  Invasive Devices     None                     Imaging: No pertinent imaging reviewed  No orders to display       EKG and Other Studies Reviewed on Admission:   · EKG: No EKG obtained  ** Please Note: This note has been constructed using a voice recognition system   **

## 2023-06-02 NOTE — TELEPHONE ENCOUNTER
Pt is going to be admitted for 72-hour fast protocol, to rule out endogenous hyperinsulinemia    72-Hour Fast Protocol:  1  Please draw  Baseline peripheral venous sample of CMP, random insulin, proinsulin, C-peptide, beta hydroxybutyrate, sulfonylurea level, insulin antibody   PT can eat dinner ( bariatric diet) after the basic labs done     START 72 hours fast after the dinner   Note: PT can have calorie free and caffeine free beverages and water during the fast       2  Check fingerstick glucose every 1 hours  3  Please draw peripheral venous samples (BMP, random insulin, proinsulin, C-peptide, beta hydroxybutyrate, ) every 6 hours after initiation of fasting  4  Check fingerstick glucose every 1 hours  5  Once fingerstick glucose is less than 60, check fingerstick glucose every half hour and draw   (BMP, random insulin, proinsulin, C-peptide, beta hydroxybutyrate) if patient has symptoms     6  Once fingerstick glucose is less than 55, draw stat  peripheral venous sample (BMP, random insulin, proinsulin, C-peptide, beta hydroxybutyrate) every half hour) and Draw samples ( every 6 hours) onwards and check blood sugars every 15 minutes     7  Once fingerstick glucose is less than 45, draw peripheral venous sample (BMP, random insulin, proinsulin, C-peptide, beta hydroxybutyrate)  After confirming serum glucose is less than 45, give 1 mg of IV glucagon  Check blood sugar 10, 20, and 30 minutes later  8  The patient can then be fed or given IV dextrose after the sample is drawn      9  Patient is allowed to get calorie free and caffeine free beverages and water during the fast

## 2023-06-03 LAB
ANION GAP SERPL CALCULATED.3IONS-SCNC: 7 MMOL/L (ref 4–13)
ANION GAP SERPL CALCULATED.3IONS-SCNC: 7 MMOL/L (ref 4–13)
ANION GAP SERPL CALCULATED.3IONS-SCNC: 8 MMOL/L (ref 4–13)
ANION GAP SERPL CALCULATED.3IONS-SCNC: 9 MMOL/L (ref 4–13)
BETA-HYDROXYBUTYRATE: 0.1 MMOL/L
BETA-HYDROXYBUTYRATE: 0.3 MMOL/L
BETA-HYDROXYBUTYRATE: 0.3 MMOL/L
BETA-HYDROXYBUTYRATE: 0.7 MMOL/L
BUN SERPL-MCNC: 10 MG/DL (ref 5–25)
BUN SERPL-MCNC: 10 MG/DL (ref 5–25)
BUN SERPL-MCNC: 11 MG/DL (ref 5–25)
BUN SERPL-MCNC: 12 MG/DL (ref 5–25)
C PEPTIDE SERPL-MCNC: 6.1 NG/ML (ref 1.1–4.4)
CALCIUM SERPL-MCNC: 8.4 MG/DL (ref 8.4–10.2)
CALCIUM SERPL-MCNC: 8.6 MG/DL (ref 8.4–10.2)
CALCIUM SERPL-MCNC: 8.8 MG/DL (ref 8.4–10.2)
CALCIUM SERPL-MCNC: 8.9 MG/DL (ref 8.4–10.2)
CHLORIDE SERPL-SCNC: 106 MMOL/L (ref 96–108)
CHLORIDE SERPL-SCNC: 108 MMOL/L (ref 96–108)
CHLORIDE SERPL-SCNC: 109 MMOL/L (ref 96–108)
CHLORIDE SERPL-SCNC: 109 MMOL/L (ref 96–108)
CO2 SERPL-SCNC: 19 MMOL/L (ref 21–32)
CO2 SERPL-SCNC: 20 MMOL/L (ref 21–32)
CREAT SERPL-MCNC: 0.54 MG/DL (ref 0.6–1.3)
CREAT SERPL-MCNC: 0.56 MG/DL (ref 0.6–1.3)
CREAT SERPL-MCNC: 0.57 MG/DL (ref 0.6–1.3)
CREAT SERPL-MCNC: 0.63 MG/DL (ref 0.6–1.3)
GFR SERPL CREATININE-BSD FRML MDRD: 116 ML/MIN/1.73SQ M
GFR SERPL CREATININE-BSD FRML MDRD: 120 ML/MIN/1.73SQ M
GFR SERPL CREATININE-BSD FRML MDRD: 121 ML/MIN/1.73SQ M
GFR SERPL CREATININE-BSD FRML MDRD: 122 ML/MIN/1.73SQ M
GLUCOSE SERPL-MCNC: 74 MG/DL (ref 65–140)
GLUCOSE SERPL-MCNC: 75 MG/DL (ref 65–140)
GLUCOSE SERPL-MCNC: 75 MG/DL (ref 65–140)
GLUCOSE SERPL-MCNC: 77 MG/DL (ref 65–140)
GLUCOSE SERPL-MCNC: 78 MG/DL (ref 65–140)
GLUCOSE SERPL-MCNC: 79 MG/DL (ref 65–140)
GLUCOSE SERPL-MCNC: 80 MG/DL (ref 65–140)
GLUCOSE SERPL-MCNC: 81 MG/DL (ref 65–140)
GLUCOSE SERPL-MCNC: 81 MG/DL (ref 65–140)
GLUCOSE SERPL-MCNC: 82 MG/DL (ref 65–140)
GLUCOSE SERPL-MCNC: 82 MG/DL (ref 65–140)
GLUCOSE SERPL-MCNC: 83 MG/DL (ref 65–140)
GLUCOSE SERPL-MCNC: 85 MG/DL (ref 65–140)
GLUCOSE SERPL-MCNC: 85 MG/DL (ref 65–140)
GLUCOSE SERPL-MCNC: 86 MG/DL (ref 65–140)
GLUCOSE SERPL-MCNC: 87 MG/DL (ref 65–140)
GLUCOSE SERPL-MCNC: 87 MG/DL (ref 65–140)
GLUCOSE SERPL-MCNC: 88 MG/DL (ref 65–140)
GLUCOSE SERPL-MCNC: 88 MG/DL (ref 65–140)
INSULIN SERPL-ACNC: 5.67 UIU/ML
INSULIN SERPL-ACNC: 7.01 UIU/ML
INSULIN SERPL-ACNC: 8.42 UIU/ML
INSULIN SERPL-ACNC: 9.27 UIU/ML
POTASSIUM SERPL-SCNC: 3.6 MMOL/L (ref 3.5–5.3)
POTASSIUM SERPL-SCNC: 3.7 MMOL/L (ref 3.5–5.3)
POTASSIUM SERPL-SCNC: 3.7 MMOL/L (ref 3.5–5.3)
POTASSIUM SERPL-SCNC: 3.9 MMOL/L (ref 3.5–5.3)
SODIUM SERPL-SCNC: 134 MMOL/L (ref 135–147)
SODIUM SERPL-SCNC: 136 MMOL/L (ref 135–147)

## 2023-06-03 PROCEDURE — 80048 BASIC METABOLIC PNL TOTAL CA: CPT | Performed by: GENERAL PRACTICE

## 2023-06-03 PROCEDURE — 99232 SBSQ HOSP IP/OBS MODERATE 35: CPT | Performed by: NURSE PRACTITIONER

## 2023-06-03 PROCEDURE — 82010 KETONE BODYS QUAN: CPT | Performed by: GENERAL PRACTICE

## 2023-06-03 PROCEDURE — 99254 IP/OBS CNSLTJ NEW/EST MOD 60: CPT | Performed by: INTERNAL MEDICINE

## 2023-06-03 PROCEDURE — 84305 ASSAY OF SOMATOMEDIN: CPT | Performed by: GENERAL PRACTICE

## 2023-06-03 PROCEDURE — 83519 RIA NONANTIBODY: CPT | Performed by: GENERAL PRACTICE

## 2023-06-03 PROCEDURE — 84681 ASSAY OF C-PEPTIDE: CPT | Performed by: GENERAL PRACTICE

## 2023-06-03 PROCEDURE — 84206 ASSAY OF PROINSULIN: CPT | Performed by: GENERAL PRACTICE

## 2023-06-03 PROCEDURE — 83525 ASSAY OF INSULIN: CPT | Performed by: GENERAL PRACTICE

## 2023-06-03 PROCEDURE — 82948 REAGENT STRIP/BLOOD GLUCOSE: CPT

## 2023-06-03 RX ADMIN — ACETAMINOPHEN 650 MG: 325 TABLET ORAL at 06:37

## 2023-06-03 RX ADMIN — PANTOPRAZOLE SODIUM 40 MG: 40 TABLET, DELAYED RELEASE ORAL at 05:12

## 2023-06-03 RX ADMIN — LEVOTHYROXINE SODIUM 50 MCG: 50 TABLET ORAL at 05:12

## 2023-06-03 RX ADMIN — TOPIRAMATE 100 MG: 100 TABLET, FILM COATED ORAL at 22:25

## 2023-06-03 NOTE — PLAN OF CARE
Problem: METABOLIC, FLUID AND ELECTROLYTES - ADULT  Goal: Electrolytes maintained within normal limits  Description: INTERVENTIONS:  - Monitor labs and assess patient for signs and symptoms of electrolyte imbalances  - Administer electrolyte replacement as ordered  - Monitor response to electrolyte replacements, including repeat lab results as appropriate  - Instruct patient on fluid and nutrition as appropriate  Outcome: Progressing     Problem: METABOLIC, FLUID AND ELECTROLYTES - ADULT  Goal: Fluid balance maintained  Description: INTERVENTIONS:  - Monitor labs   - Monitor I/O and WT  - Instruct patient on fluid and nutrition as appropriate  - Assess for signs & symptoms of volume excess or deficit  Outcome: Progressing     Problem: METABOLIC, FLUID AND ELECTROLYTES - ADULT  Goal: Glucose maintained within target range  Description: INTERVENTIONS:  - Monitor Blood Glucose as ordered  - Assess for signs and symptoms of hyperglycemia and hypoglycemia  - Administer ordered medications to maintain glucose within target range  - Assess nutritional intake and initiate nutrition service referral as needed  Outcome: Progressing     Problem: CARDIOVASCULAR - ADULT  Goal: Maintains optimal cardiac output and hemodynamic stability  Description: INTERVENTIONS:  - Monitor I/O, vital signs and rhythm  - Monitor for S/S and trends of decreased cardiac output  - Administer and titrate ordered vasoactive medications to optimize hemodynamic stability  - Assess quality of pulses, skin color and temperature  - Assess for signs of decreased coronary artery perfusion  - Instruct patient to report change in severity of symptoms  Outcome: Progressing

## 2023-06-03 NOTE — ASSESSMENT & PLAN NOTE
· Admitted for hypoglycemia testing by Dr Eli Thomas to rule out endogenous hyperinsulinemia   · Dr Eli Thomas is on call and any questions can be forwarded to her  · The patient had dinner (6/2)  She is now n p o  except sips of water and calorie/caffeine free beverage and have blood sugars checked every hour  When blood sugar less than 60, patient will need her blood sugars checked every 30 minutes  · Blood sugars have been in the 80s  · Pending formal endocrinology evaluation   · Follow 72- hours fast protocol as per Dr Eli Thomas

## 2023-06-03 NOTE — UTILIZATION REVIEW
Initial Clinical Review    Admission: Date/Time/Statement:   Admission Orders (From admission, onward)     Ordered        06/02/23 1655  Inpatient Admission  Once                      Orders Placed This Encounter   Procedures   • Inpatient Admission     Standing Status:   Standing     Number of Occurrences:   1     Order Specific Question:   Level of Care     Answer:   Level 2 Stepdown / HOT [14]     Order Specific Question:   Estimated length of stay     Answer:   More than 2 Midnights     Order Specific Question:   Certification     Answer:   I certify that inpatient services are medically necessary for this patient for a duration of greater than two midnights  See H&P and MD Progress Notes for additional information about the patient's course of treatment  ED Arrival Information     Patient not seen in ED                     No chief complaint on file  Initial Presentation: 30 y o  female with a PMH of gastric bypass who presents with episodes of hypoglycemia over the past 2 years  Patient was getting sweaty and when they checked her blood sugar and saw it was 20  Patient went to see endocrinology  Requested the patient be admitted for 72-hour fast to diagnose the etiology of her hypoglycemia  Plan: Inpatient admission for evaluation and treatment of hypoglycemia, hypothyroidism: Patient okay to have dinner  She then will be n p o  except sips of water and have blood sugars checked every hour  When blood sugar less than 60, patient will need her blood sugars checked every 30 minutes  Date: 6/3   Day 2:     Internal medicine: Blood sugars have been in the 80s  Pending formal endocrinology evaluation  Follow 72- hours fast protocol as per Endocrinology  Endocrinology consult: Given history of sleeve gastrectomy as well as Bhavya-en-Y gastric bypass surgery, as well as patient history, most likely secondary to reactive hypoglycemia  Start 72 fast after dinner  72-Hour Fast Protocol:  1   Please draw  Baseline peripheral venous sample of CMP, random insulin, proinsulin, C-peptide, beta hydroxybutyrate, sulfonylurea level, insulin antibody   PT can eat dinner ( bariatric diet) after the basic labs done     2  Check fingerstick glucose every 1 hours      3  Please draw peripheral venous samples (BMP, random insulin, proinsulin, C-peptide, beta hydroxybutyrate, ) every 6 hours after initiation of fasting        4  Check fingerstick glucose every 1 hours        5  Once fingerstick glucose is less than 60, check fingerstick glucose every half hour and draw   (BMP, random insulin, proinsulin, C-peptide, beta hydroxybutyrate) if patient has symptoms      6  Once fingerstick glucose is less than 55, draw stat  peripheral venous sample (BMP, random insulin, proinsulin, C-peptide, beta hydroxybutyrate) every half hour) and Draw samples ( every 6 hours) onwards and check blood sugars every 15 minutes      7  Once fingerstick glucose is less than 45, draw peripheral venous sample (BMP, random insulin, proinsulin, C-peptide, beta hydroxybutyrate)  After confirming serum glucose is less than 45, give 1 mg of IV glucagon  Check blood sugar 10, 20, and 30 minutes later      8  The patient can then be fed or given IV dextrose after the sample is drawn      9   Patient is allowed to get calorie free and caffeine free beverages and water during the fast     ED Triage Vitals   Temperature Pulse Respirations Blood Pressure SpO2   06/02/23 1800 06/02/23 1700 06/02/23 2308 06/02/23 1700 06/02/23 1700   98 °F (36 7 °C) 87 13 108/68 98 %      Temp Source Heart Rate Source Patient Position - Orthostatic VS BP Location FiO2 (%)   06/02/23 1800 -- 06/02/23 2308 06/02/23 2308 --   Oral  Lying Right arm       Pain Score       06/02/23 1651       No Pain          Wt Readings from Last 1 Encounters:   04/06/23 76 2 kg (168 lb)     Additional Vital Signs:     Date/Time Temp Pulse Resp BP MAP (mmHg) SpO2 O2 Device   06/03/23 1257 -- -- -- 97/69 78 -- --   06/03/23 1200 -- 71 21 -- -- 98 % --   06/02/23 2308 97 7 °F (36 5 °C) 99 13 107/71 85 100 % None (Room air)   06/02/23 2300 -- -- -- -- -- -- None (Room air)   06/02/23 1800 98 °F (36 7 °C) -- -- 95/57 71 -- --     Pertinent Labs/Diagnostic Test Results:   No orders to display           Results from last 7 days   Lab Units 06/03/23  1216 06/03/23  0622 06/03/23  0026 06/02/23  1731   ANION GAP mmol/L 7 7 9 7   BUN mg/dL 10 10 12 11   CALCIUM mg/dL 8 8 8 6 8 4 8 7   CHLORIDE mmol/L 109* 109* 108 108   CO2 mmol/L 20* 20* 19* 21   CREATININE mg/dL 0 57* 0 54* 0 63 0 61   EGFR ml/min/1 73sq m 120 122 116 117   POTASSIUM mmol/L 3 9 3 7 3 6 4 1   SODIUM mmol/L 136 136 136 136     Results from last 7 days   Lab Units 06/02/23  1731   ALBUMIN g/dL 4 0   ALK PHOS U/L 87   ALT U/L 13   AST U/L 13   TOTAL BILIRUBIN mg/dL 0 26   TOTAL PROTEIN g/dL 7 1     Results from last 7 days   Lab Units 06/03/23  1255 06/03/23  1134 06/03/23  1037 06/03/23  0937 06/03/23  0837 06/03/23  0715 06/03/23  0628 06/03/23  0511 06/03/23  0413 06/03/23  0318 06/03/23  0205 06/03/23  0132   POC GLUCOSE mg/dl 88 86 87 85 83 80 83 83 78 81 82 88     Results from last 7 days   Lab Units 06/03/23  1216 06/03/23  0622 06/03/23  0026 06/02/23  1731   GLUCOSE RANDOM mg/dL 85 86 86 76             BETA-HYDROXYBUTYRATE   Date Value Ref Range Status   06/03/2023 0 3 <0 6 mmol/L Final   06/03/2023 0 1 <0 6 mmol/L Final   06/03/2023 0 3 <0 6 mmol/L Final   06/02/2023 0 1 <0 6 mmol/L Final          ED Treatment:   Medication Administration - No Administrations Displayed (No Start Event Found)     None        Past Medical History:   Diagnosis Date   • Abnormal Pap smear of cervix    • Anemia     gets iron infusions   • Anxiety    • Chiari I malformation (HCC)    • Chronic pain disorder     during lupus flairs   • COVID-19    • Disease of thyroid gland     pt off meds   • Female infertility     IVF pregnancy   • Fibromyalgia    • Fibromyalgia, primary    • Gastric ulcer    • GERD (gastroesophageal reflux disease)    • Hiatal hernia    • Lupus (HCC)    • Muscle weakness    • Pericardial cyst    • Pituitary tumor    • Pleural effusion associated with pulmonary infection    • Polycystic ovary syndrome    • Pulmonary edema    • Pulmonary emboli (HCC)    • Spinal headache     with c section     Present on Admission:  • Headache  • Acquired hypothyroidism      Admitting Diagnosis: Hypoglycemia  Age/Sex: 30 y o  female  Admission Orders:  Scheduled Medications:  glucagon, 1 mg, Intramuscular, Once  levothyroxine, 50 mcg, Oral, Early Morning  pantoprazole, 40 mg, Oral, Early Morning  topiramate, 100 mg, Oral, HS      Continuous IV Infusions:     PRN Meds:  acetaminophen, 650 mg, Oral, Q6H PRN        IP CONSULT TO ENDOCRINOLOGY  IP CONSULT TO CASE MANAGEMENT    Network Utilization Review Department  ATTENTION: Please call with any questions or concerns to [phone] and carefully listen to the prompts so that you are directed to the right person  All voicemails are confidential   Roxann Dodson all requests for admission clinical reviews, approved or denied determinations and any other requests to dedicated fax number below belonging to the campus where the patient is receiving treatment   List of dedicated fax numbers for the Facilities:  [address] DENIALS (Administrative/Medical Necessity) [phone]   [address] (Maternity/NICU/Pediatrics) [phone]   [address] [phone]   [facility] [place]  [phone]   [facility] [place] [address] [facility] [phone]   [facility] [phone] [address] [phone]

## 2023-06-03 NOTE — PROGRESS NOTES
[facility]  Progress Note  Name: Savannah Solis  MRN: [id number]  Unit/Bed#: ICU 09 I Date of Admission: 2023   Date of Service: 6/3/2023 I Hospital Day: 1    Assessment/Plan   * Hypoglycemia  Assessment & Plan  · Admitted for hypoglycemia testing by Dr Mendoza Mathews to rule out endogenous hyperinsulinemia   · Dr Mendoza Mathews is on call and any questions can be forwarded to her  · The patient had dinner ()  She is now n p o  except sips of water and calorie/caffeine free beverage and have blood sugars checked every hour  When blood sugar less than 60, patient will need her blood sugars checked every 30 minutes  · Blood sugars have been in the 80s  · Pending formal endocrinology evaluation   · Follow 72- hours fast protocol as per Dr Mendoza Mathews  Acquired hypothyroidism  Assessment & Plan  · Continue with Synthroid    History of gastric bypass  Assessment & Plan  · Continue supportive care   · Once okay for PO intake will resume bariatric diet     Headache  Assessment & Plan  · Topamax qhs  · Tylenol prn            VTE Prophylaxis:  SCDs/encourage ambulation     Patient Centered Rounds: I have performed bedside rounds with nursing staff today  Discussions with Specialists or Other Care Team Provider: endocrinology, CM   Education and Discussions with Family / Patient: patient     Current Length of Stay: 1 day(s)    Current Patient Status: Inpatient   Certification Statement: The patient will continue to require additional inpatient hospital stay due to hypoglycemia     Discharge Plan: pending clinical cours     Code Status: Level 1 - Full Code    Subjective:   Feeling okay  Did have some headache which resolved with tylenol  No n/v/abdominal pain       Objective:     Vitals:   Temp (24hrs), Av 9 °F (36 6 °C), Min:97 7 °F (36 5 °C), Max:98 °F (36 7 °C)    Temp:  [97 7 °F (36 5 °C)-98 °F (36 7 °C)] 97 7 °F (36 5 °C)  HR:  [87-99] 99  Resp:  [13] 13  BP: ()/(57-71) 107/71  SpO2:  [98 %-100 %] 100 %  There is no height or weight on file to calculate BMI  Input and Output Summary (last 24 hours):     No intake or output data in the 24 hours ending 06/03/23 0926    Physical Exam:   Physical Exam    Additional Data:     Labs:        Results from last 7 days   Lab Units 06/03/23  0622 06/03/23  0026 06/02/23  1731   ALK PHOS U/L  --   --  87   ALT U/L  --   --  13   AST U/L  --   --  13   BUN mg/dL 10   < > 11   CALCIUM mg/dL 8 6   < > 8 7   CHLORIDE mmol/L 109*   < > 108   CO2 mmol/L 20*   < > 21   CREATININE mg/dL 0 54*   < > 0 61   POTASSIUM mmol/L 3 7   < > 4 1   SODIUM mmol/L 136   < > 136    < > = values in this interval not displayed  Results from last 7 days   Lab Units 06/03/23  0837 06/03/23  0715 06/03/23  4179 06/03/23  0511 06/03/23  0413 06/03/23  0318 06/03/23  0205 06/03/23  0132 06/03/23  0025 06/02/23  2309 06/02/23  2202 06/02/23  2057   POC GLUCOSE mg/dl 83 80 83 83 78 81 82 88 87 90 83 85           * I Have Reviewed All Lab Data Listed Above  * Additional Pertinent Lab Tests Reviewed: Amanda 66 Admission  Reviewed    Imaging:  Imaging Reports Reviewed Today Include: n/a     Recent Cultures (last 7 days):           Last 24 Hours Medication List:   Current Facility-Administered Medications   Medication Dose Route Frequency Provider Last Rate   • acetaminophen  650 mg Oral Q6H PRN Ignacio Whelan DO     • glucagon  1 mg Intramuscular Once Ignacio Whelan DO     • levothyroxine  50 mcg Oral Early Morning Ignacio Whelan DO     • pantoprazole  40 mg Oral Early Morning Ignacio Whelan DO     • topiramate  100 mg Oral HS Ignacio Whelan DO          Today, Patient Was Seen By: SEPIDEH Alexander    ** Please Note: Dictation voice to text software may have been used in the creation of this document   **

## 2023-06-03 NOTE — PLAN OF CARE
Problem: PAIN - ADULT  Goal: Verbalizes/displays adequate comfort level or baseline comfort level  Description: Interventions:  - Encourage patient to monitor pain and request assistance  - Assess pain using appropriate pain scale  - Administer analgesics based on type and severity of pain and evaluate response  - Implement non-pharmacological measures as appropriate and evaluate response  - Consider cultural and social influences on pain and pain management  - Notify physician/advanced practitioner if interventions unsuccessful or patient reports new pain  Outcome: Progressing     Problem: INFECTION - ADULT  Goal: Absence or prevention of progression during hospitalization  Description: INTERVENTIONS:  - Assess and monitor for signs and symptoms of infection  - Monitor lab/diagnostic results  - Monitor all insertion sites, i e  indwelling lines, tubes, and drains  - Monitor endotracheal if appropriate and nasal secretions for changes in amount and color  - [place] appropriate cooling/warming therapies per order  - Administer medications as ordered  - Instruct and encourage patient and family to use good hand hygiene technique  - Identify and instruct in appropriate isolation precautions for identified infection/condition  Outcome: Progressing  Goal: Absence of fever/infection during neutropenic period  Description: INTERVENTIONS:  - Monitor WBC    Outcome: Progressing     Problem: SAFETY ADULT  Goal: Patient will remain free of falls  Description: INTERVENTIONS:  - Educate patient/family on patient safety including physical limitations  - Instruct patient to call for assistance with activity   - Consult OT/PT to assist with strengthening/mobility   - Keep Call bell within reach  - Keep bed low and locked with side rails adjusted as appropriate  - Keep care items and personal belongings within reach  - Initiate and maintain comfort rounds  - Make Fall Risk Sign visible to staff  - Offer Toileting every  Hours, in advance of need  - Initiate/Maintain alarm  - Obtain necessary fall risk management equipment:   - Apply yellow socks and bracelet for high fall risk patients  - Consider moving patient to room near nurses station  Outcome: Progressing  Goal: Maintain or return to baseline ADL function  Description: INTERVENTIONS:  -  Assess patient's ability to carry out ADLs; assess patient's baseline for ADL function and identify physical deficits which impact ability to perform ADLs (bathing, care of mouth/teeth, toileting, grooming, dressing, etc )  - Assess/evaluate cause of self-care deficits   - Assess range of motion  - Assess patient's mobility; develop plan if impaired  - Assess patient's need for assistive devices and provide as appropriate  - Encourage maximum independence but intervene and supervise when necessary  - Involve family in performance of ADLs  - Assess for home care needs following discharge   - Consider OT consult to assist with ADL evaluation and planning for discharge  - Provide patient education as appropriate  Outcome: Progressing  Goal: Maintains/Returns to pre admission functional level  Description: INTERVENTIONS:  - Perform BMAT or MOVE assessment daily    - Set and communicate daily mobility goal to care team and patient/family/caregiver  - Collaborate with rehabilitation services on mobility goals if consulted  - Perform Range of Motion  times a day  - Reposition patient every  hours    - Dangle patient  times a day  - Stand patient  times a day  - Ambulate patient  times a day  - Out of bed to chair  times a day   - Out of bed for meals  times a day  - Out of bed for toileting  - Record patient progress and toleration of activity level   Outcome: Progressing     Problem: DISCHARGE PLANNING  Goal: Discharge to home or other facility with appropriate resources  Description: INTERVENTIONS:  - Identify barriers to discharge w/patient and caregiver  - Arrange for needed discharge resources and transportation as appropriate  - Identify discharge learning needs (meds, wound care, etc )  - Arrange for interpretive services to assist at discharge as needed  - Refer to Case Management Department for coordinating discharge planning if the patient needs post-hospital services based on physician/advanced practitioner order or complex needs related to functional status, cognitive ability, or social support system  Outcome: Progressing     Problem: Knowledge Deficit  Goal: Patient/family/caregiver demonstrates understanding of disease process, treatment plan, medications, and discharge instructions  Description: Complete learning assessment and assess knowledge base    Interventions:  - Provide teaching at level of understanding  - Provide teaching via preferred learning methods  Outcome: Progressing     Problem: CARDIOVASCULAR - ADULT  Goal: Maintains optimal cardiac output and hemodynamic stability  Description: INTERVENTIONS:  - Monitor I/O, vital signs and rhythm  - Monitor for S/S and trends of decreased cardiac output  - Administer and titrate ordered vasoactive medications to optimize hemodynamic stability  - Assess quality of pulses, skin color and temperature  - Assess for signs of decreased coronary artery perfusion  - Instruct patient to report change in severity of symptoms  Outcome: Progressing  Goal: Absence of cardiac dysrhythmias or at baseline rhythm  Description: INTERVENTIONS:  - Continuous cardiac monitoring, vital signs, obtain 12 lead EKG if ordered  - Administer antiarrhythmic and heart rate control medications as ordered  - Monitor electrolytes and administer replacement therapy as ordered  Outcome: Progressing     Problem: METABOLIC, FLUID AND ELECTROLYTES - ADULT  Goal: Electrolytes maintained within normal limits  Description: INTERVENTIONS:  - Monitor labs and assess patient for signs and symptoms of electrolyte imbalances  - Administer electrolyte replacement as ordered  - Monitor response to electrolyte replacements, including repeat lab results as appropriate  - Instruct patient on fluid and nutrition as appropriate  Outcome: Progressing  Goal: Fluid balance maintained  Description: INTERVENTIONS:  - Monitor labs   - Monitor I/O and WT  - Instruct patient on fluid and nutrition as appropriate  - Assess for signs & symptoms of volume excess or deficit  Outcome: Progressing  Goal: Glucose maintained within target range  Description: INTERVENTIONS:  - Monitor Blood Glucose as ordered  - Assess for signs and symptoms of hyperglycemia and hypoglycemia  - Administer ordered medications to maintain glucose within target range  - Assess nutritional intake and initiate nutrition service referral as needed  Outcome: Progressing

## 2023-06-03 NOTE — UTILIZATION REVIEW
NOTIFICATION OF INPATIENT ADMISSION   AUTHORIZATION REQUEST   SERVICING FACILITY:   [address] [place]  [place], [address] [place] [address]  Tax ID: [id number]  NPI: [id number]   ATTENDING PROVIDER:  Attending Name and NPI#: Glory Zapata [[id number]]  Address: [address] [place] [address] [place]  [place], [address] [place] [address]  Phone: [phone]     ADMISSION INFORMATION:  Place of [facility]patient [facility]  [address]  Place of Service Code: 21  Inpatient Admission Date/Time: 6/2/23  4:48 PM  Discharge Date/Time: No discharge date for patient encounter  Admitting Diagnosis Code/Description:  Hypoglycemia     UTILIZATION REVIEW CONTACT:  Teja Calhoun, Utilization   Network Utilization Review Department  Phone: [phone]  Fax: [phone]  Email: [email or web address]  Contact for approvals/pending authorizations, clinical reviews, and discharge  PHYSICIAN ADVISORY SERVICES:  Medical Necessity Denial & [id number] Review  Phone: [phone]  Fax: [phone]  Email: [email or web address]

## 2023-06-03 NOTE — CONSULTS
Consultation - Bassem Parekh 30 y o  female MRN: [id number]    Unit/Bed#: ICU 09 Encounter: [id number]      Assessment/Plan     Assessment: This is a 30y o -year-old female with hypoglycemia   Plan:  1  Hypoglycemia-Given history of sleeve gastrectomy as well as Bhavya-en-Y gastric bypass surgery, as well as patient history, most likely secondary to reactive hypoglycemia  We will rule out endogenous hyperinsulinemia/insulinoma (endogenous insulin production) by doing 72-hour fast  Previously a m  cortisol is normal, 9 9 which rules out adrenal insufficiency, and does not have any symptoms of adrenal insufficiency  See instructions   72-Hour Fast Protocol:  1  Please draw  Baseline peripheral venous sample of CMP, random insulin, proinsulin, C-peptide, beta hydroxybutyrate, sulfonylurea level, insulin antibody   PT can eat dinner ( bariatric diet) after the basic labs done      START 72 hours fast after the dinner   Note: PT can have calorie free and caffeine free beverages and water during the fast         2  Check fingerstick glucose every 1 hours      3  Please draw peripheral venous samples (BMP, random insulin, proinsulin, C-peptide, beta hydroxybutyrate, ) every 6 hours after initiation of fasting        4  Check fingerstick glucose every 1 hours        5  Once fingerstick glucose is less than 60, check fingerstick glucose every half hour and draw   (BMP, random insulin, proinsulin, C-peptide, beta hydroxybutyrate) if patient has symptoms      6  Once fingerstick glucose is less than 55, draw stat  peripheral venous sample (BMP, random insulin, proinsulin, C-peptide, beta hydroxybutyrate) every half hour) and Draw samples ( every 6 hours) onwards and check blood sugars every 15 minutes      7  Once fingerstick glucose is less than 45, draw peripheral venous sample (BMP, random insulin, proinsulin, C-peptide, beta hydroxybutyrate)   After confirming serum glucose is less than 45, give 1 mg of IV glucagon  Check blood sugar 10, 20, and 30 minutes later      8  The patient can then be fed or given IV dextrose after the sample is drawn      9  Patient is allowed to get calorie free and caffeine free beverages and water during the fast     2  Hypothyroidism -  Lab Results   Component Value Date    L4ESJSK 0 90 09/17/2022    [id number] 2 310 03/11/2023   TSH is within normal range  Continue current dose of levothyroxine    3  Gastroesophageal reflux disease/currently managed on Protonix 40 mg daily      CC: Diabetes Consult    History of Present Illness     HPI: Edna Lopez is a 30y o  year old female with with medical history of sleeve gastrectomy and subsequently Bhavya-en-Y gastric bypass surgery 3 years ago for weight loss, history of hypothyroidism PCOS fibromyalgia, vitamin D deficiency was admitted in hospital for 72 hour fast to r/o fasting hypoglycemia and post prandial hypoglycemia  PT ate bariatric diet, for dinner, and fast was started after that, blood sugars since then are in 80-87 mg/dl range  PT is asymptomatic and lying in bed,     Pt gives H/o hypoglycemia at home, at different times of the day, mostly happens after meals but sometimes at 3 AM in the morning  She tries to follow up low carb high proteins diet but sometimes forget because of busy schedule   She feels symptoms of shakiness, sweating, blurry vision when blood sugars drops and feels better after eating or drinking juice   She was given diagnostic continuous glucose monitor sensor by Dex[employer], 14 days  As per interpretation, patient did not have any hypoglycemia episodes in 10 days    She also wore continuous glucose monitor sensor by [facility] for 14 days, interpretation was average glucose 104 mg per DL 4% blood sugars are low, 2% blood sugars are high and 94% blood sugars are in target range  Patient had blood sugars range from 54 to 110 mg per DL, which led to 72-hour fast, to rule out fasting, as well as postprandial hypoglycemia      06/02 0700 - 06/03 0659 06/03 0700 - 06/04 0659      Time:  0205 0318 0413 0511 0622 0628 0715 0837 0937 1037 1134         Glucose (mg/dl) Graphs cannot display in the current view    Glucose    Finger stick glucose  82 81 78 83  83 80 83 85 87 86     Random glucose      86                   She takes levothyroxine 50 mcg po daily for hypothyroidism  Inpatient consult to Endocrinology  Consult performed by: Alford Lundborg, MD  Consult ordered by: Dee Granda DO          Review of Systems   Constitutional: Positive for activity change and fatigue  Negative for diaphoresis, fever and unexpected weight change  HENT: Negative  Eyes: Negative for visual disturbance  Respiratory: Negative for cough, chest tightness and shortness of breath  Cardiovascular: Negative for chest pain, palpitations and leg swelling  Gastrointestinal: Negative for abdominal pain, blood in stool, constipation, diarrhea, nausea and vomiting  Endocrine: Negative for cold intolerance, heat intolerance, polydipsia, polyphagia and polyuria  Genitourinary: Negative for dysuria, enuresis, frequency and urgency  Musculoskeletal: Negative for arthralgias and myalgias  Skin: Negative for pallor, rash and wound  Allergic/Immunologic: Negative  Neurological: Negative for dizziness, tremors, weakness and numbness  Hematological: Negative  Psychiatric/Behavioral: Negative          Historical Information   Past Medical History:   Diagnosis Date   • Abnormal Pap smear of cervix    • Anemia     gets iron infusions   • Anxiety    • Chiari I malformation (HCC)    • Chronic pain disorder     during lupus flairs   • COVID-19    • Disease of thyroid gland     pt off meds   • Female infertility     IVF pregnancy   • Fibromyalgia    • Fibromyalgia, primary    • Gastric ulcer    • GERD (gastroesophageal reflux disease)    • Hiatal hernia    • Lupus (HCC)    • Muscle weakness    • Pericardial cyst    • Pituitary tumor    • Pleural effusion associated with pulmonary infection    • Polycystic ovary syndrome    • Pulmonary edema    • Pulmonary emboli (HCC)    • Spinal headache     with c section     Past Surgical History:   Procedure Laterality Date   •  SECTION      x 2   • CHOLECYSTECTOMY     • GASTRECTOMY SLEEVE LAPAROSCOPIC     • GASTRIC BYPASS     • POLYPECTOMY     • TONSILLECTOMY     • TUBAL LIGATION       Social History   Social History     Substance and Sexual Activity   Alcohol Use Yes    Comment: occ  social rare     Social History     Substance and Sexual Activity   Drug Use Never     Social History     Tobacco Use   Smoking Status Never   Smokeless Tobacco Never     Family History:   Family History   Problem Relation Age of Onset   • Heart disease Mother    • Hypertension Mother    • Heart attack Father    • No Known Problems Brother    • No Known Problems Daughter    • No Known Problems Son    • Heart disease Maternal Grandmother    • Hypertension Maternal Grandmother    • Diabetes Maternal Grandmother    • Early death Maternal Grandfather    • No Known Problems Paternal Grandmother    • No Known Problems Paternal Grandfather    • No Known Problems Brother        Meds/Allergies   Current Facility-Administered Medications   Medication Dose Route Frequency Provider Last Rate Last Admin   • acetaminophen (TYLENOL) tablet 650 mg  650 mg Oral Q6H PRN Phoenix Oneco, DO   650 mg at 23 4482   • glucagon (GLUCAGEN) injection 1 mg  1 mg Intramuscular Once Phoenix Oneco, DO       • levothyroxine tablet 50 mcg  50 mcg Oral Early Morning Phoenix Oneco, DO   50 mcg at 23 9750   • pantoprazole (PROTONIX) EC tablet 40 mg  40 mg Oral Early Morning Phoenix Oneco, DO   40 mg at 23 7099   • topiramate (TOPAMAX) tablet 100 mg  100 mg Oral HS Phoenix Oneco, DO   100 mg at 23 2144     Allergies   Allergen Reactions   • Codeine Tremor   • Morphine Tremor       Objective   Vitals: Blood pressure 107/71, pulse 99, temperature 97 7 °F (36 5 °C), temperature source Oral, resp  rate 13, SpO2 100 %, not currently breastfeeding  No intake or output data in the 24 hours ending 06/03/23 1043  Invasive Devices     Peripheral Intravenous Line  Duration           Peripheral IV 06/03/23 Distal;Right;Upper;Ventral (anterior) Arm <1 day                Physical Exam  Vitals reviewed  Constitutional:       General: She is not in acute distress  Appearance: She is well-developed  She is not diaphoretic  HENT:      Head: Normocephalic and atraumatic  Eyes:      General:         Right eye: No discharge  Left eye: No discharge  Conjunctiva/sclera: Conjunctivae normal    Neck:      Thyroid: No thyromegaly  Cardiovascular:      Rate and Rhythm: Normal rate and regular rhythm  Heart sounds: Normal heart sounds  No murmur heard  Pulmonary:      Effort: Pulmonary effort is normal  No respiratory distress  Breath sounds: Normal breath sounds  No wheezing  Abdominal:      General: Bowel sounds are normal  There is no distension  Palpations: Abdomen is soft  Tenderness: There is no abdominal tenderness  Musculoskeletal:         General: No tenderness or deformity  Normal range of motion  Cervical back: Normal range of motion and neck supple  Skin:     General: Skin is warm and dry  Findings: No erythema or rash  Neurological:      General: No focal deficit present  Mental Status: She is alert and oriented to person, place, and time  Cranial Nerves: No cranial nerve deficit  Motor: No abnormal muscle tone  Deep Tendon Reflexes: Reflexes are normal and symmetric  Reflexes normal    Psychiatric:         Mood and Affect: Mood normal          Behavior: Behavior normal          The history was obtained from the review of the chart, patient      Lab Results:       Lab Results   Component Value Date    HCT 38 7 05/01/2022    HGB 12 6 05/01/2022    MCV 89 05/01/2022 " 05/01/2022    WBC 10 28 (H) 05/01/2022     Lab Results   Component Value Date/Time    ALB 4 0 06/02/2023 05:31 PM    ALT 13 06/02/2023 05:31 PM    AST 13 06/02/2023 05:31 PM    BUN 10 06/03/2023 06:22 AM     (H) 06/03/2023 06:22 AM    CO2 20 (L) 06/03/2023 06:22 AM    CREATININE 0 54 (L) 06/03/2023 06:22 AM    K 3 7 06/03/2023 06:22 AM    TP 7 1 06/02/2023 05:31 PM     No results for input(s): \"CHOL\", \"HDL\", \"LDL\", \"TRIG\", \"VLDL\" in the last 72 hours  No results found for: \"[id number]\", \"MICROALBUR\"  POC Glucose (mg/dl)   Date Value   06/03/2023 87   06/03/2023 85   06/03/2023 83   06/03/2023 80   06/03/2023 83   06/03/2023 83   06/03/2023 78   06/03/2023 81   06/03/2023 82   06/03/2023 88       Imaging Studies: I have personally reviewed pertinent reports  Portions of the record may have been created with voice recognition software    "

## 2023-06-04 LAB
ANION GAP SERPL CALCULATED.3IONS-SCNC: 10 MMOL/L (ref 4–13)
ANION GAP SERPL CALCULATED.3IONS-SCNC: 11 MMOL/L (ref 4–13)
ANION GAP SERPL CALCULATED.3IONS-SCNC: 12 MMOL/L (ref 4–13)
ANION GAP SERPL CALCULATED.3IONS-SCNC: 14 MMOL/L (ref 4–13)
BETA-HYDROXYBUTYRATE: 1 MMOL/L
BETA-HYDROXYBUTYRATE: 1.1 MMOL/L
BETA-HYDROXYBUTYRATE: 1.9 MMOL/L
BETA-HYDROXYBUTYRATE: 2 MMOL/L
BUN SERPL-MCNC: 12 MG/DL (ref 5–25)
BUN SERPL-MCNC: 13 MG/DL (ref 5–25)
BUN SERPL-MCNC: 15 MG/DL (ref 5–25)
BUN SERPL-MCNC: 16 MG/DL (ref 5–25)
C PEPTIDE SERPL-MCNC: 3 NG/ML (ref 1.1–4.4)
C PEPTIDE SERPL-MCNC: 3.6 NG/ML (ref 1.1–4.4)
C PEPTIDE SERPL-MCNC: 4.3 NG/ML (ref 1.1–4.4)
CALCIUM SERPL-MCNC: 8.7 MG/DL (ref 8.4–10.2)
CALCIUM SERPL-MCNC: 8.8 MG/DL (ref 8.4–10.2)
CALCIUM SERPL-MCNC: 9.1 MG/DL (ref 8.4–10.2)
CALCIUM SERPL-MCNC: 9.1 MG/DL (ref 8.4–10.2)
CHLORIDE SERPL-SCNC: 103 MMOL/L (ref 96–108)
CHLORIDE SERPL-SCNC: 104 MMOL/L (ref 96–108)
CHLORIDE SERPL-SCNC: 105 MMOL/L (ref 96–108)
CHLORIDE SERPL-SCNC: 106 MMOL/L (ref 96–108)
CO2 SERPL-SCNC: 16 MMOL/L (ref 21–32)
CO2 SERPL-SCNC: 16 MMOL/L (ref 21–32)
CO2 SERPL-SCNC: 18 MMOL/L (ref 21–32)
CO2 SERPL-SCNC: 20 MMOL/L (ref 21–32)
CREAT SERPL-MCNC: 0.55 MG/DL (ref 0.6–1.3)
CREAT SERPL-MCNC: 0.56 MG/DL (ref 0.6–1.3)
CREAT SERPL-MCNC: 0.59 MG/DL (ref 0.6–1.3)
CREAT SERPL-MCNC: 0.59 MG/DL (ref 0.6–1.3)
ERYTHROCYTE [DISTWIDTH] IN BLOOD BY AUTOMATED COUNT: 12.5 % (ref 11.6–15.1)
GFR SERPL CREATININE-BSD FRML MDRD: 119 ML/MIN/1.73SQ M
GFR SERPL CREATININE-BSD FRML MDRD: 119 ML/MIN/1.73SQ M
GFR SERPL CREATININE-BSD FRML MDRD: 121 ML/MIN/1.73SQ M
GFR SERPL CREATININE-BSD FRML MDRD: 121 ML/MIN/1.73SQ M
GLUCOSE SERPL-MCNC: 61 MG/DL (ref 65–140)
GLUCOSE SERPL-MCNC: 62 MG/DL (ref 65–140)
GLUCOSE SERPL-MCNC: 63 MG/DL (ref 65–140)
GLUCOSE SERPL-MCNC: 65 MG/DL (ref 65–140)
GLUCOSE SERPL-MCNC: 65 MG/DL (ref 65–140)
GLUCOSE SERPL-MCNC: 67 MG/DL (ref 65–140)
GLUCOSE SERPL-MCNC: 67 MG/DL (ref 65–140)
GLUCOSE SERPL-MCNC: 68 MG/DL (ref 65–140)
GLUCOSE SERPL-MCNC: 69 MG/DL (ref 65–140)
GLUCOSE SERPL-MCNC: 70 MG/DL (ref 65–140)
GLUCOSE SERPL-MCNC: 71 MG/DL (ref 65–140)
GLUCOSE SERPL-MCNC: 73 MG/DL (ref 65–140)
GLUCOSE SERPL-MCNC: 75 MG/DL (ref 65–140)
GLUCOSE SERPL-MCNC: 76 MG/DL (ref 65–140)
GLUCOSE SERPL-MCNC: 77 MG/DL (ref 65–140)
GLUCOSE SERPL-MCNC: 80 MG/DL (ref 65–140)
HCT VFR BLD AUTO: 39.4 % (ref 34.8–46.1)
HGB BLD-MCNC: 13.6 G/DL (ref 11.5–15.4)
INSULIN SERPL-ACNC: 12.99 UIU/ML
INSULIN SERPL-ACNC: 2.79 UIU/ML
INSULIN SERPL-ACNC: 3.69 UIU/ML
INSULIN SERPL-ACNC: 4.34 UIU/ML
INSULIN SERPL-ACNC: 6.61 UIU/ML
MCH RBC QN AUTO: 30.2 PG (ref 26.8–34.3)
MCHC RBC AUTO-ENTMCNC: 34.5 G/DL (ref 31.4–37.4)
MCV RBC AUTO: 88 FL (ref 82–98)
PLATELET # BLD AUTO: 324 THOUSANDS/UL (ref 149–390)
PMV BLD AUTO: 9.8 FL (ref 8.9–12.7)
POTASSIUM SERPL-SCNC: 3.5 MMOL/L (ref 3.5–5.3)
POTASSIUM SERPL-SCNC: 3.6 MMOL/L (ref 3.5–5.3)
POTASSIUM SERPL-SCNC: 3.9 MMOL/L (ref 3.5–5.3)
POTASSIUM SERPL-SCNC: 3.9 MMOL/L (ref 3.5–5.3)
RBC # BLD AUTO: 4.5 MILLION/UL (ref 3.81–5.12)
SODIUM SERPL-SCNC: 133 MMOL/L (ref 135–147)
SODIUM SERPL-SCNC: 133 MMOL/L (ref 135–147)
SODIUM SERPL-SCNC: 134 MMOL/L (ref 135–147)
SODIUM SERPL-SCNC: 135 MMOL/L (ref 135–147)
WBC # BLD AUTO: 8.29 THOUSAND/UL (ref 4.31–10.16)

## 2023-06-04 PROCEDURE — 80048 BASIC METABOLIC PNL TOTAL CA: CPT | Performed by: GENERAL PRACTICE

## 2023-06-04 PROCEDURE — 83519 RIA NONANTIBODY: CPT | Performed by: GENERAL PRACTICE

## 2023-06-04 PROCEDURE — 82010 KETONE BODYS QUAN: CPT | Performed by: GENERAL PRACTICE

## 2023-06-04 PROCEDURE — 99232 SBSQ HOSP IP/OBS MODERATE 35: CPT | Performed by: INTERNAL MEDICINE

## 2023-06-04 PROCEDURE — 84305 ASSAY OF SOMATOMEDIN: CPT | Performed by: GENERAL PRACTICE

## 2023-06-04 PROCEDURE — 83525 ASSAY OF INSULIN: CPT | Performed by: GENERAL PRACTICE

## 2023-06-04 PROCEDURE — 84206 ASSAY OF PROINSULIN: CPT | Performed by: GENERAL PRACTICE

## 2023-06-04 PROCEDURE — 84681 ASSAY OF C-PEPTIDE: CPT | Performed by: GENERAL PRACTICE

## 2023-06-04 PROCEDURE — 82948 REAGENT STRIP/BLOOD GLUCOSE: CPT

## 2023-06-04 PROCEDURE — 99232 SBSQ HOSP IP/OBS MODERATE 35: CPT | Performed by: FAMILY MEDICINE

## 2023-06-04 PROCEDURE — 85027 COMPLETE CBC AUTOMATED: CPT | Performed by: NURSE PRACTITIONER

## 2023-06-04 RX ORDER — SODIUM CHLORIDE 9 MG/ML
100 INJECTION, SOLUTION INTRAVENOUS CONTINUOUS
Status: DISCONTINUED | OUTPATIENT
Start: 2023-06-04 | End: 2023-06-05 | Stop reason: HOSPADM

## 2023-06-04 RX ADMIN — TOPIRAMATE 100 MG: 100 TABLET, FILM COATED ORAL at 22:44

## 2023-06-04 RX ADMIN — SODIUM CHLORIDE 100 ML/HR: 0.9 INJECTION, SOLUTION INTRAVENOUS at 20:59

## 2023-06-04 RX ADMIN — LEVOTHYROXINE SODIUM 50 MCG: 50 TABLET ORAL at 06:24

## 2023-06-04 RX ADMIN — ACETAMINOPHEN 650 MG: 325 TABLET ORAL at 08:27

## 2023-06-04 RX ADMIN — PANTOPRAZOLE SODIUM 40 MG: 40 TABLET, DELAYED RELEASE ORAL at 06:24

## 2023-06-04 NOTE — PROGRESS NOTES
[facility]  Progress Note  Name: Kait Momin  MRN: [id number]  Unit/Bed#: ICU 09 I Date of Admission: 6/2/2023   Date of Service: 6/4/2023 I Hospital Day: 2    Assessment/Plan   * Hypoglycemia  Assessment & Plan  · Admitted for hypoglycemia testing by Dr Dejah Rowan to rule out endogenous hyperinsulinemia   · Dr Dejah Rowan is on call and any questions can be forwarded to her  · The patient had dinner (6/2)  She is now n p o  except sips of water and calorie/caffeine free beverage and have blood sugars checked every hour  When blood sugar less than 60, patient will need her blood sugars checked every 30 minutes  · Blood sugars have been in the 80s  · Follow 72- hours fast protocol as per Dr Dejah Rowan  PT can have calorie free and caffeine free beverages and water during the fast   · Check fingerstick glucose every 1 hours  · Please draw peripheral venous samples (BMP, random insulin, proinsulin, C-peptide, beta hydroxybutyrate, ) every 6 hours after initiation of fasting     · Check fingerstick glucose every 1 hours     · Once fingerstick glucose is less than 60, check fingerstick glucose every half hour and draw   (BMP, random insulin, proinsulin, C-peptide, beta hydroxybutyrate) if patient has symptoms   · Once fingerstick glucose is less than 55, draw stat  peripheral venous sample (BMP, random insulin, proinsulin, C-peptide, beta hydroxybutyrate) every half hour) and Draw samples ( every 6 hours) onwards and check blood sugars every 15 minutes   · Once fingerstick glucose is less than 45, draw peripheral venous sample (BMP, random insulin, proinsulin, C-peptide, beta hydroxybutyrate)  After confirming serum glucose is less than 45, give 1 mg of IV glucagon  Check blood sugar 10, 20, and 30 minutes later  · The patient can then be fed or given IV dextrose after the sample is drawn      Acquired hypothyroidism  Assessment & Plan  · Continue with Synthroid    History of gastric bypass  Assessment & Plan  · Continue supportive care   · Once okay for PO intake will resume bariatric diet     Headache  Assessment & Plan  · Topamax qhs  · Tylenol prn                VTE Pharmacologic Prophylaxis: VTE Score: 1 Low Risk (Score 0-2) - Encourage Ambulation  Patient Centered Rounds: I performed bedside rounds with nursing staff today  Discussions with Specialists or Other Care Team Provider: endocrinology    Education and Discussions with Family / Patient: Patient declined call to   Total Time Spent on Date of Encounter in care of patient: 35 minutes This time was spent on one or more of the following: performing physical exam; counseling and coordination of care; obtaining or reviewing history; documenting in the medical record; reviewing/ordering tests, medications or procedures; communicating with other healthcare professionals and discussing with patient's family/caregivers  Current Length of Stay: 2 day(s)  Current Patient Status: Inpatient   Certification Statement: The patient will continue to require additional inpatient hospital stay due to Frequent lab draws for 72-hour fast  Discharge Plan: Anticipate discharge in 48-72 hrs to home  Code Status: Level 1 - Full Code    Subjective:   Patient has no complaints  No overnight events  Objective:     Vitals:   Temp (24hrs), Av 3 °F (36 8 °C), Min:98 1 °F (36 7 °C), Max:98 5 °F (36 9 °C)    Temp:  [98 1 °F (36 7 °C)-98 5 °F (36 9 °C)] 98 5 °F (36 9 °C)  HR:  [65-83] 83  Resp:  [18-41] 18  BP: (103-123)/(61-82) 107/61  SpO2:  [98 %-99 %] 98 %  There is no height or weight on file to calculate BMI  Input and Output Summary (last 24 hours):   No intake or output data in the 24 hours ending 23 1333    Physical Exam:   Physical Exam  Vitals reviewed  Constitutional:       Appearance: Normal appearance  She is well-developed  HENT:      Head: Normocephalic and atraumatic        Mouth/Throat:      Mouth: Mucous membranes are moist    Eyes:      Conjunctiva/sclera: Conjunctivae normal    Cardiovascular:      Rate and Rhythm: Normal rate and regular rhythm  Pulses: Normal pulses  Heart sounds: Normal heart sounds  No murmur heard  Pulmonary:      Effort: Pulmonary effort is normal  No respiratory distress  Breath sounds: Normal breath sounds  No wheezing  Abdominal:      Palpations: Abdomen is soft  Tenderness: There is no abdominal tenderness  Musculoskeletal:      Cervical back: Neck supple  Right lower leg: No edema  Left lower leg: No edema  Skin:     General: Skin is warm and dry  Neurological:      Mental Status: She is alert and oriented to person, place, and time  Psychiatric:         Mood and Affect: Mood normal          Behavior: Behavior normal           Additional Data:     Labs:  Results from last 7 days   Lab Units 06/04/23  0521   HEMATOCRIT % 39 4   HEMOGLOBIN g/dL 13 6   PLATELETS Thousands/uL 324   WBC Thousand/uL 8 29     Results from last 7 days   Lab Units 06/04/23  0521 06/03/23  0026 06/02/23  1731   ANION GAP mmol/L 12   < > 7   ALBUMIN g/dL  --   --  4 0   ALK PHOS U/L  --   --  87   ALT U/L  --   --  13   AST U/L  --   --  13   BUN mg/dL 13   < > 11   CALCIUM mg/dL 8 7   < > 8 7   CHLORIDE mmol/L 106   < > 108   CO2 mmol/L 16*   < > 21   CREATININE mg/dL 0 55*   < > 0 61   GLUCOSE RANDOM mg/dL 65   < > 76   POTASSIUM mmol/L 3 9   < > 4 1   SODIUM mmol/L 134*   < > 136   TOTAL BILIRUBIN mg/dL  --   --  0 26    < > = values in this interval not displayed           Results from last 7 days   Lab Units 06/04/23  1201 06/04/23  1104 06/04/23  1023 06/04/23  0907 06/04/23  0810 06/04/23  0729 06/04/23  0623 06/04/23  0520 06/04/23  0422 06/04/23  0318 06/04/23  0207 06/04/23  0102   POC GLUCOSE mg/dl 67 65 71 71 75 69 69 68 67 76 73 73               Lines/Drains:  Invasive Devices     Peripheral Intravenous Line  Duration           Peripheral IV 06/03/23 Distal;Right;Upper;Ventral (anterior) Arm 1 day                      Imaging: No pertinent imaging reviewed  Recent Cultures (last 7 days):         Last 24 Hours Medication List:   Current Facility-Administered Medications   Medication Dose Route Frequency Provider Last Rate   • acetaminophen  650 mg Oral Q6H PRN Minnie Cough, DO     • glucagon  1 mg Intravenous Daily PRN Georgina Barriga DO     • levothyroxine  50 mcg Oral Early Morning Minnie Cough, DO     • pantoprazole  40 mg Oral Early Morning Minnie Cough, DO     • topiramate  100 mg Oral HS Minnie Cough, DO          Today, Patient Was Seen By: Georgina Barriga DO    **Please Note: This note may have been constructed using a voice recognition system  **

## 2023-06-04 NOTE — PROGRESS NOTES
Progress Note - Bassem Parekh 30 y o  female MRN: [id number]    Unit/Bed#: ICU 09 Encounter: [id number]      CC: diabetes f/u    Subjective:   Bassem Parekh is a 30y o  year old female with hypoglycemia, status post gastric bypass surgery  She is admitted for 72-hour fast to rule out endogenous hyperinsulinemia,   feels well  No complaints except sometimes she feels nauseous  No hypoglycemia  sHe is drinking sips of water  Reviewed blood work, with beta-hydroxybutyrate is positive secondary to fasting  Electrolytes are within normal range  Patient denies symptoms of shakiness, palpitations, sweating stated that she is feeling tired  Objective:     Vitals: Blood pressure 107/61, pulse 83, temperature 98 5 °F (36 9 °C), temperature source Oral, resp  rate 18, SpO2 98 %, not currently breastfeeding  ,There is no height or weight on file to calculate BMI  No intake or output data in the 24 hours ending 06/04/23 6760    Physical Exam:  General Appearance: awake, appears stated age and cooperative  Head: Normocephalic, without obvious abnormality, atraumatic  Extremities: moves all extremities  Skin: Skin color and temperature normal    Pulm: no labored breathing    Lab, Imaging and other studies: I have personally reviewed pertinent reports        POC Glucose (mg/dl)   Date Value   06/04/2023 71   06/04/2023 71   06/04/2023 67   06/04/2023 65   06/04/2023 71   06/04/2023 71   06/04/2023 75   06/04/2023 69   06/04/2023 69   06/04/2023 68       Assessment:  Hypoglycemia  Status post bariatric surgery    Plan:  -Continue 72-hour fast  -No evidence of hypoglycemia  -Blood sugars are usually ranging in 68 to 71 mg per DL range  -Discussed with patient it is important to complete 72 hours of fast  -Continue to monitor blood sugar every 1 hour as per protocol  -Patient can have calorie free liquids, such as ginger ale  72-hour fasting protocol is in place  We will follow-up    Portions of the record may have been created with voice recognition software

## 2023-06-04 NOTE — ASSESSMENT & PLAN NOTE
· Admitted for hypoglycemia testing by Dr Delmi Latif to rule out endogenous hyperinsulinemia   · Dr Delmi Latif is on call and any questions can be forwarded to her  · The patient had dinner (6/2)  She is now n p o  except sips of water and calorie/caffeine free beverage and have blood sugars checked every hour  When blood sugar less than 60, patient will need her blood sugars checked every 30 minutes  · Blood sugars have been in the 80s  · Follow 72- hours fast protocol as per Dr Demli Latif  PT can have calorie free and caffeine free beverages and water during the fast   · Check fingerstick glucose every 1 hours  · Please draw peripheral venous samples (BMP, random insulin, proinsulin, C-peptide, beta hydroxybutyrate, ) every 6 hours after initiation of fasting     · Check fingerstick glucose every 1 hours     · Once fingerstick glucose is less than 60, check fingerstick glucose every half hour and draw   (BMP, random insulin, proinsulin, C-peptide, beta hydroxybutyrate) if patient has symptoms   · Once fingerstick glucose is less than 55, draw stat  peripheral venous sample (BMP, random insulin, proinsulin, C-peptide, beta hydroxybutyrate) every half hour) and Draw samples ( every 6 hours) onwards and check blood sugars every 15 minutes   · Once fingerstick glucose is less than 45, draw peripheral venous sample (BMP, random insulin, proinsulin, C-peptide, beta hydroxybutyrate)  After confirming serum glucose is less than 45, give 1 mg of IV glucagon  Check blood sugar 10, 20, and 30 minutes later  · The patient can then be fed or given IV dextrose after the sample is drawn

## 2023-06-05 VITALS
RESPIRATION RATE: 22 BRPM | HEART RATE: 91 BPM | SYSTOLIC BLOOD PRESSURE: 124 MMHG | TEMPERATURE: 98.4 F | DIASTOLIC BLOOD PRESSURE: 61 MMHG | OXYGEN SATURATION: 99 %

## 2023-06-05 LAB
ANION GAP SERPL CALCULATED.3IONS-SCNC: 10 MMOL/L (ref 4–13)
ANION GAP SERPL CALCULATED.3IONS-SCNC: 12 MMOL/L (ref 4–13)
ANION GAP SERPL CALCULATED.3IONS-SCNC: 9 MMOL/L (ref 4–13)
BETA-HYDROXYBUTYRATE: 2.4 MMOL/L
BETA-HYDROXYBUTYRATE: 2.4 MMOL/L
BETA-HYDROXYBUTYRATE: 2.7 MMOL/L
BUN SERPL-MCNC: 12 MG/DL (ref 5–25)
BUN SERPL-MCNC: 13 MG/DL (ref 5–25)
BUN SERPL-MCNC: 13 MG/DL (ref 5–25)
CALCIUM SERPL-MCNC: 8.5 MG/DL (ref 8.4–10.2)
CALCIUM SERPL-MCNC: 8.5 MG/DL (ref 8.4–10.2)
CALCIUM SERPL-MCNC: 8.7 MG/DL (ref 8.4–10.2)
CHLORIDE SERPL-SCNC: 106 MMOL/L (ref 96–108)
CHLORIDE SERPL-SCNC: 107 MMOL/L (ref 96–108)
CHLORIDE SERPL-SCNC: 107 MMOL/L (ref 96–108)
CO2 SERPL-SCNC: 16 MMOL/L (ref 21–32)
CO2 SERPL-SCNC: 18 MMOL/L (ref 21–32)
CO2 SERPL-SCNC: 18 MMOL/L (ref 21–32)
CORTIS SERPL-MCNC: 7.5 UG/DL
CREAT SERPL-MCNC: 0.51 MG/DL (ref 0.6–1.3)
CREAT SERPL-MCNC: 0.54 MG/DL (ref 0.6–1.3)
CREAT SERPL-MCNC: 0.55 MG/DL (ref 0.6–1.3)
GFR SERPL CREATININE-BSD FRML MDRD: 121 ML/MIN/1.73SQ M
GFR SERPL CREATININE-BSD FRML MDRD: 122 ML/MIN/1.73SQ M
GFR SERPL CREATININE-BSD FRML MDRD: 124 ML/MIN/1.73SQ M
GLUCOSE SERPL-MCNC: 163 MG/DL (ref 65–140)
GLUCOSE SERPL-MCNC: 192 MG/DL (ref 65–140)
GLUCOSE SERPL-MCNC: 212 MG/DL (ref 65–140)
GLUCOSE SERPL-MCNC: 58 MG/DL (ref 65–140)
GLUCOSE SERPL-MCNC: 60 MG/DL (ref 65–140)
GLUCOSE SERPL-MCNC: 64 MG/DL (ref 65–140)
GLUCOSE SERPL-MCNC: 65 MG/DL (ref 65–140)
GLUCOSE SERPL-MCNC: 66 MG/DL (ref 65–140)
GLUCOSE SERPL-MCNC: 66 MG/DL (ref 65–140)
GLUCOSE SERPL-MCNC: 67 MG/DL (ref 65–140)
GLUCOSE SERPL-MCNC: 68 MG/DL (ref 65–140)
GLUCOSE SERPL-MCNC: 69 MG/DL (ref 65–140)
GLUCOSE SERPL-MCNC: 71 MG/DL (ref 65–140)
GLUCOSE SERPL-MCNC: 71 MG/DL (ref 65–140)
GLUCOSE SERPL-MCNC: 72 MG/DL (ref 65–140)
GLUCOSE SERPL-MCNC: 80 MG/DL (ref 65–140)
GLUCOSE SERPL-MCNC: 84 MG/DL (ref 65–140)
GLUCOSE SERPL-MCNC: 85 MG/DL (ref 65–140)
INSULIN SERPL-ACNC: 1.7 UIU/ML
INSULIN SERPL-ACNC: 1.76 UIU/ML
INSULIN SERPL-ACNC: 3.71 UIU/ML
INSULIN SERPL-ACNC: 4.03 UIU/ML
INSULIN SERPL-ACNC: 4.32 UIU/ML
POTASSIUM SERPL-SCNC: 3.7 MMOL/L (ref 3.5–5.3)
POTASSIUM SERPL-SCNC: 3.7 MMOL/L (ref 3.5–5.3)
POTASSIUM SERPL-SCNC: 3.8 MMOL/L (ref 3.5–5.3)
PROINSULIN SERPL-SCNC: 5.5 PMOL/L (ref 0–10)
SODIUM SERPL-SCNC: 134 MMOL/L (ref 135–147)
SODIUM SERPL-SCNC: 134 MMOL/L (ref 135–147)
SODIUM SERPL-SCNC: 135 MMOL/L (ref 135–147)

## 2023-06-05 PROCEDURE — 82533 TOTAL CORTISOL: CPT | Performed by: INTERNAL MEDICINE

## 2023-06-05 PROCEDURE — 99232 SBSQ HOSP IP/OBS MODERATE 35: CPT | Performed by: INTERNAL MEDICINE

## 2023-06-05 PROCEDURE — 82010 KETONE BODYS QUAN: CPT | Performed by: GENERAL PRACTICE

## 2023-06-05 PROCEDURE — 83519 RIA NONANTIBODY: CPT | Performed by: GENERAL PRACTICE

## 2023-06-05 PROCEDURE — 83525 ASSAY OF INSULIN: CPT | Performed by: GENERAL PRACTICE

## 2023-06-05 PROCEDURE — 82947 ASSAY GLUCOSE BLOOD QUANT: CPT | Performed by: INTERNAL MEDICINE

## 2023-06-05 PROCEDURE — 83525 ASSAY OF INSULIN: CPT | Performed by: INTERNAL MEDICINE

## 2023-06-05 PROCEDURE — 84681 ASSAY OF C-PEPTIDE: CPT | Performed by: GENERAL PRACTICE

## 2023-06-05 PROCEDURE — 99239 HOSP IP/OBS DSCHRG MGMT >30: CPT

## 2023-06-05 PROCEDURE — 84305 ASSAY OF SOMATOMEDIN: CPT | Performed by: GENERAL PRACTICE

## 2023-06-05 PROCEDURE — 82948 REAGENT STRIP/BLOOD GLUCOSE: CPT

## 2023-06-05 PROCEDURE — 80048 BASIC METABOLIC PNL TOTAL CA: CPT | Performed by: GENERAL PRACTICE

## 2023-06-05 PROCEDURE — 84206 ASSAY OF PROINSULIN: CPT | Performed by: GENERAL PRACTICE

## 2023-06-05 RX ADMIN — PANTOPRAZOLE SODIUM 40 MG: 40 TABLET, DELAYED RELEASE ORAL at 05:59

## 2023-06-05 RX ADMIN — GLUCAGON 1 MG: KIT at 13:33

## 2023-06-05 RX ADMIN — LEVOTHYROXINE SODIUM 50 MCG: 50 TABLET ORAL at 05:59

## 2023-06-05 NOTE — PROGRESS NOTES
Progress Note - Javi Hill 30 y o  female MRN: [id number]    Unit/Bed#: ICU 09 Encounter: [id number]      CC: diabetes f/u    Subjective:   Javi Hill is a 30y o  year old female with  Hypoglycemia secondary to Gastric bypass surgery ( reactive hypoglycemia) was admitted for 72 hour fast ( supervised) in ICU    Feels well  No complaints  No hypoglycemia  Pt was monitored ICU, fast was ended after 68 hours , did not have any low blood sugars less than 60 mg/dl was also asymptomatic during the fast, except she felt like she was going to pass out after hot showers but felt better again  At time her blood sugar was 80 mg/dl On serum sample      Latest Reference Range & Units 06/05/23 00:31 06/05/23 05:58 06/05/23 13:15 06/05/23 14:16   Sodium 135 - 147 mmol/L 134 (L) 134 (L) 135    Potassium 3 5 - 5 3 mmol/L 3 8 3 7 3 7    Chloride 96 - 108 mmol/L 106 107 107    CO2 21 - 32 mmol/L 16 (L) 18 (L) 18 (L)    Anion Gap 4 - 13 mmol/L 12 9 10    BUN 5 - 25 mg/dL 13 13 12    Creatinine 0 60 - 1 30 mg/dL 0 54 (L) 0 51 (L) 0 55 (L)    Glucose, Random 65 - 140 mg/dL 67 69 72 68   Calcium 8 4 - 10 2 mg/dL 8 7 8 5 8 5    eGFR ml/min/1 73sq m 122 124 121    (L): Data is abnormally low   Latest Reference Range & Units 06/04/23 05:21 06/04/23 13:35 06/04/23 17:04 06/05/23 00:31   Sodium 135 - 147 mmol/L 134 (L) 133 (L) 133 (L) 134 (L)   Potassium 3 5 - 5 3 mmol/L 3 9 3 9 3 6 3 8   Chloride 96 - 108 mmol/L 106 104 103 106   CO2 21 - 32 mmol/L 16 (L) 18 (L) 16 (L) 16 (L)   Anion Gap 4 - 13 mmol/L 12 11 14 (H) 12   BUN 5 - 25 mg/dL 13 16 15 13   Creatinine 0 60 - 1 30 mg/dL 0 55 (L) 0 56 (L) 0 59 (L) 0 54 (L)   Glucose, Random 65 - 140 mg/dL 65 69 80 67   Calcium 8 4 - 10 2 mg/dL 8 7 9 1 9 1 8 7   eGFR ml/min/1 73sq m 121 121 119 122     Objective:     Vitals: Blood pressure 124/61, pulse 91, temperature 98 4 °F (36 9 °C), temperature source Oral, resp  rate 22, SpO2 99 %, not currently breastfeeding  ,There is no height or weight on file to calculate BMI  Intake/Output Summary (Last 24 hours) at 6/5/2023 1601  Last data filed at 6/5/2023 1401  Gross per 24 hour   Intake 1126 67 ml   Output --   Net 1126 67 ml       Physical Exam:  General Appearance: awake, appears stated age and cooperative  Head: Normocephalic, without obvious abnormality, atraumatic  Extremities: moves all extremities  Skin: Skin color and temperature normal    Pulm: no labored breathing    Lab, Imaging and other studies: I have personally reviewed pertinent reports        POC Glucose (mg/dl)   Date Value   06/05/2023 192 (H)   06/05/2023 80   06/05/2023 85   06/05/2023 84   06/05/2023 64 (L)   06/05/2023 71   06/05/2023 69   06/05/2023 64 (L)   06/05/2023 60 (L)   06/05/2023 71       Assessment:  Post bariatric surgery hypoglycemia     Plan:  -Pt did not have hypoglycemia during 72 hours fast , fast was ended at 68 hours was given glucagon 1 mg IV ( post glucagon ) glucose pending  -As pt did not have hypoglycemia during 72 hour fast, Rules out essentially endogenous insulin production ( insulinoma)   -Pt usually gets hypoglycemia after meals, educated about keeping carb close to 30 grams and eating double proteins with each meal as well as 2 tea spoon of corn starch mixed with milk   -We tried Acarabose before but she could not tolerate it   -Also discussed the option of Reversal of Gastric bypass surgery to improve symptoms of reactive hypoglycemia which she can discuss with bariatric surgery on out pt basis     Stressed the importance of not missing meals and bariatric diet, avoiding caffeine ( excessive)    Many labs results are pending, we will follow up on the blood work results and interpret the results when they are back     I have spent a total time of 30  minutes on 06/05/23 in caring for this patient including Diagnostic results, Prognosis, Risks and benefits of tx options, Instructions for management, Importance of tx compliance, Risk factor reductions, Counseling / Coordination of care, Documenting in the medical record, Reviewing / ordering tests, medicine, procedures  , Obtaining or reviewing history   and Communicating with other healthcare professionals   Portions of the record may have been created with voice recognition software

## 2023-06-05 NOTE — DISCHARGE INSTR - AVS FIRST PAGE
Internal medicine discharge instructions   -Continue with high protein diet, with 30 gram carbs and 2 tablespoon corn starch with milk  -Follow up with endocrinology  -Consider follow up with bariatric surgery to discuss reversal options if you are interested in this   -Follow up with your family doctor

## 2023-06-05 NOTE — PLAN OF CARE
Problem: PAIN - ADULT  Goal: Verbalizes/displays adequate comfort level or baseline comfort level  Description: Interventions:  - Encourage patient to monitor pain and request assistance  - Assess pain using appropriate pain scale  - Administer analgesics based on type and severity of pain and evaluate response  - Implement non-pharmacological measures as appropriate and evaluate response  - Consider cultural and social influences on pain and pain management  - Notify physician/advanced practitioner if interventions unsuccessful or patient reports new pain  6/5/2023 1728 by Michael George RN  Outcome: Adequate for Discharge  6/5/2023 0730 by Michael George RN  Outcome: Progressing     Problem: INFECTION - ADULT  Goal: Absence or prevention of progression during hospitalization  Description: INTERVENTIONS:  - Assess and monitor for signs and symptoms of infection  - Monitor lab/diagnostic results  - Monitor all insertion sites, i e  indwelling lines, tubes, and drains  - Monitor endotracheal if appropriate and nasal secretions for changes in amount and color  - [place] appropriate cooling/warming therapies per order  - Administer medications as ordered  - Instruct and encourage patient and family to use good hand hygiene technique  - Identify and instruct in appropriate isolation precautions for identified infection/condition  6/5/2023 1728 by Michael George RN  Outcome: Adequate for Discharge  6/5/2023 0730 by Michael George RN  Outcome: Progressing  Goal: Absence of fever/infection during neutropenic period  Description: INTERVENTIONS:  - Monitor WBC    6/5/2023 1728 by Michael George RN  Outcome: Adequate for Discharge  6/5/2023 0730 by Michael George RN  Outcome: Progressing     Problem: SAFETY ADULT  Goal: Patient will remain free of falls  Description: INTERVENTIONS:  - Educate patient/family on patient safety including physical limitations  - Instruct patient to call for assistance with activity   - Consult OT/PT to assist with strengthening/mobility   - Keep Call bell within reach  - Keep bed low and locked with side rails adjusted as appropriate  - Keep care items and personal belongings within reach  - Initiate and maintain comfort rounds  - Make Fall Risk Sign visible to staff  - Apply yellow socks and bracelet for high fall risk patients  - Consider moving patient to room near nurses station  6/5/2023 1728 by Dwayne Rendon RN  Outcome: Adequate for Discharge  6/5/2023 0730 by Dwayne Rendon RN  Outcome: Progressing  Goal: Maintain or return to baseline ADL function  Description: INTERVENTIONS:  -  Assess patient's ability to carry out ADLs; assess patient's baseline for ADL function and identify physical deficits which impact ability to perform ADLs (bathing, care of mouth/teeth, toileting, grooming, dressing, etc )  - Assess/evaluate cause of self-care deficits   - Assess range of motion  - Assess patient's mobility; develop plan if impaired  - Assess patient's need for assistive devices and provide as appropriate  - Encourage maximum independence but intervene and supervise when necessary  - Involve family in performance of ADLs  - Assess for home care needs following discharge   - Consider OT consult to assist with ADL evaluation and planning for discharge  - Provide patient education as appropriate  6/5/2023 1728 by Dwayne Rendon RN  Outcome: Adequate for Discharge  6/5/2023 0730 by Dwayne Rendon RN  Outcome: Progressing  Goal: Maintains/Returns to pre admission functional level  Description: INTERVENTIONS:  - Perform BMAT or MOVE assessment daily    - Set and communicate daily mobility goal to care team and patient/family/caregiver     - Collaborate with rehabilitation services on mobility goals if consulted  - Out of bed for toileting  - Record patient progress and toleration of activity level   6/5/2023 1728 by Dwayne Rendon RN  Outcome: Adequate for Discharge  6/5/2023 0730 by Noah Mcmahon RN  Outcome: Progressing     Problem: DISCHARGE PLANNING  Goal: Discharge to home or other facility with appropriate resources  Description: INTERVENTIONS:  - Identify barriers to discharge w/patient and caregiver  - Arrange for needed discharge resources and transportation as appropriate  - Identify discharge learning needs (meds, wound care, etc )  - Arrange for interpretive services to assist at discharge as needed  - Refer to Case Management Department for coordinating discharge planning if the patient needs post-hospital services based on physician/advanced practitioner order or complex needs related to functional status, cognitive ability, or social support system  6/5/2023 1728 by Noah Mcmahon RN  Outcome: Adequate for Discharge  6/5/2023 0730 by Noah Mmcahon RN  Outcome: Progressing     Problem: Knowledge Deficit  Goal: Patient/family/caregiver demonstrates understanding of disease process, treatment plan, medications, and discharge instructions  Description: Complete learning assessment and assess knowledge base    Interventions:  - Provide teaching at level of understanding  - Provide teaching via preferred learning methods  6/5/2023 1728 by Noah Mcmahon RN  Outcome: Adequate for Discharge  6/5/2023 0730 by Noah Mcmahon RN  Outcome: Progressing     Problem: CARDIOVASCULAR - ADULT  Goal: Maintains optimal cardiac output and hemodynamic stability  Description: INTERVENTIONS:  - Monitor I/O, vital signs and rhythm  - Monitor for S/S and trends of decreased cardiac output  - Administer and titrate ordered vasoactive medications to optimize hemodynamic stability  - Assess quality of pulses, skin color and temperature  - Assess for signs of decreased coronary artery perfusion  - Instruct patient to report change in severity of symptoms  6/5/2023 1728 by Noah Mcmahon RN  Outcome: Adequate for Discharge  6/5/2023 0730 by Parminder Da Silva RN  Outcome: Progressing  Goal: Absence of cardiac dysrhythmias or at baseline rhythm  Description: INTERVENTIONS:  - Continuous cardiac monitoring, vital signs, obtain 12 lead EKG if ordered  - Administer antiarrhythmic and heart rate control medications as ordered  - Monitor electrolytes and administer replacement therapy as ordered  6/5/2023 1728 by Parminder Da Silva RN  Outcome: Adequate for Discharge  6/5/2023 0730 by Parminder Da Silva RN  Outcome: Progressing     Problem: METABOLIC, FLUID AND ELECTROLYTES - ADULT  Goal: Electrolytes maintained within normal limits  Description: INTERVENTIONS:  - Monitor labs and assess patient for signs and symptoms of electrolyte imbalances  - Administer electrolyte replacement as ordered  - Monitor response to electrolyte replacements, including repeat lab results as appropriate  - Instruct patient on fluid and nutrition as appropriate  6/5/2023 1728 by Parminder Da Silva RN  Outcome: Adequate for Discharge  6/5/2023 0730 by Parminder Da Silva RN  Outcome: Progressing  Goal: Fluid balance maintained  Description: INTERVENTIONS:  - Monitor labs   - Monitor I/O and WT  - Instruct patient on fluid and nutrition as appropriate  - Assess for signs & symptoms of volume excess or deficit  6/5/2023 1728 by Parminder Da Silva RN  Outcome: Adequate for Discharge  6/5/2023 0730 by Parminder Da Silva RN  Outcome: Progressing  Goal: Glucose maintained within target range  Description: INTERVENTIONS:  - Monitor Blood Glucose as ordered  - Assess for signs and symptoms of hyperglycemia and hypoglycemia  - Administer ordered medications to maintain glucose within target range  - Assess nutritional intake and initiate nutrition service referral as needed  6/5/2023 1728 by Parminder Da Silva RN  Outcome: Adequate for Discharge  6/5/2023 0730 by Parminder Da Silva RN  Outcome: Progressing

## 2023-06-05 NOTE — DISCHARGE SUMMARY
[facility]  Discharge- Zita Ax 1985, 30 y o  female MRN: [id number]  Unit/Bed#: ICU 09 Encounter: [id number]  Primary Care Provider: Wilton Maldonado MD   Date and time admitted to hospital: 6/2/2023  4:48 PM    * Hypoglycemia  Assessment & Plan  · Admitted for hypoglycemia testing by Dr Izquierdo to rule out endogenous hyperinsulinemia   · Dr Izquierdo is on call and any questions can be forwarded to her  · The patient had dinner (6/2)  · 72 hour fast has ended with patients blood sugars remaining above 60  · 1 mg of IV glucagon was given and blood sugars were checked at 10, 20, 30 minutes, labs were drawn per protocol  · Diet was restarted and patient's blood sugar was monitored every hour until 5 pm      · 72- hours fast protocol that was completed as per Dr Izquierdo  PT can have calorie free and caffeine free beverages and water during the fast   · Check fingerstick glucose every 1 hours  · Please draw peripheral venous samples (BMP, random insulin, proinsulin, C-peptide, beta hydroxybutyrate, ) every 6 hours after initiation of fasting     · Check fingerstick glucose every 1 hours     · Once fingerstick glucose is less than 60, check fingerstick glucose every half hour and draw   (BMP, random insulin, proinsulin, C-peptide, beta hydroxybutyrate) if patient has symptoms   · Once fingerstick glucose is less than 55, draw stat  peripheral venous sample (BMP, random insulin, proinsulin, C-peptide, beta hydroxybutyrate) every half hour) and Draw samples ( every 6 hours) onwards and check blood sugars every 15 minutes   · Once fingerstick glucose is less than 45, draw peripheral venous sample (BMP, random insulin, proinsulin, C-peptide, beta hydroxybutyrate)  After confirming serum glucose is less than 45, give 1 mg of IV glucagon  Check blood sugar 10, 20, and 30 minutes later    · The patient can then be fed or given IV dextrose after the sample is drawn     Headache  Assessment & Plan  · Topamax qhs  · Tylenol prn     Acquired hypothyroidism  Assessment & Plan  · Continue with Synthroid    History of gastric bypass  Assessment & Plan  · Continue supportive care   · Once okay for PO intake will resume bariatric diet    Medical Problems     Resolved Problems  Date Reviewed: 6/5/2023   None       Discharging Physician / Practitioner: Humaira Stanley PA-C  PCP: Marina Lopez MD  Admission Date:   Admission Orders (From admission, onward)     Ordered        06/02/23 1655  Inpatient Admission  Once                      Discharge Date: 06/05/23    Consultations During Hospital Stay:  · endocrinology    Procedures Performed:   · 72 hour fast protocol    Significant Findings / Test Results:   · none    Incidental Findings:   · none       Test Results Pending at Discharge (will require follow up):   · none     Outpatient Tests Requested:  · none    Complications:  none    Reason for Admission: hypoglycemia    Hospital Course:   Dani Gonzalez is a 30 y o  female patient who originally presented to the hospital on 6/2/2023 due to hypoglycemia noted in the outpatient setting  Patient was admitted for 72 hour fast protocol by Dr Sheridan Lopez  Protocol was performed per recommendations  At the end of 68 hours patients blood sugar remained above 60 and she was asymptomatic during fast  After fast was completed 1 mg glucagon was given via IV and sugars were monitored  Due to the fact that no hypoglycemia was noted during fast an insulinoma is ruled out  Patient will continue to carbs close to 30 grams, double proteins and 2 teaspoon of corn starch mixed with milk with each meal  She will follow up with endocrinology  She will follow up with her family doctor  Patient can also follow up with bariatric surgery if she wishes to consider reversal of bariatric surgery       Please see above list of diagnoses and related plan for additional information       Condition at Discharge: stable    Discharge Day Visit / Exam:   Subjective:  Patient was seen laying in bed today  She reported having some diarrhea and lightheadedness when walking  After glucagon she briefly felt shaky  She denied any other acute complaints  Vitals: Blood Pressure: 124/61 (06/05/23 1501)  Pulse: 91 (06/05/23 1100)  Temperature: 98 4 °F (36 9 °C) (06/05/23 1501)  Temp Source: Oral (06/05/23 1501)  Respirations: 22 (06/05/23 1100)  SpO2: 99 % (06/05/23 1100)  Exam:   Physical Exam  Vitals and nursing note reviewed  Constitutional:       Appearance: Normal appearance  HENT:      Head: Normocephalic and atraumatic  Eyes:      General: No scleral icterus  Cardiovascular:      Rate and Rhythm: Normal rate and regular rhythm  Pulmonary:      Effort: Pulmonary effort is normal       Breath sounds: Normal breath sounds  No wheezing  Abdominal:      General: Abdomen is flat  Bowel sounds are normal       Palpations: Abdomen is soft  Tenderness: There is no abdominal tenderness  Musculoskeletal:      Right lower leg: No edema  Left lower leg: No edema  Skin:     General: Skin is warm and dry  Neurological:      Mental Status: She is alert  Mental status is at baseline  Psychiatric:         Mood and Affect: Mood normal          Behavior: Behavior normal          Discussion with Family: Patient declined call to   Discharge instructions/Information to patient and family:   See after visit summary for information provided to patient and family  Provisions for Follow-Up Care:  See after visit summary for information related to follow-up care and any pertinent home health orders  Disposition:   Home    Planned Readmission: none     Discharge Statement:  I spent 60 minutes discharging the patient  This time was spent on the day of discharge  I had direct contact with the patient on the day of discharge   Greater than 50% of the total time was spent examining patient, answering all patient questions, arranging and discussing plan of care with patient as well as directly providing post-discharge instructions  Additional time then spent on discharge activities  Discharge Medications:  See after visit summary for reconciled discharge medications provided to patient and/or family        **Please Note: This note may have been constructed using a voice recognition system**

## 2023-06-05 NOTE — ASSESSMENT & PLAN NOTE
· Admitted for hypoglycemia testing by Dr Pramod Reyes to rule out endogenous hyperinsulinemia   · Dr Pramod Reyes is on call and any questions can be forwarded to her  · The patient had dinner (6/2)  · 72 hour fast has ended with patients blood sugars remaining above 60  · 1 mg of IV glucagon was given and blood sugars were checked at 10, 20, 30 minutes, labs were drawn per protocol  · Diet was restarted and patient's blood sugar was monitored every hour until 5 pm      · 72- hours fast protocol that was completed as per Dr Pramod Reyes  PT can have calorie free and caffeine free beverages and water during the fast   · Check fingerstick glucose every 1 hours  · Please draw peripheral venous samples (BMP, random insulin, proinsulin, C-peptide, beta hydroxybutyrate, ) every 6 hours after initiation of fasting     · Check fingerstick glucose every 1 hours     · Once fingerstick glucose is less than 60, check fingerstick glucose every half hour and draw   (BMP, random insulin, proinsulin, C-peptide, beta hydroxybutyrate) if patient has symptoms   · Once fingerstick glucose is less than 55, draw stat  peripheral venous sample (BMP, random insulin, proinsulin, C-peptide, beta hydroxybutyrate) every half hour) and Draw samples ( every 6 hours) onwards and check blood sugars every 15 minutes   · Once fingerstick glucose is less than 45, draw peripheral venous sample (BMP, random insulin, proinsulin, C-peptide, beta hydroxybutyrate)  After confirming serum glucose is less than 45, give 1 mg of IV glucagon  Check blood sugar 10, 20, and 30 minutes later  · The patient can then be fed or given IV dextrose after the sample is drawn

## 2023-06-05 NOTE — PLAN OF CARE
Problem: PAIN - ADULT  Goal: Verbalizes/displays adequate comfort level or baseline comfort level  Description: Interventions:  - Encourage patient to monitor pain and request assistance  - Assess pain using appropriate pain scale  - Administer analgesics based on type and severity of pain and evaluate response  - Implement non-pharmacological measures as appropriate and evaluate response  - Consider cultural and social influences on pain and pain management  - Notify physician/advanced practitioner if interventions unsuccessful or patient reports new pain  Outcome: Progressing     Problem: INFECTION - ADULT  Goal: Absence or prevention of progression during hospitalization  Description: INTERVENTIONS:  - Assess and monitor for signs and symptoms of infection  - Monitor lab/diagnostic results  - Monitor all insertion sites, i e  indwelling lines, tubes, and drains  - Monitor endotracheal if appropriate and nasal secretions for changes in amount and color  - [place] appropriate cooling/warming therapies per order  - Administer medications as ordered  - Instruct and encourage patient and family to use good hand hygiene technique  - Identify and instruct in appropriate isolation precautions for identified infection/condition  Outcome: Progressing  Goal: Absence of fever/infection during neutropenic period  Description: INTERVENTIONS:  - Monitor WBC    Outcome: Progressing     Problem: SAFETY ADULT  Goal: Patient will remain free of falls  Description: INTERVENTIONS:  - Educate patient/family on patient safety including physical limitations  - Instruct patient to call for assistance with activity   - Consult OT/PT to assist with strengthening/mobility   - Keep Call bell within reach  - Keep bed low and locked with side rails adjusted as appropriate  - Keep care items and personal belongings within reach  - Initiate and maintain comfort rounds  - Make Fall Risk Sign visible to staff  - Apply yellow socks and bracelet for high fall risk patients  - Consider moving patient to room near nurses station  Outcome: Progressing  Goal: Maintain or return to baseline ADL function  Description: INTERVENTIONS:  -  Assess patient's ability to carry out ADLs; assess patient's baseline for ADL function and identify physical deficits which impact ability to perform ADLs (bathing, care of mouth/teeth, toileting, grooming, dressing, etc )  - Assess/evaluate cause of self-care deficits   - Assess range of motion  - Assess patient's mobility; develop plan if impaired  - Assess patient's need for assistive devices and provide as appropriate  - Encourage maximum independence but intervene and supervise when necessary  - Involve family in performance of ADLs  - Assess for home care needs following discharge   - Consider OT consult to assist with ADL evaluation and planning for discharge  - Provide patient education as appropriate  Outcome: Progressing  Goal: Maintains/Returns to pre admission functional level  Description: INTERVENTIONS:  - Perform BMAT or MOVE assessment daily    - Set and communicate daily mobility goal to care team and patient/family/caregiver     - Collaborate with rehabilitation services on mobility goals if consulted  - Out of bed for toileting  - Record patient progress and toleration of activity level   Outcome: Progressing     Problem: DISCHARGE PLANNING  Goal: Discharge to home or other facility with appropriate resources  Description: INTERVENTIONS:  - Identify barriers to discharge w/patient and caregiver  - Arrange for needed discharge resources and transportation as appropriate  - Identify discharge learning needs (meds, wound care, etc )  - Arrange for interpretive services to assist at discharge as needed  - Refer to Case Management Department for coordinating discharge planning if the patient needs post-hospital services based on physician/advanced practitioner order or complex needs related to functional status, cognitive ability, or social support system  Outcome: Progressing     Problem: Knowledge Deficit  Goal: Patient/family/caregiver demonstrates understanding of disease process, treatment plan, medications, and discharge instructions  Description: Complete learning assessment and assess knowledge base    Interventions:  - Provide teaching at level of understanding  - Provide teaching via preferred learning methods  Outcome: Progressing     Problem: CARDIOVASCULAR - ADULT  Goal: Maintains optimal cardiac output and hemodynamic stability  Description: INTERVENTIONS:  - Monitor I/O, vital signs and rhythm  - Monitor for S/S and trends of decreased cardiac output  - Administer and titrate ordered vasoactive medications to optimize hemodynamic stability  - Assess quality of pulses, skin color and temperature  - Assess for signs of decreased coronary artery perfusion  - Instruct patient to report change in severity of symptoms  Outcome: Progressing  Goal: Absence of cardiac dysrhythmias or at baseline rhythm  Description: INTERVENTIONS:  - Continuous cardiac monitoring, vital signs, obtain 12 lead EKG if ordered  - Administer antiarrhythmic and heart rate control medications as ordered  - Monitor electrolytes and administer replacement therapy as ordered  Outcome: Progressing     Problem: METABOLIC, FLUID AND ELECTROLYTES - ADULT  Goal: Electrolytes maintained within normal limits  Description: INTERVENTIONS:  - Monitor labs and assess patient for signs and symptoms of electrolyte imbalances  - Administer electrolyte replacement as ordered  - Monitor response to electrolyte replacements, including repeat lab results as appropriate  - Instruct patient on fluid and nutrition as appropriate  Outcome: Progressing  Goal: Fluid balance maintained  Description: INTERVENTIONS:  - Monitor labs   - Monitor I/O and WT  - Instruct patient on fluid and nutrition as appropriate  - Assess for signs & symptoms of volume excess or deficit  Outcome: Progressing  Goal: Glucose maintained within target range  Description: INTERVENTIONS:  - Monitor Blood Glucose as ordered  - Assess for signs and symptoms of hyperglycemia and hypoglycemia  - Administer ordered medications to maintain glucose within target range  - Assess nutritional intake and initiate nutrition service referral as needed  Outcome: Progressing LMP/First Trimester Sonogram

## 2023-06-06 LAB
C PEPTIDE SERPL-MCNC: 1.2 NG/ML (ref 1.1–4.4)
C PEPTIDE SERPL-MCNC: 1.7 NG/ML (ref 1.1–4.4)
C PEPTIDE SERPL-MCNC: 1.9 NG/ML (ref 1.1–4.4)
C PEPTIDE SERPL-MCNC: 19.1 NG/ML (ref 1.1–4.4)
C PEPTIDE SERPL-MCNC: 2.2 NG/ML (ref 1.1–4.4)
C PEPTIDE SERPL-MCNC: 2.5 NG/ML (ref 1.1–4.4)
C PEPTIDE SERPL-MCNC: 2.8 NG/ML (ref 1.1–4.4)

## 2023-06-06 NOTE — UTILIZATION REVIEW
NOTIFICATION OF ADMISSION DISCHARGE   This is a Notification of Discharge from [facility]  Please be advised that this patient has been discharge from our facility  Below you will find the admission and discharge date and time including the patient’s disposition  UTILIZATION REVIEW CONTACT:  Mortimer Bleacher, MA  Utilization   Network Utilization Review Department  Phone: [phone] x carefully listen to the prompts  All voicemails are confidential   Email: [email or web address]     ADMISSION INFORMATION  PRESENTATION DATE: 6/2/2023  4:48 PM  OBERVATION ADMISSION DATE:   INPATIENT ADMISSION DATE: 6/2/23  4:48 PM   DISCHARGE DATE: 6/5/2023  6:00 PM   DISPOSITION:Home/Self Care    IMPORTANT INFORMATION:  Send all requests for admission clinical reviews, approved or denied determinations and any other requests to dedicated fax number below belonging to the campus where the patient is receiving treatment   List of dedicated fax numbers:  [address] DENIALS (Administrative/Medical Necessity) [phone]   [address] (Maternity/NICU/Pediatrics) [phone]   [facility] [phone]   [facility] 87 [phone]   [address] [phone]   [address] [facility] [phone]   90 [facility] [phone]   [address] [place] [address] 119 [phone]   [facility] [phone]   [address] [facility] [phone]   [facility]ur [address] [facility] [phone]

## 2023-06-07 LAB
IGF-I SERPL-MCNC: 189 NG/ML (ref 84–281)
PROINSULIN SERPL-SCNC: 3.4 PMOL/L (ref 0–10)
PROINSULIN SERPL-SCNC: 3.8 PMOL/L (ref 0–10)
PROINSULIN SERPL-SCNC: 4 PMOL/L (ref 0–10)
PROINSULIN SERPL-SCNC: 4 PMOL/L (ref 0–10)
PROINSULIN SERPL-SCNC: 4.1 PMOL/L (ref 0–10)
PROINSULIN SERPL-SCNC: 4.1 PMOL/L (ref 0–10)
PROINSULIN SERPL-SCNC: 4.6 PMOL/L (ref 0–10)
PROINSULIN SERPL-SCNC: 4.7 PMOL/L (ref 0–10)
PROINSULIN SERPL-SCNC: 4.7 PMOL/L (ref 0–10)
PROINSULIN SERPL-SCNC: 5.5 PMOL/L (ref 0–10)
PROINSULIN SERPL-SCNC: 5.8 PMOL/L (ref 0–10)

## 2023-06-08 ENCOUNTER — TRANSITIONAL CARE MANAGEMENT (OUTPATIENT)
Dept: INTERNAL MEDICINE CLINIC | Facility: CLINIC | Age: 38
End: 2023-06-08

## 2023-06-08 LAB
IGF-I SERPL-MCNC: 124 NG/ML (ref 84–281)
IGF-I SERPL-MCNC: 169 NG/ML (ref 84–281)
IGF-I SERPL-MCNC: 171 NG/ML (ref 84–281)
IGF-I SERPL-MCNC: 183 NG/ML (ref 84–281)
IGF-I SERPL-MCNC: 185 NG/ML (ref 84–281)

## 2023-06-09 LAB
C PEPTIDE SERPL-MCNC: 1.1 NG/ML (ref 1.1–4.4)
IGF-I SERPL-MCNC: 151 NG/ML (ref 84–281)
IGF-I SERPL-MCNC: 185 NG/ML (ref 84–281)
IGF-I SERPL-MCNC: 190 NG/ML (ref 84–281)
IGF-I SERPL-MCNC: 192 NG/ML (ref 84–281)
IGF-I SERPL-MCNC: 196 NG/ML (ref 84–281)
IGF-I SERPL-MCNC: 208 NG/ML (ref 84–281)

## 2023-06-10 LAB — INSULIN AB SER-ACNC: <5 UU/ML

## 2023-06-12 LAB
MISCELLANEOUS LAB TEST RESULT: NORMAL

## 2023-06-13 LAB
ACETOHEXAMIDE SERPL-MCNC: NEGATIVE UG/ML (ref 20–60)
CHLORPROPAMIDE SERPL-MCNC: NEGATIVE UG/ML (ref 75–250)
GLIMEPIRIDE SERPL-MCNC: NEGATIVE NG/ML (ref 80–250)
GLIPIZIDE SERPL-MCNC: NEGATIVE NG/ML (ref 200–1000)
GLYBURIDE SERPL-MCNC: NEGATIVE NG/ML
NATEGLINIDE SERPL-MCNC: NEGATIVE NG/ML
REPAGLINIDE SERPL-MCNC: NEGATIVE NG/ML
TOLAZAMIDE SERPL-MCNC: NEGATIVE UG/ML
TOLBUTAMIDE SERPL-MCNC: NEGATIVE UG/ML (ref 40–100)

## 2023-06-20 ENCOUNTER — TELEPHONE (OUTPATIENT)
Dept: NEUROLOGY | Facility: CLINIC | Age: 38
End: 2023-06-20

## 2023-06-23 ENCOUNTER — OFFICE VISIT (OUTPATIENT)
Dept: INTERNAL MEDICINE CLINIC | Facility: CLINIC | Age: 38
End: 2023-06-23
Payer: COMMERCIAL

## 2023-06-23 VITALS
HEIGHT: 59 IN | TEMPERATURE: 97.2 F | BODY MASS INDEX: 34.51 KG/M2 | OXYGEN SATURATION: 98 % | WEIGHT: 171.2 LBS | HEART RATE: 74 BPM

## 2023-06-23 DIAGNOSIS — B36.9 FUNGAL SKIN INFECTION: ICD-10-CM

## 2023-06-23 DIAGNOSIS — D50.9 IRON DEFICIENCY ANEMIA, UNSPECIFIED IRON DEFICIENCY ANEMIA TYPE: ICD-10-CM

## 2023-06-23 DIAGNOSIS — E16.2 HYPOGLYCEMIA: Primary | ICD-10-CM

## 2023-06-23 DIAGNOSIS — Z98.84 HISTORY OF GASTRIC BYPASS: ICD-10-CM

## 2023-06-23 PROCEDURE — 99214 OFFICE O/P EST MOD 30 MIN: CPT | Performed by: INTERNAL MEDICINE

## 2023-06-23 RX ORDER — TOLNAFTATE 1 G/100G
POWDER TOPICAL 2 TIMES DAILY
Qty: 45 G | Refills: 0 | Status: SHIPPED | OUTPATIENT
Start: 2023-06-23

## 2023-06-23 NOTE — ASSESSMENT & PLAN NOTE
History of gastric bypass surgery possibly contributing to the patient's tendencies for hypoglycemia at this time  Recommend consideration for reversal of gastric bypass patient is very hesitant to proceed with any surgery  A referral to bariatrics was placed today to evaluate possibilities for reversal of her gastric bypass and also to term in their experience with other patients that have had gastric bypass and subsequent hypoglycemic episodes  We will follow-up with the patient after this consultation

## 2023-06-23 NOTE — ASSESSMENT & PLAN NOTE
I have reviewed in detail with the patient the studies performed during her recent hospitalization to evaluate for possible endogenous hyperinsulinemia  There was no evidence of this on her evaluation  Suspect her hypoglycemic tendencies are related to her gastric bypass surgery    Recommend following the diet recommended by endocrinology to minimize the risk of hypoglycemia this was outlined on the patient's discharge notes

## 2023-06-23 NOTE — ASSESSMENT & PLAN NOTE
Striae of iron deficiency anemia since gastric bypass CBC and iron levels have been requested to evaluate current status

## 2023-06-23 NOTE — PROGRESS NOTES
Name: Lan Serna      : 1985      MRN: [id number]  Encounter Provider: Sean Mckeon MD  Encounter Date: 2023   Encounter department: [facility]    Assessment & Plan     1  History of gastric bypass  Assessment & Plan:  History of gastric bypass surgery possibly contributing to the patient's tendencies for hypoglycemia at this time  Recommend consideration for reversal of gastric bypass patient is very hesitant to proceed with any surgery  A referral to bariatrics was placed today to evaluate possibilities for reversal of her gastric bypass and also to term in their experience with other patients that have had gastric bypass and subsequent hypoglycemic episodes  We will follow-up with the patient after this consultation  Orders:  -     Ambulatory Referral to Weight Management; Future    2  Iron deficiency anemia, unspecified iron deficiency anemia type  Assessment & Plan:  Striae of iron deficiency anemia since gastric bypass CBC and iron levels have been requested to evaluate current status  Orders:  -     Iron Panel (Includes Ferritin, Iron Sat%, Iron, and TIBC); Future  -     CBC and differential; Future    3  Fungal skin infection  Assessment & Plan:  Fungal infection of the groin creases recommend Tinactin 1% powder twice a day    Orders:  -     tolnaftate (TINACTIN) 1 % powder; Apply topically 2 (two) times a day    4  Hypoglycemia  Assessment & Plan:  I have reviewed in detail with the patient the studies performed during her recent hospitalization to evaluate for possible endogenous hyperinsulinemia  There was no evidence of this on her evaluation  Suspect her hypoglycemic tendencies are related to her gastric bypass surgery    Recommend following the diet recommended by endocrinology to minimize the risk of hypoglycemia this was outlined on the patient's discharge notes           Subjective     This 29-year-old female patient returns today after hospitalization under the direction of endocrinology for evaluation of possible insulinoma  After observation and testing it does not appear that she has any convincing evidence for an insulinoma  It is believed that her hypoglycemic episodes are most likely the result of her gastric bypass surgery  It is recommended that she follow the dietary guidelines formulated by her endocrinologist to minimize the risks of hypoglycemia  The patient is concerned about eating as many calories as a have recommended as she feels that she will gain weight  We discussed options such as reversal of her gastric bypass surgery  I have recommended that she have a consultation with our bariatric service at [place] [address] to see if they have encountered similar cases where hypoglycemic responses have occurred after gastric bypass  Patient inquired about possible medication to help her lose weight in view of her hypoglycemic episodes I do not endorse the use of Ozempic or why Jessica    Review of Systems   All other systems reviewed and are negative        Past Medical History:   Diagnosis Date   • Abnormal Pap smear of cervix    • Anemia     gets iron infusions   • Anxiety    • Chiari I malformation (HCC)    • Chronic pain disorder     during lupus flairs   • COVID-19    • Disease of thyroid gland     pt off meds   • Female infertility     IVF pregnancy   • Fibromyalgia    • Fibromyalgia, primary    • Gastric ulcer    • GERD (gastroesophageal reflux disease)    • Hiatal hernia    • Lupus (HCC)    • Muscle weakness    • Pericardial cyst    • Pituitary tumor    • Pleural effusion associated with pulmonary infection    • Polycystic ovary syndrome    • Pulmonary edema    • Pulmonary emboli (HCC)    • Spinal headache     with c section     Past Surgical History:   Procedure Laterality Date   •  SECTION      x 2   • CHOLECYSTECTOMY     • GASTRECTOMY SLEEVE LAPAROSCOPIC     • GASTRIC BYPASS     • POLYPECTOMY     • TONSILLECTOMY     • TUBAL LIGATION       Family History   Problem Relation Age of Onset   • Heart disease Mother    • Hypertension Mother    • Heart attack Father    • No Known Problems Brother    • No Known Problems Daughter    • No Known Problems Son    • Heart disease Maternal Grandmother    • Hypertension Maternal Grandmother    • Diabetes Maternal Grandmother    • Early death Maternal Grandfather    • No Known Problems Paternal Grandmother    • No Known Problems Paternal Grandfather    • No Known Problems Brother      Social History     Socioeconomic History   • Marital status: /Civil Union     Spouse name: None   • Number of children: None   • Years of education: None   • Highest education level: None   Occupational History   • None   Tobacco Use   • Smoking status: Never   • Smokeless tobacco: Never   Vaping Use   • Vaping Use: Never used   Substance and Sexual Activity   • Alcohol use: Yes     Comment: occ  social rare   • Drug use: Never   • Sexual activity: Yes     Partners: Male     Birth control/protection: Female Sterilization   Other Topics Concern   • None   Social History Narrative   • None     Social Determinants of Health     Financial Resource Strain: Not on file   Food Insecurity: Not on file   Transportation Needs: Not on file   Physical Activity: Not on file   Stress: Not on file   Social Connections: Not on file   Intimate Partner Violence: Not on file   Housing Stability: Not on file     Current Outpatient Medications on File Prior to Visit   Medication Sig   • Continuous Blood Gluc  (Dexcom G7 ) CARMINE Use 1 Device 4 (four) times a day   • Continuous Blood Gluc Sensor (Dexcom G7 Sensor) MISC Use 1 Device 4 (four) times a day   • dexamethasone (DECADRON) 1 mg tablet Take 1 tab at 11pm and get cortisol levels checked between 7:00 a m  and 9:00 a m  on the next morning     • dicyclomine (BENTYL) 10 mg capsule Take 1 capsule (10 mg total) by mouth 4 (four) times a day (before meals and at "bedtime)   • Erenumab-aooe 140 MG/ML SOAJ Inject 140 mg under the skin every 30 (thirty) days   • levothyroxine (Synthroid) 50 mcg tablet Take 1 tablet (50 mcg total) by mouth daily in the early morning   • nystatin (MYCOSTATIN) powder Apply topically 2 (two) times a day   • pantoprazole (PROTONIX) 40 mg tablet Take 1 tablet (40 mg total) by mouth daily   • rizatriptan (MAXALT-MLT) 10 mg disintegrating tablet Take 1 tablet (10 mg total) by mouth once as needed for migraine for up to 1 dose May repeat in 2 hours if needed   • tiZANidine (ZANAFLEX) 2 mg tablet Take 1 tablet (2 mg total) by mouth daily at bedtime as needed for muscle spasms   • topiramate (TOPAMAX) 50 MG tablet Take 2 tablets (100 mg total) by mouth daily at bedtime     Allergies   Allergen Reactions   • Codeine Tremor   • Morphine Tremor     Immunization History   Administered Date(s) Administered   • COVID-19 PFIZER VACCINE 0 3 ML IM 11/30/2021, 01/08/2022   • Influenza Quadrivalent Preservative Free 3 years and older IM 12/03/2021   • Influenza, seasonal, injectable, preservative free 10/14/2011   • Tdap 04/23/2018       Objective     Pulse 74   Temp (!) 97 2 °F (36 2 °C)   Ht 4' 11\" (1 499 m)   Wt 77 7 kg (171 lb 3 2 oz)   SpO2 98%   BMI 34 58 kg/m²     Physical Exam  Dada Jerome MD  "

## 2023-06-24 ENCOUNTER — APPOINTMENT (OUTPATIENT)
Dept: LAB | Facility: CLINIC | Age: 38
End: 2023-06-24
Payer: COMMERCIAL

## 2023-06-24 ENCOUNTER — HOSPITAL ENCOUNTER (OUTPATIENT)
Dept: RADIOLOGY | Facility: HOSPITAL | Age: 38
Discharge: HOME | End: 2023-06-24
Payer: COMMERCIAL

## 2023-06-24 DIAGNOSIS — Z00.8 ENCOUNTER FOR OTHER GENERAL EXAMINATION: ICD-10-CM

## 2023-06-24 DIAGNOSIS — D35.2 PITUITARY MICROADENOMA (HCC): ICD-10-CM

## 2023-06-24 DIAGNOSIS — D50.9 IRON DEFICIENCY ANEMIA, UNSPECIFIED IRON DEFICIENCY ANEMIA TYPE: ICD-10-CM

## 2023-06-24 LAB
BASOPHILS # BLD AUTO: 0.06 THOUSANDS/ÂΜL (ref 0–0.1)
BASOPHILS NFR BLD AUTO: 1 % (ref 0–1)
CHOLEST SERPL-MCNC: 165 MG/DL
CRP SERPL QL: 4.3 MG/L
EOSINOPHIL # BLD AUTO: 0.33 THOUSAND/ÂΜL (ref 0–0.61)
EOSINOPHIL NFR BLD AUTO: 6 % (ref 0–6)
ERYTHROCYTE [DISTWIDTH] IN BLOOD BY AUTOMATED COUNT: 12.4 % (ref 11.6–15.1)
EST. AVERAGE GLUCOSE BLD GHB EST-MCNC: 105 MG/DL
FERRITIN SERPL-MCNC: 9 NG/ML (ref 11–307)
FSH SERPL-ACNC: 6.1 MIU/ML
HBA1C MFR BLD: 5.3 %
HCT VFR BLD AUTO: 36.1 % (ref 34.8–46.1)
HDLC SERPL-MCNC: 68 MG/DL
HGB BLD-MCNC: 12 G/DL (ref 11.5–15.4)
IMM GRANULOCYTES # BLD AUTO: 0.01 THOUSAND/UL (ref 0–0.2)
IMM GRANULOCYTES NFR BLD AUTO: 0 % (ref 0–2)
IRON SATN MFR SERPL: 13 % (ref 15–50)
IRON SERPL-MCNC: 55 UG/DL (ref 50–170)
LDLC SERPL CALC-MCNC: 87 MG/DL (ref 0–100)
LH SERPL-ACNC: 17.4 MIU/ML
LYMPHOCYTES # BLD AUTO: 1.71 THOUSANDS/ÂΜL (ref 0.6–4.47)
LYMPHOCYTES NFR BLD AUTO: 33 % (ref 14–44)
MCH RBC QN AUTO: 29.9 PG (ref 26.8–34.3)
MCHC RBC AUTO-ENTMCNC: 33.2 G/DL (ref 31.4–37.4)
MCV RBC AUTO: 90 FL (ref 82–98)
MONOCYTES # BLD AUTO: 0.39 THOUSAND/ÂΜL (ref 0.17–1.22)
MONOCYTES NFR BLD AUTO: 8 % (ref 4–12)
NEUTROPHILS # BLD AUTO: 2.68 THOUSANDS/ÂΜL (ref 1.85–7.62)
NEUTS SEG NFR BLD AUTO: 52 % (ref 43–75)
NONHDLC SERPL-MCNC: 97 MG/DL
NRBC BLD AUTO-RTO: 0 /100 WBCS
PLATELET # BLD AUTO: 366 THOUSANDS/UL (ref 149–390)
PMV BLD AUTO: 10.1 FL (ref 8.9–12.7)
RBC # BLD AUTO: 4.02 MILLION/UL (ref 3.81–5.12)
TIBC SERPL-MCNC: 436 UG/DL (ref 250–450)
TRIGL SERPL-MCNC: 50 MG/DL
WBC # BLD AUTO: 5.18 THOUSAND/UL (ref 4.31–10.16)

## 2023-06-24 PROCEDURE — 83001 ASSAY OF GONADOTROPIN (FSH): CPT

## 2023-06-24 PROCEDURE — 82728 ASSAY OF FERRITIN: CPT

## 2023-06-24 PROCEDURE — 80061 LIPID PANEL: CPT

## 2023-06-24 PROCEDURE — 83520 IMMUNOASSAY QUANT NOS NONAB: CPT

## 2023-06-24 PROCEDURE — 86140 C-REACTIVE PROTEIN: CPT

## 2023-06-24 PROCEDURE — 85025 COMPLETE CBC W/AUTO DIFF WBC: CPT

## 2023-06-24 PROCEDURE — 83540 ASSAY OF IRON: CPT

## 2023-06-24 PROCEDURE — 82024 ASSAY OF ACTH: CPT

## 2023-06-24 PROCEDURE — 36415 COLL VENOUS BLD VENIPUNCTURE: CPT

## 2023-06-24 PROCEDURE — 83003 ASSAY GROWTH HORMONE (HGH): CPT

## 2023-06-24 PROCEDURE — 84403 ASSAY OF TOTAL TESTOSTERONE: CPT

## 2023-06-24 PROCEDURE — 83550 IRON BINDING TEST: CPT

## 2023-06-24 PROCEDURE — G1004 CDSM NDSC: HCPCS

## 2023-06-24 PROCEDURE — A9585 GADOBUTROL INJECTION: HCPCS | Performed by: PHYSICIAN ASSISTANT

## 2023-06-24 PROCEDURE — 84402 ASSAY OF FREE TESTOSTERONE: CPT

## 2023-06-24 PROCEDURE — 83002 ASSAY OF GONADOTROPIN (LH): CPT

## 2023-06-24 PROCEDURE — 70553 MRI BRAIN STEM W/O & W/DYE: CPT

## 2023-06-24 PROCEDURE — 84305 ASSAY OF SOMATOMEDIN: CPT

## 2023-06-24 PROCEDURE — 83036 HEMOGLOBIN GLYCOSYLATED A1C: CPT

## 2023-06-24 RX ADMIN — GADOBUTROL 7 ML: 604.72 INJECTION INTRAVENOUS at 09:15

## 2023-06-26 LAB
TESTOST FREE SERPL-MCNC: 1.3 PG/ML (ref 0–4.2)
TESTOST SERPL-MCNC: 34 NG/DL (ref 8–60)

## 2023-06-27 LAB — ACTH PLAS-MCNC: 8.2 PG/ML (ref 7.2–63.3)

## 2023-06-28 ENCOUNTER — HOSPITAL ENCOUNTER (INPATIENT)
Facility: HOSPITAL | Age: 38
LOS: 1 days | Discharge: HOME | DRG: 391 | End: 2023-06-30
Attending: EMERGENCY MEDICINE | Admitting: INTERNAL MEDICINE
Payer: COMMERCIAL

## 2023-06-28 ENCOUNTER — TELEPHONE (OUTPATIENT)
Dept: BARIATRICS | Facility: CLINIC | Age: 38
End: 2023-06-28

## 2023-06-28 ENCOUNTER — APPOINTMENT (EMERGENCY)
Dept: RADIOLOGY | Facility: HOSPITAL | Age: 38
DRG: 391 | End: 2023-06-28
Payer: COMMERCIAL

## 2023-06-28 DIAGNOSIS — Z98.84 HISTORY OF GASTRIC BYPASS: ICD-10-CM

## 2023-06-28 DIAGNOSIS — R11.2 NAUSEA AND VOMITING: ICD-10-CM

## 2023-06-28 DIAGNOSIS — K29.00 ACUTE SUPERFICIAL GASTRITIS WITHOUT HEMORRHAGE: ICD-10-CM

## 2023-06-28 DIAGNOSIS — R10.13 EPIGASTRIC ABDOMINAL PAIN: Primary | ICD-10-CM

## 2023-06-28 LAB
ALBUMIN SERPL BCP-MCNC: 3.7 G/DL (ref 3.5–5)
ALP SERPL-CCNC: 96 U/L (ref 46–116)
ALT SERPL W P-5'-P-CCNC: 22 U/L (ref 12–78)
ANION GAP SERPL CALCULATED.3IONS-SCNC: 4 MMOL/L
AST SERPL W P-5'-P-CCNC: 13 U/L (ref 5–45)
BASOPHILS # BLD AUTO: 0.07 THOUSANDS/ÂΜL (ref 0–0.1)
BASOPHILS NFR BLD AUTO: 1 % (ref 0–1)
BILIRUB SERPL-MCNC: 0.42 MG/DL (ref 0.2–1)
BUN SERPL-MCNC: 12 MG/DL (ref 5–25)
CALCIUM SERPL-MCNC: 9.2 MG/DL (ref 8.3–10.1)
CHLORIDE SERPL-SCNC: 113 MMOL/L (ref 96–108)
CO2 SERPL-SCNC: 24 MMOL/L (ref 21–32)
CREAT SERPL-MCNC: 0.64 MG/DL (ref 0.6–1.3)
EOSINOPHIL # BLD AUTO: 0.56 THOUSAND/ÂΜL (ref 0–0.61)
EOSINOPHIL NFR BLD AUTO: 8 % (ref 0–6)
ERYTHROCYTE [DISTWIDTH] IN BLOOD BY AUTOMATED COUNT: 12.3 % (ref 11.6–15.1)
GFR SERPL CREATININE-BSD FRML MDRD: 115 ML/MIN/1.73SQ M
GLUCOSE SERPL-MCNC: 97 MG/DL (ref 65–140)
HCG SERPL QL: NEGATIVE
HCT VFR BLD AUTO: 37.2 % (ref 34.8–46.1)
HGB BLD-MCNC: 12.3 G/DL (ref 11.5–15.4)
IMM GRANULOCYTES # BLD AUTO: 0.02 THOUSAND/UL (ref 0–0.2)
IMM GRANULOCYTES NFR BLD AUTO: 0 % (ref 0–2)
LIPASE SERPL-CCNC: 138 U/L (ref 73–393)
LYMPHOCYTES # BLD AUTO: 2.27 THOUSANDS/ÂΜL (ref 0.6–4.47)
LYMPHOCYTES NFR BLD AUTO: 34 % (ref 14–44)
MCH RBC QN AUTO: 29.3 PG (ref 26.8–34.3)
MCHC RBC AUTO-ENTMCNC: 33.1 G/DL (ref 31.4–37.4)
MCV RBC AUTO: 89 FL (ref 82–98)
MONOCYTES # BLD AUTO: 0.5 THOUSAND/ÂΜL (ref 0.17–1.22)
MONOCYTES NFR BLD AUTO: 8 % (ref 4–12)
NEUTROPHILS # BLD AUTO: 3.24 THOUSANDS/ÂΜL (ref 1.85–7.62)
NEUTS SEG NFR BLD AUTO: 49 % (ref 43–75)
NRBC BLD AUTO-RTO: 0 /100 WBCS
PLATELET # BLD AUTO: 354 THOUSANDS/UL (ref 149–390)
PMV BLD AUTO: 9.6 FL (ref 8.9–12.7)
POTASSIUM SERPL-SCNC: 4.5 MMOL/L (ref 3.5–5.3)
PROT SERPL-MCNC: 7.4 G/DL (ref 6.4–8.4)
RBC # BLD AUTO: 4.2 MILLION/UL (ref 3.81–5.12)
SODIUM SERPL-SCNC: 141 MMOL/L (ref 135–147)
WBC # BLD AUTO: 6.66 THOUSAND/UL (ref 4.31–10.16)

## 2023-06-28 PROCEDURE — 96361 HYDRATE IV INFUSION ADD-ON: CPT

## 2023-06-28 PROCEDURE — 83690 ASSAY OF LIPASE: CPT

## 2023-06-28 PROCEDURE — 96374 THER/PROPH/DIAG INJ IV PUSH: CPT

## 2023-06-28 PROCEDURE — 85025 COMPLETE CBC W/AUTO DIFF WBC: CPT

## 2023-06-28 PROCEDURE — G1004 CDSM NDSC: HCPCS

## 2023-06-28 PROCEDURE — 81025 URINE PREGNANCY TEST: CPT | Performed by: INTERNAL MEDICINE

## 2023-06-28 PROCEDURE — 99285 EMERGENCY DEPT VISIT HI MDM: CPT

## 2023-06-28 PROCEDURE — 84703 CHORIONIC GONADOTROPIN ASSAY: CPT

## 2023-06-28 PROCEDURE — 93005 ELECTROCARDIOGRAM TRACING: CPT

## 2023-06-28 PROCEDURE — 74177 CT ABD & PELVIS W/CONTRAST: CPT

## 2023-06-28 PROCEDURE — 96376 TX/PRO/DX INJ SAME DRUG ADON: CPT

## 2023-06-28 PROCEDURE — 36415 COLL VENOUS BLD VENIPUNCTURE: CPT

## 2023-06-28 PROCEDURE — 96375 TX/PRO/DX INJ NEW DRUG ADDON: CPT

## 2023-06-28 PROCEDURE — 80053 COMPREHEN METABOLIC PANEL: CPT

## 2023-06-28 PROCEDURE — 99285 EMERGENCY DEPT VISIT HI MDM: CPT | Performed by: EMERGENCY MEDICINE

## 2023-06-28 RX ORDER — ONDANSETRON 2 MG/ML
4 INJECTION INTRAMUSCULAR; INTRAVENOUS ONCE
Status: COMPLETED | OUTPATIENT
Start: 2023-06-28 | End: 2023-06-28

## 2023-06-28 RX ORDER — DIPHENHYDRAMINE HYDROCHLORIDE 50 MG/ML
25 INJECTION INTRAMUSCULAR; INTRAVENOUS ONCE
Status: COMPLETED | OUTPATIENT
Start: 2023-06-28 | End: 2023-06-28

## 2023-06-28 RX ORDER — DIAZEPAM 5 MG/ML
5 INJECTION, SOLUTION INTRAMUSCULAR; INTRAVENOUS ONCE
Status: COMPLETED | OUTPATIENT
Start: 2023-06-28 | End: 2023-06-28

## 2023-06-28 RX ORDER — HYDROMORPHONE HCL/PF 1 MG/ML
0.5 SYRINGE (ML) INJECTION ONCE
Status: COMPLETED | OUTPATIENT
Start: 2023-06-28 | End: 2023-06-28

## 2023-06-28 RX ORDER — METOCLOPRAMIDE 10 MG/1
10 TABLET ORAL ONCE
Status: COMPLETED | OUTPATIENT
Start: 2023-06-28 | End: 2023-06-28

## 2023-06-28 RX ADMIN — METOCLOPRAMIDE 10 MG: 10 TABLET ORAL at 23:56

## 2023-06-28 RX ADMIN — SODIUM CHLORIDE 1000 ML: 0.9 INJECTION, SOLUTION INTRAVENOUS at 17:22

## 2023-06-28 RX ADMIN — ONDANSETRON 4 MG: 2 INJECTION INTRAMUSCULAR; INTRAVENOUS at 17:22

## 2023-06-28 RX ADMIN — HYDROMORPHONE HYDROCHLORIDE 0.5 MG: 1 INJECTION, SOLUTION INTRAMUSCULAR; INTRAVENOUS; SUBCUTANEOUS at 17:22

## 2023-06-28 RX ADMIN — IOHEXOL 100 ML: 350 INJECTION, SOLUTION INTRAVENOUS at 20:54

## 2023-06-28 RX ADMIN — HYOSCYAMINE SULFATE 0.12 MG: 0.12 TABLET, ORALLY DISINTEGRATING ORAL at 18:50

## 2023-06-28 RX ADMIN — DIPHENHYDRAMINE HYDROCHLORIDE 25 MG: 50 INJECTION, SOLUTION INTRAMUSCULAR; INTRAVENOUS at 17:57

## 2023-06-28 RX ADMIN — DIAZEPAM 5 MG: 10 INJECTION, SOLUTION INTRAMUSCULAR; INTRAVENOUS at 17:52

## 2023-06-28 RX ADMIN — DIAZEPAM 5 MG: 10 INJECTION, SOLUTION INTRAMUSCULAR; INTRAVENOUS at 18:31

## 2023-06-28 NOTE — ED ATTENDING ATTESTATION
"Raúl Kc MD, saw and evaluated the patient  I have discussed the patient with the resident and agree with the resident's findings, Plan of Care, and MDM as documented in the resident's note, except where noted  All available labs and Radiology studies were reviewed  I was present for key portions of any procedure(s) performed by the resident and I was immediately available to provide assistance  At this point I agree with the current assessment done in the Emergency Department  I have conducted an independent evaluation of this patient a history and physical is as follows:    29 yo obese female with a history of SLE, fibromyalgia, anxiety, GERD, PCOS, PUD, and s/p gastric bypass surgery about 3 years ago in [place] presents to the ED complaining of chest and upper abdominal pain x several days  The patient reports severe \"spasms\" in her central chest and upper abdomen with any PO intake  She says the food \"gets stuck\" then she is forced to vomit  She is able to tolerate small amounts of liquid with moderate discomfort but immediately vomits after any solid food  No diarrhea  No dark or bloody stools  (+) Bowel movement yesterday  The patient has been taking her daily Protonix without relief  She is not currently followed by GI or bariatrics  No fevers, chills, or shortness of breath  ROS: per resident physician note    Gen: NAD, AA&Ox3  HEENT: PERRL, EOMI  Neck: supple  CV: RRR  Lungs: CTA B/L  Abdomen: soft, (+) epigastric tenderness, no rebound or guarding  Ext: no swelling or deformity  Neuro: 5/5 strength all extremities, sensation grossly intact  Skin: no rash    ED Course  The patient is comfortable appearing with stable vital signs  Exam is significant for some mild epigastric tenderness  Unclear etiology of her pain  Esophageal spasm vs bariatric complication vs GERD vs pancreatitis? Low clinical suspicion for ACS, TAD, and PE  Will check basic labs, lipase, hCG, and CT A/P   IVFs, dilaudid, " and Zofran administered  Will continue to monitor in the ED  Disposition per workup and reassessment        Critical Care Time  Procedures

## 2023-06-28 NOTE — TELEPHONE ENCOUNTER
Received call from Pt re: symptoms  Pt reported pain in stomach around to her back with significant reflux  Explained to Pt since she has not completed the transfer process we are limited in the advice we are able to give  Advised Tylenol around the clock and alternating heat and cold  Was able to search through Pt's Epic and located OP notes for Pt's [facility] (3/10/2020)  Assured Pt we will submit transfer paperwork to  for review  Pt verbalized understanding and was agreeable to plan  Printed OP note and gave to MR for submission to

## 2023-06-28 NOTE — ED PROVIDER NOTES
History  Chief Complaint   Patient presents with   • Chest Pain     Pt reports CP and epigastric pain after eating, states food keeps getting stuck when swallowing, reports vomiting after eating reports having hx ulcers     HPI     Patient is a 29 y/o F with pmh chiari malformation, PE, GERD, gastric bypass presenting with chest and epigastric pain  Patient states for past 2 weeks she has had worsening epigastric pain and feeling of esophageal spasm with eating food  For past two days she notes inability to tolerate PO, feels like everything gets stuck in her chest and in her stomach  Associated vomiting  +BM yesterday  Notes hx of gastric ulcers and taking daily protonix which was working well until 2 weeks ago  Pt is in process of setting up with new bariatric team in this area  Denies fever, chills, SOB  Prior to Admission Medications   Prescriptions Last Dose Informant Patient Reported? Taking? Continuous Blood Gluc  (Dexcom G7 ) CARMINE   No No   Sig: Use 1 Device 4 (four) times a day   Continuous Blood Gluc Sensor (Dexcom G7 Sensor) MISC   No No   Sig: Use 1 Device 4 (four) times a day   Erenumab-aooe 140 MG/ML SOAJ   No No   Sig: Inject 140 mg under the skin every 30 (thirty) days   dexamethasone (DECADRON) 1 mg tablet   No No   Sig: Take 1 tab at 11pm and get cortisol levels checked between 7:00 a m  and 9:00 a m  on the next morning     dicyclomine (BENTYL) 10 mg capsule   No No   Sig: Take 1 capsule (10 mg total) by mouth 4 (four) times a day (before meals and at bedtime)   levothyroxine (Synthroid) 50 mcg tablet   No No   Sig: Take 1 tablet (50 mcg total) by mouth daily in the early morning   nystatin (MYCOSTATIN) powder   No No   Sig: Apply topically 2 (two) times a day   pantoprazole (PROTONIX) 40 mg tablet  Self No No   Sig: Take 1 tablet (40 mg total) by mouth daily   rizatriptan (MAXALT-MLT) 10 mg disintegrating tablet   No No   Sig: Take 1 tablet (10 mg total) by mouth once as needed for migraine for up to 1 dose May repeat in 2 hours if needed   tiZANidine (ZANAFLEX) 2 mg tablet   No No   Sig: Take 1 tablet (2 mg total) by mouth daily at bedtime as needed for muscle spasms   tolnaftate (TINACTIN) 1 % powder   No No   Sig: Apply topically 2 (two) times a day   topiramate (TOPAMAX) 50 MG tablet  Self No No   Sig: Take 2 tablets (100 mg total) by mouth daily at bedtime      Facility-Administered Medications: None       Past Medical History:   Diagnosis Date   • Abnormal Pap smear of cervix    • Anemia     gets iron infusions   • Anxiety    • Chiari I malformation (HCC)    • Chronic pain disorder     during lupus flairs   • COVID-19    • Disease of thyroid gland     pt off meds   • Female infertility     IVF pregnancy   • Fibromyalgia    • Fibromyalgia, primary    • Gastric ulcer    • GERD (gastroesophageal reflux disease)    • Hiatal hernia    • Lupus (HCC)    • Muscle weakness    • Pericardial cyst    • Pituitary tumor    • Pleural effusion associated with pulmonary infection    • Polycystic ovary syndrome    • Pulmonary edema    • Pulmonary emboli (HCC)    • Spinal headache     with c section       Past Surgical History:   Procedure Laterality Date   •  SECTION      x 2   • CHOLECYSTECTOMY     • GASTRECTOMY SLEEVE LAPAROSCOPIC     • GASTRIC BYPASS     • POLYPECTOMY     • TONSILLECTOMY     • TUBAL LIGATION         Family History   Problem Relation Age of Onset   • Heart disease Mother    • Hypertension Mother    • Heart attack Father    • No Known Problems Brother    • No Known Problems Daughter    • No Known Problems Son    • Heart disease Maternal Grandmother    • Hypertension Maternal Grandmother    • Diabetes Maternal Grandmother    • Early death Maternal Grandfather    • No Known Problems Paternal Grandmother    • No Known Problems Paternal Grandfather    • No Known Problems Brother      I have reviewed and agree with the history as documented      E-Cigarette/Vaping   • E-Cigarette Use Never User      E-Cigarette/Vaping Substances   • Nicotine No    • THC No    • CBD No    • Flavoring No    • Other No    • Unknown No      Social History     Tobacco Use   • Smoking status: Never   • Smokeless tobacco: Never   Vaping Use   • Vaping Use: Never used   Substance Use Topics   • Alcohol use: Yes     Comment: occ  social rare   • Drug use: Never        Review of Systems   Constitutional: Negative for chills and fever  HENT: Negative for ear pain and sore throat  Eyes: Negative for pain and visual disturbance  Respiratory: Negative for cough and shortness of breath  Cardiovascular: Negative for chest pain and palpitations  Gastrointestinal: Positive for abdominal pain, nausea and vomiting  Genitourinary: Negative for dysuria and hematuria  Musculoskeletal: Negative for arthralgias and back pain  Skin: Negative for color change and rash  Neurological: Negative for seizures and syncope  All other systems reviewed and are negative  Physical Exam  ED Triage Vitals [06/28/23 1554]   Temperature Pulse Respirations Blood Pressure SpO2   98 1 °F (36 7 °C) 74 18 121/83 99 %      Temp Source Heart Rate Source Patient Position - Orthostatic VS BP Location FiO2 (%)   Tympanic Monitor Lying Left arm --      Pain Score       7             Orthostatic Vital Signs  Vitals:    06/28/23 1554 06/28/23 1800 06/28/23 1830 06/28/23 2230   BP: 121/83 144/79 113/74    Pulse: 74 74 88 75   Patient Position - Orthostatic VS: Lying Lying Lying        Physical Exam  Vitals and nursing note reviewed  Constitutional:       General: She is not in acute distress  HENT:      Head: Normocephalic and atraumatic  Right Ear: External ear normal       Left Ear: External ear normal       Nose: Nose normal       Mouth/Throat:      Pharynx: Oropharynx is clear  Eyes:      Extraocular Movements: Extraocular movements intact  Pupils: Pupils are equal, round, and reactive to light  Cardiovascular:      Rate and Rhythm: Normal rate and regular rhythm  Pulses: Normal pulses  Heart sounds: Normal heart sounds  No murmur heard  No friction rub  No gallop  Pulmonary:      Effort: Pulmonary effort is normal  No respiratory distress  Breath sounds: Normal breath sounds  No wheezing, rhonchi or rales  Abdominal:      General: Abdomen is flat  There is no distension  Palpations: Abdomen is soft  Tenderness: There is abdominal tenderness  There is no guarding or rebound  Comments: Epigastric tenderness no r/g   Musculoskeletal:         General: No deformity  Normal range of motion  Cervical back: Normal range of motion  Right lower leg: No tenderness  No edema  Left lower leg: No tenderness  No edema  Skin:     General: Skin is warm and dry  Capillary Refill: Capillary refill takes less than 2 seconds  Findings: No rash  Neurological:      General: No focal deficit present  Mental Status: She is alert and oriented to person, place, and time        Gait: Gait normal    Psychiatric:         Mood and Affect: Mood normal          ED Medications  Medications   iohexol (OMNIPAQUE) 240 MG/ML solution 50 mL (has no administration in time range)   dicyclomine (BENTYL) capsule 10 mg (has no administration in time range)   levothyroxine tablet 50 mcg (has no administration in time range)   pantoprazole (PROTONIX) injection 40 mg (has no administration in time range)   SUMAtriptan (IMITREX) tablet 25 mg (has no administration in time range)   tiZANidine (ZANAFLEX) tablet 2 mg (has no administration in time range)   nystatin (MYCOSTATIN) powder (has no administration in time range)   topiramate (TOPAMAX) tablet 100 mg (has no administration in time range)   multi-electrolyte (PLASMALYTE-A/ISOLYTE-S PH 7 4) IV solution (has no administration in time range)   acetaminophen (TYLENOL) tablet 650 mg (has no administration in time range)   docusate sodium (COLACE) capsule 100 mg (has no administration in time range)   ondansetron (ZOFRAN) injection 4 mg (has no administration in time range)   aluminum-magnesium hydroxide-simethicone (MYLANTA) oral suspension 30 mL (has no administration in time range)   enoxaparin (LOVENOX) subcutaneous injection 40 mg (has no administration in time range)   sucralfate (CARAFATE) tablet 1 g (has no administration in time range)   diphenhydramine, lidocaine, Al/Mg hydroxide, simethicone (Magic Mouthwash) oral solution 10 mL (has no administration in time range)   LORazepam (ATIVAN) injection 1 mg (has no administration in time range)   promethazine (PHENERGAN) injection 25 mg (has no administration in time range)   sodium chloride 0 9 % bolus 1,000 mL (1,000 mL Intravenous New Bag 6/28/23 1722)   HYDROmorphone (DILAUDID) injection 0 5 mg (0 5 mg Intravenous Given 6/28/23 1722)   ondansetron (ZOFRAN) injection 4 mg (4 mg Intravenous Given 6/28/23 1722)   diazepam (VALIUM) injection 5 mg (5 mg Intravenous Given 6/28/23 1752)   diphenhydrAMINE (BENADRYL) injection 25 mg (25 mg Intravenous Given 6/28/23 1757)   diazepam (VALIUM) injection 5 mg (5 mg Intravenous Given 6/28/23 1831)   hyoscyamine (LEVSIN/SL) SL tablet 0 125 mg (0 125 mg Sublingual Given 6/28/23 1850)   iohexol (OMNIPAQUE) 350 MG/ML injection (SINGLE-DOSE) 100 mL (100 mL Intravenous Given 6/28/23 2054)   metoclopramide (REGLAN) tablet 10 mg (10 mg Oral Given 6/28/23 2356)   aluminum-magnesium hydroxide-simethicone (MYLANTA) oral suspension 30 mL (30 mL Oral Given 6/29/23 0044)       Diagnostic Studies  Results Reviewed     Procedure Component Value Units Date/Time    hCG, qualitative pregnancy [[id number]]  (Normal) Collected: 06/28/23 1721    Lab Status: Final result Specimen: Blood from Arm, Left Updated: 06/28/23 2226     Preg, Serum Negative    Comprehensive metabolic panel [[id number]]  (Abnormal) Collected: 06/28/23 7087    Lab Status: Final result Specimen: Blood from Arm, Left Updated: 06/28/23 1814     Sodium 141 mmol/L      Potassium 4 5 mmol/L      Chloride 113 mmol/L      CO2 24 mmol/L      ANION GAP 4 mmol/L      BUN 12 mg/dL      Creatinine 0 64 mg/dL      Glucose 97 mg/dL      Calcium 9 2 mg/dL      AST 13 U/L      ALT 22 U/L      Alkaline Phosphatase 96 U/L      Total Protein 7 4 g/dL      Albumin 3 7 g/dL      Total Bilirubin 0 42 mg/dL      eGFR 115 ml/min/1 73sq m     Narrative:      [employer] guidelines for Chronic Kidney Disease (CKD):   •  Stage 1 with normal or high GFR (GFR > 90 mL/min/1 73 square meters)  •  Stage 2 Mild CKD (GFR = 60-89 mL/min/1 73 square meters)  •  Stage 3A Moderate CKD (GFR = 45-59 mL/min/1 73 square meters)  •  Stage 3B Moderate CKD (GFR = 30-44 mL/min/1 73 square meters)  •  Stage 4 Severe CKD (GFR = 15-29 mL/min/1 73 square meters)  •  Stage 5 End Stage CKD (GFR <15 mL/min/1 73 square meters)  Note: GFR calculation is accurate only with a steady state creatinine    Lipase [[id number]]  (Normal) Collected: 06/28/23 1721    Lab Status: Final result Specimen: Blood from Arm, Left Updated: 06/28/23 1814     Lipase 138 u/L     POCT pregnancy, urine [[id number]]     Lab Status: No result     CBC and differential [[id number]]  (Abnormal) Collected: 06/28/23 1721    Lab Status: Final result Specimen: Blood from Arm, Left Updated: 06/28/23 1739     WBC 6 66 Thousand/uL      RBC 4 20 Million/uL      Hemoglobin 12 3 g/dL      Hematocrit 37 2 %      MCV 89 fL      MCH 29 3 pg      MCHC 33 1 g/dL      RDW 12 3 %      MPV 9 6 fL      Platelets 573 Thousands/uL      nRBC 0 /100 WBCs      Neutrophils Relative 49 %      Immat GRANS % 0 %      Lymphocytes Relative 34 %      Monocytes Relative 8 %      Eosinophils Relative 8 %      Basophils Relative 1 %      Neutrophils Absolute 3 24 Thousands/µL      Immature Grans Absolute 0 02 Thousand/uL      Lymphocytes Absolute 2 27 Thousands/µL      Monocytes Absolute 0 50 Thousand/µL Eosinophils Absolute 0 56 Thousand/µL      Basophils Absolute 0 07 Thousands/µL                  CT abdomen pelvis with contrast   Final Result by Herminio Gutierrez MD (06/28 2238)      Postsurgical changes from prior gastric bypass  No acute intra-abdominal/pelvic abnormalities noted with findings detailed above  Workstation performed: [id number]               Procedures  Procedures      ED Course  ED Course as of 06/29/23 0146   Wed Jun 28, 2023   1640 ECG 12 lead  Procedure Note: EKG  Date/Time: 06/28/23 4:40 PM   Interpreted by: Porsha Gross MD  Indications / Diagnosis: CP  ECG reviewed by me, the ED Provider: yes   The EKG demonstrates:  Rhythm: normal sinus  Intervals: normal intervals  Axis: normal axis  QRS/Blocks: normal QRS  ST Changes: No acute ST Changes, no STD/CRUZ      1804 Pt reporting abd discomfort after dilaudid administration, gave 5mg valium and benadryl, will reassess   2250 Scan negative, trialing PO                                       Medical Decision Making  31 y/o F presenting with epigastric abd pain  VSS  Ddx includes SBO, esophageal stricture/spasm/less likely rupture, gastric ulcer  Plan for abd labs, pain control, CT with PO contrast  Prolonged ED stay due to pt difficulty tolerating PO contrast and delay in CT scan read  Scan read as without acute finding  Trialed PO, pt initially tolerated apple sauce with minimal discomfort but unable to tolerating solid PO, felt stuck in chest and was going to vomit  Due to inability to tolerate PO and difficult to control pain and nausea, will admit for further evaluation  Discussed with Dr Sumi Etienne who agreed for admission  Amount and/or Complexity of Data Reviewed  Labs: ordered  Radiology: ordered  ECG/medicine tests:  Decision-making details documented in ED Course  Risk  OTC drugs  Prescription drug management  Decision regarding hospitalization              Disposition  Final diagnoses:   Epigastric abdominal pain History of gastric bypass   Nausea and vomiting     Time reflects when diagnosis was documented in both MDM as applicable and the Disposition within this note     Time User Action Codes Description Comment    6/28/2023  6:35 PM Zeferinoazalia Rosenbaum Add [R10 13] Epigastric abdominal pain     6/28/2023  6:35 PM Zeferino Rosenbaum Add [Z98 84] History of gastric bypass     6/29/2023 12:29 AM Zeferinoazalia Rosenbaum Add [R11 2] Nausea and vomiting       ED Disposition     ED Disposition   Admit    Condition   Stable    Date/Time   Thu Jun 29, 2023 12:29 AM    Comment   Case was discussed with Dr Gabino Landa and the patient's admission status was agreed to be Admission Status: observation status to the service of Dr Gabino Landa  Follow-up Information    None         Patient's Medications   Discharge Prescriptions    No medications on file     No discharge procedures on file  PDMP Review     None           ED Provider  Attending physically available and evaluated Silverio Monroe  ANEL managed the patient along with the ED Attending      Electronically Signed by         Nancy Bacon MD  06/29/23 0537

## 2023-06-28 NOTE — TELEPHONE ENCOUNTER
Transfer patient who had the sleeve in the year of 2018 and RNY in the year of 2020 by [facility] in [place]  Patient is interested in seeing   Elizabeth Son in our office  Patient need to fax over her OP record before starting the progress   Placed paper work in filing cabinet under the letter R

## 2023-06-29 PROBLEM — K29.00 ACUTE SUPERFICIAL GASTRITIS WITHOUT HEMORRHAGE: Status: ACTIVE | Noted: 2023-06-29

## 2023-06-29 LAB
ALBUMIN SERPL BCP-MCNC: 3.3 G/DL (ref 3.5–5)
ALP SERPL-CCNC: 119 U/L (ref 46–116)
ALT SERPL W P-5'-P-CCNC: 65 U/L (ref 12–78)
ANION GAP SERPL CALCULATED.3IONS-SCNC: 4 MMOL/L
AST SERPL W P-5'-P-CCNC: 69 U/L (ref 5–45)
ATRIAL RATE: 68 BPM
BASOPHILS # BLD AUTO: 0.04 THOUSANDS/ÂΜL (ref 0–0.1)
BASOPHILS NFR BLD AUTO: 1 % (ref 0–1)
BILIRUB SERPL-MCNC: 0.52 MG/DL (ref 0.2–1)
BUN SERPL-MCNC: 9 MG/DL (ref 5–25)
CALCIUM ALBUM COR SERPL-MCNC: 9.3 MG/DL (ref 8.3–10.1)
CALCIUM SERPL-MCNC: 8.7 MG/DL (ref 8.3–10.1)
CHLORIDE SERPL-SCNC: 114 MMOL/L (ref 96–108)
CO2 SERPL-SCNC: 25 MMOL/L (ref 21–32)
CREAT SERPL-MCNC: 0.68 MG/DL (ref 0.6–1.3)
EOSINOPHIL # BLD AUTO: 0.59 THOUSAND/ÂΜL (ref 0–0.61)
EOSINOPHIL NFR BLD AUTO: 11 % (ref 0–6)
ERYTHROCYTE [DISTWIDTH] IN BLOOD BY AUTOMATED COUNT: 12.4 % (ref 11.6–15.1)
EXT PREGNANCY TEST URINE: NEGATIVE
EXT. CONTROL: NORMAL
GFR SERPL CREATININE-BSD FRML MDRD: 113 ML/MIN/1.73SQ M
GH SERPL-MCNC: 1.3 NG/ML (ref 0–10)
GLUCOSE SERPL-MCNC: 83 MG/DL (ref 65–140)
HCT VFR BLD AUTO: 36.7 % (ref 34.8–46.1)
HGB BLD-MCNC: 12.3 G/DL (ref 11.5–15.4)
IMM GRANULOCYTES # BLD AUTO: 0.02 THOUSAND/UL (ref 0–0.2)
IMM GRANULOCYTES NFR BLD AUTO: 0 % (ref 0–2)
LYMPHOCYTES # BLD AUTO: 1.6 THOUSANDS/ÂΜL (ref 0.6–4.47)
LYMPHOCYTES NFR BLD AUTO: 29 % (ref 14–44)
MAGNESIUM SERPL-MCNC: 2.4 MG/DL (ref 1.6–2.6)
MCH RBC QN AUTO: 29.3 PG (ref 26.8–34.3)
MCHC RBC AUTO-ENTMCNC: 33.5 G/DL (ref 31.4–37.4)
MCV RBC AUTO: 87 FL (ref 82–98)
MONOCYTES # BLD AUTO: 0.51 THOUSAND/ÂΜL (ref 0.17–1.22)
MONOCYTES NFR BLD AUTO: 9 % (ref 4–12)
NEUTROPHILS # BLD AUTO: 2.77 THOUSANDS/ÂΜL (ref 1.85–7.62)
NEUTS SEG NFR BLD AUTO: 50 % (ref 43–75)
NRBC BLD AUTO-RTO: 0 /100 WBCS
P AXIS: 41 DEGREES
PLATELET # BLD AUTO: 332 THOUSANDS/UL (ref 149–390)
PMV BLD AUTO: 10 FL (ref 8.9–12.7)
POTASSIUM SERPL-SCNC: 3.9 MMOL/L (ref 3.5–5.3)
PR INTERVAL: 158 MS
PROT SERPL-MCNC: 6.8 G/DL (ref 6.4–8.4)
QRS AXIS: 12 DEGREES
QRSD INTERVAL: 86 MS
QT INTERVAL: 396 MS
QTC INTERVAL: 421 MS
RBC # BLD AUTO: 4.2 MILLION/UL (ref 3.81–5.12)
SODIUM SERPL-SCNC: 143 MMOL/L (ref 135–147)
T WAVE AXIS: 23 DEGREES
VENTRICULAR RATE: 68 BPM
WBC # BLD AUTO: 5.53 THOUSAND/UL (ref 4.31–10.16)

## 2023-06-29 PROCEDURE — 83735 ASSAY OF MAGNESIUM: CPT | Performed by: INTERNAL MEDICINE

## 2023-06-29 PROCEDURE — 80053 COMPREHEN METABOLIC PANEL: CPT | Performed by: INTERNAL MEDICINE

## 2023-06-29 PROCEDURE — 99222 1ST HOSP IP/OBS MODERATE 55: CPT | Performed by: INTERNAL MEDICINE

## 2023-06-29 PROCEDURE — 99223 1ST HOSP IP/OBS HIGH 75: CPT | Performed by: INTERNAL MEDICINE

## 2023-06-29 PROCEDURE — 85025 COMPLETE CBC W/AUTO DIFF WBC: CPT | Performed by: INTERNAL MEDICINE

## 2023-06-29 PROCEDURE — 93010 ELECTROCARDIOGRAM REPORT: CPT | Performed by: INTERNAL MEDICINE

## 2023-06-29 PROCEDURE — 36415 COLL VENOUS BLD VENIPUNCTURE: CPT | Performed by: INTERNAL MEDICINE

## 2023-06-29 PROCEDURE — C9113 INJ PANTOPRAZOLE SODIUM, VIA: HCPCS | Performed by: INTERNAL MEDICINE

## 2023-06-29 RX ORDER — MAGNESIUM HYDROXIDE/ALUMINUM HYDROXICE/SIMETHICONE 120; 1200; 1200 MG/30ML; MG/30ML; MG/30ML
30 SUSPENSION ORAL ONCE
Status: COMPLETED | OUTPATIENT
Start: 2023-06-29 | End: 2023-06-29

## 2023-06-29 RX ORDER — MAGNESIUM HYDROXIDE/ALUMINUM HYDROXICE/SIMETHICONE 120; 1200; 1200 MG/30ML; MG/30ML; MG/30ML
30 SUSPENSION ORAL EVERY 6 HOURS PRN
Status: DISCONTINUED | OUTPATIENT
Start: 2023-06-29 | End: 2023-06-30 | Stop reason: HOSPADM

## 2023-06-29 RX ORDER — DIPHENHYDRAMINE HYDROCHLORIDE AND LIDOCAINE HYDROCHLORIDE AND ALUMINUM HYDROXIDE AND MAGNESIUM HYDRO
10 KIT ONCE
Status: DISCONTINUED | OUTPATIENT
Start: 2023-06-29 | End: 2023-06-30 | Stop reason: HOSPADM

## 2023-06-29 RX ORDER — ACETAMINOPHEN 325 MG/1
650 TABLET ORAL EVERY 6 HOURS PRN
Status: DISCONTINUED | OUTPATIENT
Start: 2023-06-29 | End: 2023-06-30 | Stop reason: HOSPADM

## 2023-06-29 RX ORDER — SODIUM CHLORIDE, SODIUM GLUCONATE, SODIUM ACETATE, POTASSIUM CHLORIDE, MAGNESIUM CHLORIDE, SODIUM PHOSPHATE, DIBASIC, AND POTASSIUM PHOSPHATE .53; .5; .37; .037; .03; .012; .00082 G/100ML; G/100ML; G/100ML; G/100ML; G/100ML; G/100ML; G/100ML
50 INJECTION, SOLUTION INTRAVENOUS CONTINUOUS
Status: DISCONTINUED | OUTPATIENT
Start: 2023-06-29 | End: 2023-06-30 | Stop reason: HOSPADM

## 2023-06-29 RX ORDER — DICYCLOMINE HYDROCHLORIDE 10 MG/1
10 CAPSULE ORAL
Status: DISCONTINUED | OUTPATIENT
Start: 2023-06-29 | End: 2023-06-30 | Stop reason: HOSPADM

## 2023-06-29 RX ORDER — ENOXAPARIN SODIUM 100 MG/ML
40 INJECTION SUBCUTANEOUS DAILY
Status: DISCONTINUED | OUTPATIENT
Start: 2023-06-29 | End: 2023-06-30 | Stop reason: HOSPADM

## 2023-06-29 RX ORDER — PANTOPRAZOLE SODIUM 40 MG/10ML
40 INJECTION, POWDER, LYOPHILIZED, FOR SOLUTION INTRAVENOUS EVERY 12 HOURS SCHEDULED
Status: DISCONTINUED | OUTPATIENT
Start: 2023-06-29 | End: 2023-06-30 | Stop reason: HOSPADM

## 2023-06-29 RX ORDER — TOPIRAMATE 100 MG/1
100 TABLET, FILM COATED ORAL
Status: DISCONTINUED | OUTPATIENT
Start: 2023-06-29 | End: 2023-06-30 | Stop reason: HOSPADM

## 2023-06-29 RX ORDER — NYSTATIN 100000 [USP'U]/G
POWDER TOPICAL 2 TIMES DAILY
Status: DISCONTINUED | OUTPATIENT
Start: 2023-06-29 | End: 2023-06-30 | Stop reason: HOSPADM

## 2023-06-29 RX ORDER — SUMATRIPTAN 50 MG/1
25 TABLET, FILM COATED ORAL ONCE AS NEEDED
Status: COMPLETED | OUTPATIENT
Start: 2023-06-29 | End: 2023-06-29

## 2023-06-29 RX ORDER — SUCRALFATE 1 G/1
1 TABLET ORAL EVERY 6 HOURS SCHEDULED
Status: DISCONTINUED | OUTPATIENT
Start: 2023-06-29 | End: 2023-06-30 | Stop reason: HOSPADM

## 2023-06-29 RX ORDER — LANOLIN ALCOHOL/MO/W.PET/CERES
3 CREAM (GRAM) TOPICAL
Status: DISCONTINUED | OUTPATIENT
Start: 2023-06-29 | End: 2023-06-30 | Stop reason: HOSPADM

## 2023-06-29 RX ORDER — TIZANIDINE 2 MG/1
2 TABLET ORAL
Status: DISCONTINUED | OUTPATIENT
Start: 2023-06-29 | End: 2023-06-30 | Stop reason: HOSPADM

## 2023-06-29 RX ORDER — DOCUSATE SODIUM 100 MG/1
100 CAPSULE, LIQUID FILLED ORAL 2 TIMES DAILY PRN
Status: DISCONTINUED | OUTPATIENT
Start: 2023-06-29 | End: 2023-06-30 | Stop reason: HOSPADM

## 2023-06-29 RX ORDER — LORAZEPAM 2 MG/ML
1 INJECTION INTRAMUSCULAR EVERY 4 HOURS PRN
Status: DISCONTINUED | OUTPATIENT
Start: 2023-06-29 | End: 2023-06-30 | Stop reason: HOSPADM

## 2023-06-29 RX ORDER — PROMETHAZINE HYDROCHLORIDE 25 MG/ML
25 INJECTION, SOLUTION INTRAMUSCULAR; INTRAVENOUS ONCE
Status: COMPLETED | OUTPATIENT
Start: 2023-06-29 | End: 2023-06-29

## 2023-06-29 RX ORDER — ONDANSETRON 2 MG/ML
4 INJECTION INTRAMUSCULAR; INTRAVENOUS EVERY 6 HOURS PRN
Status: DISCONTINUED | OUTPATIENT
Start: 2023-06-29 | End: 2023-06-30 | Stop reason: HOSPADM

## 2023-06-29 RX ORDER — LEVOTHYROXINE SODIUM 0.05 MG/1
50 TABLET ORAL
Status: DISCONTINUED | OUTPATIENT
Start: 2023-06-29 | End: 2023-06-30 | Stop reason: HOSPADM

## 2023-06-29 RX ADMIN — DICYCLOMINE HYDROCHLORIDE 10 MG: 10 CAPSULE ORAL at 21:57

## 2023-06-29 RX ADMIN — TIZANIDINE 2 MG: 2 TABLET ORAL at 21:59

## 2023-06-29 RX ADMIN — SUCRALFATE 1 G: 1 TABLET ORAL at 02:58

## 2023-06-29 RX ADMIN — DICYCLOMINE HYDROCHLORIDE 10 MG: 10 CAPSULE ORAL at 11:28

## 2023-06-29 RX ADMIN — SUCRALFATE 1 G: 1 TABLET ORAL at 11:28

## 2023-06-29 RX ADMIN — TOPIRAMATE 100 MG: 100 TABLET, FILM COATED ORAL at 21:57

## 2023-06-29 RX ADMIN — SODIUM CHLORIDE, SODIUM GLUCONATE, SODIUM ACETATE, POTASSIUM CHLORIDE AND MAGNESIUM CHLORIDE 100 ML/HR: 526; 502; 368; 37; 30 INJECTION, SOLUTION INTRAVENOUS at 03:08

## 2023-06-29 RX ADMIN — SUCRALFATE 1 G: 1 TABLET ORAL at 17:42

## 2023-06-29 RX ADMIN — LORAZEPAM 1 MG: 2 INJECTION INTRAMUSCULAR; INTRAVENOUS at 03:08

## 2023-06-29 RX ADMIN — SUCRALFATE 1 G: 1 TABLET ORAL at 08:38

## 2023-06-29 RX ADMIN — PANTOPRAZOLE SODIUM 40 MG: 40 INJECTION, POWDER, FOR SOLUTION INTRAVENOUS at 02:50

## 2023-06-29 RX ADMIN — ALUMINUM HYDROXIDE, MAGNESIUM HYDROXIDE, AND SIMETHICONE 30 ML: 200; 200; 20 SUSPENSION ORAL at 00:44

## 2023-06-29 RX ADMIN — ACETAMINOPHEN 650 MG: 325 TABLET, FILM COATED ORAL at 21:59

## 2023-06-29 RX ADMIN — DICYCLOMINE HYDROCHLORIDE 10 MG: 10 CAPSULE ORAL at 02:50

## 2023-06-29 RX ADMIN — NYSTATIN 1 APPLICATION: 100000 POWDER TOPICAL at 08:46

## 2023-06-29 RX ADMIN — MELATONIN 3 MG: at 22:00

## 2023-06-29 RX ADMIN — PROMETHAZINE HYDROCHLORIDE 25 MG: 25 INJECTION INTRAMUSCULAR; INTRAVENOUS at 02:49

## 2023-06-29 RX ADMIN — DICYCLOMINE HYDROCHLORIDE 10 MG: 10 CAPSULE ORAL at 17:42

## 2023-06-29 RX ADMIN — TOPIRAMATE 100 MG: 100 TABLET, FILM COATED ORAL at 02:50

## 2023-06-29 RX ADMIN — DICYCLOMINE HYDROCHLORIDE 10 MG: 10 CAPSULE ORAL at 08:39

## 2023-06-29 RX ADMIN — DOCUSATE SODIUM 100 MG: 100 CAPSULE, LIQUID FILLED ORAL at 02:50

## 2023-06-29 RX ADMIN — PANTOPRAZOLE SODIUM 40 MG: 40 INJECTION, POWDER, FOR SOLUTION INTRAVENOUS at 17:43

## 2023-06-29 RX ADMIN — SUCRALFATE 1 G: 1 TABLET ORAL at 23:19

## 2023-06-29 RX ADMIN — SUMATRIPTAN SUCCINATE 25 MG: 50 TABLET ORAL at 02:50

## 2023-06-29 NOTE — CONSULTS
Consult Service: Gastroenterology      PATIENT INFORMATION      Jesús Vicente 30 y o  female MRN: [id number]  Unit/Bed#: ED 02 Encounter: [id number]  PCP: Avon Gosselin, MD  Date of Admission:  6/28/2023  Date of Consultation: 06/29/23  Requesting Physician: Glory Gil MD       ASSESSMENTS & PLAN     30years old female with past medical history of GERD, marginal ulcer, morbid obesity s/p sleeve gastrectomy with continued severe reflux which was converted to Bhavya-en-Y gastric bypass, history of prolactinoma recently admitted to the hospital in 5/23 for multiple episodes of hypoglycemia after meal and was advised to follow-up with bariatric regarding reversal for gastric bypass as per endocrinology  Presented again with worsening epigastric abdominal pain, nausea, vomiting related to meals  GI was consulted for further evaluation  1   Epigastric abdominal pain along with odynophagia  -Undetermined etiology currently  -Reports pain worse after meal and cannot tolerate solid food  - Denies any NSAID use, alcohol use, smoking  - Prior history of marginal ulcer  - History of Bhavya-en-Y gastric bypass in 2020   - EGD last in 2021 showing severe GERD with small ulceration at Bravo capsule site    2  Peripheral eosinophilia 11% of undetermined etiology    3  History of sleeve gastrectomy converted to Bhavya-en-Y gastric bypass for severe reflux    4  Severe GERD     5  Elevated liver enzymes  - Noted to have AST of 69 with ALT of 119 today  Normal ALT  Undetermined etiology  - No prior history of celiac disease  Plan:-  - Plan for EGD in a m , n p o  midnight  - Continue PPI IV twice daily, Carafate 4 times daily  - Trend liver enzymes  - We will check celiac panel    For the consultation  We will continue to follow      REASON FOR CONSULTATION      Epigastric abdominal pain      HISTORY OF PRESENT ILLNESS      Jesús Vicente is a 30 y o  female with PMH of GERD, morbid obesity s/p sleeve gastrectomy with continued severe reflux converted to Bhavya-en-Y gastric bypass many years ago, history of marginal ulcer, history of prolactinoma, lupus who was recently admitted to the hospital in  for persistent episodes of hypoglycemia after meal and was advised to follow-up with bariatric regarding reversal for gastric bypass as he was suspected to have increased gastric emptying leading to hypoglycemia  Presented again with significant epigastric abdominal pain since 2 weeks  Related to eating solid meal with worsening pain after that  Also complaining of some dysphagia  Does have a history of marginal ulcer in the past   Also noted nausea vomiting  States having difficulty swallowing solid food since last 3 days and episode of vomiting in ED as well  Pain radiates to back  Denies fever  No diarrhea, constipation  No recent changes in medication  REVIEW OF SYSTEMS     A thorough 12-point review of systems has been conducted  Pertinent positives and negatives are mentioned in the history of present illness         PAST MEDICAL & SURGICAL HISTORY      Past Medical History:   Diagnosis Date   • Abnormal Pap smear of cervix    • Anemia     gets iron infusions   • Anxiety    • Chiari I malformation (HCC)    • Chronic pain disorder     during lupus flairs   • COVID-19    • Disease of thyroid gland     pt off meds   • Female infertility     IVF pregnancy   • Fibromyalgia    • Fibromyalgia, primary    • Gastric ulcer    • GERD (gastroesophageal reflux disease)    • Hiatal hernia    • Lupus (HCC)    • Muscle weakness    • Pericardial cyst    • Pituitary tumor    • Pleural effusion associated with pulmonary infection    • Polycystic ovary syndrome    • Pulmonary edema    • Pulmonary emboli (HCC)    • Spinal headache     with c section       Past Surgical History:   Procedure Laterality Date   •  SECTION      x 2   • CHOLECYSTECTOMY     • GASTRECTOMY SLEEVE LAPAROSCOPIC     • GASTRIC BYPASS     • POLYPECTOMY     • TONSILLECTOMY     • TUBAL LIGATION           MEDICATIONS & ALLERGIES       Medications:   Prior to Admission medications    Medication Sig Start Date End Date Taking? Authorizing Provider   Continuous Blood Gluc  (Dexcom G7 ) CARMINE Use 1 Device 4 (four) times a day 3/22/23   Aracely Weiss PA-C   Continuous Blood Gluc Sensor (Dexcom G7 Sensor) MISC Use 1 Device 4 (four) times a day 3/22/23   Aracely Weiss PA-C   dexamethasone (DECADRON) 1 mg tablet Take 1 tab at 11pm and get cortisol levels checked between 7:00 a m  and 9:00 a m  on the next morning  10/18/22   Duane Davis MD   dicyclomine (BENTYL) 10 mg capsule Take 1 capsule (10 mg total) by mouth 4 (four) times a day (before meals and at bedtime) 10/18/22   Duane Davis MD   Erenumab-aooe 140 MG/ML SOAJ Inject 140 mg under the skin every 30 (thirty) days 9/21/22   Navjot Dang DO   levothyroxine (Synthroid) 50 mcg tablet Take 1 tablet (50 mcg total) by mouth daily in the early morning 5/11/23   Ayush Tellez MD   nystatin (MYCOSTATIN) powder Apply topically 2 (two) times a day 1/4/23   Ayush Tellez MD   pantoprazole (PROTONIX) 40 mg tablet Take 1 tablet (40 mg total) by mouth daily 8/1/22 1/31/24  Ayush Tellez MD   rizatriptan (MAXALT-MLT) 10 mg disintegrating tablet Take 1 tablet (10 mg total) by mouth once as needed for migraine for up to 1 dose May repeat in 2 hours if needed 3/13/23   Navjot Dang DO   tiZANidine (ZANAFLEX) 2 mg tablet Take 1 tablet (2 mg total) by mouth daily at bedtime as needed for muscle spasms 3/10/23   SEPIDEH Edwards   tolnaftate (TINACTIN) 1 % powder Apply topically 2 (two) times a day 6/23/23   Ayush Tellez MD   topiramate (TOPAMAX) 50 MG tablet Take 2 tablets (100 mg total) by mouth daily at bedtime 7/26/22   Ayush Tellez MD       Allergies:    Allergies   Allergen Reactions   • Codeine Tremor   • Morphine Tremor         SOCIAL HISTORY Marital Status: /Civil Union    Substance Use History:   Social History     Substance and Sexual Activity   Alcohol Use Yes    Comment: occ  social rare     Social History     Tobacco Use   Smoking Status Never   Smokeless Tobacco Never     Social History     Substance and Sexual Activity   Drug Use Never         FAMILY HISTORY      Non-Contributory      PHYSICAL EXAM     Vitals:   Blood Pressure: 122/77 (06/29/23 0844)  Pulse: 76 (06/29/23 0844)  Temperature: 98 1 °F (36 7 °C) (06/28/23 1554)  Temp Source: Tympanic (06/28/23 1554)  Respirations: 18 (06/29/23 0844)  SpO2: 98 % (06/29/23 0844)    Physical Exam:   GENERAL: NAD  HEENT:  NC/AT, no scleral icterus  CARDIAC:  RRR, +S1/S2  PULMONARY:  CTA B/L, no wheezing/rales/rhonci, non-labored breathing  ABDOMEN:  Soft, NT/ND, +BS, no rebound/guarding/rigidity  Extremities:  No edema, cyanosis, or clubbing  NEUROLOGIC:  Alert  SKIN:  No rashes or erythema      ADDITIONAL DATA     Lab Results:     Results from last 7 days   Lab Units 06/29/23  0619   WBC Thousand/uL 5 53   HEMOGLOBIN g/dL 12 3   HEMATOCRIT % 36 7   PLATELETS Thousands/uL 332   NEUTROS PCT % 50   LYMPHS PCT % 29   MONOS PCT % 9   EOS PCT % 11*     Results from last 7 days   Lab Units 06/29/23  0619   POTASSIUM mmol/L 3 9   CHLORIDE mmol/L 114*   CO2 mmol/L 25   BUN mg/dL 9   CREATININE mg/dL 0 68   CALCIUM mg/dL 8 7   ALK PHOS U/L 119*   ALT U/L 65   AST U/L 69*           Imaging:    MRI brain pituitary wo and w contrast    Result Date: 6/29/2023  Narrative: MRI BRAIN AND SELLA  WITH AND WITHOUT CONTRAST INDICATION:  D35 2: Benign neoplasm of pituitary gland  MRI brain pituitary wo and w contrast  Pituitary microadenoma COMPARISON: MRI brain without contrast 7/13/2022  MRI brain pituitary with and without contrast 6/4/2022   CTA head and neck with and without contrast 5/1/2022 TECHNIQUE: Multiplanar, multisequence imaging of the brain and sella was performed before and after gadolinium administration  Targeted images of the sella were performed requiring additional time at acquisition and interpretation of approximately 25% IV Contrast:  7 mL of Gadobutrol injection (SINGLE-DOSE) IMAGE QUALITY:  Diagnostic  FINDINGS: BRAIN PARENCHYMA:  There is no discrete mass, mass effect or midline shift  Brainstem and cerebellum demonstrate normal signal  There is no intracranial hemorrhage  There is no evidence of acute infarction and diffusion imaging is unremarkable  There are no white matter changes in the cerebral hemispheres  Chiari I malformation  Normal postcontrast imaging  VENTRICLES:  Normal for the patient's age  SELLA AND PITUITARY GLAND: Unchanged 0 3 cm hypoenhancing lesion in the posterior aspect of pituitary gland (11:7)  The pituitary stalk is midline  Normal parasellar and suprasellar structures  ORBITS:  Normal  PARANASAL SINUSES:  Normal  NASOPHARYNX: Small Tornwaldt cyst  VASCULATURE:  Evaluation of the major intracranial vasculature demonstrates appropriate flow voids  CALVARIUM AND SKULL BASE:  Normal  EXTRACRANIAL SOFT TISSUES:  Normal      Impression: No acute intracranial abnormality  Unchanged 0 3 cm pituitary microadenoma  Chiari I malformation  Workstation performed: [id number]     CT abdomen pelvis with contrast    Result Date: 6/28/2023  Narrative: CT ABDOMEN AND PELVIS WITH IV CONTRAST INDICATION:   Epigastric pain epigastric pain, vomiting, eval sbo  COMPARISON: None available  TECHNIQUE:  CT examination of the abdomen and pelvis was performed  Multiplanar 2D reformatted images were created from the source data  This examination, like all CT scans performed in the [facility], was performed utilizing techniques to minimize radiation dose exposure, including the use of iterative reconstruction and automated exposure control   Radiation dose length product (DLP) for this visit:  1203 4 mGy-cm IV Contrast:  100 mL of iohexol (OMNIPAQUE) Enteric Contrast: Enteric contrast was not administered  FINDINGS: ABDOMEN LOWER CHEST:  No clinically significant abnormality identified in the visualized lower chest  LIVER/BILIARY TREE:  Unremarkable  GALLBLADDER:  Gallbladder is surgically absent  SPLEEN:  Unremarkable  PANCREAS:  Unremarkable  ADRENAL GLANDS:  Unremarkable  KIDNEYS/URETERS:  Unremarkable  No hydronephrosis  STOMACH AND BOWEL: Postsurgical changes from prior gastric bypass surgery  Occluded stomach is contracted and unremarkable  Gastrojejunostomy appear unremarkable  Small and large bowel loops are otherwise normal in course and caliber without evidence for  obstruction or inflammation  Terminal ileum and appendix are unremarkable  APPENDIX:  No findings to suggest appendicitis  ABDOMINOPELVIC CAVITY:  No ascites  No pneumoperitoneum  No lymphadenopathy  VESSELS:  Unremarkable for patient's age  PELVIS REPRODUCTIVE ORGANS:  Unremarkable for patient's age  URINARY BLADDER: Moderately distended and grossly unremarkable    ABDOMINAL WALL/INGUINAL REGIONS:  Unremarkable  OSSEOUS STRUCTURES:  No acute fracture or destructive osseous lesion  Impression: Postsurgical changes from prior gastric bypass  No acute intra-abdominal/pelvic abnormalities noted with findings detailed above  Workstation performed: [id number]       EKG, Pathology, and Other Studies Reviewed on Admission:   · EKG: Reviewed      Counseling / Coordination of Care Time: 30 total mins spent n consult  Greater than 50% of total time spent on patient counseling and coordination of care     ** Please Note: This note is constructed using a voice recognition dictation system   **

## 2023-06-29 NOTE — H&P
[address] [facility]  H&P  Name: Bright Dill 30 y o  female I MRN: [id number]  Unit/Bed#: ED 02 I Date of Admission: 6/28/2023   Date of Service: 6/29/2023 I Hospital Day: 0      Assessment/Plan   * Acute superficial gastritis without hemorrhage  Assessment & Plan  · Patient given Mylanta; will also give a dose of Magic mouthwash  · Continue Protonix 40 mg intravenous twice daily  · Phenergan significantly decrease nausea  · Carafate 1 g 4 times daily  · Intravenous fluids Isolyte 100 mL/h  · Metabolic profile in the morning; CBC  · Patient may also have element of anxiety causing her symptoms; will continue with Ativan 1 mg every 4 hours as needed  Acquired hypothyroidism  Assessment & Plan  Continue levothyroxine 50 mcg daily  History of gastric bypass  Assessment & Plan  Recently admitted for bouts of hypoglycemia which may be secondary to the gastric bypass; was advised that patient may need reversal of this procedure  Currently, patient is having nausea with epigastric to chest discomfort associated with some vomiting  It seems that patient may have worsening gastritis; see plans regarding acute superficial gastritis without hemorrhage as above  Continue Protonix, Magic mouthwash, Carafate, Phenergan  VTE Prophylaxis: Enoxaparin (Lovenox)  / sequential compression device   Code Status: Level 1 - Full Code as discussed  POLST: There is no POLST form on file for this patient (pre-hospital)    Anticipated Length of Stay:  Patient will be admitted on an Inpatient basis with an anticipated length of stay of greater than 2 midnights  Justification for Hospital Stay: Please see detailed plans noted above  Would need to control symptoms of dyspepsia; started Protonix intravenous twice daily  Magic mouthwash/Carafate  GI consult  Chief Complaint:     Epigastric to chest discomfort  Nausea and vomiting    History of Present Illness:  Bright Dill is a 30 y o  female who has past medical history significant for pituitary microadenoma, prolactinoma, lupus erythematosus, status post bariatric surgery and said to have resultant hypoglycemia with advice for reversal of bariatric surgery, menorrhagia, Chiari I malformation, hypothyroidism  Patient comes to the emergency room complaining of chest with epigastric discomfort  This has been going on for 2 weeks and feels that she has some problems eating solid food which increases the chest to epigastric pain  Patient complains of esophageal dysphagia with note of poor p o  intake  There is also nausea with vomiting  Patient comes to the emergency room unable to tolerate taking fluids  She suddenly has an urge to vomit  The patient has been given multiple medications including Valium 5 mg x 2, Mylanta, Dilaudid, hyoscyamine, Reglan and Zofran without much improvement  Patient was about to be sent home when suddenly she had nausea and vomiting  Patient was kept in the hospital for further symptomatic treatment  Currently, patient is seen on bed and nauseous  Patient is also able to walk and go to the bathroom by herself  She does not complain of any cardiorespiratory distress      Review of Systems:    Constitutional:  Denies fever or chills   Eyes:  Denies change in visual acuity   HENT:  Denies nasal congestion or sore throat   Respiratory:  Denies cough or shortness of breath   Cardiovascular:  Denies chest pain or edema   GI: Epigastric to chest pain, with nausea, vomiting, without bloody stools or diarrhea   :  Denies dysuria   Musculoskeletal:  Denies back pain or joint pain   Integument:  Denies rash   Neurologic:  Denies headache, focal weakness or sensory changes   Endocrine:  Denies polyuria or polydipsia   Psychiatric:  Denies depression or anxiety     Past Medical and Surgical History:   Past Medical History:   Diagnosis Date   • Abnormal Pap smear of cervix    • Anemia     gets iron infusions • Anxiety    • Chiari I malformation (HCC)    • Chronic pain disorder     during lupus flairs   • COVID-19    • Disease of thyroid gland     pt off meds   • Female infertility     IVF pregnancy   • Fibromyalgia    • Fibromyalgia, primary    • Gastric ulcer    • GERD (gastroesophageal reflux disease)    • Hiatal hernia    • Lupus (HCC)    • Muscle weakness    • Pericardial cyst    • Pituitary tumor    • Pleural effusion associated with pulmonary infection    • Polycystic ovary syndrome    • Pulmonary edema    • Pulmonary emboli (HCC)    • Spinal headache     with c section     Past Surgical History:   Procedure Laterality Date   •  SECTION      x 2   • CHOLECYSTECTOMY     • GASTRECTOMY SLEEVE LAPAROSCOPIC     • GASTRIC BYPASS     • POLYPECTOMY     • TONSILLECTOMY     • TUBAL LIGATION         Meds/Allergies:  (Not in a hospital admission)      Allergies:    Allergies   Allergen Reactions   • Codeine Tremor   • Morphine Tremor     History:  Marital Status: /Civil Union   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  Patient Pre-hospital Living Situation: Lives at home  Patient Pre-hospital Level of Mobility: Ambulatory  Patient Pre-hospital Diet Restrictions: Regular  Substance Use History:   Social History     Substance and Sexual Activity   Alcohol Use Yes    Comment: occ  social rare     Social History     Tobacco Use   Smoking Status Never   Smokeless Tobacco Never     Social History     Substance and Sexual Activity   Drug Use Never       Family History:  Family History   Problem Relation Age of Onset   • Heart disease Mother    • Hypertension Mother    • Heart attack Father    • No Known Problems Brother    • No Known Problems Daughter    • No Known Problems Son    • Heart disease Maternal Grandmother    • Hypertension Maternal Grandmother    • Diabetes Maternal Grandmother    • Early death Maternal Grandfather    • No Known Problems Paternal Grandmother • No Known Problems Paternal Grandfather    • No Known Problems Brother        Physical Exam:     Vitals:   Blood Pressure: 113/74 (06/28/23 1830)  Pulse: 75 (06/28/23 2230)  Temperature: 98 1 °F (36 7 °C) (06/28/23 1554)  Temp Source: Tympanic (06/28/23 1554)  Respirations: 18 (06/28/23 2230)  SpO2: 97 % (06/28/23 2230)    Constitutional:  Well developed, well nourished, no acute distress, non-toxic appearance complaining of chest to epigastric discomfort  Eyes:  PERRL, conjunctiva normal   HENT:  Atraumatic, external ears normal, nose normal, oropharynx moist, no pharyngeal exudates  Neck- normal range of motion, no tenderness, supple   Respiratory:  No respiratory distress, normal breath sounds, no rales, no wheezing   Cardiovascular:  Normal rate, normal rhythm, no murmurs, no gallops, no rubs   GI:  Soft, nondistended, obese abdomen normal bowel sounds, epigastric discomfort, radiating towards the chest   :  No costovertebral angle tenderness   Musculoskeletal:  No edema, no tenderness, no deformities  Back- no tenderness  Integument:  Well hydrated, no rash   Lymphatic:  No lymphadenopathy noted   Neurologic:  Alert &awake, communicative, CN 2-12 normal, normal motor function, normal sensory function, no focal deficits noted   Psychiatric:  Speech and behavior appropriate       Lab Results: I have personally reviewed pertinent reports  Results from last 7 days   Lab Units 06/28/23  1721   WBC Thousand/uL 6 66   HEMOGLOBIN g/dL 12 3   HEMATOCRIT % 37 2   PLATELETS Thousands/uL 354   NEUTROS PCT % 49   LYMPHS PCT % 34   MONOS PCT % 8   EOS PCT % 8*     Results from last 7 days   Lab Units 06/28/23  1721   POTASSIUM mmol/L 4 5   CHLORIDE mmol/L 113*   CO2 mmol/L 24   BUN mg/dL 12   CREATININE mg/dL 0 64   CALCIUM mg/dL 9 2   ALK PHOS U/L 96   ALT U/L 22   AST U/L 13               Imaging: I have personally reviewed pertinent reports        CT abdomen pelvis with contrast    Result Date: 6/28/2023  Narrative: CT ABDOMEN AND PELVIS WITH IV CONTRAST INDICATION:   Epigastric pain epigastric pain, vomiting, eval sbo  COMPARISON: None available  TECHNIQUE:  CT examination of the abdomen and pelvis was performed  Multiplanar 2D reformatted images were created from the source data  This examination, like all CT scans performed in the [facility], was performed utilizing techniques to minimize radiation dose exposure, including the use of iterative reconstruction and automated exposure control  Radiation dose length product (DLP) for this visit:  1203 4 mGy-cm IV Contrast:  100 mL of iohexol (OMNIPAQUE) Enteric Contrast:  Enteric contrast was not administered  FINDINGS: ABDOMEN LOWER CHEST:  No clinically significant abnormality identified in the visualized lower chest  LIVER/BILIARY TREE:  Unremarkable  GALLBLADDER:  Gallbladder is surgically absent  SPLEEN:  Unremarkable  PANCREAS:  Unremarkable  ADRENAL GLANDS:  Unremarkable  KIDNEYS/URETERS:  Unremarkable  No hydronephrosis  STOMACH AND BOWEL: Postsurgical changes from prior gastric bypass surgery  Occluded stomach is contracted and unremarkable  Gastrojejunostomy appear unremarkable  Small and large bowel loops are otherwise normal in course and caliber without evidence for  obstruction or inflammation  Terminal ileum and appendix are unremarkable  APPENDIX:  No findings to suggest appendicitis  ABDOMINOPELVIC CAVITY:  No ascites  No pneumoperitoneum  No lymphadenopathy  VESSELS:  Unremarkable for patient's age  PELVIS REPRODUCTIVE ORGANS:  Unremarkable for patient's age  URINARY BLADDER: Moderately distended and grossly unremarkable    ABDOMINAL WALL/INGUINAL REGIONS:  Unremarkable  OSSEOUS STRUCTURES:  No acute fracture or destructive osseous lesion  Impression: Postsurgical changes from prior gastric bypass  No acute intra-abdominal/pelvic abnormalities noted with findings detailed above   Workstation performed: [id number] ** Please Note: Dragon 360 Dictation voice to text software was used in the creation of this document   **

## 2023-06-29 NOTE — ASSESSMENT & PLAN NOTE
· Patient given Mylanta; will also give a dose of Magic mouthwash  · Continue Protonix 40 mg intravenous twice daily  · Phenergan significantly decrease nausea  · Carafate 1 g 4 times daily  · Intravenous fluids Isolyte 100 mL/h  · Metabolic profile in the morning; CBC  · Patient may also have element of anxiety causing her symptoms; will continue with Ativan 1 mg every 4 hours as needed

## 2023-06-29 NOTE — ASSESSMENT & PLAN NOTE
Recently admitted for bouts of hypoglycemia which may be secondary to the gastric bypass; was advised that patient may need reversal of this procedure  Currently, patient is having nausea with epigastric to chest discomfort associated with some vomiting  It seems that patient may have worsening gastritis; see plans regarding acute superficial gastritis without hemorrhage as above  Continue Protonix, Magic mouthwash, Carafate, Phenergan

## 2023-06-29 NOTE — QUICK NOTE
POST ADMIT CHECK    Patient was able to eat cereal this morning  No vomiting episodes  Some epigastric discomfort  Abdomen is soft and non tender on exam   Evaluated by GI, being planned for EGD tomorrow  Diet per GI  continue PPi and sucralfate  Marginally elevated AST and ALP noted with normal bilirubin  CT findings noted, no liver or biliary tree pathology and known cholecystectomy  Trend liver enzymes    NPO PM

## 2023-06-30 ENCOUNTER — TELEPHONE (OUTPATIENT)
Dept: ENDOCRINOLOGY | Facility: CLINIC | Age: 38
End: 2023-06-30

## 2023-06-30 ENCOUNTER — APPOINTMENT (INPATIENT)
Dept: GASTROENTEROLOGY | Facility: HOSPITAL | Age: 38
DRG: 391 | End: 2023-06-30
Payer: COMMERCIAL

## 2023-06-30 ENCOUNTER — ANESTHESIA (INPATIENT)
Dept: GASTROENTEROLOGY | Facility: HOSPITAL | Age: 38
End: 2023-06-30
Payer: COMMERCIAL

## 2023-06-30 ENCOUNTER — ANESTHESIA EVENT (INPATIENT)
Dept: GASTROENTEROLOGY | Facility: HOSPITAL | Age: 38
End: 2023-06-30
Payer: COMMERCIAL

## 2023-06-30 VITALS
RESPIRATION RATE: 16 BRPM | SYSTOLIC BLOOD PRESSURE: 115 MMHG | WEIGHT: 169.75 LBS | OXYGEN SATURATION: 97 % | BODY MASS INDEX: 34.29 KG/M2 | DIASTOLIC BLOOD PRESSURE: 73 MMHG | TEMPERATURE: 97.9 F | HEART RATE: 116 BPM

## 2023-06-30 LAB
ALPHA SUBUNIT FREE SERPL-MCNC: 0.25 NG/ML
GLUCOSE SERPL-MCNC: 178 MG/DL (ref 65–140)
IGF-I SERPL-MCNC: 158 NG/ML (ref 84–281)

## 2023-06-30 PROCEDURE — 86231 EMA EACH IG CLASS: CPT | Performed by: INTERNAL MEDICINE

## 2023-06-30 PROCEDURE — 88305 TISSUE EXAM BY PATHOLOGIST: CPT | Performed by: PATHOLOGY

## 2023-06-30 PROCEDURE — 82784 ASSAY IGA/IGD/IGG/IGM EACH: CPT | Performed by: INTERNAL MEDICINE

## 2023-06-30 PROCEDURE — 0DB68ZX EXCISION OF STOMACH, VIA NATURAL OR ARTIFICIAL OPENING ENDOSCOPIC, DIAGNOSTIC: ICD-10-PCS | Performed by: INTERNAL MEDICINE

## 2023-06-30 PROCEDURE — 43239 EGD BIOPSY SINGLE/MULTIPLE: CPT | Performed by: INTERNAL MEDICINE

## 2023-06-30 PROCEDURE — 86258 DGP ANTIBODY EACH IG CLASS: CPT | Performed by: INTERNAL MEDICINE

## 2023-06-30 PROCEDURE — 86364 TISS TRNSGLTMNASE EA IG CLAS: CPT | Performed by: INTERNAL MEDICINE

## 2023-06-30 PROCEDURE — C9113 INJ PANTOPRAZOLE SODIUM, VIA: HCPCS | Performed by: INTERNAL MEDICINE

## 2023-06-30 PROCEDURE — 99239 HOSP IP/OBS DSCHRG MGMT >30: CPT | Performed by: INTERNAL MEDICINE

## 2023-06-30 PROCEDURE — 82948 REAGENT STRIP/BLOOD GLUCOSE: CPT

## 2023-06-30 RX ORDER — OMEPRAZOLE 40 MG/1
40 CAPSULE, DELAYED RELEASE ORAL 2 TIMES DAILY
Qty: 60 CAPSULE | Refills: 1 | Status: SHIPPED | OUTPATIENT
Start: 2023-06-30

## 2023-06-30 RX ORDER — METOCLOPRAMIDE HYDROCHLORIDE 5 MG/ML
10 INJECTION INTRAMUSCULAR; INTRAVENOUS ONCE AS NEEDED
Status: CANCELLED | OUTPATIENT
Start: 2023-06-30

## 2023-06-30 RX ORDER — LIDOCAINE HYDROCHLORIDE 10 MG/ML
0.5 INJECTION, SOLUTION EPIDURAL; INFILTRATION; INTRACAUDAL; PERINEURAL ONCE AS NEEDED
Status: CANCELLED | OUTPATIENT
Start: 2023-06-30

## 2023-06-30 RX ORDER — DIPHENHYDRAMINE HYDROCHLORIDE 50 MG/ML
12.5 INJECTION INTRAMUSCULAR; INTRAVENOUS ONCE AS NEEDED
Status: CANCELLED | OUTPATIENT
Start: 2023-06-30

## 2023-06-30 RX ORDER — LIDOCAINE HYDROCHLORIDE 10 MG/ML
INJECTION, SOLUTION EPIDURAL; INFILTRATION; INTRACAUDAL; PERINEURAL AS NEEDED
Status: DISCONTINUED | OUTPATIENT
Start: 2023-06-30 | End: 2023-06-30

## 2023-06-30 RX ORDER — PROPOFOL 10 MG/ML
INJECTION, EMULSION INTRAVENOUS AS NEEDED
Status: DISCONTINUED | OUTPATIENT
Start: 2023-06-30 | End: 2023-06-30

## 2023-06-30 RX ORDER — ONDANSETRON 2 MG/ML
4 INJECTION INTRAMUSCULAR; INTRAVENOUS ONCE AS NEEDED
Status: CANCELLED | OUTPATIENT
Start: 2023-06-30

## 2023-06-30 RX ORDER — SODIUM CHLORIDE 9 MG/ML
INJECTION, SOLUTION INTRAVENOUS CONTINUOUS PRN
Status: DISCONTINUED | OUTPATIENT
Start: 2023-06-30 | End: 2023-06-30

## 2023-06-30 RX ADMIN — PROPOFOL 60 MG: 10 INJECTION, EMULSION INTRAVENOUS at 15:43

## 2023-06-30 RX ADMIN — SUCRALFATE 1 G: 1 TABLET ORAL at 17:05

## 2023-06-30 RX ADMIN — PANTOPRAZOLE SODIUM 40 MG: 40 INJECTION, POWDER, FOR SOLUTION INTRAVENOUS at 17:05

## 2023-06-30 RX ADMIN — LIDOCAINE HYDROCHLORIDE 50 MG: 10 INJECTION, SOLUTION EPIDURAL; INFILTRATION; INTRACAUDAL; PERINEURAL at 15:40

## 2023-06-30 RX ADMIN — ACETAMINOPHEN 650 MG: 325 TABLET, FILM COATED ORAL at 16:51

## 2023-06-30 RX ADMIN — PROPOFOL 40 MG: 10 INJECTION, EMULSION INTRAVENOUS at 15:42

## 2023-06-30 RX ADMIN — ENOXAPARIN SODIUM 40 MG: 40 INJECTION SUBCUTANEOUS at 16:56

## 2023-06-30 RX ADMIN — SODIUM CHLORIDE: 0.9 INJECTION, SOLUTION INTRAVENOUS at 14:22

## 2023-06-30 RX ADMIN — PROPOFOL 40 MG: 10 INJECTION, EMULSION INTRAVENOUS at 15:41

## 2023-06-30 RX ADMIN — DICYCLOMINE HYDROCHLORIDE 10 MG: 10 CAPSULE ORAL at 16:51

## 2023-06-30 RX ADMIN — PROPOFOL 120 MG: 10 INJECTION, EMULSION INTRAVENOUS at 15:40

## 2023-06-30 RX ADMIN — PANTOPRAZOLE SODIUM 40 MG: 40 INJECTION, POWDER, FOR SOLUTION INTRAVENOUS at 05:27

## 2023-06-30 RX ADMIN — LEVOTHYROXINE SODIUM 50 MCG: 50 TABLET ORAL at 05:27

## 2023-06-30 RX ADMIN — SUCRALFATE 1 G: 1 TABLET ORAL at 05:27

## 2023-06-30 RX ADMIN — SODIUM CHLORIDE: 0.9 INJECTION, SOLUTION INTRAVENOUS at 15:34

## 2023-06-30 RX ADMIN — SODIUM CHLORIDE, SODIUM GLUCONATE, SODIUM ACETATE, POTASSIUM CHLORIDE AND MAGNESIUM CHLORIDE 50 ML/HR: 526; 502; 368; 37; 30 INJECTION, SOLUTION INTRAVENOUS at 05:28

## 2023-06-30 NOTE — LETTER
[address] [facility] Department  Fax # [phone]    Letter of Medical Necessity     Patient: Herve Killian  : 1985     To Whom It May Concern:     I am requesting approval for Dexcom G7 for my patient, Herve Killian to assist with monitoring her blood sugars  She is a 26-year-old woman with history of gastric bypass surgery and is being treated by me for post bariatric surgery hypoglycemia  She is having recurrent episodes of hypoglycemia throughout the week  Blood sugars are dropping in the range of 50 to 60 mg per DL which is affecting daily activities and making her severely symptomatic  Hypoglycemia could be life-threatening in certain situations  She would benefit from using a personal continuous glucose monitor sensor which can help alert her of an hypoglycemic events and take appropriate actions to avoid severe episodes, hospitalization and even death  I strongly feel she is a great candidate for Dexcom and I'm requesting approval through her insurance  If you have any questions please do not hesitate to contact me for further information      Sincerely,       Alexa Gonzalez MD

## 2023-06-30 NOTE — TELEPHONE ENCOUNTER
TO whom it may concern,    I am writing this appeal letter for Pam Schmitt for approval of continuous glucose monitor sensor by Dexcom for monitoring of blood sugars  Pam Schmitt is a 44-year-old woman, with medical history of gastric bypass surgery is being treated by me for Post bariatric surgery, post bariatric surgery hypoglycemia, is having recurrent episodes of hypoglycemia throughout the week  Blood sugar sometimes dropping in the range of 50 to 60 mg per DL range affecting daily activities and making her severely symptomatic  Hypoglycemia could be life-threatening certain situations  She will benefit from using personal glucose monitor sensor by Dexcom which can help to alert her and take appropriate actions to avoid severe hypoglycemia  I strongly feel that she is a great candidate for continuous glucose monitor sensor, and it should be approved by [employer] for reasons above      Please do not hesitate to contact me if you need any further information    Valerie Rivas      Please send the letter of appeal to insurance company and inform pt     Svitlana Lima

## 2023-06-30 NOTE — PLAN OF CARE
Problem: PAIN - ADULT  Goal: Verbalizes/displays adequate comfort level or baseline comfort level  Description: Interventions:  - Encourage patient to monitor pain and request assistance  - Assess pain using appropriate pain scale  - Administer analgesics based on type and severity of pain and evaluate response  - Implement non-pharmacological measures as appropriate and evaluate response  - Consider cultural and social influences on pain and pain management  - Notify physician/advanced practitioner if interventions unsuccessful or patient reports new pain  Outcome: Progressing     Problem: INFECTION - ADULT  Goal: Absence or prevention of progression during hospitalization  Description: INTERVENTIONS:  - Assess and monitor for signs and symptoms of infection  - Monitor lab/diagnostic results  - Monitor all insertion sites, i e  indwelling lines, tubes, and drains  - Monitor endotracheal if appropriate and nasal secretions for changes in amount and color  - [place] appropriate cooling/warming therapies per order  - Administer medications as ordered  - Instruct and encourage patient and family to use good hand hygiene technique  - Identify and instruct in appropriate isolation precautions for identified infection/condition  Outcome: Progressing  Goal: Absence of fever/infection during neutropenic period  Description: INTERVENTIONS:  - Monitor WBC    Outcome: Progressing     Problem: SAFETY ADULT  Goal: Maintain or return to baseline ADL function  Description: INTERVENTIONS:  -  Assess patient's ability to carry out ADLs; assess patient's baseline for ADL function and identify physical deficits which impact ability to perform ADLs (bathing, care of mouth/teeth, toileting, grooming, dressing, etc )  - Assess/evaluate cause of self-care deficits   - Assess range of motion  - Assess patient's mobility; develop plan if impaired  - Assess patient's need for assistive devices and provide as appropriate  - Encourage maximum independence but intervene and supervise when necessary  - Involve family in performance of ADLs  - Assess for home care needs following discharge   - Consider OT consult to assist with ADL evaluation and planning for discharge  - Provide patient education as appropriate  Outcome: Progressing

## 2023-06-30 NOTE — CASE MANAGEMENT
Case Management Assessment & Discharge Planning Note    Patient name Ralph Castellanos  Location [address] [place] [address] [id number] MRN [id number]  : 1985 Date 2023       Current Admission Date: 2023  Current Admission Diagnosis:Acute superficial gastritis without hemorrhage   Patient Active Problem List    Diagnosis Date Noted   • Acute superficial gastritis without hemorrhage 2023   • Fungal skin infection 2023   • Iron deficiency anemia 2023   • Hypoglycemia 2023   • Numbness and tingling in right hand 2023   • Right wrist pain 2023   • Numbness and tingling in left hand 2023   • Left wrist pain 2023   • Neck pain 2022   • Mid back pain 2022   • Myofascial pain syndrome 2022   • Obstructive sleep apnea (adult) (pediatric)    • Cerebellar tonsillar ectopia ([address] ) 2022   • Secondary oligomenorrhea 07/15/2022   • Menorrhagia with irregular cycle 07/15/2022   • Dysmenorrhea 07/15/2022   • Encounter for annual routine gynecological examination 2022   • History of 2  sections 2022   • Chiari I malformation ([address] )    • Pituitary microadenoma ([address] )    • Prolactinoma ([facility] ) 06/10/2022   • Acquired hypothyroidism 2022   • Muscle spasm 2022   • History of gastric bypass 2022   • Irregular periods 2022   • Elevated ferritin 10/29/2021   • Pericardial cyst 12/10/2020   • History of Chiari malformation 2020   • Headache 10/04/2017   • Lupus erythematosus 10/04/2017   • History of prolactinoma 10/04/2017      LOS (days): 1  Geometric Mean LOS (GMLOS) (days): 2 30  Days to GMLOS:1     OBJECTIVE:  PATIENT READMITTED TO HOSPITAL  Risk of Unplanned Readmission Score: 9 95         Current admission status: Inpatient       Preferred Pharmacy:   [address] [place] [address], [address] [place]  [facility]  Phone: [phone] Fax: [phone]    Primary Care Provider: John Perez MD    Primary Insurance: CAPITAL  Secondary Insurance:     ASSESSMENT:  Active Health Care Proxies     mohini hughes Health Care Representative - Spouse   Primary Phone: [phone] (Mobile)                              Patient Information  Admitted from[de-identified] Home  Mental Status: Alert  During Assessment patient was accompanied by: Not accompanied during assessment  Assessment information provided by[de-identified] Patient  Primary Caregiver: Self  Support Systems: Spouse/significant other, Family members  Type of Current Residence: 2 story home  Upon entering residence, is there a bedroom on the main floor (no further steps)?: No  A bedroom is located on the following floor levels of residence (select all that apply):: 2nd Floor  Upon entering residence, is there a bathroom on the main floor (no further steps)?: Yes  Number of steps to 2nd floor from main floor: One Flight  Living Arrangements: Lives w/ Spouse/significant other (lives with  & 2 children)    Activities of Daily Living Prior to Admission  Functional Status: Independent  Completes ADLs independently?: Yes  Ambulates independently?: Yes  Does patient use assisted devices?: No  Does patient currently own DME?: No  Does patient have a history of Outpatient Therapy (PT/OT)?: No  Does the patient have a history of Short-Term Rehab?: No  Does patient have a history of [facility]?: No  [facility]         Patient Information Continued  Income Source: Employed  Does patient have prescription coverage?: Yes  Does patient have a history of substance abuse?: No  Does patient have a history of Mental Health Diagnosis?: No         Means of Transportation  [facility] transport        DISCHARGE DETAILS:    Discharge planning discussed with[de-identified] patient  Freedom of Choice: Yes     CM contacted family/caregiver?: No- see comments  Were Treatment Team discharge recommendations reviewed with patient/caregiver?: Yes  Did patient/caregiver verbalize understanding of patient care needs?: Yes  Were patient/caregiver advised of the risks associated with not following Treatment Team discharge recommendations?: Yes    Contacts  Patient Contacts:  Trinidad Grant  Relationship to Patient[de-identified] Family  Contact Method: Phone  Phone Number: [phone]  Reason/Outcome: Emergency Contact     Transport at Discharge : Family

## 2023-06-30 NOTE — UTILIZATION REVIEW
Initial Clinical Review    Admission: Date/Time/Statement:   Admission Orders (From admission, onward)     Ordered        06/29/23 0051  Inpatient Admission  Once                      Orders Placed This Encounter   Procedures   • Inpatient Admission     Standing Status:   Standing     Number of Occurrences:   1     Order Specific Question:   Level of Care     Answer:   Med Surg [16]     Order Specific Question:   Estimated length of stay     Answer:   More than 2 Midnights     Order Specific Question:   Certification     Answer:   I certify that inpatient services are medically necessary for this patient for a duration of greater than two midnights  See H&P and MD Progress Notes for additional information about the patient's course of treatment  ED Arrival Information     Expected   -    Arrival   6/28/2023 15:51    Acuity   Urgent            Means of arrival   Walk-In    Escorted by   Self    Service   Hospitalist    Admission type   Emergency            Arrival complaint   Chest Pain           Chief Complaint   Patient presents with   • Chest Pain     Pt reports CP and epigastric pain after eating, states food keeps getting stuck when swallowing, reports vomiting after eating reports having hx ulcers       Initial Presentation: 30 y o  female to ED via walk-in from home  Present to ED with chest with epigastric discomfort  This has been going on for 2 weeks and feels that she has some problems eating solid food which increases the chest to epigastric pain  Patient complains of esophageal dysphagia with note of poor p o  intake  Endorses nausea with vomiting and unable to toerate taking fluids  PMHX pituitary microadenoma, prolactinoma, lupus erythematosus, status post bariatric surgery and said to have resultant hypoglycemia with advice for reversal of bariatric surgery, menorrhagia, Chiari I malformation, hypothyroidism    Admitted to [facility] with DX: Acute superficial gastritis without hemorrhage  on exam: vomited while in the ED; hypotensive  CT the gastric pouch appears to be distended  PLAN: cont ivf; cont iv protonix; monitor labs; GI consult; pain / nausea control; cont carafate    GI CONSULT:   80-year-old female patient with history of morbid obesity status post sleeve gastrectomy in [address] [place] [address] later converted to Bhavya-en-Y gastric bypass at the same center due to angulation of the sleeve as well as severe reflux  She persisted with reflux after Bhavya-en-Y, she was previously evaluated for Hol See ([facility]) procedure at [facility]  She is currently complaining about persistent epigastric pain and intolerance to p o  route    CT of the abdomen was performed, images were personally reviewed and interpreted, the gastric pouch appears to be distended, she discloses prior ulcers found during EGD at [facility], concern for marginal ulcers causing stenosis of the [address] [place] [address], will perform EGD      Date: 6/30/23    Day 2  Patient discharged to home / self care - s/p EGD  IMPRESSION:  • The esophagus appeared normal   • Previous anti-reflux procedure in the stomach  • The gastric pouch, gastrojejunal anastomosis and jejunum appeared normal   • Performed forceps biopsies in the body of the stomach to rule out H  pylori      ED Triage Vitals [06/28/23 1554]   Temperature Pulse Respirations Blood Pressure SpO2   98 1 °F (36 7 °C) 74 18 121/83 99 %      Temp Source Heart Rate Source Patient Position - Orthostatic VS BP Location FiO2 (%)   Tympanic Monitor Lying Left arm --      Pain Score       7          Wt Readings from Last 1 Encounters:   06/30/23 77 kg (169 lb 12 1 oz)     Additional Vital Signs:   Date/Time Temp Pulse Resp BP MAP (mmHg) SpO2 O2 Device Patient Position - Orthostatic VS   06/30/23 0317 -- -- -- 99/56 -- -- -- --   06/30/23 03:16:40 98 °F (36 7 °C) 71 18 89/54 Abnormal  66 98 % -- --   06/29/23 2015 -- -- -- -- -- 97 % None (Room air) --   06/29/23 18:56:37 98 7 °F (37 1 °C) 74 17 105/72 83 97 % -- --   06/29/23 15:19:18 98 6 °F (37 °C) 71 16 101/62 75 98 % -- --   06/29/23 12:21:13 98 3 °F (36 8 °C) 66 17 93/60 71 97 % -- --   06/29/23 1221 -- -- -- -- -- -- None (Room air) --   06/29/23 1111 -- 86 18 91/52 -- 100 % None (Room air) Lying   06/29/23 0844 -- 76 18 122/77 -- 98 % None (Room air) Sitting   06/29/23 0400 -- 72 18 124/72 -- 97 % None (Room air) Lying   06/28/23 2230 -- 75 18 -- -- 97 % None (Room air) --   06/28/23 1830 -- 88 -- 113/74 89 99 % None (Room air) Lying   06/28/23 1800 -- 74 -- 144/79 106 98 % None (Room air) Lying   06/28/23 1554 98 1 °F (36 7 °C) 74 18 121/83 -- 99 % None (Room air) Lying           EKG: Normal sinus rhythm  Normal ECG    Pertinent Labs/Diagnostic Test Results:   CT abdomen pelvis with contrast   Final Result by Lorin Brewer MD (06/28 2238)      Postsurgical changes from prior gastric bypass  No acute intra-abdominal/pelvic abnormalities noted with findings detailed above              Workstation performed: [id number]               Results from last 7 days   Lab Units 06/29/23  0619 06/28/23  1721   WBC Thousand/uL 5 53 6 66   HEMOGLOBIN g/dL 12 3 12 3   HEMATOCRIT % 36 7 37 2   PLATELETS Thousands/uL 332 354   NEUTROS ABS Thousands/µL 2 77 3 24         Results from last 7 days   Lab Units 06/29/23  0619 06/28/23  1721   SODIUM mmol/L 143 141   POTASSIUM mmol/L 3 9 4 5   CHLORIDE mmol/L 114* 113*   CO2 mmol/L 25 24   ANION GAP mmol/L 4 4   BUN mg/dL 9 12   CREATININE mg/dL 0 68 0 64   EGFR ml/min/1 73sq m 113 115   CALCIUM mg/dL 8 7 9 2   MAGNESIUM mg/dL 2 4  --      Results from last 7 days   Lab Units 06/29/23  0619 06/28/23  1721   AST U/L 69* 13   ALT U/L 65 22   ALK PHOS U/L 119* 96   TOTAL PROTEIN g/dL 6 8 7 4   ALBUMIN g/dL 3 3* 3 7   TOTAL BILIRUBIN mg/dL 0 52 0 42     Results from last 7 days   Lab Units 06/30/23  1740   POC GLUCOSE mg/dl 178*     Results from last 7 days   Lab Units 06/29/23  0619 06/28/23  1721   GLUCOSE RANDOM mg/dL 83 97             BETA-HYDROXYBUTYRATE   Date Value Ref Range Status   06/05/2023 2 4 (H) <0 6 mmol/L Final   06/05/2023 2 4 (H) <0 6 mmol/L Final   06/05/2023 2 7 (H) <0 6 mmol/L Final   06/04/2023 1 9 (H) <0 6 mmol/L Final   06/04/2023 2 0 (H) <0 6 mmol/L Final   06/04/2023 1 1 (H) <0 6 mmol/L Final   06/04/2023 1 0 (H) <0 6 mmol/L Final   06/03/2023 0 7 (H) <0 6 mmol/L Final   06/03/2023 0 3 <0 6 mmol/L Final   06/03/2023 0 1 <0 6 mmol/L Final   06/03/2023 0 3 <0 6 mmol/L Final                        Results from last 7 days   Lab Units 06/28/23  1721   LIPASE u/L 138           ED Treatment:   Medication Administration from 06/28/2023 1551 to 06/29/2023 1209       Date/Time Order Dose Route Action     06/28/2023 1722 EDT sodium chloride 0 9 % bolus 1,000 mL 1,000 mL Intravenous New Bag     06/28/2023 1722 EDT HYDROmorphone (DILAUDID) injection 0 5 mg 0 5 mg Intravenous Given     06/28/2023 1722 EDT ondansetron (ZOFRAN) injection 4 mg 4 mg Intravenous Given     06/28/2023 1752 EDT diazepam (VALIUM) injection 5 mg 5 mg Intravenous Given     06/28/2023 1757 EDT diphenhydrAMINE (BENADRYL) injection 25 mg 25 mg Intravenous Given     06/28/2023 1831 EDT diazepam (VALIUM) injection 5 mg 5 mg Intravenous Given     06/28/2023 1850 EDT hyoscyamine (LEVSIN/SL) SL tablet 0 125 mg 0 125 mg Sublingual Given     06/28/2023 2054 EDT iohexol (OMNIPAQUE) 350 MG/ML injection (SINGLE-DOSE) 100 mL 100 mL Intravenous Given     06/28/2023 2356 EDT metoclopramide (REGLAN) tablet 10 mg 10 mg Oral Given     06/29/2023 0044 EDT aluminum-magnesium hydroxide-simethicone (MYLANTA) oral suspension 30 mL 30 mL Oral Given     06/29/2023 1128 EDT dicyclomine (BENTYL) capsule 10 mg 10 mg Oral Given     06/29/2023 0839 EDT dicyclomine (BENTYL) capsule 10 mg 10 mg Oral Given     06/29/2023 0250 EDT dicyclomine (BENTYL) capsule 10 mg 10 mg Oral Given     06/29/2023 0250 EDT pantoprazole (PROTONIX) injection 40 mg 40 mg Intravenous Given     06/29/2023 0250 EDT SUMAtriptan (IMITREX) tablet 25 mg 25 mg Oral Given     06/29/2023 0846 EDT nystatin (MYCOSTATIN) powder 1 Application Topical Given     06/29/2023 0250 EDT topiramate (TOPAMAX) tablet 100 mg 100 mg Oral Given     06/29/2023 0308 EDT multi-electrolyte (PLASMALYTE-A/ISOLYTE-S PH 7 4) IV solution 100 mL/hr Intravenous New Bag     06/29/2023 0250 EDT docusate sodium (COLACE) capsule 100 mg 100 mg Oral Given     06/29/2023 1128 EDT sucralfate (CARAFATE) tablet 1 g 1 g Oral Given     06/29/2023 8129 EDT sucralfate (CARAFATE) tablet 1 g 1 g Oral Given     06/29/2023 0258 EDT sucralfate (CARAFATE) tablet 1 g 1 g Oral Given     06/29/2023 0308 EDT LORazepam (ATIVAN) injection 1 mg 1 mg Intravenous Given     06/29/2023 0249 EDT promethazine (PHENERGAN) injection 25 mg 25 mg Intravenous Given          Present on Admission:  • Acquired hypothyroidism      Admitting Diagnosis: Epigastric abdominal pain [R10 13]  Nausea and vomiting [R11 2]  History of gastric bypass [Z98 84]  Acute superficial gastritis without hemorrhage [K29 00]     Age/Sex: 30 y o  female     Admission Orders: SCDs; daily wts; I/O; NPO    Scheduled Medications:  dicyclomine, 10 mg, Oral, 4x Daily (AC & HS)  diphenhydramine, lidocaine, Al/Mg hydroxide, simethicone, 10 mL, Swish & Swallow, Once  enoxaparin, 40 mg, Subcutaneous, Daily  levothyroxine, 50 mcg, Oral, Early Morning  melatonin, 3 mg, Oral, HS  nystatin, , Topical, BID  pantoprazole, 40 mg, Intravenous, Q12H KATE  sucralfate, 1 g, Oral, Q6H KATE  topiramate, 100 mg, Oral, HS      Continuous IV Infusions:  multi-electrolyte, 50 mL/hr, Intravenous, Continuous      PRN Meds:  acetaminophen, 650 mg, Oral, Q6H PRN  aluminum-magnesium hydroxide-simethicone, 30 mL, Oral, Q6H PRN  docusate sodium, 100 mg, Oral, BID PRN  iohexol, 50 mL, Oral, Once in imaging  LORazepam, 1 mg, Intravenous, Q4H PRN  ondansetron, 4 mg, Intravenous, Q6H PRN  tiZANidine, 2 mg, Oral, HS PRN        IP CONSULT TO GASTROENTEROLOGY    Network Utilization Review Department  ATTENTION: Please call with any questions or concerns to [phone] and carefully listen to the prompts so that you are directed to the right person  All voicemails are confidential   Dudley Moreno all requests for admission clinical reviews, approved or denied determinations and any other requests to dedicated fax number below belonging to the campus where the patient is receiving treatment   List of dedicated fax numbers for the Facilities:  [address] DENIALS (Administrative/Medical Necessity) [phone]   [address] (Maternity/NICU/Pediatrics) [phone]   4 Brittani Astorga [phone]   [facility] [phone]   [address] [place] [address] [facility] Sander [facility] [phone]   [facility] [place] [facility] 134 815 [facility] [phone]

## 2023-06-30 NOTE — LETTER
[facility]   Letter of Medical Necessity       Patient: Brittani Spear  : 1985       To Whom It May Concern:     I am requesting approval for Dexcom G7 for my patient, Brittani Spear to assist with monitoring her blood sugars  She is a 29-year-old woman with history of gastric bypass surgery and is being treated by me for post bariatric surgery hypoglycemia  She is having recurrent episodes of hypoglycemia throughout the week  Blood sugars are dropping in the range of 50 to 60 mg per DL which is affecting daily activities and making her severely symptomatic  Hypoglycemia could be life-threatening in certain situations  She would benefit from using a personal continuous glucose monitor sensor which can help alert her of an hypoglycemic events and take appropriate actions to avoid severe episodes, hospitalization and even death  I strongly feel she is a great candidate for Dexcom and I'm requesting approval through her insurance  If you have any questions please do not hesitate to contact me for further information      Sincerely,       ttwick MD Z[place]

## 2023-06-30 NOTE — ANESTHESIA POSTPROCEDURE EVALUATION
Post-Op Assessment Note    CV Status:  Stable  Pain Score: 0    Pain management: adequate     Mental Status:  Alert and awake   Hydration Status:  Euvolemic   PONV Controlled:  Controlled   Airway Patency:  Patent      Post Op Vitals Reviewed: Yes      Staff: CRNA, Anesthesiologist         No notable events documented      BP   115/60   Temp      Pulse  75   Resp   12   SpO2   99

## 2023-07-01 LAB
ENDOMYSIUM IGA SER QL: NEGATIVE
GLIADIN PEPTIDE IGA SER-ACNC: 3 UNITS (ref 0–19)
GLIADIN PEPTIDE IGG SER-ACNC: 2 UNITS (ref 0–19)
IGA SERPL-MCNC: 179 MG/DL (ref 87–352)
TTG IGA SER-ACNC: <2 U/ML (ref 0–3)
TTG IGG SER-ACNC: <2 U/ML (ref 0–5)

## 2023-07-01 NOTE — UTILIZATION REVIEW
Notification of Inpatient Admission/Inpatient Authorization Request  This is a Notification of Inpatient Admission/Request for Inpatient Authorization for our facility [facility]  Be advised that this patient was admitted to our facility under Inpatient Status  Please contact the Utilization Review Department where the patient is receiving care services for additional admission information  Facility: [facility] ([address] [place] [address])  Place of Service Code: 21   Place of Service Name: [address]  Presentation Date & Time: 6/28/2023  4:16 PM  Inpatient Admission Date & Time: 6/29/23 12:51 AM  Discharge Date & Time: 6/30/2023  6:54 PM   Discharge Disposition (if discharged): Home/Self Care  Attending Physician and NPI#: [id number] [[id number]]  Admission Orders (From admission, onward)     Ordered        06/29/23 0051  Inpatient Admission  Once                      Network Utilization Review Department  Phone: [phone]; Fax [phone]  [email or web address]  ATTENTION: Please call with any questions or concerns to [phone]  and carefully listen to the prompts so that you are directed to the right person  Send all requests for admission clinical reviews, approved or denied determinations and any other requests to fax [phone]   All voicemails are confidential

## 2023-07-03 ENCOUNTER — TRANSITIONAL CARE MANAGEMENT (OUTPATIENT)
Dept: INTERNAL MEDICINE CLINIC | Facility: CLINIC | Age: 38
End: 2023-07-03

## 2023-07-04 NOTE — DISCHARGE SUMMARY
[address] [facility]  Discharge- Martina Sykes 1985, 30 y.o. female MRN: [id number]  Unit/Bed#: [facility] 906-01 Encounter: [id number]  Primary Care Provider: Candie Resendiz MD   Date and time admitted to hospital: 6/28/2023  4:16 PM    Acquired hypothyroidism  Assessment & Plan  Continue levothyroxine 50 mcg daily. History of gastric bypass  Assessment & Plan  Recently admitted for bouts of hypoglycemia which may be secondary to the gastric bypass; was advised that patient may need reversal of this procedure. Patient's pain and nausea improved  It seems that patient may have worsening gastritis; see plans regarding acute superficial gastritis without hemorrhage as above. Continue Protonix, Magic mouthwash, Carafate, Phenergan. * Acute superficial gastritis without hemorrhage  Assessment & Plan  · Patient given Mylanta; will also give a dose of Magic mouthwash. · Continue Protonix 40 mg intravenous twice daily. · Phenergan significantly decrease nausea. · Carafate 1 g 4 times daily. · Intravenous fluids Isolyte 100 mL/h. · Metabolic profile in the morning; CBC. · Patient may also have element of anxiety causing her symptoms; will continue with Ativan 1 mg every 4 hours as needed. Medical Problems     Resolved Problems  Date Reviewed: 7/4/2023   None       Discharging Physician / Practitioner: Heriberto Shine DO  PCP: Candie Resendiz MD  Admission Date:   Admission Orders (From admission, onward)     Ordered        06/29/23 0051  Inpatient Admission  Once                      Discharge Date: 06/30/23  Consultations During Hospital Stay:  · gi    Procedures Performed:   · endoscopy    Significant Findings / Test Results:   CT abdomen pelvis with contrast   Final Result by Calvin Griggs MD (06/28 2238)      Postsurgical changes from prior gastric bypass. No acute intra-abdominal/pelvic abnormalities noted with findings detailed above.             Workstation performed: [id number]         ·     Incidental Findings:   ·    · I reviewed the above mentioned incidental findings with the patient and/or family and they expressed understanding. Test Results Pending at Discharge (will require follow up): · Biopsy results     Outpatient Tests Requested:  ·     Complications:      Reason for Admission:     Hospital Course:   Dago Fang is a 30 y.o. female patient who originally presented to the hospital on 6/28/2023 due to epigastric pain nausea vomiting after meals. Patient was eval by GI and had endoscopy which showed normal esophagus,, normal gastric pouch gastrojejunal anastomosis and jejunum. Biopsies were taken for H. pylori which are pending on discharge. Patient able to tolerate her meals on the day of discharge    Please see above list of diagnoses and related plan for additional information. Condition at Discharge: stable    Discharge Day Visit / Exam:   Subjective: She denies any acute complaints on day of discharge  Vitals: Blood Pressure: 115/73 (06/30/23 1749)  Pulse: (!) 116 (06/30/23 1749)  Temperature: 97.9 °F (36.6 °C) (06/30/23 1749)  Temp Source: Tympanic (06/30/23 1548)  Respirations: 16 (06/30/23 1602)  Weight - Scale: 77 kg (169 lb 12.1 oz) (06/30/23 0600)  SpO2: 97 % (06/30/23 1749)  Exam:   Physical Exam  Vitals and nursing note reviewed. Constitutional:       General: She is not in acute distress. Appearance: She is well-developed. She is not toxic-appearing or diaphoretic. HENT:      Head: Normocephalic and atraumatic. Eyes:      General: No scleral icterus. Conjunctiva/sclera: Conjunctivae normal.   Cardiovascular:      Rate and Rhythm: Normal rate and regular rhythm. Heart sounds: No murmur heard. No friction rub. No gallop. Pulmonary:      Effort: Pulmonary effort is normal. No respiratory distress. Breath sounds: Normal breath sounds. No stridor. No wheezing, rhonchi or rales.    Chest:      Chest wall: No tenderness. Abdominal:      General: There is no distension. Palpations: Abdomen is soft. There is no mass. Tenderness: There is no abdominal tenderness. There is no guarding or rebound. Hernia: No hernia is present. Musculoskeletal:         General: No swelling or tenderness. Cervical back: Neck supple. Skin:     General: Skin is warm and dry. Capillary Refill: Capillary refill takes less than 2 seconds. Neurological:      Mental Status: She is alert and oriented to person, place, and time. Psychiatric:         Mood and Affect: Mood normal.          Discussion with Family: Updated  (significant other) at bedside. Discharge instructions/Information to patient and family:   See after visit summary for information provided to patient and family. Provisions for Follow-Up Care:  See after visit summary for information related to follow-up care and any pertinent home health orders. Disposition:   Home    Planned Readmission: no     Discharge Statement:  I spent 35 minutes discharging the patient. This time was spent on the day of discharge. I had direct contact with the patient on the day of discharge. Greater than 50% of the total time was spent examining patient, answering all patient questions, arranging and discussing plan of care with patient as well as directly providing post-discharge instructions. Additional time then spent on discharge activities. Discharge Medications:  See after visit summary for reconciled discharge medications provided to patient and/or family.       **Please Note: This note may have been constructed using a voice recognition system**

## 2023-07-04 NOTE — ASSESSMENT & PLAN NOTE
· Patient given Mylanta; will also give a dose of Magic mouthwash. · Continue Protonix 40 mg intravenous twice daily. · Phenergan significantly decrease nausea. · Carafate 1 g 4 times daily. · Intravenous fluids Isolyte 100 mL/h. · Metabolic profile in the morning; CBC. · Patient may also have element of anxiety causing her symptoms; will continue with Ativan 1 mg every 4 hours as needed.

## 2023-07-04 NOTE — ASSESSMENT & PLAN NOTE
Recently admitted for bouts of hypoglycemia which may be secondary to the gastric bypass; was advised that patient may need reversal of this procedure. Patient's pain and nausea improved  It seems that patient may have worsening gastritis; see plans regarding acute superficial gastritis without hemorrhage as above. Continue Protonix, Magic mouthwash, Carafate, Phenergan.

## 2023-07-05 PROCEDURE — 88305 TISSUE EXAM BY PATHOLOGIST: CPT | Performed by: PATHOLOGY

## 2023-07-05 NOTE — TELEPHONE ENCOUNTER
Appeal denied. [facility] to initiate a peer to matthew. Spoke to [place]. Explained to her that I believe it was denied w/o the appeal letter actually being read by the clinical pharmacist. I mentioned that the denial letter was sent to soon after the appeal letter was submitted. She spoke to the clinical pharmacist and they were in agreement that it was over looked. She reopened the case and determination will be received between 4-15 business days. If they need additional info they will reach out to our office.

## 2023-07-05 NOTE — TELEPHONE ENCOUNTER
Received appeal acknowledgment from insurance which states it can take up to 10 business days for insurance response.

## 2023-07-06 ENCOUNTER — TELEPHONE (OUTPATIENT)
Age: 38
End: 2023-07-06

## 2023-07-06 NOTE — TELEPHONE ENCOUNTER
Patients GI provider:  Dr. Thanh Ko    Number to return call: [phone]    Reason for call: Pt calling requesting recent procedure results from 6/30/2023. Please return patient phone call.

## 2023-07-12 ENCOUNTER — OFFICE VISIT (OUTPATIENT)
Dept: BARIATRICS | Facility: CLINIC | Age: 38
End: 2023-07-12
Payer: COMMERCIAL

## 2023-07-12 VITALS
BODY MASS INDEX: 35.89 KG/M2 | TEMPERATURE: 97.9 F | HEART RATE: 87 BPM | DIASTOLIC BLOOD PRESSURE: 68 MMHG | WEIGHT: 171 LBS | SYSTOLIC BLOOD PRESSURE: 104 MMHG | HEIGHT: 58 IN

## 2023-07-12 DIAGNOSIS — Z98.84 HISTORY OF GASTRIC BYPASS: ICD-10-CM

## 2023-07-12 DIAGNOSIS — Z48.815 ENCOUNTER FOR SURGICAL AFTERCARE FOLLOWING SURGERY OF DIGESTIVE SYSTEM: Primary | ICD-10-CM

## 2023-07-12 DIAGNOSIS — Z98.84 BARIATRIC SURGERY STATUS: ICD-10-CM

## 2023-07-12 DIAGNOSIS — K91.2 POSTSURGICAL MALABSORPTION: ICD-10-CM

## 2023-07-12 DIAGNOSIS — E66.9 OBESITY, CLASS II, BMI 35-39.9: ICD-10-CM

## 2023-07-12 DIAGNOSIS — K21.9 GERD (GASTROESOPHAGEAL REFLUX DISEASE): ICD-10-CM

## 2023-07-12 DIAGNOSIS — R10.13 EPIGASTRIC PAIN: ICD-10-CM

## 2023-07-12 PROCEDURE — 99205 OFFICE O/P NEW HI 60 MIN: CPT | Performed by: NURSE PRACTITIONER

## 2023-07-12 RX ORDER — SUCRALFATE ORAL 1 G/10ML
1 SUSPENSION ORAL 4 TIMES DAILY
Qty: 3600 ML | Refills: 2 | Status: SHIPPED | OUTPATIENT
Start: 2023-07-12

## 2023-07-12 RX ORDER — FAMOTIDINE 20 MG/1
20 TABLET, FILM COATED ORAL 2 TIMES DAILY
Qty: 60 TABLET | Refills: 2 | Status: SHIPPED | OUTPATIENT
Start: 2023-07-12

## 2023-07-12 NOTE — PROGRESS NOTES
Assessment/Plan:     Patient ID: Barb Gray is a 30 y.o. female. Bariatric Surgery Status    -Patient with PMH of lupus erythematosus, chiari I malformation, pituitary microadenoma and prolactinoma underwent Vertical Sleeve Gastrectomy (2017) with conversion to RNYGB by [facility] in 2020 by Dr. Leigha Mendoza due to severe heartburn and "twisted esophagus."  Presents to the office today to establish care with multiple concerns. Hypoglycemia -   Hypoglycemia episodes started after her RNYGB. She sees endocrinology with recent direct admission or 72 hour fasting protocol and to rule out insulinoma but did not find any true etiology. The endocrinologist discussed with patient that she may need her surgery reverse. She checks her blood sugar at home with a glucometer and at times levels as low as 20-40s. She has seen a diabetic dietitian but she feels it is not helpful. She is trying to eat small frequent meals but does not always have time to eat due to busy work schedule. Had tried acarbose but dislike the side effects.   - recommended to track caloric intake and increase protein intake. Per review of food diary, it appears patient is not eating enough. Track and f/u with our dietitian for support. Epigastric pain/Nausea/heartburn -  - Epigastric pain started shortly after her bariatric surgery in 2020 with worsening of heartburn (never went away from gastric sleeve). Reports her pain occurs intermittently with radiation to the back. She has associated nausea and vomiting. Radha Postin has worsened - she reports her heartburn was still present during ginna weight as well. Shortly after she was discharge for hypoglycemia, she was admitted to the hospital for abdominal pain in which a work up was done. CT abdomen was done showing no acute intra-abdominal/pelvic abnormalities noted with findings detailed above.  She also had an EGD on 06/30/2023 showing   "IMPRESSION:  • The esophagus appeared normal.  • Previous anti-reflux procedure in the stomach  • The gastric pouch, gastrojejunal anastomosis and jejunum appeared normal.  • Performed forceps biopsies in the body of the stomach to rule out H. Pylori"    Was only suspected to have gastritis. Reports inpatient GI suggest possible linx, stretta or motility work up. Patient is concern as her epigastric pain has worsened over the past few years leading to lack of appetite and fear of eating. She denies smoking, ETOH, NSAIDs, steroid use, caffeine use, or acidic contents. She follows GERD precautions. Had recent adjustment to PPI - now currently taking omeprazole 40 mg PO BID. Has never tried to use carafate, pepcid and omeprazole in combination. PLAN:     - patient is not interested in LINX procedure. Will obtain UGI and 24 hr pH manometry to further evaluate. Follow up with Dr. Bernie Kim after to discuss findings. Discussed with patient she would benefit from GI work up due to autoimmune history and symptoms, would like to rule out gastroparesis. GI referral placed. - start on carafate 4 times per day and pepcid twice daily as needed. Discussed to try these medications in combination to see if this will help provide symptom relief. Hopeful if her symptoms improve, this would in turn help with her hypoglycemia episodes. - Continue with healthy lifestyle, adequate protein intake of 60 gm, fluid intake of at least 64 oz. - Continue with MVI daily. - Activity as tolerated. - Labs ordered and will adjust accordingly if any deficiency. - Follow up with RD and SW as needed.        · Continued/Maintain healthy weight loss with good nutrition intakes. · Adequate hydration with at least 64oz. fluid intake. · Follow diet as discussed. · Follow vitamin and mineral recommendations as reviewed with you. · Exercise as tolerated. · Colonoscopy referral made: N/A    · Follow-up in after studies are completed.  . We kindly ask that your arrive 13 minutes before your scheduled appointment time with your provider to allow our staff to room you, get your vital signs and update your chart. · Get lab work done prior to annual visit. Please call the office if you need a script. It is recommended to check with your insurance BEFORE getting labs done to make sure they are covered by your policy. · Call our office if you have any problems with abdominal pain especially associated with fever, chills, nausea, vomiting or any other concerns. · All  Post-bariatric surgery patients should be aware that very small quantities of any alcohol can cause impairment and it is very possible not to feel the effect. The effect can be in the system for several hours. It is also a stomach irritant. · It is advised to AVOID alcohol, Nonsteroidal antiinflammatory drugs (NSAIDS) and nicotine of all forms . Any of these can cause stomach irritation/pain. · Discussed the effects of alcohol on a bariatric patient and the increased impairment risk. · Keep up the good work!      Postsurgical Malabsorption   -At risk for malabsorption of vitamins/minerals secondary to malabsorption and restriction of intake from bariatric surgery  -NOT Currently taking adequate postop bariatric surgery vitamin supplementation  -Next set of bariatric labs ordered for approximately 2 weeks  -Patient received education about the importance of adhering to a lifelong supplementation regimen to avoid vitamin/mineral deficiencies      Diagnoses and all orders for this visit:    History of gastric bypass  -     Ambulatory Referral to Weight Management         Subjective:      Patient ID: Richard Diaz is a 30 y.o. female.    -Patient with PMH of lupus erythematosus, chiari I malformation, pituitary microadenoma and prolactinoma underwent Vertical Sleeve Gastrectomy (2017) with conversion to RNYGB by [facility] in 2020 by Dr. Pool Calderon due to severe heartburn and "twisted esophagus." Presents to the office today to establish care with multiple concerns. Initial: 242 lbs (before sleeve), 230 lbs (prior to RNYGB)  Current: 171 lbs  EWL: (Weight loss is ahead of schedule at this post surgical period.)  Chad: 150s (after sleeve), 158 lbs (after RNYGB)   Current BMI is Body mass index is 35.49 kg/m². · Tolerating a regular diet-yes but having difficulty   · Eating at least 60 grams of protein per day-yes  · Following 30/60 minute rule with liquids-no  · Drinking at least 64 ounces of fluid per day-yes  · Drinking carbonated beverages-no  · Sufficient exercise- no but does try to stay active at home and keeping busy  · Using NSAIDs regularly-no  · Using nicotine-no  · Using alcohol-no  · Supplements: Multivitamins and probiotics, prebiotics, dionte william    · EWL is , which places the patient ahead of schedule for expected post surgical weight loss at this time. The following portions of the patient's history were reviewed and updated as appropriate: allergies, current medications, past family history, past medical history, past social history, past surgical history and problem list.    Review of Systems   Constitutional: Positive for appetite change, fatigue and unexpected weight change. Respiratory: Negative. Cardiovascular: Negative. Gastrointestinal: Positive for abdominal pain, nausea and vomiting. Negative for constipation and diarrhea. Heartburn   Musculoskeletal: Negative. Neurological: Negative. Psychiatric/Behavioral: Negative. Objective:    /68   Pulse 87   Temp 97.9 °F (36.6 °C) (Tympanic)   Ht 4' 10.2" (1.478 m)   Wt 77.6 kg (171 lb)   LMP 05/30/2023 (Approximate)   BMI 35.49 kg/m²      Physical Exam  Vitals and nursing note reviewed. Constitutional:       Appearance: Normal appearance. She is obese. Cardiovascular:      Rate and Rhythm: Normal rate and regular rhythm. Pulses: Normal pulses.       Heart sounds: Normal heart sounds. Pulmonary:      Effort: Pulmonary effort is normal.      Breath sounds: Normal breath sounds. Abdominal:      General: Bowel sounds are normal. There is no distension. Palpations: Abdomen is soft. Tenderness: There is no abdominal tenderness. Musculoskeletal:         General: Normal range of motion. Skin:     General: Skin is warm and dry. Neurological:      General: No focal deficit present. Mental Status: She is alert and oriented to person, place, and time. Psychiatric:         Mood and Affect: Mood normal.         Behavior: Behavior normal.         Thought Content: Thought content normal.         Judgment: Judgment normal.         I have spent a total time of 65 minutes on 07/12/23 in caring for this patient including Diagnostic results, Prognosis, Risks and benefits of tx options, Instructions for management, Patient and family education, Importance of tx compliance, Risk factor reductions, Impressions, Counseling / Coordination of care, Documenting in the medical record, Reviewing / ordering tests, medicine, procedures   and Obtaining or reviewing history  .

## 2023-07-14 ENCOUNTER — TELEPHONE (OUTPATIENT)
Dept: NEUROSURGERY | Facility: CLINIC | Age: 38
End: 2023-07-14

## 2023-07-14 ENCOUNTER — TELEMEDICINE (OUTPATIENT)
Dept: NEUROSURGERY | Facility: CLINIC | Age: 38
End: 2023-07-14

## 2023-07-14 VITALS — WEIGHT: 170 LBS | BODY MASS INDEX: 34.27 KG/M2 | HEIGHT: 59 IN

## 2023-07-14 DIAGNOSIS — Q04.8 CEREBELLAR TONSILLAR ECTOPIA (HCC): ICD-10-CM

## 2023-07-14 DIAGNOSIS — M54.2 CERVICALGIA: ICD-10-CM

## 2023-07-14 DIAGNOSIS — D35.2 PITUITARY MICROADENOMA (HCC): ICD-10-CM

## 2023-07-14 DIAGNOSIS — J39.2 THORNWALDT'S CYST: ICD-10-CM

## 2023-07-14 DIAGNOSIS — G93.5 CHIARI I MALFORMATION (HCC): Primary | ICD-10-CM

## 2023-07-14 NOTE — Clinical Note
[place]  For chairi malformation Schedule f/u visit with DKO only in 2-3 weeks after completion of MRI Brain CINE study  and MRI cervical check for syrnic Chiari malformation--must be an office visit no virtual . Pauly later reviewing record . During visit with dr Ana Small and ANEL 8/3/22 patient requested a second opinion from one of our neurosurgeons she ricarda not agree with Dr. Yanira Estrada opinion. She was suppose to have and appointment with Dr. Margo Camara, This never happened unsure why. Ans MRI brain was ordered for completion in 6 months then f/u w/ DKO PLEASE schedule this next appointment with DR. Margo Camara for imagining review and second opinion of chiari Malformation 1 vs cerebellar ectopia . PLEASE she needs and appointment w/ DKO.   Thank U

## 2023-07-14 NOTE — PROGRESS NOTES
Virtual Regular Visit    Verification of patient location:    Patient is located at Home in the following state in which I hold an active license [place]      Assessment/Plan:    Problem List Items Addressed This Visit        Digestive    Thornwaldt's cyst     · MRI Pituitary 6/24/23   · New finding; NASOPHARYNX: Small Tornwaldt cyst.  · Patient questioning what ist is ans treatment . She wishes to f/u with a specialist .    Plan   · Referral to ENT. · Appointment scheduled with Dr Alan Scott for 7/29/23 @ 0845          Relevant Orders    Ambulatory Referral to Otolaryngology       Endocrine    Pituitary microadenoma [facility]) - Primary     · As addressed in HPI   · No change in size of pituitary microadenoma from prior visit   · No recent prolactin , completed all other pituitary labs 6/4/23   · Last Prolactin level 9/17/23  Result 24.4  · Reports Galactorrhea, nipples felt moist/wett about 3 weeks ago     · PCP ongoing monitoring and managemnt of hypothyroidism       Imagining   6/24/23  MRI BRAIN AND SELLA  WITH AND WITHOUT CONTRAST Unchanged 0.3 cm pituitary microadenoma. Plan  · Plan f/u in 1 year , no increased size of Pituitary adenoma   · Ordered MRI pituitary w/wo dur June 2024, then schedule f/u appointment with in 1 weeks post completion solo visit with and AP  · Ordered for prolactin level completion over the next few days. · Referral to endocrinology patient known to your office for hypoglycemia , need new diagnosis following for HO pituitary microadenoma dx in 2003 ,HO prolactinoma  and was previously treating with Bromocriptine. Reports possible galactorrhea, nipples wet about 3 weeks ago had an episode Pituitary labs ordered in June of 2023 but no prolactin level , ordering today  · Advised if additional questions or concerns call the office.            Relevant Orders    Ambulatory Referral to Endocrinology    Prolactin (Completed)    MRI brain pituitary wo and w contrast    MRI brain without contrast       Nervous and Auditory    Cerebellar tonsillar ectopia (HCC)     As addressed in HPI  As addressed in chiari malformation         Chiari I malformation ([address])     · As addressed in HPI  · Multiple symptom complaints with worsening as per patient and interfering with the quality of her life. Headaches worsening with bending coughing straining , valsalva, dizziness , muscle weakness, weakness in hands , reports progressive worsening symptoms, pressure behind eye. · Under care of neurology for headaches. · Assessed by neurosurgeon surgeon 8/3/22 felt to be cerebellar ectopia  And no surgical intervention indicated. · She reports was offered surgery in the past but wanted to continue with conservative management     Imagining  6/24/23  MRI BRAIN AND SELLA  WITH AND WITHOUT CONTRAST--- Chiari I malformation. 6/4/22 MRI BRAIN AND SELLA  WITH AND WITHOUT CONTRAST--Chiari I malformation noted with cerebellar tonsils descending 6 mm below the foramen magnum. Plan   · Ordered MRI brain wo CINE Flow  -cerebellar  focus for ectopia vs chiari  · Ordered cervical spine MRI to assess for syrinx  · Patient requested a second opinion at initial neurosurgery visit for chairi 8/3/22, she was not satisfied with Dr. Cindy Guevara and plan, non surgical is a cerebellar ectopia. Need referral to neurology and consider further assessment for Intracranial hypertension and treatment w/ Diamox  , as addressed in HPI. · Asked check out to schedule f/u visit for imagining review with Dr. Louie Quintanilla.      • Patient advised if she develops any myelopathic symptoms such as arm weakness or numbness/tingling, difficulty with fine motor skills in hands, neck pain, balance problems, or dizziness to follow-up sooner.     • Patient made aware to contact Neurosurgery with any further questions or concerns         Relevant Orders    MRI brain without contrast    MRI cervical spine without contrast       Other    Cervicalgia     · As addresse carola hPI, cervicalgia  · Is undercare o neurology , received TPI  · 7/14/23 Dr Sharonda Cox entered referral physiatry for  For trigger point injections     Plan  Advised to continue conservative multimodal treatment under care of neurology. Reason for visit is   Chief Complaint   Patient presents with   • Virtual Regular Visit   • Follow-up     1 year f/u with repeat MRI brain pituitary 6/24/23 SL, endo labs 6/24/23 SL   Dx: Pituitary microadenoma   Last ov for pituitary mass 6/29/22 Claudette Hands        Encounter provider SEPIDEH Benavides    Provider located at [address] [address] [phone]      Recent Visits  Date Type Provider Dept   07/14/23 Telephone Kimberley Reyes [facility]   07/14/23 Telemedicine Nely Martines, [facility]   Showing recent visits within past 7 days and meeting all other requirements  Future Appointments  No visits were found meeting these conditions. Showing future appointments within next 150 days and meeting all other requirements       The patient was identified by name and date of birth. Fidencio Spear was informed that this is a telemedicine visit and that the visit is being conducted through the [employer]. She agrees to proceed. .  My office door was closed. No one else was in the room. She acknowledged consent and understanding of privacy and security of the video platform. The patient has agreed to participate and understands they can discontinue the visit at any time. Patient is aware this is a billable service. Subjective     She remarked last year they discussed my Chiari and pituitary at the same appointment. 1 year f/u appointment for  Pituitary but discussed also chiari malformation 1 as per patient request ans she is experiencing symptoms.       Fidencio Spear is a 30 y.o. female  PM/SH hypothyroidism, pericardial cyst, lupus, hiatal hernia, pulmonary embolism in 2011 during pregnancy, history of prolactinoma, headaches and anxiety   Bhavya-en-Y gastric bypass surgery, hypoglycemia     HPI   Initial neurosurgery visit  6/29/22 consultation to establish care for ongoing management of neurosurgical conditions, pituitary adenoma and Chiari  Malformation 1. Reports she relocated for [place] to [place]  In 2021      Pituitary microadenoma   · 3 mm since age 27, discovered during workup for  Amenorrhea with elevated prolactin level. · She initially treated with bromocriptine and successfully conceived a child  And medication was stopped on 2016. · Followed with neurology at [facility] in [place] with annual serial imagining for years, has been stable per patient report but previously thre was a concern for hemorrhage around the macroadenoma. On imagining in 2020  · She is under care of endocrine for hypoglycemia status post bariatric surgery   · She has a history of hypothyroidism that is monitored and managed by her PCP. Prolactin level 9/17/2022, result 24.4   Was   · Plan --ordered Pituitary labs  recent pituitary labs completed 6/4/23 there is no prolactin level. Record review shows a Prolactin dilution study request 3/9/23 but never completed UNK why. · 1 year f/u recommended with pituitary imagining. Imagining  6/4/2022  Mri Brain pituitary w wo: 3 x 4 mm focus of relative under enhancement in the pituitary gland is compatible with pituitary microadenoma. This appears similar to prior imaging noted on [facility] for MRI brain pituitary w wo 5/21/21. Per the reports, the Pituitary microadenoma measurement was read as (4.6 x 4.3 x 3.1 mm)      Chiari Malformation  · DX while living in [place]  · Reports was previously offered decompressive surgery due to symptoms. but she wanted to continue conservative management. · She reported prior care with neurologist at [facility] but insurance changed with new employment at [employer] in December 2021. · Tried various medications including including Botox injections with failure. · Reports headaches are progressively worsening and has had emergency room visits for severe headaches not relieved by home medication Topamax, neither  relieved by headache cocktail received in ED. · She reports multiple associated symptoms dizziness, weakness in hands, lightheadedness, occipital headache with pressure , cervicalgia  and stiffness, unsteady gait with loss of balance, pain and pressure behind eyes, intermittent episodes of dizziness with franklin in head position cervical flexion or extension or rotation side to side, She also reports ringing in her ears. · Severity of symptoms are 7/10. Neurosurgery visit 8/3/22 snpx w/ Dr. Sherry Milligan and I ,  f/u appointment for imagining review CSF flow study/MRI wo contrast .addressed condition as follows:   Dr. Sherry Milligan   • Reassurred patient does not think Chairi malformation is iatrogenic  • His review of serial imagining is not convincing she has the dx of chiari malformation but instead a cerebellar ectopia. There is no syrnix on imagining, flow study is normal.  • Reviewed several brain MRI suspect this  is a more plausible diagnosis and not Chiari malformation, radiologist read is incorrect. • No Syrnix on cervical imagining  • MRI wo contrast  Including CINE demonstrated normal CSF flow. • Headache is not related to this cerebellar ectopia but instead DDX to consider is idiopathic intracranial hypertension  or pseudo tumor Cerebri. She reports has never undergone and spinal tap to check pressures , this diagnostic tool should be considered. May need to consider Diamox treatment for intracranial hypertension. She acknowledges the neurosurgeon in [address] [place] [address] previously mentioned this medication as well as considering a spinal tap. • Does not feel surgery will resolve headache symptoms  · She was referred to [facility] neurology during visit 8/3/22 . snpx appointment with Dr Sherry Milligan and NATHALY Consult visit with neurology 9/2/2022 and there is ongoing telephone communication/managem,ent. At visit end patient requested a  F/u surviellance imagining and a revisit to neurosurgery for a second opinion from another surgeon. MRI brain wo ordered for completion in 6 months , and schedule appointment with DKO. If symptoms worsen call office for earlier appointment t address other DDX as discussed above. For unknown reason she never visited with Dr. Haroldo Gilliam. 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 Pituitary microadenoma     COMPARISON: MRI brain without contrast 7/13/2022. MRI brain pituitary with and without contrast 6/4/2022. CTA head and neck with and without contrast 5/1/2022     TECHNIQUE:  Multiplanar, multisequence imaging of the brain and sella was performed before and after gadolinium administration.     Targeted images of the sella were performed requiring additional time at acquisition and interpretation of approximately 25%     IV Contrast:  7 mL of Gadobutrol injection (SINGLE-DOSE)     IMAGE QUALITY:  Diagnostic.     FINDINGS:     BRAIN PARENCHYMA:  There is no discrete mass, mass effect or midline shift. Brainstem and cerebellum demonstrate normal signal. There is no intracranial hemorrhage. There is no evidence of acute infarction and diffusion imaging is unremarkable. There   are no white matter changes in the cerebral hemispheres. Chiari I malformation. Normal postcontrast imaging.     VENTRICLES:  Normal for the patient's age.     SELLA AND PITUITARY GLAND: Unchanged 0.3 cm hypoenhancing lesion in the posterior aspect of pituitary gland (11:7). The pituitary stalk is midline.   Normal parasellar and suprasellar structures.     ORBITS:  Normal.     PARANASAL SINUSES:  Normal.     NASOPHARYNX: Small Tornwaldt cyst.     VASCULATURE:  Evaluation of the major intracranial vasculature demonstrates appropriate flow voids.     CALVARIUM AND SKULL BASE:  Normal.     EXTRACRANIAL SOFT TISSUES:  Normal.     IMPRESSION:     No acute intracranial abnormality.     Unchanged 0.3 cm pituitary microadenoma.     Chiari I malformation.       23 Dr Mary Lou Trujillo entered referral physiatry for  For trigger point injections       Past Medical History:   Diagnosis Date   • Abnormal Pap smear of cervix    • Anemia     gets iron infusions   • Anxiety    • Chiari I malformation (HCC)    • Chronic pain disorder     during lupus flairs   • COVID-19    • Disease of thyroid gland     pt off meds   • Female infertility     IVF pregnancy   • Fibromyalgia    • Fibromyalgia, primary    • Gastric ulcer    • GERD (gastroesophageal reflux disease)    • Hiatal hernia    • Lupus ([address])    • Muscle weakness    • Pericardial cyst    • Pituitary tumor    • Pleural effusion associated with pulmonary infection    • Polycystic ovary syndrome    • Pulmonary edema    • Pulmonary emboli (HCC)    • Spinal headache     with c section       Past Surgical History:   Procedure Laterality Date   •  SECTION      x 2   • CHOLECYSTECTOMY     • GASTRECTOMY SLEEVE LAPAROSCOPIC     • GASTRIC BYPASS     • POLYPECTOMY     • TONSILLECTOMY     • TUBAL LIGATION         Current Outpatient Medications   Medication Sig Dispense Refill   • dicyclomine (BENTYL) 10 mg capsule Take 1 capsule (10 mg total) by mouth 4 (four) times a day (before meals and at bedtime) 30 capsule 0   • famotidine (PEPCID) 20 mg tablet Take 1 tablet (20 mg total) by mouth 2 (two) times a day 60 tablet 2   • levothyroxine (Synthroid) 50 mcg tablet Take 1 tablet (50 mcg total) by mouth daily in the early morning 90 tablet 3   • omeprazole (PriLOSEC) 40 MG capsule Take 1 capsule (40 mg total) by mouth 2 (two) times a day 60 capsule 1   • sucralfate (CARAFATE) 1 g/10 mL suspension Take 10 mL (1 g total) by mouth 4 (four) times a day 3600 mL 2   • topiramate (TOPAMAX) 50 MG tablet Take 2 tablets (100 mg total) by mouth daily at bedtime 60 tablet 5   • Continuous Blood Gluc Sensor (Dexcom G7 Sensor) MISC Use 1 Device 4 (four) times a day 9 each 1   • Erenumab-aooe 140 MG/ML SOAJ Inject 140 mg under the skin every 30 (thirty) days (Patient not taking: Reported on 7/14/2023) 1 mL 11   • nystatin (MYCOSTATIN) powder Apply topically 2 (two) times a day (Patient not taking: Reported on 7/14/2023) 60 g 1   • rizatriptan (MAXALT-MLT) 10 mg disintegrating tablet Take 1 tablet (10 mg total) by mouth once as needed for migraine for up to 1 dose May repeat in 2 hours if needed (Patient not taking: Reported on 7/12/2023) 10 tablet 6   • tiZANidine (ZANAFLEX) 2 mg tablet Take 1 tablet (2 mg total) by mouth daily at bedtime as needed for muscle spasms (Patient not taking: Reported on 7/12/2023) 30 tablet 1   • tolnaftate (TINACTIN) 1 % powder Apply topically 2 (two) times a day (Patient not taking: Reported on 7/14/2023) 45 g 0     No current facility-administered medications for this visit. Allergies   Allergen Reactions   • Codeine Tremor   • Morphine Tremor       Review of Systems   Constitutional: Negative. HENT: Positive for tinnitus and trouble swallowing (at times feels as if choking on her food. ). Eyes: Positive for pain (pressure behind eyes. ). Respiratory: Positive for shortness of breath. Cardiovascular: Positive for palpitations. Pericardio cyst   Gastrointestinal: Positive for diarrhea (occasional). Endocrine: Positive for cold intolerance. Genitourinary: Positive for frequency and urgency. Musculoskeletal: Positive for arthralgias, gait problem (unsteady loosing balance.), myalgias, neck pain and neck stiffness. Generalized aches / pains, all over entire body  Lupus  Fibromyalgia   Skin: Negative. Allergic/Immunologic: Negative.     Neurological: Positive for dizziness, weakness (hands), light-headedness, numbness (N/T, L>R, shoots from neck to fingertips) and headaches (HA, occipital and neck, with pressure . ). Right handed   Hematological: Bruises/bleeds easily (bruises). Psychiatric/Behavioral: Positive for confusion, decreased concentration and sleep disturbance. Negative for dysphoric mood. The patient is nervous/anxious. Video Exam    Vitals:    07/14/23 1302   Weight: 77.1 kg (170 lb)   Height: 4' 11" (1.499 m)       Physical Exam  Constitutional:       General: She is not in acute distress. Appearance: She is obese. She is not ill-appearing, toxic-appearing or diaphoretic. Eyes:      Extraocular Movements: EOM normal.      Pupils: Pupils are equal, round, and reactive to light. Pulmonary:      Effort: Pulmonary effort is normal. No respiratory distress. Musculoskeletal:         General: Normal range of motion. Cervical back: Normal range of motion. Neurological:      Mental Status: She is alert and oriented to person, place, and time. Gait: Gait is intact. Gait normal.   Psychiatric:         Mood and Affect: Mood normal.         Speech: Speech normal.         Behavior: Behavior normal.        Neurologic Exam     Mental Status   Oriented to person, place, and time. Oriented to person. Oriented to place. Oriented to country, city, area, street and number. Oriented to time. Oriented to year, month, date, day and season. Attention: normal.   Speech: speech is normal   Level of consciousness: alert  Able to perform simple calculations. Cranial Nerves     CN III, IV, VI   Pupils are equal, round, and reactive to light.   Extraocular motions are normal.     Motor Exam While demonstrating motor activity was loosing balance     Gait, Coordination, and Reflexes     Gait  Gait: normal    Visit Time  I have spent a total time of 48:11 minutes on 07/15/23 in caring for this patient including Diagnostic results, Risks and benefits of tx options, Instructions for management, Patient and family education, Importance of tx compliance, Risk factor reductions, Impressions, Counseling / Coordination of care, Documenting in the medical record, Reviewing / ordering tests, medicine, procedures  , Obtaining or reviewing history   and Communicating with other healthcare professionals .

## 2023-07-14 NOTE — ASSESSMENT & PLAN NOTE
· MRI Pituitary 6/24/23   · New finding; NASOPHARYNX: Small Tornwaldt cyst.  · Patient questioning what ist is ans treatment . She wishes to f/u with a specialist .    Plan   · Referral to ENT.   · Appointment scheduled with Dr Barbara Peter for 7/29/23 @ [phone]

## 2023-07-14 NOTE — TELEPHONE ENCOUNTER
7/14/23 I called pt x2 no answer just rang and rang pt was virtual today   Check out comments: Endo referral PK[facility] for new DX ,pituitary adenoma. Lab --prolactin pt will get now need for endo appointment    2024 June need 1 year f/u MRI pituitary.    Schedule f/u appointment with me     For chairi malformation Schedule f/u visit with me in 2-3 weeks after completion of MRI Brain CINE study  and MRI cervical check for syrnic Chiari malformation--must be an office visit no virtual      Will try again _BA

## 2023-07-15 ENCOUNTER — APPOINTMENT (OUTPATIENT)
Dept: LAB | Facility: CLINIC | Age: 38
End: 2023-07-15
Payer: COMMERCIAL

## 2023-07-15 DIAGNOSIS — Z98.84 BARIATRIC SURGERY STATUS: ICD-10-CM

## 2023-07-15 DIAGNOSIS — K21.9 GERD (GASTROESOPHAGEAL REFLUX DISEASE): ICD-10-CM

## 2023-07-15 DIAGNOSIS — Z48.815 ENCOUNTER FOR SURGICAL AFTERCARE FOLLOWING SURGERY OF DIGESTIVE SYSTEM: ICD-10-CM

## 2023-07-15 DIAGNOSIS — K91.2 POSTSURGICAL MALABSORPTION: ICD-10-CM

## 2023-07-15 DIAGNOSIS — D35.2 PITUITARY MICROADENOMA (HCC): ICD-10-CM

## 2023-07-15 DIAGNOSIS — E66.9 OBESITY, CLASS II, BMI 35-39.9: ICD-10-CM

## 2023-07-15 LAB
25(OH)D3 SERPL-MCNC: 19.3 NG/ML (ref 30–100)
FERRITIN SERPL-MCNC: 7 NG/ML (ref 11–307)
FOLATE SERPL-MCNC: 20.6 NG/ML
PROLACTIN SERPL-MCNC: 17.66 NG/ML (ref 3.34–26.72)
PTH-INTACT SERPL-MCNC: 46.2 PG/ML (ref 12–88)
VIT B12 SERPL-MCNC: 275 PG/ML (ref 180–914)

## 2023-07-15 PROCEDURE — 82746 ASSAY OF FOLIC ACID SERUM: CPT

## 2023-07-15 PROCEDURE — 84630 ASSAY OF ZINC: CPT

## 2023-07-15 PROCEDURE — 82607 VITAMIN B-12: CPT

## 2023-07-15 PROCEDURE — 82728 ASSAY OF FERRITIN: CPT

## 2023-07-15 PROCEDURE — 84590 ASSAY OF VITAMIN A: CPT

## 2023-07-15 PROCEDURE — 82306 VITAMIN D 25 HYDROXY: CPT

## 2023-07-15 PROCEDURE — 83970 ASSAY OF PARATHORMONE: CPT

## 2023-07-15 PROCEDURE — 84146 ASSAY OF PROLACTIN: CPT

## 2023-07-15 PROCEDURE — 36415 COLL VENOUS BLD VENIPUNCTURE: CPT

## 2023-07-15 NOTE — ASSESSMENT & PLAN NOTE
· As addressed in HPI  · Multiple symptom complaints with worsening as per patient and interfering with the quality of her life. Headaches worsening with bending coughing straining , valsalva, dizziness , muscle weakness, weakness in hands , reports progressive worsening symptoms, pressure behind eye. · Under care of neurology for headaches. · Assessed by neurosurgeon surgeon 8/3/22 felt to be cerebellar ectopia  And no surgical intervention indicated. · She reports was offered surgery in the past but wanted to continue with conservative management     Imagining  6/24/23  MRI BRAIN AND SELLA  WITH AND WITHOUT CONTRAST--- Chiari I malformation. 6/4/22 MRI BRAIN AND SELLA  WITH AND WITHOUT CONTRAST--Chiari I malformation noted with cerebellar tonsils descending 6 mm below the foramen magnum. Plan   · Ordered MRI brain wo CINE Flow  -cerebellar  focus for ectopia vs chiari  · Ordered cervical spine MRI to assess for syrinx  · Patient requested a second opinion at initial neurosurgery visit for chairi 8/3/22, she was not satisfied with Dr. Eric Medel and plan, non surgical is a cerebellar ectopia. Need referral to neurology and consider further assessment for Intracranial hypertension and treatment w/ Diamox  , as addressed in HPI. · Asked check out to schedule f/u visit for imagining review with Dr. Yolanda Collazo.      • Patient advised if she develops any myelopathic symptoms such as arm weakness or numbness/tingling, difficulty with fine motor skills in hands, neck pain, balance problems, or dizziness to follow-up sooner.     • Patient made aware to contact Neurosurgery with any further questions or concerns

## 2023-07-15 NOTE — ASSESSMENT & PLAN NOTE
· As addressed in HPI   · No change in size of pituitary microadenoma from prior visit   · No recent prolactin , completed all other pituitary labs 6/4/23   · Last Prolactin level 9/17/23  Result 24.4  · Reports Galactorrhea, nipples felt moist/wett about 3 weeks ago     · PCP ongoing monitoring and managemnt of hypothyroidism       Imagining   6/24/23  MRI BRAIN AND SELLA  WITH AND WITHOUT CONTRAST Unchanged 0.3 cm pituitary microadenoma. Plan  · Plan f/u in 1 year , no increased size of Pituitary adenoma   · Ordered MRI pituitary w/wo dur June 2024, then schedule f/u appointment with in 1 weeks post completion solo visit with and AP  · Ordered for prolactin level completion over the next few days. · Referral to endocrinology patient known to your office for hypoglycemia , need new diagnosis following for HO pituitary microadenoma dx in 2003 ,HO prolactinoma  and was previously treating with Bromocriptine. Reports possible galactorrhea, nipples wet about 3 weeks ago had an episode Pituitary labs ordered in June of 2023 but no prolactin level , ordering today  · Advised if additional questions or concerns call the office.

## 2023-07-15 NOTE — ASSESSMENT & PLAN NOTE
· As addresse din hPI, cervicalgia  · Is undercare o neurology , received TPI  · 7/14/23 Dr Mili Louise entered referral physiatry for  For trigger point injections     Plan  Advised to continue conservative multimodal treatment under care of neurology.

## 2023-07-17 ENCOUNTER — TELEPHONE (OUTPATIENT)
Dept: GASTROENTEROLOGY | Facility: HOSPITAL | Age: 38
End: 2023-07-17

## 2023-07-18 ENCOUNTER — TELEPHONE (OUTPATIENT)
Dept: ENDOCRINOLOGY | Facility: CLINIC | Age: 38
End: 2023-07-18

## 2023-07-19 DIAGNOSIS — R79.0 LOW FERRITIN: ICD-10-CM

## 2023-07-19 DIAGNOSIS — Z98.84 BARIATRIC SURGERY STATUS: Primary | ICD-10-CM

## 2023-07-19 PROBLEM — K91.2 HYPOGLYCEMIA AFTER GI (GASTROINTESTINAL) SURGERY: Status: ACTIVE | Noted: 2023-07-19

## 2023-07-19 PROBLEM — E16.1 REACTIVE HYPOGLYCEMIA: Status: ACTIVE | Noted: 2023-07-19

## 2023-07-19 LAB
VIT A SERPL-MCNC: 34 UG/DL (ref 18.9–57.3)
ZINC SERPL-MCNC: 63 UG/DL (ref 44–115)

## 2023-07-20 ENCOUNTER — TELEPHONE (OUTPATIENT)
Dept: HEMATOLOGY ONCOLOGY | Facility: CLINIC | Age: 38
End: 2023-07-20

## 2023-07-20 DIAGNOSIS — E55.9 VITAMIN D DEFICIENCY: ICD-10-CM

## 2023-07-20 DIAGNOSIS — E53.8 VITAMIN B12 DEFICIENCY: ICD-10-CM

## 2023-07-20 DIAGNOSIS — Z98.84 BARIATRIC SURGERY STATUS: Primary | ICD-10-CM

## 2023-07-20 DIAGNOSIS — K91.2 POSTSURGICAL MALABSORPTION: ICD-10-CM

## 2023-07-20 RX ORDER — ERGOCALCIFEROL 1.25 MG/1
50000 CAPSULE ORAL 2 TIMES WEEKLY
Qty: 24 CAPSULE | Refills: 0 | Status: SHIPPED | OUTPATIENT
Start: 2023-07-20

## 2023-07-20 NOTE — TELEPHONE ENCOUNTER
I called Rosalba Lee in response to a referral that was received for patient to establish care with Hematology. Outreach was made to schedule a consultation. .    I left a voicemail explaining the reason for my call and advised patient to call [facility] at [phone]. Another attempt will be made to contact patient.

## 2023-07-21 ENCOUNTER — TELEPHONE (OUTPATIENT)
Dept: NEUROLOGY | Facility: CLINIC | Age: 38
End: 2023-07-21

## 2023-07-21 NOTE — TELEPHONE ENCOUNTER
Called pt and left a vm to offer an appt. Told pt to call back if she would like to take one of the available time slots with Dr. Cira Mayers in [facility].

## 2023-07-22 ENCOUNTER — APPOINTMENT (OUTPATIENT)
Dept: LAB | Facility: CLINIC | Age: 38
End: 2023-07-22
Payer: COMMERCIAL

## 2023-07-22 DIAGNOSIS — Z48.815 ENCOUNTER FOR SURGICAL AFTERCARE FOLLOWING SURGERY OF DIGESTIVE SYSTEM: ICD-10-CM

## 2023-07-22 DIAGNOSIS — K91.2 POSTSURGICAL MALABSORPTION: ICD-10-CM

## 2023-07-22 DIAGNOSIS — Z98.84 BARIATRIC SURGERY STATUS: ICD-10-CM

## 2023-07-22 PROCEDURE — 84425 ASSAY OF VITAMIN B-1: CPT

## 2023-07-22 PROCEDURE — 36415 COLL VENOUS BLD VENIPUNCTURE: CPT

## 2023-07-26 LAB — VIT B1 BLD-SCNC: 118.2 NMOL/L (ref 66.5–200)

## 2023-07-28 ENCOUNTER — TELEPHONE (OUTPATIENT)
Dept: NEUROSURGERY | Facility: CLINIC | Age: 38
End: 2023-07-28

## 2023-07-28 ENCOUNTER — TELEPHONE (OUTPATIENT)
Dept: NEUROLOGY | Facility: CLINIC | Age: 38
End: 2023-07-28

## 2023-07-28 NOTE — TELEPHONE ENCOUNTER
Per [employer] email -For chairi malformation Schedule f/u visit with PRANAV only in 2-3 weeks after completion of MRI Brain CINE study  and MRI cervical check for syrnic Chiari malformation--must be an office visit no virtual f.u with dale 1 yr evelina after mri brain in June 2024 -diana   Pt is aware dale apt for aug will be cx and she will see DKo she asked me to call her back after 11 am so I will apted with pranav 8/8/23 - she will also need sol with dale mariano June 2024 mri brain scan _DIANA     7/28/23 - I CALLED PT BACK UNABLE TO LEAVE MESSAGE RECORDING STATES MAIL BOX IS FULL THIS PT NEEDS MRI BRAIN 1 YR AND F/U AFTER THAT WITH DALE CARO -[address]

## 2023-07-28 NOTE — TELEPHONE ENCOUNTER
Spoke with patient in regards to scheduling a follow up appt with Dr. Lior Wesley.  Patient stated they were at work and would call back possibly Monday to schedule

## 2023-07-29 ENCOUNTER — HOSPITAL ENCOUNTER (OUTPATIENT)
Dept: RADIOLOGY | Facility: HOSPITAL | Age: 38
Discharge: HOME | End: 2023-07-29
Payer: COMMERCIAL

## 2023-07-29 DIAGNOSIS — Q04.8 CEREBELLAR TONSILLAR ECTOPIA (HCC): ICD-10-CM

## 2023-07-29 DIAGNOSIS — G93.5 CHIARI I MALFORMATION (HCC): ICD-10-CM

## 2023-07-29 PROCEDURE — 70551 MRI BRAIN STEM W/O DYE: CPT

## 2023-07-29 PROCEDURE — 72141 MRI NECK SPINE W/O DYE: CPT

## 2023-07-29 PROCEDURE — G1004 CDSM NDSC: HCPCS

## 2023-07-31 ENCOUNTER — TELEPHONE (OUTPATIENT)
Dept: GASTROENTEROLOGY | Facility: HOSPITAL | Age: 38
End: 2023-07-31

## 2023-07-31 DIAGNOSIS — R51.9 NONINTRACTABLE EPISODIC HEADACHE, UNSPECIFIED HEADACHE TYPE: ICD-10-CM

## 2023-07-31 RX ORDER — TOPIRAMATE 50 MG/1
100 TABLET, FILM COATED ORAL
Qty: 60 TABLET | Refills: 5 | Status: SHIPPED | OUTPATIENT
Start: 2023-07-31

## 2023-08-04 ENCOUNTER — HOSPITAL ENCOUNTER (OUTPATIENT)
Dept: RADIOLOGY | Facility: HOSPITAL | Age: 38
Discharge: HOME | End: 2023-08-04
Payer: COMMERCIAL

## 2023-08-04 DIAGNOSIS — Z48.815 ENCOUNTER FOR SURGICAL AFTERCARE FOLLOWING SURGERY OF DIGESTIVE SYSTEM: ICD-10-CM

## 2023-08-04 DIAGNOSIS — E66.9 OBESITY, CLASS II, BMI 35-39.9: ICD-10-CM

## 2023-08-04 DIAGNOSIS — Z98.84 BARIATRIC SURGERY STATUS: ICD-10-CM

## 2023-08-04 DIAGNOSIS — K91.2 POSTSURGICAL MALABSORPTION: ICD-10-CM

## 2023-08-04 DIAGNOSIS — R10.13 EPIGASTRIC PAIN: ICD-10-CM

## 2023-08-04 PROCEDURE — 74240 X-RAY XM UPR GI TRC 1CNTRST: CPT

## 2023-08-07 ENCOUNTER — NURSE TRIAGE (OUTPATIENT)
Dept: OTHER | Facility: OTHER | Age: 38
End: 2023-08-07

## 2023-08-07 ENCOUNTER — HOSPITAL ENCOUNTER (OUTPATIENT)
Dept: GASTROENTEROLOGY | Facility: HOSPITAL | Age: 38
Discharge: HOME | End: 2023-08-07
Payer: COMMERCIAL

## 2023-08-07 VITALS
TEMPERATURE: 98.1 F | OXYGEN SATURATION: 100 % | SYSTOLIC BLOOD PRESSURE: 123 MMHG | RESPIRATION RATE: 16 BRPM | HEART RATE: 72 BPM | DIASTOLIC BLOOD PRESSURE: 86 MMHG

## 2023-08-07 DIAGNOSIS — K91.2 POSTSURGICAL MALABSORPTION: ICD-10-CM

## 2023-08-07 DIAGNOSIS — Z98.84 BARIATRIC SURGERY STATUS: ICD-10-CM

## 2023-08-07 DIAGNOSIS — K21.9 GERD (GASTROESOPHAGEAL REFLUX DISEASE): ICD-10-CM

## 2023-08-07 DIAGNOSIS — Z48.815 ENCOUNTER FOR SURGICAL AFTERCARE FOLLOWING SURGERY OF DIGESTIVE SYSTEM: ICD-10-CM

## 2023-08-07 PROCEDURE — 91010 ESOPHAGUS MOTILITY STUDY: CPT

## 2023-08-07 PROCEDURE — 91038 ESOPH IMPED FUNCT TEST > 1HR: CPT

## 2023-08-07 RX ADMIN — TOPICAL ANESTHETIC 2 SPRAY: 200 SPRAY DENTAL; PERIODONTAL at 11:04

## 2023-08-07 NOTE — TELEPHONE ENCOUNTER
Reason for Disposition  • [1] Caller has URGENT question AND [2] triager unable to answer question    Answer Assessment - Initial Assessment Questions  1. SYMPTOM: "What's the main symptom you're concerned about?" (e.g., pain, fever, vomiting)      Discomfort  2. ONSET: "When did symptoms start?"      Today  3. SURGERY: "What surgery was performed?"      Esophageal Manometry  4. DATE of SURGERY: "When was surgery performed?"       Today  5. ANESTHESIA: " What type of anesthesia did you have?" (e.g., general, spinal, epidural, local)      N/A  6. PAIN: "Is there any pain?" If Yes, ask: "How bad is it?"  (Scale 1-10; or mild, moderate, severe)      Discomfort  7. OTHER      Tried chloraseptic and pulling out slightly    Protocols used: POST-OP SYMPTOMS AND QUESTIONS-ADULT-    Pt c/o severe discomfort from catheter. Paged on-call provider. Per on-call, if unable to tolerate may remove. Pt made aware. She is going to try to keep it in. Please f/u with her in the morning.

## 2023-08-07 NOTE — PERIOPERATIVE NURSING NOTE
Patient brought in the room and explained the esophageal manometry procedure. After the confirmation of allergies,  Hurricaine one spray given via oral mucosal   and  a transnasal insertion of the High Resolution esophageal manometry catheter was inserted via left nostril. Patient given water to drink during the insertion and once the catheter inserted pressure bands of both Upper esophageal sphincter  (UES) and Lower esophageal sphincter ( LES) were observed on the color contour. Patient instructed to take a deep breath to verify placement of the catheter, diaphragmatic pinch noted on inspiration. Catheter was secured to left cheek. Patient was assisted to supine position . Patient was instructed to relax  while acclimating the catheter for about 5 minutes. A 30 second baseline resting pressure was obtained to identify the UES and LES followed by a series of 10 liquid swallows using 5 cc room temperature normal saline to assess esophageal motility and bolus transit. Patient administered 10 viscous swallows using 5 cc viscous solution, 1 multiple rapid drink swallow using 2 cc room temperature normal saline given a total of 5 drinks. Patient instructed to sit up at the edge of the stretcher and given 5 upright liquid swallows using 5 cc room temperature normal saline and 1 rapid drink challenge using 100 cc room temperature water. At the end of the procedure the high resolution esophageal manometry catheter was removed from the nostril intact. sensor PH probe inserted via left nostril and secured. Zephr recorder teachback performed and patient verbalized understanding. Patient instructed to return next day to have probe remove. Discharge instructions given and patient ambulated out of room in stable condition.

## 2023-08-07 NOTE — TELEPHONE ENCOUNTER
Regarding: Uncomfortable monitor causing pain and pt can't breathe  ----- Message from Dianne Caldwell sent at 8/7/2023  6:03 PM EDT -----  "I have this 24hr [address] [place] monitor in my nose going into my stomach and it's really uncomfortable.  I can't breathe because it hurts and I feel it in my throat."

## 2023-08-08 NOTE — PROGRESS NOTES
Bariatric  Nutrition Assessment Note      i. Name was verified by patient stating name? yes  ii.  verified by patient stating? yes  iii. Verification of patient location yes  iv. Verified patient is in state in which I hold an active license? yes  v. Verified the patient is alone? yes  vi. I would like to verify that you were offered a live visit but are now consenting to this telephone visit? yes  vii. This visit is free yes       Type of surgery  Vertical sleeve gastrectomy  Surgery Date:    6 years post op   Gastric bypass: laparoscopic( converted due to severe GERD)   Surgery Date:  Jayme Granda   3 years post op bypass   Surgeon: Dr. Isac Baird for possible revisional surgery     Nutrition Assessment   Carol Olvera  41 y.o.  female  Ht 4' 10.2" (1.478 m)   Wt 78.7 kg (173 lb 8 oz)   BMI 36.01 kg/m²      Wt Readings from Last 3 Encounters:   23 77.1 kg (170 lb)   23 77.6 kg (171 lb)   23 77 kg (169 lb 12.1 oz)       [place]- [place]. Jeor Equation:  1355  Estimated calories for weight maintenance:  1626 -1863 ( sedentary to lightly active   )   Estimated calories for weight loss 1126- 1376 ( 1/2#to 1#  per wk wt loss - sedentary )  Estimated calories for weight loss 863-1363 ( 1-2# per wk wt loss - lightly active )   Estimated protein needs 55-83 grams per day (1.0-1.5 gms/kg IBW )   Estimated fluid needs 55-64 ounces per day (30-35 ml/kg IBW )      Weight on Day of Weight Loss Surgery: 242#  Weight in (lb) to have BMI = 25: 120.7#  Pre-Op Excess Wt: 121.3#  Post-Op Wt Loss: 72#/ 59% EBWL in 6 year(s)  Chad wt 150#   Had pregnancy after first surgery about 9 months post op     Per NP note :   Hypoglycemia episodes started after her RNYGB. She sees endocrinology with recent direct admission or 72 hour fasting protocol and to rule out insulinoma but did not find any true etiology. The endocrinologist discussed with patient that she may need her surgery reverse. She checks her blood sugar at home with a glucometer and at times levels as low as 20-40s. She has seen a diabetic dietitian but she feels it is not helpful. She is trying to eat small frequent meals but does not always have time to eat due to busy work schedule. Had tried acarbose but dislike the side effects.     Review of History and Medications   Past Medical History:   Diagnosis Date   • Abnormal Pap smear of cervix    • Anemia     gets iron infusions   • Anxiety    • Chiari I malformation (HCC)    • Chronic pain disorder     during lupus flairs   • COVID-19    • Disease of thyroid gland     pt off meds   • Female infertility     IVF pregnancy   • Fibromyalgia    • Fibromyalgia, primary    • Gastric ulcer    • GERD (gastroesophageal reflux disease)    • Hiatal hernia    • Lupus (HCC)    • Muscle weakness    • Pericardial cyst    • Pituitary tumor    • Pleural effusion associated with pulmonary infection    • Polycystic ovary syndrome    • Pulmonary edema    • Pulmonary emboli (HCC)    • Spinal headache     with c section     Past Surgical History:   Procedure Laterality Date   •  SECTION      x 2   • CHOLECYSTECTOMY     • GASTRECTOMY SLEEVE LAPAROSCOPIC     • GASTRIC BYPASS     • POLYPECTOMY     • TONSILLECTOMY     • TUBAL LIGATION       Social History     Socioeconomic History   • Marital status: /Civil Union     Spouse name: Not on file   • Number of children: Not on file   • Years of education: Not on file   • Highest education level: Not on file   Occupational History   • Not on file   Tobacco Use   • Smoking status: Never   • Smokeless tobacco: Never   Vaping Use   • Vaping Use: Never used   Substance and Sexual Activity   • Alcohol use: Yes     Comment: occ. social rare   • Drug use: Never   • Sexual activity: Yes     Partners: Male     Birth control/protection: Female Sterilization   Other Topics Concern   • Not on file   Social History Narrative   • Not on file     Social Determinants of Health     Financial Resource Strain: Not on file   Food Insecurity: Not on file   Transportation Needs: Not on file   Physical Activity: Not on file   Stress: Not on file   Social Connections: Not on file   Intimate Partner Violence: Not on file   Housing Stability: Not on file       Current Outpatient Medications:   •  Continuous Blood Gluc Sensor (Dexcom G7 Sensor) MISC, Use 1 Device 4 (four) times a day, Disp: 9 each, Rfl: 1  •  dicyclomine (BENTYL) 10 mg capsule, Take 1 capsule (10 mg total) by mouth 4 (four) times a day (before meals and at bedtime), Disp: 30 capsule, Rfl: 0  •  Erenumab-aooe 140 MG/ML SOAJ, Inject 140 mg under the skin every 30 (thirty) days (Patient not taking: Reported on 7/14/2023), Disp: 1 mL, Rfl: 11  •  ergocalciferol (VITAMIN D2) 50,000 units, Take 1 capsule (50,000 Units total) by mouth 2 (two) times a week, Disp: 24 capsule, Rfl: 0  •  famotidine (PEPCID) 20 mg tablet, Take 1 tablet (20 mg total) by mouth 2 (two) times a day, Disp: 60 tablet, Rfl: 2  •  levothyroxine (Synthroid) 50 mcg tablet, Take 1 tablet (50 mcg total) by mouth daily in the early morning, Disp: 90 tablet, Rfl: 3  •  nystatin (MYCOSTATIN) powder, Apply topically 2 (two) times a day (Patient not taking: Reported on 7/14/2023), Disp: 60 g, Rfl: 1  •  omeprazole (PriLOSEC) 40 MG capsule, Take 1 capsule (40 mg total) by mouth 2 (two) times a day, Disp: 60 capsule, Rfl: 1  •  rizatriptan (MAXALT-MLT) 10 mg disintegrating tablet, Take 1 tablet (10 mg total) by mouth once as needed for migraine for up to 1 dose May repeat in 2 hours if needed (Patient not taking: Reported on 7/12/2023), Disp: 10 tablet, Rfl: 6  •  sucralfate (CARAFATE) 1 g/10 mL suspension, Take 10 mL (1 g total) by mouth 4 (four) times a day, Disp: 3600 mL, Rfl: 2  •  tiZANidine (ZANAFLEX) 2 mg tablet, Take 1 tablet (2 mg total) by mouth daily at bedtime as needed for muscle spasms (Patient not taking: Reported on 7/12/2023), Disp: 30 tablet, Rfl: 1  • tolnaftate (TINACTIN) 1 % powder, Apply topically 2 (two) times a day (Patient not taking: Reported on 7/14/2023), Disp: 45 g, Rfl: 0  •  topiramate (TOPAMAX) 50 MG tablet, Take 2 tablets (100 mg total) by mouth daily at bedtime, Disp: 60 tablet, Rfl: 5    Food Intake and Lifestyle Assessment   Food Intake Assessment completed via usual diet recall  Breakfast: 7 am [employer] protein   Snack: 10 am - egg & cheese wrap from [employer] OR  Oatmeal OR PB with banana  Lunch: Left overs from dinner   Snack: nuts or dry cereal or [employer] Y[employer]urt or raw veggies   Dinner: lean protein and vegetables   Snack: not always - sometimes carrots or dry cereal or [employer] [place] Y[employer]urt   Beverage intake: water and sometimes coffee but not every day   Diet texture/stage: regular  Protein supplement: one per day   Estimated protein intake per day: ~60-70 grams per day   Estimated fluid intake per day: 48-64 ounces per day   Meals eaten away from home: [place]elina for breakfast   Typical meal pattern: 3 meals per day and 2-3 snacks per day  Eating Behaviors: Appropriate diet advancement, Appropriate portion sizes and Does not drink with meals and waits 30-minutes after meal before resuming drinking    Vitamin and Mineral regimen: reviewed with NP     Food allergies or intolerances: NKFA  Cultural or Rastafari considerations: not assessed at this visit     Physical Assessment  Nutrition Related Findings  Dumping and Return of Hunger   Was having frequent episodes of low blood sugar - 20-40s  Did wear a CGM- awaiting insurance approval to continue   Did alarm at night when sleeping  Pt would get up go downstairs, eat something and fall asleep/ pass out     Physical Activity  Types of exercise: activities of daily living   Current physical limitations: not assessed     Psychosocial Assessment   Support systems: spouse  Socioeconomic factors: works and cares for family     Nutrition Diagnosis  Diagnosis: Altered nutrition related lab value (NC2.2)  Related to: Altered GI function  As Evidenced by: hypoglycemia      Nutrition Prescription: Recommend the following diet  7686-7977 calores /105 grams protein  /105 grams carb / 40 grams fat /15 grams of fiber     Meal Plan ( Wong/Pro/Carb)   Breakfast: 250/20/15  Snack: 150/15/10  Lunch: 250/20/15  Snack: 150/15/10  Dinner: 250/20/15  Snack: 150/15/10    Limit carbohydrates to 10-15 grams or less per meal  Always include protein and fat to decrease absorption. Interventions and Teaching   Patient educated on post-op nutrition guidelines. Patient educated and handouts provided. Surgical changes to stomach / GI  Capacity of post-surgery stomach  Sugar and fat restriction to decrease "dumping syndrome"  Expected weight loss  Weight loss plateaus/ possibility of weight regain  Exercise  Nutrition considerations after surgery  Appropriate carbohydrate, protein, and fat intake, and food/fluid choices to maximize safe weight loss, nutrient intake, and tolerance   Dietary and lifestyle changes  Possible problems with poor eating habits  Techniques for self monitoring and keeping daily food journal    Reviewed s/s of hypoglycemia and treatment of hypoglycemia   Discussed late dumping and limiting carb content of meals to 15 grams   Limit carb content of snacks 10 grams   Include protein and some fat with each meal to decrease absorption of carb  Be prepared when being physically active to treat low BS levels - recommend eat before and after exercise.        Patient is not currently pregnant and doesn't desire to become pregnant a minimum of one year post-op    Education provided to: patient    Barriers to learning: No barriers identified    Readiness to change: action    Comprehension: needs reinforcement and verbalizes understanding     Expected Compliance: good    Evaluation/Monitoring   Eating pattern as discussed Tolerance of nutrition prescription Body weight Lab values Physical activity Bowel pattern    Goals  Eliminate sugar sweetened beverages, Food journal, Eat 3 meals per day and plus 2-3 planned snacks   Meal Plan ( Wong/Pro/Carb)   Breakfast: 250/20/15  Snack: 150/15/10  Lunch: 250/20/15  Snack: 150/15/10  Dinner: 250/20/15  Snack: 150/15/10  4701-9682 calores /105 grams protein  /105 grams carb / 40 grams fat     Emailed reference information       Time Spent:   39 Minutes

## 2023-08-09 ENCOUNTER — TELEMEDICINE (OUTPATIENT)
Dept: BARIATRICS | Facility: CLINIC | Age: 38
End: 2023-08-09

## 2023-08-09 ENCOUNTER — OFFICE VISIT (OUTPATIENT)
Dept: BARIATRICS | Facility: CLINIC | Age: 38
End: 2023-08-09
Payer: COMMERCIAL

## 2023-08-09 VITALS
HEIGHT: 58 IN | BODY MASS INDEX: 36.42 KG/M2 | TEMPERATURE: 97.5 F | WEIGHT: 173.5 LBS | DIASTOLIC BLOOD PRESSURE: 70 MMHG | SYSTOLIC BLOOD PRESSURE: 108 MMHG | HEART RATE: 83 BPM

## 2023-08-09 VITALS — BODY MASS INDEX: 36.42 KG/M2 | WEIGHT: 173.5 LBS | HEIGHT: 58 IN

## 2023-08-09 DIAGNOSIS — Z98.84 HISTORY OF GASTRIC BYPASS: ICD-10-CM

## 2023-08-09 DIAGNOSIS — K91.2 HYPOGLYCEMIA AFTER GI (GASTROINTESTINAL) SURGERY: Primary | ICD-10-CM

## 2023-08-09 DIAGNOSIS — Z01.818 PREOP TESTING: Primary | ICD-10-CM

## 2023-08-09 DIAGNOSIS — R10.13 EPIGASTRIC PAIN: Primary | ICD-10-CM

## 2023-08-09 PROCEDURE — 99213 OFFICE O/P EST LOW 20 MIN: CPT | Performed by: SURGERY

## 2023-08-09 PROCEDURE — RECHECK: Performed by: DIETITIAN, REGISTERED

## 2023-08-09 NOTE — PATIENT INSTRUCTIONS
Goals  Meal Plan ( Wong/Pro/Carb)   Breakfast: 250/20/15  Snack: 150/15/10  Lunch: 250/20/15  Snack: 150/15/10  Dinner: 250/20/15  Snack: 150/15/10   8169-8062 calores /105 grams protein  /105 grams carb / 40 grams fat  What to Do if Your Blood Sugar is Low   WHAT YOU NEED TO KNOW:   Low blood sugar levels (hypoglycemia) can happen with Type 1 and Type 2 diabetes. Low levels are more likely to happen if you use insulin. Hypoglycemia can cause you to have falls, accidents, and injuries. A blood sugar level that gets too low can lead to seizures, coma, and death. Learn to recognize the symptoms early so you can get treatment quickly. DISCHARGE INSTRUCTIONS:   Have someone call your local emergency number (911 in the [address]) if:   You cannot be woken. You have a seizure. Call your doctor if:   You have symptoms of a low blood sugar level, such as trouble thinking, sweating, or a pounding heartbeat. Your blood sugar level is lower than normal and it does not improve with treatment. You often have lower blood sugar levels than your target goals. You have trouble coping with your illness, or you feel anxious or depressed. You have questions or concerns about your condition or care. What to do if you have symptoms of low blood sugar: If you are sweaty, anxious, confused or have a fast, pounding heartbeat:  Check your blood sugar level, if possible. Your blood sugar level is too low if it is at or below 70 mg/dL. Eat or drink 15 grams of fast-acting carbohydrate. Fast-acting carbohydrates will raise your blood sugar level quickly. Examples of 15 grams of fast-acting carbohydrates:     4 ounces (½ cup) of fruit juice     4 ounces of regular soda    2 tablespoons of raisins     1 tube of glucose gel or 3 to 4 glucose tablets       Check your blood sugar level 15 minutes later. If the level is still low (less than 100 mg/dL), eat another 15 grams of carbohydrate.  When the level returns to 100 mg/dL, eat a snack or meal that contains carbohydrates. This will help prevent another drop in blood sugar. Teach people close to you how to use your glucagon kit. Your blood sugar may be too low for you to be awake. People need to know when and how to use your kit. Prevent low blood sugar levels:  Prevent low blood sugar by knowing what increases your risk. Ask your healthcare provider for ways to prevent low blood sugar levels. Any of the following can increase your risk of low blood sugar:  Fasting for tests or procedures    During or after intense exercise    Late or postponed meals    Sleeping (you may need a bedtime snack)     Drinking alcohol if you use insulin or insulin releasing pills    Follow up with your doctor as directed:  Write down your questions so you remember to ask them during your visits. © Copyright [employer] 2022 Information is for End User's use only and may not be sold, redistributed or otherwise used for commercial purposes. The above information is an  only. It is not intended as medical advice for individual conditions or treatments. Talk to your doctor, nurse or pharmacist before following any medical regimen to see if it is safe and effective for you.

## 2023-08-09 NOTE — PROGRESS NOTES
OFFICE VISIT - BARIATRIC SURGERY  Elba Flores 41 y.o. female MRN: [id number]  Unit/Bed#:  Encounter: [id number]      HPI:  Elba Flores is a 41 y.o. female status post sleeve gastrectomy in 2017 with conversion to RYGB in 2020 by Dr Arely Clarke 2/2 severe heartburn, now presents with continued symptoms of GERD, recurrent hypoglycemic episodes and sharp episodes of epigastric pain. Subjective   Patient continues to have episodes of epigastric abdominal pain radiating to the back as well as GERD that is improved with medication but not completely resolved. She also continues to have episodes of hypoglycemia despite appropriate PO intake. Tolerating diet: Intermittently  Main symptoms: GERD, intermittent sharp epigastric abdominal pain radiating to the back, lasting a few minutes. Worse with food: No  Nausea: Intermittent mild nausea  Vomiting: None  Diarrhea: None  Duration of symptoms: Since RYGB  Smoking: None  Alcohol use: None  NSAID use: None  Caffeine use: Coffee occasionally  Current treatment: PPI 40 BID and carafate  Previous cholecystectomy or pertinent abdominal surgeries: cholecystectomy, C section x2, Sleeve, RYGB  Previous EGD: Yes  Previous Upper GI: Yes   Previous Manometry: Yes  Ambulation is not impaired. Review of Systems   Constitutional: Positive for appetite change. Respiratory: Positive for chest tightness (During episodes of epigastric pain) and shortness of breath (During episodes of epigastric pain). Cardiovascular: Negative for chest pain and palpitations. Gastrointestinal: Positive for abdominal pain (During episodes of epigastric pain). Negative for abdominal distention, constipation, diarrhea and nausea. Skin: Negative for color change. Neurological: Negative for dizziness.        Historical Information   Past Medical History:   Diagnosis Date   • Abnormal Pap smear of cervix    • Anemia     gets iron infusions   • Anxiety    • Chiari I malformation (HCC)    • Chronic pain disorder     during lupus flairs   • COVID-19    • Disease of thyroid gland     pt off meds   • Female infertility     IVF pregnancy   • Fibromyalgia    • Fibromyalgia, primary    • Gastric ulcer    • GERD (gastroesophageal reflux disease)    • Hiatal hernia    • Lupus (HCC)    • Muscle weakness    • Pericardial cyst    • Pituitary tumor    • Pleural effusion associated with pulmonary infection    • Polycystic ovary syndrome    • Pulmonary edema    • Pulmonary emboli (HCC)    • Spinal headache     with c section     Past Surgical History:   Procedure Laterality Date   •  SECTION      x 2   • CHOLECYSTECTOMY     • GASTRECTOMY SLEEVE LAPAROSCOPIC     • GASTRIC BYPASS     • POLYPECTOMY     • TONSILLECTOMY     • TUBAL LIGATION       Social History   Social History     Substance and Sexual Activity   Alcohol Use Yes    Comment: occ. social rare     Social History     Substance and Sexual Activity   Drug Use Never     Social History     Tobacco Use   Smoking Status Never   Smokeless Tobacco Never       Objective       Current Vitals:   Blood Pressure: 108/70 (23)  Pulse: 83 (23)  Temperature: 97.5 °F (36.4 °C) (23)  Temp Source: Tympanic (23)  Height: 4' 10.2" (147.8 cm) (23)  Weight - Scale: 78.7 kg (173 lb 8 oz) (23)    Invasive Devices     None                 Physical Exam  Constitutional:       Appearance: Normal appearance. Eyes:      Pupils: Pupils are equal, round, and reactive to light. Cardiovascular:      Rate and Rhythm: Normal rate and regular rhythm. Pulses: Normal pulses. Heart sounds: Normal heart sounds. Pulmonary:      Effort: Pulmonary effort is normal.      Breath sounds: Normal breath sounds. Abdominal:      General: Abdomen is flat. Palpations: Abdomen is soft. Musculoskeletal:         General: Normal range of motion. Skin:     General: Skin is warm and dry.    Neurological:      Mental Status: She is alert and oriented to person, place, and time. Psychiatric:         Behavior: Behavior normal.           Pathology, and Other Studies: N/A      Assessment/PLAN:   Fabienne Elmore is a 41 y.o. female status post sleeve gastrectomy in 2017 with conversion to RYGB in 2020 by Dr Iqra Barbosa 2/2 severe heartburn, now presents with continued symptoms of GERD, recurrent hypoglycemic episodes and sharp episodes of epigastric pain. Workup thus far reviewed and discussed with patient: with no abnormal findings on upperGI or pH manometry    UGI  LIMITED UPPER GI SERIES     INDICATION: Epigastric pain     COMPARISON:  None     IMAGES:  96 including cine images of swallowing     FLUOROSCOPY TIME:  1 minute 47 seconds     FINDINGS:     A limited single contrast upper GI study was performed with thick and thin barium.      radiograph was unremarkable.     The esophagus was normal in caliber. There was normal esophageal motility and emptying through the GE junction. There was no visualized mucosal abnormality within limits of single contrast technique.     There is a normal postoperative appearance of the gastric pouch.     There is no evidence of leak or ulcer.     Contrast passes freely from the stomach through the gastrojejunostomy site without evidence of obstruction.     Visualized small bowel is unremarkable.     IMPRESSION:     Unremarkable upper GI status post gastric bypass      EGD        Pathology from EGD         MANOMETRY/pH Study  Indication-evaluation for GERD  History of bariatric surgery     Esophageal manometry  Esophageal motility-10 out of 10 swallows demonstrated normal esophageal contractility pattern with mean DCI of 1276 mmHg. s.cm     LES-median IRP is normal limits     Impedance-100% complete clearance of liquid bolus swallow     Rapid swallow index is greater than 1     [place] classification-normal esophageal motility        24-hour pH study-history of reflux     acid exposure time is 0%  Gastric pH was suppressed consistent with history of bypass surgery        [place] scores 0.8  Symptom correlation was not significant with reported symptoms  33 episode reflux overall  25 episodes of weak acid reflux in upright position  6 episodes a week acid reflux in recumbent position  2 Episodes of alkaline reflux in upright position        Findings most consistent with bariatric surgery in terms of gastric pouch pH  Reflux was within physiological range      GASTRIC EMPTYING STUDY      --------------------------------------------------------------------    Will schedule for diagnostic laparoscopy.  Will follow up with medical bariatric to follow-up with refractory hypoglycemic episodes         Marion Hi MD  Bariatric Surgery  8/9/2023  9:18 AM

## 2023-08-11 ENCOUNTER — CONSULT (OUTPATIENT)
Dept: HEMATOLOGY ONCOLOGY | Facility: CLINIC | Age: 38
End: 2023-08-11
Payer: COMMERCIAL

## 2023-08-11 ENCOUNTER — TELEPHONE (OUTPATIENT)
Dept: HEMATOLOGY ONCOLOGY | Facility: CLINIC | Age: 38
End: 2023-08-11

## 2023-08-11 VITALS
RESPIRATION RATE: 17 BRPM | DIASTOLIC BLOOD PRESSURE: 86 MMHG | BODY MASS INDEX: 36.74 KG/M2 | HEIGHT: 58 IN | HEART RATE: 82 BPM | SYSTOLIC BLOOD PRESSURE: 120 MMHG | WEIGHT: 175.04 LBS | TEMPERATURE: 97.6 F | OXYGEN SATURATION: 98 %

## 2023-08-11 DIAGNOSIS — E53.8 VITAMIN B12 DEFICIENCY: ICD-10-CM

## 2023-08-11 DIAGNOSIS — R79.0 LOW FERRITIN: ICD-10-CM

## 2023-08-11 DIAGNOSIS — Z98.84 BARIATRIC SURGERY STATUS: Primary | ICD-10-CM

## 2023-08-11 DIAGNOSIS — D50.8 IRON DEFICIENCY ANEMIA SECONDARY TO INADEQUATE DIETARY IRON INTAKE: ICD-10-CM

## 2023-08-11 PROCEDURE — 99204 OFFICE O/P NEW MOD 45 MIN: CPT

## 2023-08-11 RX ORDER — SODIUM CHLORIDE 9 MG/ML
20 INJECTION, SOLUTION INTRAVENOUS ONCE
OUTPATIENT
Start: 2023-08-25

## 2023-08-11 RX ORDER — CYANOCOBALAMIN 1000 UG/ML
1000 INJECTION, SOLUTION INTRAMUSCULAR; SUBCUTANEOUS ONCE
OUTPATIENT
Start: 2023-08-25

## 2023-08-11 NOTE — PROGRESS NOTES
Oncology Outpatient Consult Note  Fay Lino 41 y.o. female MRN: @ Encounter: [id number]        Date:  8/11/2023        CC:  Iron Deficiency Anemia      HPI:  Fay Lino is seen for initial consultation 8/11/2023 regarding iron deficiency anemia. Patient has a PMH of hypoglycemia due to GI surgery, gastric sleeve 6 years ago and revision to Bhavya-en-Y 3 years ago, prolactinoma, pituatary microadenoma, lupus erythematosus. Patient has a history of iron deficiency due to malabsorption from Bariatric Surgery. Patient reports she was put on a maintenance plan however, did not follow up. Samuel Plate endorses increased fatigue, dizziness, lightheadedness, HAs, RLS, PICA (ice chips). Patient is not tolerating oral iron supplements. Patient is not a vegetarian/vegan. Patient has an irregular menstrual cycle, when she does have it, her cycle lasts 7 days, reports the whole cycle is heavy and spots on day 10 and 11. She goes through [employer] every two hours. Labs: Hemoglobin 12.3, MCV 87, WBC 5.53, ANC 2.77, Platelets [id number], ferritin 7, folate 20.6, vitamin B12 275. ROS: As stated in history of present illness otherwise her 14 point review of systems today was negative. ECOG PS: 0    Test Results:    Imaging: Espoh manometry/24hr ph    Result Date: 8/8/2023  Narrative: Indication-evaluation for GERD History of bariatric surgery Esophageal manometry Esophageal motility-10 out of 10 swallows demonstrated normal esophageal contractility pattern with mean DCI of 1276 mmHg. s.cm LES-median IRP is normal limits Impedance-100% complete clearance of liquid bolus swallow Rapid swallow index is greater than 1 [place] classification-normal esophageal motility 24-hour pH study-history of reflux acid exposure time is 0% Gastric pH was suppressed consistent with history of bypass surgery DeMeester scores 0.8 Symptom correlation was not significant with reported symptoms 33 episode reflux overall 25 episodes of weak acid reflux in upright position 6 episodes a week acid reflux in recumbent position 2 Episodes of alkaline reflux in upright position Findings most consistent with bariatric surgery in terms of gastric pouch pH Reflux was within physiological range     FL UPPER GI UGI    Result Date: 8/4/2023  Narrative: LIMITED UPPER GI SERIES INDICATION: Epigastric pain COMPARISON:  None IMAGES:  96 including cine images of swallowing FLUOROSCOPY TIME:  1 minute 47 seconds FINDINGS: A limited single contrast upper GI study was performed with thick and thin barium.  radiograph was unremarkable. The esophagus was normal in caliber. There was normal esophageal motility and emptying through the GE junction. There was no visualized mucosal abnormality within limits of single contrast technique. There is a normal postoperative appearance of the gastric pouch. There is no evidence of leak or ulcer. Contrast passes freely from the stomach through the gastrojejunostomy site without evidence of obstruction. Visualized small bowel is unremarkable. Impression: Unremarkable upper GI status post gastric bypass. Workstation performed: [id number]     MRI brain without contrast    Result Date: 8/3/2023  Narrative: MRI BRAIN WITHOUT CONTRAST INDICATION: G93.5: Compression of brain Q04.8: Other specified congenital malformations of brain. History of Chiari I malformation. COMPARISON:   6/24/2023 TECHNIQUE:  Multiplanar, multisequence imaging of the brain was performed. Supplemental CSF flow study is performed as requested at the foramen magnum. IMAGE QUALITY:  Diagnostic. FINDINGS: BRAIN PARENCHYMA:  There is no discrete mass, mass effect or midline shift. There is no intracranial hemorrhage. There is no evidence of acute infarction and diffusion imaging is unremarkable. There are no white matter changes in the cerebral hemispheres. 5 mm of cerebellar tonsillar ectopia redemonstrated.  CSF flow study at the foramen magnum demonstrates CSF flow both ventrally and dorsally in the foramen magnum; CSF flow detected anterior to the cervical medullary junction and posterior to the cervical medullary junction. Visualized upper cervical spinal  cord is free of syrinx. VENTRICLES:  Normal for the patient's age. SELLA AND PITUITARY GLAND: Small pituitary microadenoma noted on recent contrast enhanced pituitary protocol is not apparent on this routine noncontrast brain MRI. No large pituitary mass. ORBITS:  Normal. PARANASAL SINUSES:  Normal. VASCULATURE:  Evaluation of the major intracranial vasculature demonstrates appropriate flow voids. CALVARIUM AND SKULL BASE:  Normal. EXTRACRANIAL SOFT TISSUES: Rounded FLAIR hyperintensity in the midline of the nasopharynx, series 7, image 4 noted, indicative of a Tornwaldt cyst. Mastoid air cells are well pneumatized. Impression: 1. Mild Chiari I malformation with CSF flow detected in the foramen magnum. 2. No acute infarction, intracranial hemorrhage or mass effect. Workstation performed: [id number]     MRI cervical spine without contrast    Result Date: 8/3/2023  Narrative: MRI CERVICAL SPINE WITHOUT CONTRAST INDICATION: G93.5: Compression of brain. History of Chiari I malformation. Worsening symptoms. Clinical suspicion for syrinx. COMPARISON: 6/24/2023 TECHNIQUE:  Multiplanar, multisequence imaging of the cervical spine was performed. . IMAGE QUALITY:  Diagnostic FINDINGS: ALIGNMENT: Slight reversal of the cervical lordosis at the C4 level. MARROW SIGNAL:  Normal marrow signal is identified within the visualized bony structures. No discrete marrow lesion. CERVICAL AND VISUALIZED THORACIC CORD:  Normal signal within the visualized cord. No syrinx cavity identified in the cervical and upper thoracic spinal cord. The upper thoracic spinal cord is visualized to the T4 level.  PREVERTEBRAL AND PARASPINAL SOFT TISSUES:  Normal. VISUALIZED POSTERIOR FOSSA: Again demonstrated is 5 mm of cerebellar tonsillar ectopia indicative of mild Chiari I malformation. CERVICAL DISC SPACES: C2-C3:  Normal. C3-C4:  Normal. C4-C5:  Normal. C5-C6:  Normal. C6-C7:  Normal. C7-T1:  Normal. UPPER THORACIC DISC SPACES:  Normal. OTHER FINDINGS:  None. Impression: 1. Mild Chiari I malformation redemonstrated. 2. No syrinx cavity. 3. There is no focal disk herniation, central canal or neural foraminal narrowing.  . Workstation performed: [id number]       Labs:   Lab Results   Component Value Date    WBC 5.53 2023    HGB 12.3 2023    HCT 36.7 2023    MCV 87 2023     2023     Lab Results   Component Value Date    K 3.9 2023     (H) 2023    CO2 25 2023    BUN 9 2023    CREATININE 0.68 2023    GLUF 92 2023    CALCIUM 8.7 2023    CORRECTEDCA 9.3 2023    AST 69 (H) 2023    ALT 65 2023    ALKPHOS 119 (H) 2023    EGFR 113 2023     Lab Results   Component Value Date    IRON 55 2023    TIBC 436 2023    FERRITIN 7 (L) 07/15/2023     Lab Results   Component Value Date    FEZXILLP79 275 07/15/2023     Active Problems:   Patient Active Problem List   Diagnosis   • Headache   • Lupus erythematosus   • Elevated ferritin   • History of Chiari malformation   • History of gastric bypass   • History of prolactinoma   • Pericardial cyst   • Irregular periods   • Acquired hypothyroidism   • Muscle spasm   • Prolactinoma (HCC)   • Chiari I malformation (HCC)   • Pituitary microadenoma (HCC)   • Encounter for annual routine gynecological examination   • History of 2  sections   • Secondary oligomenorrhea   • Menorrhagia with irregular cycle   • Dysmenorrhea   • Cerebellar tonsillar ectopia (HCC)   • Obstructive sleep apnea (adult) (pediatric)   • Cervicalgia   • Mid back pain   • Myofascial pain syndrome   • Numbness and tingling in right hand   • Right wrist pain   • Numbness and tingling in left hand   • Left wrist pain   • Hypoglycemia   • Fungal skin infection   • Iron deficiency anemia   • Acute superficial gastritis without hemorrhage   • Thornwaldt's cyst   • Reactive hypoglycemia   • Hypoglycemia after GI (gastrointestinal) surgery   • Iron deficiency anemia secondary to inadequate dietary iron intake   • Vitamin B12 deficiency       Past Medical History:   Past Medical History:   Diagnosis Date   • Abnormal Pap smear of cervix    • Anemia     gets iron infusions   • Anxiety    • Chiari I malformation (HCC)    • Chronic pain disorder     during lupus flairs   • COVID-19    • Disease of thyroid gland     pt off meds   • Female infertility     IVF pregnancy   • Fibromyalgia    • Fibromyalgia, primary    • Gastric ulcer    • GERD (gastroesophageal reflux disease)    • Hiatal hernia    • Lupus (HCC)    • Muscle weakness    • Pericardial cyst    • Pituitary tumor    • Pleural effusion associated with pulmonary infection    • Polycystic ovary syndrome    • Pulmonary edema    • Pulmonary emboli (HCC)    • Spinal headache     with c section       Surgical History:   Past Surgical History:   Procedure Laterality Date   •  SECTION      x 2   • CHOLECYSTECTOMY     • GASTRECTOMY SLEEVE LAPAROSCOPIC     • GASTRIC BYPASS     • POLYPECTOMY     • TONSILLECTOMY     • TUBAL LIGATION         Family History:    Family History   Problem Relation Age of Onset   • Heart disease Mother    • Hypertension Mother    • Heart attack Father    • No Known Problems Brother    • No Known Problems Daughter    • No Known Problems Son    • Heart disease Maternal Grandmother    • Hypertension Maternal Grandmother    • Diabetes Maternal Grandmother    • Early death Maternal Grandfather    • No Known Problems Paternal Grandmother    • No Known Problems Paternal Grandfather    • No Known Problems Brother        Cancer-related family history is not on file.     Social History:   Social History     Socioeconomic History   • Marital status: /Civil Union     Spouse name: Not on file   • Number of children: Not on file   • Years of education: Not on file   • Highest education level: Not on file   Occupational History   • Not on file   Tobacco Use   • Smoking status: Never   • Smokeless tobacco: Never   Vaping Use   • Vaping Use: Never used   Substance and Sexual Activity   • Alcohol use: Yes     Comment: occ. social rare   • Drug use: Never   • Sexual activity: Yes     Partners: Male     Birth control/protection: Female Sterilization   Other Topics Concern   • Not on file   Social History Narrative   • Not on file     Social Determinants of Health     Financial Resource Strain: Not on file   Food Insecurity: Not on file   Transportation Needs: Not on file   Physical Activity: Not on file   Stress: Not on file   Social Connections: Not on file   Intimate Partner Violence: Not on file   Housing Stability: Not on file       Current Medications:   Current Outpatient Medications   Medication Sig Dispense Refill   • ergocalciferol (VITAMIN D2) 50,000 units Take 1 capsule (50,000 Units total) by mouth 2 (two) times a week 24 capsule 0   • famotidine (PEPCID) 20 mg tablet Take 1 tablet (20 mg total) by mouth 2 (two) times a day 60 tablet 2   • levothyroxine (Synthroid) 50 mcg tablet Take 1 tablet (50 mcg total) by mouth daily in the early morning 90 tablet 3   • omeprazole (PriLOSEC) 40 MG capsule Take 1 capsule (40 mg total) by mouth 2 (two) times a day 60 capsule 1   • rizatriptan (MAXALT-MLT) 10 mg disintegrating tablet Take 1 tablet (10 mg total) by mouth once as needed for migraine for up to 1 dose May repeat in 2 hours if needed 10 tablet 6   • sucralfate (CARAFATE) 1 g/10 mL suspension Take 10 mL (1 g total) by mouth 4 (four) times a day 3600 mL 2   • tiZANidine (ZANAFLEX) 2 mg tablet Take 1 tablet (2 mg total) by mouth daily at bedtime as needed for muscle spasms 30 tablet 1   • topiramate (TOPAMAX) 50 MG tablet Take 2 tablets (100 mg total) by mouth daily at bedtime 60 tablet 5   • Continuous Blood Gluc Sensor (Dexcom G7 Sensor) MISC Use 1 Device 4 (four) times a day 9 each 1   • dicyclomine (BENTYL) 10 mg capsule Take 1 capsule (10 mg total) by mouth 4 (four) times a day (before meals and at bedtime) (Patient not taking: Reported on 8/11/2023) 30 capsule 0   • Erenumab-aooe 140 MG/ML SOAJ Inject 140 mg under the skin every 30 (thirty) days (Patient not taking: Reported on 7/14/2023) 1 mL 11   • nystatin (MYCOSTATIN) powder Apply topically 2 (two) times a day (Patient not taking: Reported on 7/14/2023) 60 g 1   • tolnaftate (TINACTIN) 1 % powder Apply topically 2 (two) times a day (Patient not taking: Reported on 7/14/2023) 45 g 0     No current facility-administered medications for this visit. Allergies: Allergies   Allergen Reactions   • Valium [Diazepam] Other (See Comments)     Spasms   • Codeine Tremor   • Morphine Tremor         Physical Exam:    Body surface area is 1.73 meters squared. Wt Readings from Last 3 Encounters:   08/11/23 79.4 kg (175 lb 0.7 oz)   08/09/23 78.7 kg (173 lb 8 oz)   08/09/23 78.7 kg (173 lb 8 oz)        Temp Readings from Last 3 Encounters:   08/11/23 97.6 °F (36.4 °C)   08/09/23 97.5 °F (36.4 °C) (Tympanic)   08/07/23 98.1 °F (36.7 °C) (Oral)        BP Readings from Last 3 Encounters:   08/11/23 120/86   08/09/23 108/70   08/07/23 123/86         Pulse Readings from Last 3 Encounters:   08/11/23 82   08/09/23 83   08/07/23 72     @LASTSAO2(3)@    Physical Exam     Constitutional   General appearance: No acute distress, well appearing and well nourished. Eyes   Conjunctiva and lids: No swelling, erythema or discharge. Pupils and irises: Equal, round and reactive to light. Ears, Nose, Mouth, and Throat   External inspection of ears and nose: Normal.    Nasal mucosa, septum, and turbinates: Normal without edema or erythema. Oropharynx: Normal with no erythema, edema, exudate or lesions.     Pulmonary   Respiratory effort: No increased work of breathing or signs of respiratory distress. Auscultation of lungs: Clear to auscultation. Cardiovascular   Palpation of heart: Normal PMI, no thrills. Auscultation of heart: Normal rate and rhythm, normal S1 and S2, without murmurs. Examination of extremities for edema and/or varicosities: Normal.    Carotid pulses: Normal.    Abdomen   Abdomen: Non-tender, no masses. Liver and spleen: No hepatomegaly or splenomegaly. Lymphatic   Palpation of lymph nodes in neck: No lymphadenopathy. Musculoskeletal   Gait and station: Normal.    Digits and nails: Normal without clubbing or cyanosis. Inspection/palpation of joints, bones, and muscles: Normal.    Skin   Skin and subcutaneous tissue: Normal without rashes or lesions. Neurologic   Cranial nerves: Cranial nerves 2-12 intact. Sensation: No sensory loss. Psychiatric   Orientation to person, place, and time: Normal.    Mood and affect: Normal.          Assessment/ Plan:  Discussed etiologies of iron deficiency, which include blood loss, reduced iron absorption, diet, medication, bariatric surgery chronic disease, or malignancy. We discussed her iron deficiency anemia likely related to malabsorption from bariatric surgery and chronic blood loss from her heavy menstrual cycles. Since patient is not able to tolerate oral iron supplements, she will likely benefit for IV iron infusions (feraheme 510 mg weekly x 2 doses) plus vitamin B12 injections weekly x 6 doses. Patient educated about the iron product, administration and common adverse effects including but not limited to: Anaphylaxis/allergic reaction, arthralgias/myalgias, nausea; agrees to proceed with treatment. We will see patient back in the office in 3 months with labs prior (CBC, Iron Panel, Vitamin B12). Patient aware to contact us for worsening symptoms or for additional questions/concerns.         I spent 50 minutes in chart review, face to face counseling, coordination of care, and documentation.

## 2023-08-14 ENCOUNTER — TELEPHONE (OUTPATIENT)
Dept: ENDOCRINOLOGY | Facility: CLINIC | Age: 38
End: 2023-08-14

## 2023-08-14 NOTE — TELEPHONE ENCOUNTER
Patient will like Free style kera 3 sent to Raulito Booker in [facility] instead of [employer].  Patient can be reached to [phone]

## 2023-08-15 DIAGNOSIS — R73.03 PREDIABETES: Primary | ICD-10-CM

## 2023-08-15 RX ORDER — BLOOD-GLUCOSE SENSOR
EACH MISCELLANEOUS
Qty: 6 EACH | Refills: 3 | Status: SHIPPED | OUTPATIENT
Start: 2023-08-15 | End: 2023-08-18 | Stop reason: SDUPTHER

## 2023-08-15 NOTE — TELEPHONE ENCOUNTER
Patient requested Arlyn 3. I'm sending this to you so that it gets filled under the correct address.

## 2023-08-16 ENCOUNTER — OFFICE VISIT (OUTPATIENT)
Dept: INTERNAL MEDICINE CLINIC | Facility: CLINIC | Age: 38
End: 2023-08-16
Payer: COMMERCIAL

## 2023-08-16 VITALS
BODY MASS INDEX: 37.41 KG/M2 | HEART RATE: 87 BPM | SYSTOLIC BLOOD PRESSURE: 126 MMHG | DIASTOLIC BLOOD PRESSURE: 74 MMHG | HEIGHT: 58 IN | WEIGHT: 178.2 LBS | OXYGEN SATURATION: 98 % | TEMPERATURE: 97.3 F

## 2023-08-16 DIAGNOSIS — Z01.818 PRE-OP EXAMINATION: Primary | ICD-10-CM

## 2023-08-16 DIAGNOSIS — E16.1 REACTIVE HYPOGLYCEMIA: ICD-10-CM

## 2023-08-16 PROBLEM — E16.2 HYPOGLYCEMIA: Status: RESOLVED | Noted: 2023-06-02 | Resolved: 2023-08-16

## 2023-08-16 PROCEDURE — 99214 OFFICE O/P EST MOD 30 MIN: CPT | Performed by: INTERNAL MEDICINE

## 2023-08-16 PROCEDURE — 93000 ELECTROCARDIOGRAM COMPLETE: CPT | Performed by: INTERNAL MEDICINE

## 2023-08-16 RX ORDER — OMEPRAZOLE 40 MG/1
CAPSULE, DELAYED RELEASE ORAL
COMMUNITY

## 2023-08-16 NOTE — PROGRESS NOTES
Name: John Lopez      : 1985      MRN: [id number]  Encounter Provider: Diamond Guillermo MD  Encounter Date: 2023   Encounter department: [address] [place] [address] [id number]     1. Pre-op examination  Assessment & Plan:  On today's preoperative medical clearance exam we find the patient to be in stable good general health. She is medically cleared to proceed with a laparoscopic evaluation of her abdomen. Blood work has been reviewed and electrocardiogram shows no abnormalities. Orders:  -     POCT ECG    2. Reactive hypoglycemia  Assessment & Plan:  Reactive hypoglycemia secondary to bariatric surgery. Patient is being guided by bariatric services and endocrinology in an effort to avoid her hypoglycemic episodes. Proper diet is essential to preventing her hypoglycemia. Subjective      This patient presents today for a preoperative medical clearance prior to laparoscopic evaluation of her abdomen. She has a history of bariatric surgery with sleeve followed by Bhavya-en-Y procedure. She has had persistent symptoms of abdominal pain and nausea. She was recently hospitalized for evaluation of her symptoms. She has undergone endoscopy of the stomach as well as gastric pH monitoring. She is now scheduled to have a laparoscopic evaluation of the abdomen. Patient also has been experiencing episodes of hypoglycemia which seem better since her last visit with us. She has met with a dietitian at the bariatric department at [facility] who has assisted her with structuring a diet that is able to minimize hypoglycemia. She will be getting a glucometer to assist with monitoring for hypoglycemic episodes. She understands the importance of avoiding concentrated sugars and excessive carbohydrates. She reports no recent infection symptoms. Electrocardiogram performed today shows a sinus rhythm with normal EKG.   She has no prior history of any cardiac issues and no history of chest pain palpitations. Is recent blood work has been reviewed on this visit and is acceptable to proceed with planned laparoscopic procedure. Review of Systems   Gastrointestinal: Positive for nausea. Abdominal epigastric discomfort   All other systems reviewed and are negative. Current Outpatient Medications on File Prior to Visit   Medication Sig   • Continuous Blood Gluc Sensor (FreeStyle Arlyn 3 Sensor) MISC Change every 14 days. • Erenumab-aooe 140 MG/ML SOAJ Inject 140 mg under the skin every 30 (thirty) days   • ergocalciferol (VITAMIN D2) 50,000 units Take 1 capsule (50,000 Units total) by mouth 2 (two) times a week   • famotidine (PEPCID) 20 mg tablet Take 1 tablet (20 mg total) by mouth 2 (two) times a day   • levothyroxine (Synthroid) 50 mcg tablet Take 1 tablet (50 mcg total) by mouth daily in the early morning   • omeprazole (PriLOSEC) 40 MG capsule Take 1 capsule (40 mg total) by mouth 2 (two) times a day   • omeprazole (PriLOSEC) 40 MG capsule Take 1 capsule every day by oral route.    • rizatriptan (MAXALT-MLT) 10 mg disintegrating tablet Take 1 tablet (10 mg total) by mouth once as needed for migraine for up to 1 dose May repeat in 2 hours if needed   • sucralfate (CARAFATE) 1 g/10 mL suspension Take 10 mL (1 g total) by mouth 4 (four) times a day   • tiZANidine (ZANAFLEX) 2 mg tablet Take 1 tablet (2 mg total) by mouth daily at bedtime as needed for muscle spasms   • topiramate (TOPAMAX) 50 MG tablet Take 2 tablets (100 mg total) by mouth daily at bedtime   • dicyclomine (BENTYL) 10 mg capsule Take 1 capsule (10 mg total) by mouth 4 (four) times a day (before meals and at bedtime) (Patient not taking: Reported on 8/11/2023)   • [DISCONTINUED] Continuous Blood Gluc Sensor (Dexcom G7 Sensor) MISC Use 1 Device 4 (four) times a day   • [DISCONTINUED] nystatin (MYCOSTATIN) powder Apply topically 2 (two) times a day (Patient not taking: Reported on 7/14/2023)   • [DISCONTINUED] tolnaftate (TINACTIN) 1 % powder Apply topically 2 (two) times a day (Patient not taking: Reported on 7/14/2023)       Objective     /74   Pulse 87   Temp (!) 97.3 °F (36.3 °C)   Ht 4' 10.2" (1.478 m)   Wt 80.8 kg (178 lb 3.2 oz)   SpO2 98%   BMI 36.99 kg/m²     Physical Exam  Vitals reviewed. Constitutional:       General: She is not in acute distress. Appearance: Normal appearance. She is well-developed. She is not ill-appearing. HENT:      Head: Normocephalic. Right Ear: Hearing, tympanic membrane, ear canal and external ear normal.      Left Ear: Hearing, tympanic membrane, ear canal and external ear normal.      Nose: Nose normal. No mucosal edema. Mouth/Throat:      Mouth: Mucous membranes are moist.      Pharynx: Oropharynx is clear. Uvula midline. Eyes:      General: Lids are normal.      Conjunctiva/sclera: Conjunctivae normal.      Pupils: Pupils are equal, round, and reactive to light. Neck:      Thyroid: No thyromegaly. Vascular: No carotid bruit or JVD. Cardiovascular:      Rate and Rhythm: Normal rate and regular rhythm. Heart sounds: Normal heart sounds. No murmur heard. Pulmonary:      Effort: Pulmonary effort is normal. No respiratory distress. Breath sounds: Normal breath sounds. No wheezing, rhonchi or rales. Abdominal:      General: Bowel sounds are normal. There is no distension. Palpations: Abdomen is soft. There is no mass. Tenderness: There is no abdominal tenderness. There is no guarding. Musculoskeletal:         General: Normal range of motion. Cervical back: Normal range of motion and neck supple. No rigidity or tenderness. Right lower leg: No edema. Left lower leg: No edema. Lymphadenopathy:      Cervical: No cervical adenopathy. Skin:     General: Skin is warm and dry. Coloration: Skin is not jaundiced or pale. Neurological:      General: No focal deficit present.       Mental Status: She is alert and oriented to person, place, and time. Mental status is at baseline. Deep Tendon Reflexes: Reflexes are normal and symmetric. Reflexes normal.   Psychiatric:         Mood and Affect: Mood normal.         Speech: Speech normal.         Behavior: Behavior normal. Behavior is cooperative. Thought Content:  Thought content normal.         Judgment: Judgment normal.       Deep Patel MD

## 2023-08-16 NOTE — ASSESSMENT & PLAN NOTE
Reactive hypoglycemia secondary to bariatric surgery. Patient is being guided by bariatric services and endocrinology in an effort to avoid her hypoglycemic episodes. Proper diet is essential to preventing her hypoglycemia.

## 2023-08-16 NOTE — ASSESSMENT & PLAN NOTE
On today's preoperative medical clearance exam we find the patient to be in stable good general health. She is medically cleared to proceed with a laparoscopic evaluation of her abdomen. Blood work has been reviewed and electrocardiogram shows no abnormalities.

## 2023-08-18 DIAGNOSIS — R73.03 PREDIABETES: ICD-10-CM

## 2023-08-18 RX ORDER — BLOOD-GLUCOSE SENSOR
EACH MISCELLANEOUS
Qty: 6 EACH | Refills: 3 | Status: SHIPPED | OUTPATIENT
Start: 2023-08-18

## 2023-08-25 ENCOUNTER — HOSPITAL ENCOUNTER (OUTPATIENT)
Dept: INFUSION CENTER | Facility: HOSPITAL | Age: 38
End: 2023-08-25
Attending: INTERNAL MEDICINE
Payer: COMMERCIAL

## 2023-08-25 VITALS
DIASTOLIC BLOOD PRESSURE: 74 MMHG | TEMPERATURE: 97.5 F | RESPIRATION RATE: 18 BRPM | HEART RATE: 78 BPM | OXYGEN SATURATION: 99 % | SYSTOLIC BLOOD PRESSURE: 107 MMHG

## 2023-08-25 DIAGNOSIS — D50.8 IRON DEFICIENCY ANEMIA SECONDARY TO INADEQUATE DIETARY IRON INTAKE: Primary | ICD-10-CM

## 2023-08-25 DIAGNOSIS — E53.8 VITAMIN B12 DEFICIENCY: ICD-10-CM

## 2023-08-25 PROCEDURE — 96365 THER/PROPH/DIAG IV INF INIT: CPT

## 2023-08-25 PROCEDURE — 96372 THER/PROPH/DIAG INJ SC/IM: CPT

## 2023-08-25 RX ORDER — SODIUM CHLORIDE 9 MG/ML
20 INJECTION, SOLUTION INTRAVENOUS ONCE
Status: CANCELLED | OUTPATIENT
Start: 2023-09-01

## 2023-08-25 RX ORDER — CYANOCOBALAMIN 1000 UG/ML
1000 INJECTION, SOLUTION INTRAMUSCULAR; SUBCUTANEOUS ONCE
Status: COMPLETED | OUTPATIENT
Start: 2023-08-25 | End: 2023-08-25

## 2023-08-25 RX ORDER — CYANOCOBALAMIN 1000 UG/ML
1000 INJECTION, SOLUTION INTRAMUSCULAR; SUBCUTANEOUS ONCE
Status: CANCELLED | OUTPATIENT
Start: 2023-09-01

## 2023-08-25 RX ORDER — SODIUM CHLORIDE 9 MG/ML
20 INJECTION, SOLUTION INTRAVENOUS ONCE
Status: COMPLETED | OUTPATIENT
Start: 2023-08-25 | End: 2023-08-25

## 2023-08-25 RX ADMIN — FERUMOXYTOL 510 MG: 510 INJECTION INTRAVENOUS at 13:45

## 2023-08-25 RX ADMIN — CYANOCOBALAMIN 1000 MCG: 1000 INJECTION, SOLUTION INTRAMUSCULAR at 14:14

## 2023-08-25 RX ADMIN — SODIUM CHLORIDE 20 ML/HR: 0.9 INJECTION, SOLUTION INTRAVENOUS at 13:48

## 2023-08-28 DIAGNOSIS — D50.8 IRON DEFICIENCY ANEMIA SECONDARY TO INADEQUATE DIETARY IRON INTAKE: Primary | ICD-10-CM

## 2023-08-28 RX ORDER — SODIUM CHLORIDE 9 MG/ML
20 INJECTION, SOLUTION INTRAVENOUS ONCE
Status: CANCELLED | OUTPATIENT
Start: 2023-08-30

## 2023-08-30 ENCOUNTER — HOSPITAL ENCOUNTER (OUTPATIENT)
Dept: INFUSION CENTER | Facility: HOSPITAL | Age: 38
Discharge: HOME | End: 2023-08-30
Attending: INTERNAL MEDICINE
Payer: COMMERCIAL

## 2023-08-30 VITALS
SYSTOLIC BLOOD PRESSURE: 113 MMHG | HEART RATE: 84 BPM | DIASTOLIC BLOOD PRESSURE: 76 MMHG | TEMPERATURE: 97.8 F | RESPIRATION RATE: 16 BRPM

## 2023-08-30 DIAGNOSIS — D50.8 IRON DEFICIENCY ANEMIA SECONDARY TO INADEQUATE DIETARY IRON INTAKE: ICD-10-CM

## 2023-08-30 DIAGNOSIS — E53.8 VITAMIN B12 DEFICIENCY: Primary | ICD-10-CM

## 2023-08-30 RX ORDER — CYANOCOBALAMIN 1000 UG/ML
1000 INJECTION, SOLUTION INTRAMUSCULAR; SUBCUTANEOUS ONCE
Status: COMPLETED | OUTPATIENT
Start: 2023-08-30 | End: 2023-08-30

## 2023-08-30 RX ORDER — SODIUM CHLORIDE 9 MG/ML
20 INJECTION, SOLUTION INTRAVENOUS ONCE
Status: COMPLETED | OUTPATIENT
Start: 2023-08-30 | End: 2023-08-30

## 2023-08-30 RX ORDER — SODIUM CHLORIDE 9 MG/ML
20 INJECTION, SOLUTION INTRAVENOUS ONCE
Status: CANCELLED | OUTPATIENT
Start: 2023-09-06

## 2023-08-30 RX ORDER — CYANOCOBALAMIN 1000 UG/ML
1000 INJECTION, SOLUTION INTRAMUSCULAR; SUBCUTANEOUS ONCE
Status: CANCELLED | OUTPATIENT
Start: 2023-09-08

## 2023-08-30 RX ADMIN — FERUMOXYTOL 510 MG: 510 INJECTION INTRAVENOUS at 13:09

## 2023-08-30 RX ADMIN — CYANOCOBALAMIN 1000 MCG: 1000 INJECTION, SOLUTION INTRAMUSCULAR at 13:10

## 2023-08-30 RX ADMIN — SODIUM CHLORIDE 20 ML/HR: 0.9 INJECTION, SOLUTION INTRAVENOUS at 13:09

## 2023-09-06 ENCOUNTER — DOCUMENTATION (OUTPATIENT)
Dept: HEMATOLOGY ONCOLOGY | Facility: CLINIC | Age: 38
End: 2023-09-06

## 2023-09-06 NOTE — PROGRESS NOTES
Oncology Finance Advocacy Intake and Intervention  Oncology Finance Counselor/Advocate placed call to patient. This writer informed patient that this writer is here to assist patient with billing questions, financial assistance, payment/payment plans, quotes, copayment assistance, insurance optimization, and insurance navigation.    This writer conducted a thorough benefit review of copayment, deductible, and out of pocket cost. This information is documented below and has been reviewed with patient.     Copayment: N/A  Deductible: N/A  Out of Pocket Cost: N/A  Insurance optimization (Limited benefit vs self-pay): N/A  Patient assistance status: N/A  Free Drug Applications: N/A  Interventions:  Pt has active cap blue through [facility]  Added to carete        Information above was review thoroughly with patient and patient was advise of possible assistance programs/interventions. If any question arise patient can contact this writer at below information. This information was given to patient at time of contact.      Carmen Asher  Phone:[phone]  Email: [email or web address]

## 2023-09-08 ENCOUNTER — HOSPITAL ENCOUNTER (OUTPATIENT)
Dept: INFUSION CENTER | Facility: HOSPITAL | Age: 38
End: 2023-09-08
Attending: INTERNAL MEDICINE
Payer: COMMERCIAL

## 2023-09-08 DIAGNOSIS — E53.8 VITAMIN B12 DEFICIENCY: Primary | ICD-10-CM

## 2023-09-08 PROCEDURE — 96372 THER/PROPH/DIAG INJ SC/IM: CPT

## 2023-09-08 RX ORDER — CYANOCOBALAMIN 1000 UG/ML
1000 INJECTION, SOLUTION INTRAMUSCULAR; SUBCUTANEOUS ONCE
Status: CANCELLED | OUTPATIENT
Start: 2023-09-15

## 2023-09-08 RX ORDER — CYANOCOBALAMIN 1000 UG/ML
1000 INJECTION, SOLUTION INTRAMUSCULAR; SUBCUTANEOUS ONCE
Status: COMPLETED | OUTPATIENT
Start: 2023-09-08 | End: 2023-09-08

## 2023-09-08 RX ADMIN — CYANOCOBALAMIN 1000 MCG: 1000 INJECTION, SOLUTION INTRAMUSCULAR at 13:17

## 2023-09-15 ENCOUNTER — HOSPITAL ENCOUNTER (OUTPATIENT)
Dept: INFUSION CENTER | Facility: HOSPITAL | Age: 38
Discharge: HOME | End: 2023-09-15
Attending: INTERNAL MEDICINE

## 2023-09-20 DIAGNOSIS — E53.8 VITAMIN B12 DEFICIENCY: Primary | ICD-10-CM

## 2023-09-20 RX ORDER — CYANOCOBALAMIN 1000 UG/ML
1000 INJECTION, SOLUTION INTRAMUSCULAR; SUBCUTANEOUS ONCE
Status: CANCELLED | OUTPATIENT
Start: 2023-09-22

## 2023-09-22 ENCOUNTER — HOSPITAL ENCOUNTER (OUTPATIENT)
Dept: INFUSION CENTER | Facility: HOSPITAL | Age: 38
End: 2023-09-22
Attending: INTERNAL MEDICINE
Payer: COMMERCIAL

## 2023-09-22 ENCOUNTER — TELEPHONE (OUTPATIENT)
Dept: HEMATOLOGY ONCOLOGY | Facility: CLINIC | Age: 38
End: 2023-09-22

## 2023-09-22 DIAGNOSIS — E53.8 VITAMIN B12 DEFICIENCY: Primary | ICD-10-CM

## 2023-09-22 PROCEDURE — 96372 THER/PROPH/DIAG INJ SC/IM: CPT

## 2023-09-22 RX ORDER — CYANOCOBALAMIN 1000 UG/ML
1000 INJECTION, SOLUTION INTRAMUSCULAR; SUBCUTANEOUS ONCE
Status: COMPLETED | OUTPATIENT
Start: 2023-09-22 | End: 2023-09-22

## 2023-09-22 RX ORDER — CYANOCOBALAMIN 1000 UG/ML
1000 INJECTION, SOLUTION INTRAMUSCULAR; SUBCUTANEOUS ONCE
Status: CANCELLED | OUTPATIENT
Start: 2023-09-29

## 2023-09-22 RX ADMIN — CYANOCOBALAMIN 1000 MCG: 1000 INJECTION, SOLUTION INTRAMUSCULAR at 13:25

## 2023-09-22 NOTE — TELEPHONE ENCOUNTER
Spoke to patient. She is asking if she can possibly be rescheduled to tomorrow. If not, she will try her best to make today's appt.

## 2023-09-22 NOTE — TELEPHONE ENCOUNTER
Patient Call    Who are you speaking with? Patient    If it is not the patient, are they listed on an active communication consent form? N/A   What is the reason for this call? Patient would like to discuss rescheduling her infusion for today. Does this require a call back? Yes   If a call back is required, please list best call back number [phone]   If a call back is required, advise that a message will be forwarded to their care team and someone will return their call as soon as possible. Did you relay this information to the patient?  Yes

## 2023-09-29 ENCOUNTER — HOSPITAL ENCOUNTER (OUTPATIENT)
Dept: INFUSION CENTER | Facility: HOSPITAL | Age: 38
End: 2023-09-29
Attending: INTERNAL MEDICINE
Payer: COMMERCIAL

## 2023-09-29 DIAGNOSIS — E53.8 VITAMIN B12 DEFICIENCY: Primary | ICD-10-CM

## 2023-09-29 PROCEDURE — 96372 THER/PROPH/DIAG INJ SC/IM: CPT

## 2023-09-29 RX ORDER — CYANOCOBALAMIN 1000 UG/ML
1000 INJECTION, SOLUTION INTRAMUSCULAR; SUBCUTANEOUS ONCE
Status: COMPLETED | OUTPATIENT
Start: 2023-09-29 | End: 2023-09-29

## 2023-09-29 RX ORDER — CYANOCOBALAMIN 1000 UG/ML
1000 INJECTION, SOLUTION INTRAMUSCULAR; SUBCUTANEOUS ONCE
Status: CANCELLED | OUTPATIENT
Start: 2023-10-06

## 2023-09-29 RX ADMIN — CYANOCOBALAMIN 1000 MCG: 1000 INJECTION, SOLUTION INTRAMUSCULAR at 13:36

## 2023-10-07 ENCOUNTER — HOSPITAL ENCOUNTER (OUTPATIENT)
Dept: INFUSION CENTER | Facility: HOSPITAL | Age: 38
Discharge: HOME | End: 2023-10-07
Attending: INTERNAL MEDICINE
Payer: COMMERCIAL

## 2023-10-07 DIAGNOSIS — E53.8 VITAMIN B12 DEFICIENCY: Primary | ICD-10-CM

## 2023-10-07 PROCEDURE — 96372 THER/PROPH/DIAG INJ SC/IM: CPT

## 2023-10-07 RX ORDER — CYANOCOBALAMIN 1000 UG/ML
1000 INJECTION, SOLUTION INTRAMUSCULAR; SUBCUTANEOUS ONCE
Status: CANCELLED | OUTPATIENT
Start: 2023-10-13

## 2023-10-07 RX ORDER — CYANOCOBALAMIN 1000 UG/ML
1000 INJECTION, SOLUTION INTRAMUSCULAR; SUBCUTANEOUS ONCE
Status: COMPLETED | OUTPATIENT
Start: 2023-10-07 | End: 2023-10-07

## 2023-10-07 RX ADMIN — CYANOCOBALAMIN 1000 MCG: 1000 INJECTION, SOLUTION INTRAMUSCULAR at 08:40

## 2023-11-07 ENCOUNTER — TELEPHONE (OUTPATIENT)
Dept: HEMATOLOGY ONCOLOGY | Facility: CLINIC | Age: 38
End: 2023-11-07

## 2023-11-07 DIAGNOSIS — R79.0 LOW FERRITIN: ICD-10-CM

## 2023-11-07 DIAGNOSIS — E53.8 VITAMIN B12 DEFICIENCY: ICD-10-CM

## 2023-11-07 DIAGNOSIS — D50.8 IRON DEFICIENCY ANEMIA SECONDARY TO INADEQUATE DIETARY IRON INTAKE: ICD-10-CM

## 2023-11-07 DIAGNOSIS — E53.8 VITAMIN B12 DEFICIENCY: Primary | ICD-10-CM

## 2023-11-07 DIAGNOSIS — Z98.84 BARIATRIC SURGERY STATUS: ICD-10-CM

## 2023-11-07 DIAGNOSIS — D50.8 IRON DEFICIENCY ANEMIA SECONDARY TO INADEQUATE DIETARY IRON INTAKE: Primary | ICD-10-CM

## 2023-11-07 NOTE — TELEPHONE ENCOUNTER
Appointment Change  Cancel, Reschedule, Change to Virtual      Who are you speaking with? Patient   If it is not the patient, is the caller listed on the communication consent form? N/A   Which provider is the appointment scheduled with? SEPIDEH Benito   When was the original appointment scheduled? Please list date and time 11/8/23 @ 1   At which location is the appointment scheduled to take place? [id number]   Was the appointment rescheduled? Was the appointment changed from an in person visit to a virtual visit? If so, please list the details of the change. 12/8/23 @ 1230   What is the reason for the appointment change? Did not get labs done       Was STAR transport scheduled? No   Does STAR transport need to be scheduled for the new visit (if applicable) No   Does the patient need an infusion appointment rescheduled? No   Does the patient have an upcoming infusion appointment scheduled? If so, when? No   Is the patient undergoing chemotherapy? No   For appointments cancelled with less than 24 hours:  Was the no-show policy reviewed?  N/A

## 2023-11-08 DIAGNOSIS — R10.13 EPIGASTRIC ABDOMINAL PAIN: ICD-10-CM

## 2023-11-08 RX ORDER — OMEPRAZOLE 40 MG/1
40 CAPSULE, DELAYED RELEASE ORAL 2 TIMES DAILY
Qty: 60 CAPSULE | Refills: 1 | Status: SHIPPED | OUTPATIENT
Start: 2023-11-08

## 2023-11-13 ENCOUNTER — APPOINTMENT (OUTPATIENT)
Dept: LAB | Age: 38
End: 2023-11-13
Payer: COMMERCIAL

## 2023-11-13 DIAGNOSIS — R79.0 LOW FERRITIN: ICD-10-CM

## 2023-11-13 DIAGNOSIS — E55.9 VITAMIN D DEFICIENCY: ICD-10-CM

## 2023-11-13 DIAGNOSIS — E53.8 VITAMIN B12 DEFICIENCY: ICD-10-CM

## 2023-11-13 DIAGNOSIS — Z98.84 BARIATRIC SURGERY STATUS: ICD-10-CM

## 2023-11-13 DIAGNOSIS — D50.8 IRON DEFICIENCY ANEMIA SECONDARY TO INADEQUATE DIETARY IRON INTAKE: ICD-10-CM

## 2023-11-13 DIAGNOSIS — L93.0 LUPUS ERYTHEMATOSUS, UNSPECIFIED FORM: ICD-10-CM

## 2023-11-13 DIAGNOSIS — K91.2 POSTSURGICAL MALABSORPTION: ICD-10-CM

## 2023-11-13 DIAGNOSIS — L93.0 LUPUS ERYTHEMATOSUS, UNSPECIFIED FORM: Primary | ICD-10-CM

## 2023-11-13 LAB
25(OH)D3 SERPL-MCNC: 26.8 NG/ML (ref 30–100)
ANA SER QL IA: NEGATIVE
BASOPHILS # BLD AUTO: 0.05 THOUSANDS/ÂΜL (ref 0–0.1)
BASOPHILS NFR BLD AUTO: 1 % (ref 0–1)
CRP SERPL QL: 5.3 MG/L
EOSINOPHIL # BLD AUTO: 0.3 THOUSAND/ÂΜL (ref 0–0.61)
EOSINOPHIL NFR BLD AUTO: 5 % (ref 0–6)
ERYTHROCYTE [DISTWIDTH] IN BLOOD BY AUTOMATED COUNT: 14 % (ref 11.6–15.1)
ERYTHROCYTE [SEDIMENTATION RATE] IN BLOOD: 16 MM/HOUR (ref 0–19)
FERRITIN SERPL-MCNC: 102 NG/ML (ref 11–307)
HCT VFR BLD AUTO: 39.5 % (ref 34.8–46.1)
HGB BLD-MCNC: 13.3 G/DL (ref 11.5–15.4)
IMM GRANULOCYTES # BLD AUTO: 0.02 THOUSAND/UL (ref 0–0.2)
IMM GRANULOCYTES NFR BLD AUTO: 0 % (ref 0–2)
IRON SATN MFR SERPL: 33 % (ref 15–50)
IRON SERPL-MCNC: 109 UG/DL (ref 50–212)
LYMPHOCYTES # BLD AUTO: 1.83 THOUSANDS/ÂΜL (ref 0.6–4.47)
LYMPHOCYTES NFR BLD AUTO: 30 % (ref 14–44)
MCH RBC QN AUTO: 29.8 PG (ref 26.8–34.3)
MCHC RBC AUTO-ENTMCNC: 33.7 G/DL (ref 31.4–37.4)
MCV RBC AUTO: 88 FL (ref 82–98)
MONOCYTES # BLD AUTO: 0.49 THOUSAND/ÂΜL (ref 0.17–1.22)
MONOCYTES NFR BLD AUTO: 8 % (ref 4–12)
NEUTROPHILS # BLD AUTO: 3.45 THOUSANDS/ÂΜL (ref 1.85–7.62)
NEUTS SEG NFR BLD AUTO: 56 % (ref 43–75)
NRBC BLD AUTO-RTO: 0 /100 WBCS
PLATELET # BLD AUTO: 331 THOUSANDS/UL (ref 149–390)
PMV BLD AUTO: 10.3 FL (ref 8.9–12.7)
RBC # BLD AUTO: 4.47 MILLION/UL (ref 3.81–5.12)
TIBC SERPL-MCNC: 326 UG/DL (ref 250–450)
UIBC SERPL-MCNC: 217 UG/DL (ref 155–355)
VIT B12 SERPL-MCNC: 609 PG/ML (ref 180–914)
WBC # BLD AUTO: 6.14 THOUSAND/UL (ref 4.31–10.16)

## 2023-11-13 PROCEDURE — 82306 VITAMIN D 25 HYDROXY: CPT

## 2023-11-13 PROCEDURE — 86430 RHEUMATOID FACTOR TEST QUAL: CPT

## 2023-11-13 PROCEDURE — 85025 COMPLETE CBC W/AUTO DIFF WBC: CPT

## 2023-11-13 PROCEDURE — 83550 IRON BINDING TEST: CPT

## 2023-11-13 PROCEDURE — 36415 COLL VENOUS BLD VENIPUNCTURE: CPT

## 2023-11-13 PROCEDURE — 83540 ASSAY OF IRON: CPT

## 2023-11-13 PROCEDURE — 82607 VITAMIN B-12: CPT

## 2023-11-13 PROCEDURE — 85652 RBC SED RATE AUTOMATED: CPT

## 2023-11-13 PROCEDURE — 86038 ANTINUCLEAR ANTIBODIES: CPT

## 2023-11-13 PROCEDURE — 86140 C-REACTIVE PROTEIN: CPT

## 2023-11-13 PROCEDURE — 83918 ORGANIC ACIDS TOTAL QUANT: CPT

## 2023-11-13 PROCEDURE — 82728 ASSAY OF FERRITIN: CPT

## 2023-11-14 DIAGNOSIS — Z98.84 BARIATRIC SURGERY STATUS: Primary | ICD-10-CM

## 2023-11-14 DIAGNOSIS — E55.9 VITAMIN D DEFICIENCY: ICD-10-CM

## 2023-11-14 LAB — RHEUMATOID FACT SER QL LA: NEGATIVE

## 2023-11-19 LAB — METHYLMALONATE SERPL-SCNC: 76 NMOL/L (ref 0–378)

## 2023-11-30 ENCOUNTER — TELEPHONE (OUTPATIENT)
Dept: HEMATOLOGY ONCOLOGY | Facility: CLINIC | Age: 38
End: 2023-11-30

## 2023-11-30 NOTE — TELEPHONE ENCOUNTER
Lm on vm advising Araceli Basurto will be rounding 12/8, she has been r/s to 12/7 at 1:30 pm in the [facility] office with Erin Dove. Advised if this appointment date/time does not work with her schedule to please call [phone].

## 2023-12-13 ENCOUNTER — OFFICE VISIT (OUTPATIENT)
Dept: BARIATRICS | Facility: CLINIC | Age: 38
End: 2023-12-13
Payer: COMMERCIAL

## 2023-12-13 VITALS
WEIGHT: 183 LBS | HEART RATE: 74 BPM | TEMPERATURE: 97.5 F | DIASTOLIC BLOOD PRESSURE: 70 MMHG | HEIGHT: 58 IN | BODY MASS INDEX: 38.41 KG/M2 | SYSTOLIC BLOOD PRESSURE: 104 MMHG

## 2023-12-13 DIAGNOSIS — R10.13 EPIGASTRIC PAIN: Primary | ICD-10-CM

## 2023-12-13 PROCEDURE — 99213 OFFICE O/P EST LOW 20 MIN: CPT | Performed by: SURGERY

## 2023-12-13 RX ORDER — CEFAZOLIN SODIUM 2 G/50ML
2000 SOLUTION INTRAVENOUS ONCE
OUTPATIENT
Start: 2023-12-13 | End: 2023-12-13

## 2023-12-13 RX ORDER — ACETAMINOPHEN 10 MG/ML
1000 INJECTION, SOLUTION INTRAVENOUS ONCE
OUTPATIENT
Start: 2023-12-13 | End: 2023-12-13

## 2023-12-13 RX ORDER — ENOXAPARIN SODIUM 100 MG/ML
40 INJECTION SUBCUTANEOUS ONCE
OUTPATIENT
Start: 2023-12-13 | End: 2023-12-13

## 2023-12-13 RX ORDER — CELECOXIB 200 MG/1
200 CAPSULE ORAL ONCE
OUTPATIENT
Start: 2023-12-13 | End: 2023-12-13

## 2023-12-13 NOTE — H&P (VIEW-ONLY)
BARIATRIC HISTORY AND PHYSICAL - BARIATRIC SURGERY  Alexis Reyes 38 y.o. female MRN: [id number]  Unit/Bed#:  Encounter: [id number]      HPI:  Alexis Reyes is a 38 y.o. female who presents to review their preoperative workup and see if they are a good candidate to undergo a diagnostic laparoscopy.     Hiatal Hernia? None seen on 2023 EGD  Prior bariatric surgery? Sleeve gastrectomy in [place] (2017) converted to RnYGB in  in [place].    Patient reports having a hiatal hernia prior to the sleeve.    Review of Systems   Constitutional:  Negative for chills and fever.   HENT:  Negative for ear pain and sore throat.    Eyes:  Negative for pain and visual disturbance.   Respiratory:  Negative for cough and shortness of breath.    Cardiovascular:  Negative for chest pain and palpitations.   Gastrointestinal:  Negative for abdominal pain and vomiting.   Genitourinary:  Negative for dysuria and hematuria.   Musculoskeletal:  Negative for arthralgias and back pain.   Skin:  Negative for color change and rash.   Neurological:  Negative for seizures and syncope.   All other systems reviewed and are negative.      Historical Information   Past Medical History:   Diagnosis Date    Abnormal Pap smear of cervix     Anemia     gets iron infusions    Anxiety     Chiari I malformation (HCC)     Chronic pain disorder     during lupus flairs    COVID-19     Disease of thyroid gland     pt off meds    Female infertility     IVF pregnancy    Fibromyalgia     Fibromyalgia, primary     Gastric ulcer     GERD (gastroesophageal reflux disease)     Hiatal hernia     Lupus (HCC)     Muscle weakness     Pericardial cyst     Pituitary tumor     Pleural effusion associated with pulmonary infection     Polycystic ovary syndrome     Pulmonary edema     Pulmonary emboli (HCC)     Spinal headache     with c section     Past Surgical History:   Procedure Laterality Date     SECTION      x 2    CHOLECYSTECTOMY      GASTRECTOMY SLEEVE  "LAPAROSCOPIC      GASTRIC BYPASS      POLYPECTOMY      TONSILLECTOMY      TUBAL LIGATION       Social History   Social History     Substance and Sexual Activity   Alcohol Use Yes    Comment: occ. social rare     Social History     Substance and Sexual Activity   Drug Use Never     Social History     Tobacco Use   Smoking Status Never   Smokeless Tobacco Never     Family History: non-contributory    Meds/Allergies   all medications and allergies reviewed  Allergies   Allergen Reactions    Valium [Diazepam] Other (See Comments)     Spasms    Codeine Tremor    Morphine Tremor       Objective     Current Vitals:      bowel movement  [unfilled]    Invasive Devices       None                   Physical Exam  Vitals reviewed.   Constitutional:       General: She is not in acute distress.     Appearance: Normal appearance. She is obese. She is not ill-appearing.   HENT:      Head: Normocephalic and atraumatic.      Nose: Nose normal.      Mouth/Throat:      Mouth: Mucous membranes are moist.      Pharynx: Oropharynx is clear.   Eyes:      General: No scleral icterus.  Cardiovascular:      Rate and Rhythm: Normal rate and regular rhythm.   Pulmonary:      Effort: Pulmonary effort is normal. No respiratory distress.      Breath sounds: Normal breath sounds.   Abdominal:      General: There is no distension.      Palpations: Abdomen is soft.      Tenderness: There is no abdominal tenderness.      Hernia: No hernia is present.   Musculoskeletal:         General: Normal range of motion.   Skin:     General: Skin is warm and dry.      Capillary Refill: Capillary refill takes less than 2 seconds.   Neurological:      General: No focal deficit present.      Mental Status: She is alert. Mental status is at baseline.   Psychiatric:         Mood and Affect: Mood normal.         Behavior: Behavior normal.         Lab Results: I have personally reviewed pertinent lab results.  , CBC: No results found for: \"WBC\", \"HGB\", \"HCT\", \"MCV\", " "\"PLT\", \"ADJUSTEDWBC\", \"RBC\", \"MCH\", \"MCHC\", \"RDW\", \"MPV\", \"NRBC\", CMP: No results found for: \"SODIUM\", \"K\", \"CL\", \"CO2\", \"ANIONGAP\", \"BUN\", \"CREATININE\", \"GLUCOSE\", \"CALCIUM\", \"AST\", \"ALT\", \"ALKPHOS\", \"PROT\", \"BILITOT\", \"EGFR\"  Imaging: I have personally reviewed pertinent reports.    EKG, Pathology, and Other Studies: I have personally reviewed pertinent reports.      Code Status: [unfilled]  Advance Directive and Living Will:      Power of :    POLST:        Assessment/Plan:    The patient presented to review the preoperative workup and see if bariatric surgery is appropriate and indicated following the extensive preoperative workup and the enrollment in our weight loss program.  Preoperative workup was complete. Results were reviewed with the patient including the blood work results and the endoscopy findings and the biopsy results. We also reviewed the cardiology evaluation.    The patient was determined to be a good candidate for diagnostic laparoscopy, possible intraoperative upper endoscopy.  ----------------------------------------------------------------------  Smoking: no  Blood thinner use: no  Home pain medication use and who manages it: no  CPAP use: no (If yes, sleep study obtained and reminded pt to bring CPAP to hospital)  History of blood clots: pulmonary embolism 11 years ago   Previous abdominal surgeries: sleeve, RnYGB, pradeep, c-sectionx2  Cardiac clearance obtained: PCP Dr. Downs   EKG performed: yes  EGD prior to surgery: Dr. Santana 6/30/2023  Screening Labs obtained (CBC, CMP): yes  H. Pylori status: none 6/30/23  Medication allergies: tremors from morphine and codiene  SLIM consult Post-Op: not needed  Reminded patient about 2 week pre-op liquid diet: yes  10% body weight loss pre-operatively met/discussed: yes  Consent signed: yes  Pre-Op ERAS Orders placed: yes  -----------------------------------------------------------------------  Risks and benefits explained one more time " to the patient. Alternatives to surgery and alternative forms of surgery were also explained. Post-surgical commitment and after care programs were explained.  Consent was signed. Questions were answered and concerns were addressed. Patient will need to start the 2 week liquid diet prior to surgery.  Patient wishes to proceed. As per [facility] guidelines, I had a discussion with the patient regarding their CODE STATUS in the perioperative period and the patient is level 1 or FULL CODE STATUS.    Herber Brito MD  Bariatric Surgery   12/13/2023  1:03 PM

## 2023-12-13 NOTE — H&P
BARIATRIC HISTORY AND PHYSICAL - BARIATRIC SURGERY  Lian Block 41 y.o. female MRN: [id number]  Unit/Bed#:  Encounter: [id number]      HPI:  Lian Block is a 41 y.o. female who presents to review their preoperative workup and see if they are a good candidate to undergo a diagnostic laparoscopy. Hiatal Hernia? None seen on 2023 EGD  Prior bariatric surgery? Sleeve gastrectomy in [place] (2017) converted to [address] in  in [place]. Patient reports having a hiatal hernia prior to the sleeve. Review of Systems   Constitutional:  Negative for chills and fever. HENT:  Negative for ear pain and sore throat. Eyes:  Negative for pain and visual disturbance. Respiratory:  Negative for cough and shortness of breath. Cardiovascular:  Negative for chest pain and palpitations. Gastrointestinal:  Negative for abdominal pain and vomiting. Genitourinary:  Negative for dysuria and hematuria. Musculoskeletal:  Negative for arthralgias and back pain. Skin:  Negative for color change and rash. Neurological:  Negative for seizures and syncope. All other systems reviewed and are negative.       Historical Information   Past Medical History:   Diagnosis Date    Abnormal Pap smear of cervix     Anemia     gets iron infusions    Anxiety     Chiari I malformation (HCC)     Chronic pain disorder     during lupus flairs    COVID-19     Disease of thyroid gland     pt off meds    Female infertility     IVF pregnancy    Fibromyalgia     Fibromyalgia, primary     Gastric ulcer     GERD (gastroesophageal reflux disease)     Hiatal hernia     Lupus (HCC)     Muscle weakness     Pericardial cyst     Pituitary tumor     Pleural effusion associated with pulmonary infection     Polycystic ovary syndrome     Pulmonary edema     Pulmonary emboli (HCC)     Spinal headache     with c section     Past Surgical History:   Procedure Laterality Date     SECTION      x 2    CHOLECYSTECTOMY      GASTRECTOMY SLEEVE LAPAROSCOPIC      GASTRIC BYPASS      POLYPECTOMY      TONSILLECTOMY      TUBAL LIGATION       Social History   Social History     Substance and Sexual Activity   Alcohol Use Yes    Comment: occ. social rare     Social History     Substance and Sexual Activity   Drug Use Never     Social History     Tobacco Use   Smoking Status Never   Smokeless Tobacco Never     Family History: non-contributory    Meds/Allergies   all medications and allergies reviewed  Allergies   Allergen Reactions    Valium [Diazepam] Other (See Comments)     Spasms    Codeine Tremor    Morphine Tremor       Objective     Current Vitals:      bowel movement  [unfilled]    Invasive Devices       None                   Physical Exam  Vitals reviewed. Constitutional:       General: She is not in acute distress. Appearance: Normal appearance. She is obese. She is not ill-appearing. HENT:      Head: Normocephalic and atraumatic. Nose: Nose normal.      Mouth/Throat:      Mouth: Mucous membranes are moist.      Pharynx: Oropharynx is clear. Eyes:      General: No scleral icterus. Cardiovascular:      Rate and Rhythm: Normal rate and regular rhythm. Pulmonary:      Effort: Pulmonary effort is normal. No respiratory distress. Breath sounds: Normal breath sounds. Abdominal:      General: There is no distension. Palpations: Abdomen is soft. Tenderness: There is no abdominal tenderness. Hernia: No hernia is present. Musculoskeletal:         General: Normal range of motion. Skin:     General: Skin is warm and dry. Capillary Refill: Capillary refill takes less than 2 seconds. Neurological:      General: No focal deficit present. Mental Status: She is alert. Mental status is at baseline. Psychiatric:         Mood and Affect: Mood normal.         Behavior: Behavior normal.         Lab Results: I have personally reviewed pertinent lab results.   , CBC: No results found for: "WBC", "HGB", "HCT", "MCV", "PLT", "ADJUSTEDWBC", "RBC", "MCH", "MCHC", "RDW", "MPV", "NRBC", CMP: No results found for: "SODIUM", "K", "CL", "CO2", "ANIONGAP", "BUN", "CREATININE", "GLUCOSE", "CALCIUM", "AST", "ALT", "ALKPHOS", "PROT", "BILITOT", "EGFR"  Imaging: I have personally reviewed pertinent reports. EKG, Pathology, and Other Studies: I have personally reviewed pertinent reports. Code Status: [unfilled]  Advance Directive and Living Will:      Power of :    POLST:        Assessment/Plan:    The patient presented to review the preoperative workup and see if bariatric surgery is appropriate and indicated following the extensive preoperative workup and the enrollment in our weight loss program.  Preoperative workup was complete. Results were reviewed with the patient including the blood work results and the endoscopy findings and the biopsy results. We also reviewed the cardiology evaluation. The patient was determined to be a good candidate for diagnostic laparoscopy, possible intraoperative upper endoscopy.  ----------------------------------------------------------------------  Smoking: no  Blood thinner use: no  Home pain medication use and who manages it: no  CPAP use: no (If yes, sleep study obtained and reminded pt to bring CPAP to hospital)  History of blood clots: pulmonary embolism 11 years ago   Previous abdominal surgeries: sleeve, RnYGB, pradeep, c-sectionx2  Cardiac clearance obtained: PCP Dr. Josselyn Cuevas   EKG performed: yes  EGD prior to surgery: Dr. Dasha Limon 6/30/2023  Screening Labs obtained (CBC, CMP): yes  H.  Pylori status: none 6/30/23  Medication allergies: tremors from morphine and codiene  SLIM consult Post-Op: not needed  Reminded patient about 2 week pre-op liquid diet: yes  10% body weight loss pre-operatively met/discussed: yes  Consent signed: yes  Pre-Op ERAS Orders placed: yes  -----------------------------------------------------------------------  Risks and benefits explained one more time to the patient. Alternatives to surgery and alternative forms of surgery were also explained. Post-surgical commitment and after care programs were explained. Consent was signed. Questions were answered and concerns were addressed. Patient will need to start the 2 week liquid diet prior to surgery. Patient wishes to proceed. As per [address] [place] [address] guidelines, I had a discussion with the patient regarding their CODE STATUS in the perioperative period and the patient is level 1 or FULL CODE STATUS.     Vinita Crum MD  Bariatric Surgery   12/13/2023  1:03 PM

## 2023-12-14 DIAGNOSIS — Z98.84 BARIATRIC SURGERY STATUS: Primary | ICD-10-CM

## 2023-12-14 NOTE — TELEPHONE ENCOUNTER
PO Meds for  Diagnostic  Laparoscopy  and all indicated procedures . with Dr. Jurgen Woodall  on 1/8/2024        Allergies * Valium [diazepam] ,Codeine ,Morphine .

## 2023-12-15 RX ORDER — OXYCODONE HYDROCHLORIDE 5 MG/1
5 TABLET ORAL EVERY 4 HOURS PRN
Qty: 10 TABLET | Refills: 0 | Status: SHIPPED | OUTPATIENT
Start: 2023-12-15

## 2023-12-15 RX ORDER — OMEPRAZOLE 20 MG/1
20 CAPSULE, DELAYED RELEASE ORAL DAILY
Qty: 90 CAPSULE | Refills: 1 | Status: SHIPPED | OUTPATIENT
Start: 2023-12-15

## 2023-12-27 ENCOUNTER — ANESTHESIA EVENT (OUTPATIENT)
Dept: PERIOP | Facility: HOSPITAL | Age: 38
End: 2023-12-27
Payer: COMMERCIAL

## 2023-12-27 DIAGNOSIS — Z01.818 PRE-OP TESTING: Primary | ICD-10-CM

## 2023-12-28 DIAGNOSIS — Z01.818 PREOP TESTING: ICD-10-CM

## 2023-12-28 DIAGNOSIS — R10.13 EPIGASTRIC PAIN: ICD-10-CM

## 2023-12-28 DIAGNOSIS — Z98.84 BARIATRIC SURGERY STATUS: Primary | ICD-10-CM

## 2023-12-28 NOTE — PRE-PROCEDURE INSTRUCTIONS
Pre-Surgery Instructions:   Medication Instructions    levothyroxine (Synthroid) 50 mcg tablet Take day of surgery.    omeprazole (PriLOSEC) 40 MG capsule Take day of surgery.    rizatriptan (MAXALT-MLT) 10 mg disintegrating tablet Uses PRN- OK to take day of surgery    topiramate (TOPAMAX) 50 MG tablet Take night before surgery    Medication instructions for day surgery reviewed. Please use only a sip of water to take your instructed medications. Avoid all over the counter vitamins, supplements and NSAIDS for one week prior to surgery per anesthesia guidelines. Tylenol is ok to take as needed.     You will receive a call one business day prior to surgery with an arrival time and hospital directions. If your surgery is scheduled on a Monday, the hospital will be calling you on the Friday prior to your surgery. If you have not heard from anyone by 8pm, please call the hospital supervisor through the hospital  at [phone]. ([place] [phone]).    Do not eat or drink anything after midnight the night before your surgery, including candy, mints, lifesavers, or chewing gum. Do not drink alcohol 24hrs before your surgery. Try not to smoke at least 24hrs before your surgery.       Follow the pre surgery showering instructions as listed in the “My Surgical Experience Booklet” or otherwise provided by your surgeon's office. Do not use a blade to shave the surgical area 1 week before surgery. It is okay to use a clean electric clippers up to 24 hours before surgery. Do not apply any lotions, creams, including makeup, cologne, deodorant, or perfumes after showering on the day of your surgery. Do not use dry shampoo, hair spray, hair gel, or any type of hair products.     No contact lenses, eye make-up, or artificial eyelashes. Remove nail polish, including gel polish, and any artificial, gel, or acrylic nails if possible. Remove all jewelry including rings and body piercing jewelry.     Wear causal clothing  that is easy to take on and off. Consider your type of surgery.    Keep any valuables, jewelry, piercings at home. Please bring any specially ordered equipment (sling, braces) if indicated.    Arrange for a responsible person to drive you to and from the hospital on the day of your surgery. Visitor Guidelines discussed.     Call the surgeon's office with any new illnesses, exposures, or additional questions prior to surgery.    Please reference your “My Surgical Experience Booklet” for additional information to prepare for your upcoming surgery.

## 2024-01-04 ENCOUNTER — TELEPHONE (OUTPATIENT)
Dept: BARIATRICS | Facility: CLINIC | Age: 39
End: 2024-01-04

## 2024-01-04 NOTE — TELEPHONE ENCOUNTER
Called and lvm with a reminder for patient to go for her labs that are needed before her Diagnostic Lap surgery on 1/8/24.  BRIAN

## 2024-01-05 ENCOUNTER — APPOINTMENT (OUTPATIENT)
Dept: LAB | Age: 39
End: 2024-01-05
Payer: COMMERCIAL

## 2024-01-05 ENCOUNTER — LAB REQUISITION (OUTPATIENT)
Dept: LAB | Facility: HOSPITAL | Age: 39
End: 2024-01-05
Payer: COMMERCIAL

## 2024-01-05 DIAGNOSIS — Z01.818 PRE-OP TESTING: ICD-10-CM

## 2024-01-05 DIAGNOSIS — Z01.818 PREOP TESTING: ICD-10-CM

## 2024-01-05 DIAGNOSIS — Z01.818 ENCOUNTER FOR OTHER PREPROCEDURAL EXAMINATION: ICD-10-CM

## 2024-01-05 DIAGNOSIS — Z98.84 BARIATRIC SURGERY STATUS: ICD-10-CM

## 2024-01-05 DIAGNOSIS — R10.13 EPIGASTRIC PAIN: ICD-10-CM

## 2024-01-05 LAB
ABO GROUP BLD: NORMAL
BLD GP AB SCN SERPL QL: NEGATIVE
RH BLD: NEGATIVE
SPECIMEN EXPIRATION DATE: NORMAL

## 2024-01-05 PROCEDURE — 86900 BLOOD TYPING SEROLOGIC ABO: CPT | Performed by: SURGERY

## 2024-01-05 PROCEDURE — 36415 COLL VENOUS BLD VENIPUNCTURE: CPT

## 2024-01-05 PROCEDURE — 86901 BLOOD TYPING SEROLOGIC RH(D): CPT | Performed by: SURGERY

## 2024-01-05 PROCEDURE — 86850 RBC ANTIBODY SCREEN: CPT | Performed by: SURGERY

## 2024-01-05 PROCEDURE — 80053 COMPREHEN METABOLIC PANEL: CPT

## 2024-01-06 LAB
ALBUMIN SERPL BCP-MCNC: 4 G/DL (ref 3.5–5)
ALP SERPL-CCNC: 77 U/L (ref 34–104)
ALT SERPL W P-5'-P-CCNC: 18 U/L (ref 7–52)
ANION GAP SERPL CALCULATED.3IONS-SCNC: 16 MMOL/L
AST SERPL W P-5'-P-CCNC: 18 U/L (ref 13–39)
BILIRUB SERPL-MCNC: 0.52 MG/DL (ref 0.2–1)
BUN SERPL-MCNC: 11 MG/DL (ref 5–25)
CALCIUM SERPL-MCNC: 9.3 MG/DL (ref 8.4–10.2)
CHLORIDE SERPL-SCNC: 107 MMOL/L (ref 96–108)
CO2 SERPL-SCNC: 18 MMOL/L (ref 21–32)
CREAT SERPL-MCNC: 0.6 MG/DL (ref 0.6–1.3)
GFR SERPL CREATININE-BSD FRML MDRD: 117 ML/MIN/1.73SQ M
GLUCOSE P FAST SERPL-MCNC: 83 MG/DL (ref 65–99)
POTASSIUM SERPL-SCNC: 4.5 MMOL/L (ref 3.5–5.3)
PROT SERPL-MCNC: 6.8 G/DL (ref 6.4–8.4)
SODIUM SERPL-SCNC: 141 MMOL/L (ref 135–147)

## 2024-01-07 NOTE — ANESTHESIA PREPROCEDURE EVALUATION
Procedure:  LAPAROSCOPY DIAGNOSTIC (Abdomen)    Relevant Problems   ANESTHESIA (within normal limits)      ENDO   (+) Acquired hypothyroidism      GI/HEPATIC   (+) Reactive hypoglycemia      HEMATOLOGY   (+) Iron deficiency anemia   (+) Iron deficiency anemia secondary to inadequate dietary iron intake      MUSCULOSKELETAL   (+) Mid back pain   (+) Myofascial pain syndrome      NEURO/PSYCH   (+) Headache   (+) Myofascial pain syndrome   (+) Numbness and tingling in left hand   (+) Numbness and tingling in right hand      PULMONARY   (+) Obstructive sleep apnea (adult) (pediatric)      Other   (+) Bariatric surgery status   (+) History of Chiari malformation   (+) History of prolactinoma   (+) Hypoglycemia after GI (gastrointestinal) surgery   (+) Lupus erythematosus        Physical Exam    Airway    Mallampati score: II  TM Distance: >3 FB  Neck ROM: full     Dental   No notable dental hx     Cardiovascular  Rhythm: regular, Rate: normal, Cardiovascular exam normal    Pulmonary  Pulmonary exam normal Breath sounds clear to auscultation    Other Findings  post-pubertal.      Anesthesia Plan  ASA Score- 3     Anesthesia Type- general with ASA Monitors.         Additional Monitors:     Airway Plan: ETT.           Plan Factors-Exercise tolerance (METS): >4 METS.    Chart reviewed. EKG reviewed.  Existing labs reviewed. Patient summary reviewed.    Patient is not a current smoker.              Induction- intravenous.    Postoperative Plan-     Informed Consent- Anesthetic plan and risks discussed with patient.

## 2024-01-08 ENCOUNTER — ANESTHESIA (OUTPATIENT)
Dept: PERIOP | Facility: HOSPITAL | Age: 39
End: 2024-01-08
Payer: COMMERCIAL

## 2024-01-08 ENCOUNTER — NURSE TRIAGE (OUTPATIENT)
Dept: OTHER | Facility: OTHER | Age: 39
End: 2024-01-08

## 2024-01-08 ENCOUNTER — HOSPITAL ENCOUNTER (OUTPATIENT)
Facility: HOSPITAL | Age: 39
Setting detail: OUTPATIENT SURGERY
Discharge: HOME | End: 2024-01-08
Attending: SURGERY | Admitting: SURGERY
Payer: COMMERCIAL

## 2024-01-08 VITALS
BODY MASS INDEX: 38 KG/M2 | TEMPERATURE: 97.9 F | HEART RATE: 81 BPM | DIASTOLIC BLOOD PRESSURE: 55 MMHG | RESPIRATION RATE: 16 BRPM | OXYGEN SATURATION: 92 % | WEIGHT: 188.49 LBS | SYSTOLIC BLOOD PRESSURE: 102 MMHG | HEIGHT: 59 IN

## 2024-01-08 DIAGNOSIS — Z98.890 STATUS POST LAPAROSCOPIC SURGERY: Primary | ICD-10-CM

## 2024-01-08 PROBLEM — R10.13 EPIGASTRIC PAIN: Status: ACTIVE | Noted: 2024-01-08

## 2024-01-08 LAB
ABO GROUP BLD: NORMAL
EXT PREGNANCY TEST URINE: NEGATIVE
EXT. CONTROL: ABNORMAL
RH BLD: NEGATIVE

## 2024-01-08 PROCEDURE — 81025 URINE PREGNANCY TEST: CPT | Performed by: ANESTHESIOLOGY

## 2024-01-08 PROCEDURE — 86900 BLOOD TYPING SEROLOGIC ABO: CPT | Performed by: SURGERY

## 2024-01-08 PROCEDURE — 86901 BLOOD TYPING SEROLOGIC RH(D): CPT | Performed by: SURGERY

## 2024-01-08 PROCEDURE — C9290 INJ, BUPIVACAINE LIPOSOME: HCPCS | Performed by: STUDENT IN AN ORGANIZED HEALTH CARE EDUCATION/TRAINING PROGRAM

## 2024-01-08 RX ORDER — MAGNESIUM HYDROXIDE 1200 MG/15ML
LIQUID ORAL AS NEEDED
Status: DISCONTINUED | OUTPATIENT
Start: 2024-01-08 | End: 2024-01-08 | Stop reason: HOSPADM

## 2024-01-08 RX ORDER — KETOROLAC TROMETHAMINE 30 MG/ML
15 INJECTION, SOLUTION INTRAMUSCULAR; INTRAVENOUS EVERY 6 HOURS
Qty: 8 ML | Refills: 0 | Status: DISCONTINUED | OUTPATIENT
Start: 2024-01-08 | End: 2024-01-08 | Stop reason: HOSPADM

## 2024-01-08 RX ORDER — FAMOTIDINE 10 MG/ML
20 INJECTION, SOLUTION INTRAVENOUS EVERY 12 HOURS SCHEDULED
Status: DISCONTINUED | OUTPATIENT
Start: 2024-01-08 | End: 2024-01-08 | Stop reason: HOSPADM

## 2024-01-08 RX ORDER — ONDANSETRON 2 MG/ML
INJECTION INTRAMUSCULAR; INTRAVENOUS AS NEEDED
Status: DISCONTINUED | OUTPATIENT
Start: 2024-01-08 | End: 2024-01-08

## 2024-01-08 RX ORDER — CEFAZOLIN SODIUM 2 G/50ML
2000 SOLUTION INTRAVENOUS ONCE
Status: COMPLETED | OUTPATIENT
Start: 2024-01-08 | End: 2024-01-08

## 2024-01-08 RX ORDER — ACETAMINOPHEN 10 MG/ML
1000 INJECTION, SOLUTION INTRAVENOUS ONCE
Status: COMPLETED | OUTPATIENT
Start: 2024-01-08 | End: 2024-01-08

## 2024-01-08 RX ORDER — ENOXAPARIN SODIUM 100 MG/ML
40 INJECTION SUBCUTANEOUS DAILY
Status: DISCONTINUED | OUTPATIENT
Start: 2024-01-09 | End: 2024-01-08 | Stop reason: HOSPADM

## 2024-01-08 RX ORDER — CELECOXIB 200 MG/1
200 CAPSULE ORAL ONCE
Status: COMPLETED | OUTPATIENT
Start: 2024-01-08 | End: 2024-01-08

## 2024-01-08 RX ORDER — SODIUM CHLORIDE 9 MG/ML
INJECTION, SOLUTION INTRAVENOUS AS NEEDED
Status: DISCONTINUED | OUTPATIENT
Start: 2024-01-08 | End: 2024-01-08 | Stop reason: HOSPADM

## 2024-01-08 RX ORDER — ACETAMINOPHEN 10 MG/ML
1000 INJECTION, SOLUTION INTRAVENOUS EVERY 6 HOURS SCHEDULED
Status: DISCONTINUED | OUTPATIENT
Start: 2024-01-08 | End: 2024-01-08 | Stop reason: HOSPADM

## 2024-01-08 RX ORDER — LIDOCAINE HYDROCHLORIDE 10 MG/ML
INJECTION, SOLUTION EPIDURAL; INFILTRATION; INTRACAUDAL; PERINEURAL AS NEEDED
Status: DISCONTINUED | OUTPATIENT
Start: 2024-01-08 | End: 2024-01-08

## 2024-01-08 RX ORDER — SIMETHICONE 80 MG
80 TABLET,CHEWABLE ORAL 4 TIMES DAILY PRN
Status: DISCONTINUED | OUTPATIENT
Start: 2024-01-08 | End: 2024-01-08 | Stop reason: HOSPADM

## 2024-01-08 RX ORDER — FENTANYL CITRATE 50 UG/ML
INJECTION, SOLUTION INTRAMUSCULAR; INTRAVENOUS AS NEEDED
Status: DISCONTINUED | OUTPATIENT
Start: 2024-01-08 | End: 2024-01-08

## 2024-01-08 RX ORDER — ROCURONIUM BROMIDE 10 MG/ML
INJECTION, SOLUTION INTRAVENOUS AS NEEDED
Status: DISCONTINUED | OUTPATIENT
Start: 2024-01-08 | End: 2024-01-08

## 2024-01-08 RX ORDER — METOCLOPRAMIDE HYDROCHLORIDE 5 MG/ML
10 INJECTION INTRAMUSCULAR; INTRAVENOUS EVERY 6 HOURS PRN
Status: DISCONTINUED | OUTPATIENT
Start: 2024-01-08 | End: 2024-01-08 | Stop reason: HOSPADM

## 2024-01-08 RX ORDER — LEVOTHYROXINE SODIUM 0.05 MG/1
50 TABLET ORAL
Status: CANCELLED | OUTPATIENT
Start: 2024-01-08

## 2024-01-08 RX ORDER — ACETAMINOPHEN 160 MG/5ML
640 SUSPENSION ORAL EVERY 8 HOURS
Qty: 140 ML | Refills: 0 | Status: SHIPPED | OUTPATIENT
Start: 2024-01-08 | End: 2024-01-11

## 2024-01-08 RX ORDER — PROPOFOL 10 MG/ML
INJECTION, EMULSION INTRAVENOUS AS NEEDED
Status: DISCONTINUED | OUTPATIENT
Start: 2024-01-08 | End: 2024-01-08

## 2024-01-08 RX ORDER — SODIUM CHLORIDE, SODIUM LACTATE, POTASSIUM CHLORIDE, CALCIUM CHLORIDE 600; 310; 30; 20 MG/100ML; MG/100ML; MG/100ML; MG/100ML
75 INJECTION, SOLUTION INTRAVENOUS CONTINUOUS
Status: DISCONTINUED | OUTPATIENT
Start: 2024-01-08 | End: 2024-01-08 | Stop reason: HOSPADM

## 2024-01-08 RX ORDER — PROMETHAZINE HYDROCHLORIDE 25 MG/ML
25 INJECTION, SOLUTION INTRAMUSCULAR; INTRAVENOUS EVERY 6 HOURS PRN
Status: DISCONTINUED | OUTPATIENT
Start: 2024-01-08 | End: 2024-01-08 | Stop reason: HOSPADM

## 2024-01-08 RX ORDER — ONDANSETRON 2 MG/ML
4 INJECTION INTRAMUSCULAR; INTRAVENOUS EVERY 6 HOURS PRN
Status: DISCONTINUED | OUTPATIENT
Start: 2024-01-08 | End: 2024-01-08 | Stop reason: HOSPADM

## 2024-01-08 RX ORDER — DEXAMETHASONE SODIUM PHOSPHATE 10 MG/ML
INJECTION, SOLUTION INTRAMUSCULAR; INTRAVENOUS AS NEEDED
Status: DISCONTINUED | OUTPATIENT
Start: 2024-01-08 | End: 2024-01-08

## 2024-01-08 RX ORDER — BUPIVACAINE HYDROCHLORIDE 5 MG/ML
INJECTION, SOLUTION EPIDURAL; INTRACAUDAL AS NEEDED
Status: DISCONTINUED | OUTPATIENT
Start: 2024-01-08 | End: 2024-01-08 | Stop reason: HOSPADM

## 2024-01-08 RX ORDER — DIPHENHYDRAMINE HCL 25 MG
25 TABLET ORAL
Status: DISCONTINUED | OUTPATIENT
Start: 2024-01-08 | End: 2024-01-08 | Stop reason: HOSPADM

## 2024-01-08 RX ORDER — ENOXAPARIN SODIUM 100 MG/ML
40 INJECTION SUBCUTANEOUS ONCE
Status: COMPLETED | OUTPATIENT
Start: 2024-01-08 | End: 2024-01-08

## 2024-01-08 RX ORDER — ONDANSETRON 2 MG/ML
4 INJECTION INTRAMUSCULAR; INTRAVENOUS ONCE AS NEEDED
Status: DISCONTINUED | OUTPATIENT
Start: 2024-01-08 | End: 2024-01-08 | Stop reason: HOSPADM

## 2024-01-08 RX ORDER — HYDROMORPHONE HCL/PF 1 MG/ML
0.5 SYRINGE (ML) INJECTION
Status: DISCONTINUED | OUTPATIENT
Start: 2024-01-08 | End: 2024-01-08 | Stop reason: HOSPADM

## 2024-01-08 RX ORDER — MIDAZOLAM HYDROCHLORIDE 2 MG/2ML
INJECTION, SOLUTION INTRAMUSCULAR; INTRAVENOUS AS NEEDED
Status: DISCONTINUED | OUTPATIENT
Start: 2024-01-08 | End: 2024-01-08

## 2024-01-08 RX ORDER — FENTANYL CITRATE/PF 50 MCG/ML
50 SYRINGE (ML) INJECTION
Status: DISCONTINUED | OUTPATIENT
Start: 2024-01-08 | End: 2024-01-08 | Stop reason: HOSPADM

## 2024-01-08 RX ORDER — TOPIRAMATE 100 MG/1
100 TABLET, FILM COATED ORAL
Status: CANCELLED | OUTPATIENT
Start: 2024-01-08

## 2024-01-08 RX ORDER — OXYCODONE HCL 5 MG/5 ML
5 SOLUTION, ORAL ORAL EVERY 4 HOURS PRN
Status: DISCONTINUED | OUTPATIENT
Start: 2024-01-08 | End: 2024-01-08 | Stop reason: HOSPADM

## 2024-01-08 RX ORDER — SODIUM CHLORIDE 9 MG/ML
125 INJECTION, SOLUTION INTRAVENOUS CONTINUOUS
Status: DISCONTINUED | OUTPATIENT
Start: 2024-01-08 | End: 2024-01-08 | Stop reason: HOSPADM

## 2024-01-08 RX ORDER — OXYCODONE HCL 5 MG/5 ML
10 SOLUTION, ORAL ORAL EVERY 4 HOURS PRN
Status: DISCONTINUED | OUTPATIENT
Start: 2024-01-08 | End: 2024-01-08 | Stop reason: HOSPADM

## 2024-01-08 RX ADMIN — SODIUM CHLORIDE 125 ML/HR: 0.9 INJECTION, SOLUTION INTRAVENOUS at 09:46

## 2024-01-08 RX ADMIN — ONDANSETRON 4 MG: 2 INJECTION INTRAMUSCULAR; INTRAVENOUS at 07:53

## 2024-01-08 RX ADMIN — CEFAZOLIN SODIUM 2000 MG: 2 SOLUTION INTRAVENOUS at 07:26

## 2024-01-08 RX ADMIN — ROCURONIUM BROMIDE 50 MG: 10 INJECTION, SOLUTION INTRAVENOUS at 07:38

## 2024-01-08 RX ADMIN — DEXAMETHASONE SODIUM PHOSPHATE 10 MG: 10 INJECTION INTRAMUSCULAR; INTRAVENOUS at 07:53

## 2024-01-08 RX ADMIN — FENTANYL CITRATE 50 MCG: 50 INJECTION INTRAMUSCULAR; INTRAVENOUS at 07:38

## 2024-01-08 RX ADMIN — SUGAMMADEX 200 MG: 100 INJECTION, SOLUTION INTRAVENOUS at 08:30

## 2024-01-08 RX ADMIN — SODIUM CHLORIDE 125 ML/HR: 0.9 INJECTION, SOLUTION INTRAVENOUS at 05:39

## 2024-01-08 RX ADMIN — OXYCODONE HYDROCHLORIDE 5 MG: 5 SOLUTION ORAL at 10:32

## 2024-01-08 RX ADMIN — LIDOCAINE HYDROCHLORIDE 60 MG: 10 INJECTION, SOLUTION EPIDURAL; INFILTRATION; INTRACAUDAL; PERINEURAL at 07:38

## 2024-01-08 RX ADMIN — FENTANYL CITRATE 50 MCG: 50 INJECTION INTRAMUSCULAR; INTRAVENOUS at 08:03

## 2024-01-08 RX ADMIN — ACETAMINOPHEN 1000 MG: 10 INJECTION INTRAVENOUS at 06:08

## 2024-01-08 RX ADMIN — FENTANYL CITRATE 50 MCG: 0.05 INJECTION, SOLUTION INTRAMUSCULAR; INTRAVENOUS at 09:04

## 2024-01-08 RX ADMIN — CELECOXIB 200 MG: 200 CAPSULE ORAL at 05:40

## 2024-01-08 RX ADMIN — FENTANYL CITRATE 50 MCG: 0.05 INJECTION, SOLUTION INTRAMUSCULAR; INTRAVENOUS at 09:30

## 2024-01-08 RX ADMIN — SODIUM CHLORIDE: 0.9 INJECTION, SOLUTION INTRAVENOUS at 08:08

## 2024-01-08 RX ADMIN — MIDAZOLAM 2 MG: 1 INJECTION INTRAMUSCULAR; INTRAVENOUS at 07:34

## 2024-01-08 RX ADMIN — ENOXAPARIN SODIUM 40 MG: 40 INJECTION SUBCUTANEOUS at 05:42

## 2024-01-08 RX ADMIN — FOSAPREPITANT DIMEGLUMINE 150 MG: 150 INJECTION, POWDER, LYOPHILIZED, FOR SOLUTION INTRAVENOUS at 05:39

## 2024-01-08 RX ADMIN — PROPOFOL 200 MG: 10 INJECTION, EMULSION INTRAVENOUS at 07:38

## 2024-01-08 NOTE — DISCHARGE SUMMARY
Discharge Summary - Alexis Reyes 38 y.o. female MRN: [id number]    Unit/Bed#: [facility] Encounter: [id number]      Pre-Operative Diagnosis: Pre-Op Diagnosis Codes:     * Epigastric pain [R10.13]     * Abdominal pain [R10.9]    Post-Operative Diagnosis: Post-Op Diagnosis Codes:     * Epigastric pain [R10.13]     * Abdominal pain [R10.9]    Procedures Performed:  Procedure(s):  LAPAROSCOPY DIAGNOSTIC, lysis of adhesions, closure of madrid's space,  intra op EGD    Surgeon: Silver Rausch MD    See H & P for full details of admission and Operative Note for full details of operations performed.     Patient tolerated surgery well without complications.  After tolerating clear liquid diet she was stable for discharge the PACU  Patient was seen and examined prior to discharge.      Provisions for Follow-Up Care:  See After Visit Summary/Discharge Instructions for information related to follow-up care and home orders.      Disposition: Home, in stable condition.     Planned Readmission: No    Discharge Medications:  See After Visit Summary/Discharge Instructions for reconciled discharge medications provided to patient and family.      Post Operative instructions: Reviewed with patient and/or family.    Signature:   Herber Brito MD  Date: 1/8/2024 Time: 8:55 AM

## 2024-01-08 NOTE — ANESTHESIA POSTPROCEDURE EVALUATION
Post-Op Assessment Note    CV Status:  Stable    Pain management: adequate       Mental Status:  Alert and awake   Hydration Status:  Euvolemic   PONV Controlled:  Controlled   Airway Patency:  Patent     Post Op Vitals Reviewed: Yes      Staff: Anesthesiologist               /63 (01/08/24 0959)    Temp      Pulse 70 (01/08/24 1007)   Resp 18 (01/08/24 1007)    SpO2 94 % (01/08/24 1007)

## 2024-01-08 NOTE — DISCHARGE INSTRUCTIONS
You underwent a diagnostic laparoscopy on 1/8/2024 with Dr. Carmelo Rausch.  We found a small band of adhesion in your abdomen that might be causing your pain but otherwise everything else looks okay.    We looked at your gastric bypass anatomy and thing looks as it should be.  There was an opening between your intestines that we closed with suture to prevent hernias.     your medications from [facility] in Hospital Lobby   Crush or cut your pills and open capsules, mix with liquid to drink.  Take Tylenol every 8 hours around the clock, unless instructed otherwise  Take your omeprazole daily  It is important to stay hydrated and follow your discharge diet progression   Mild nausea is ok as long as you can drink fluids, sip very slowly and get up and walk during any periods of nausea  You may shower normally after 48 hours, but do not scrub incision sites, blot gently with clean towel to dry incisions  Take home medications as usual unless instructed otherwise while in hospital  Follow up with Dr. Fabian and your PCP within the next week  Sleeve gastrectomy patients ONLY: Complete full course of lovenox injections!

## 2024-01-08 NOTE — INTERVAL H&P NOTE
H&P reviewed. After examining the patient I find no changes in the patients condition since the H&P had been written.    Vitals:    01/08/24 0617   BP: 109/60   Pulse: 89   Resp: 16   Temp: 98.2 °F (36.8 °C)   SpO2: 98%

## 2024-01-08 NOTE — OP NOTE
OPERATIVE REPORT  PATIENT NAME: Alexis Reyes    :  1985  MRN: [id number]  Pt Location: [facility] OR ROOM 08    SURGERY DATE: 2024    Surgeons and Role:     * Silver Rausch MD - Primary     * Herber Brito MD - Assisting    Preop Diagnosis:  Epigastric pain [R10.13]  Abdominal pain [R10.9]    Post-Op Diagnosis Codes:     * Epigastric pain [R10.13]     * Abdominal pain [R10.9]    Procedure(s):  LAPAROSCOPY DIAGNOSTIC. lysis of adhesions. closure of madrid's space.  intra op EGD    Specimen(s):  * No specimens in log *    Estimated Blood Loss:   Minimal    Drains:  * No LDAs found *    Anesthesia Type:   General    Operative Indications:  Epigastric pain [R10.13]  Abdominal pain [R10.9]  History of Bhavya-en-Y gastric bypass    Operative Findings:  Adhesions of omentum to right abdominal wall  Petersons defect open  Bypass anatomy intact    Complications:   None    Procedure and Technique:  The patient was positively identified as Alexis Reyes was brought to the OR and laid supine on the OR table, after anesthesia timeout confirmed the correct patient and procedure, general anesthesia was induced and the patient was intubated without issue.  A footboard was placed antibiotics given DVT prophylaxis given her abdomen was prepped and draped in usual standard fashion.    After surgical timeout confirming the correct patient and procedure, entry was made into the abdomen using a Veress needle technique at Mckeon's point followed by a 5 mm trocar periumbilical.  Patient tolerated insufflation.  Upon evaluation of the abdomen minimal adhesions were noted but a particular dense band of adhesion was noted from right upper abdomen to omentum.  Additional trocars were then placed including a 12 mm and 5 mm trocar sites in the right abdomen and a 5 mm trocar site in the left abdomen.  The band of adhesion was taken down with the cautery on scissors and hemostasis was achieved with the harmonic scalpel.    The  bypass anatomy was evaluated, noting a mildly distended gastric remnant, a normal-appearing Bhavya limb and normal-appearing biliopancreatic limb and common channel.    The jejunojejunostomy anastomosis appears intact, no mesenteric defect present.  On further evaluation, Petersons space was open between the Bhavya limb and the transverse mesocolon.  This was closed with a nonabsorbable V-Loc suture.  Hemostasis was assured.    She then underwent an intraoperative upper endoscopy, with findings of a healed marginal ulcer and small gastric pouch with stable gastrojejunostomy anastomosis.    This concludes the procedure all sponge and instrument counts were correct, abdomen was desufflated, and all port sites were closed with 4-0 Monocryl suture.  Skin glue applied, the patient was awakened and extubated and transferred to PACU in stable condition.    Dr. Carmelo Rausch was present for the entire procedure     A qualified resident physician was not available.    Patient Disposition:  PACU     This procedure was not performed to treat colon cancer through resection      SIGNATURE: Herber Brito MD  DATE: January 8, 2024  TIME: 8:40 AM

## 2024-01-08 NOTE — DISCHARGE INSTR - AVS FIRST PAGE
Bariatric/Weight Loss Surgery  Hospital Discharge Instructions  ACTIVITY:  Progress as feels comfortable - a good rule is:  if you are doing something and it begins to hurt, stop doing the activity. Walk every hour while at home.  You may walk stairs if you do so slowly  You may shower 48 hours after surgery.  Do not scrub incision sites.  Blot gently with clean towel to dry incisions. (see #4 below)   Use your incentive spirometer 10 times per hour while awake for 1 week after surgery.  Do NOT drive for 48 hours after surgery. No driving 24 hours after taking certain prescription pain medications. Examples of such medication are Percocet, Darvocet, Oxycodone, Tylenol #3, and Tylenol with Codeine.     DIET  Stay on a liquid diet for 7 days after your surgery date, sipping slowly. Refer to your manual for examples of choices. Remember to keep your liquids sugar free or low calorie. You may have protein drinks. Make sure to drink 48 to 64 ounces per day of fluids.   You may advance to a pureed diet one week after surgery as instructed by your diet progression pamphlet. Once you get approval from your surgeon at your first post operative visit, you may advance to the soft diet and remain on soft diet for 8 weeks unless otherwise instructed.    MEDICATIONS:  The abdominal nerve block will wear off during the first 1-2 days that you are home, and you may become sore (especially over incision site/sites where abdominal wall is sutured). This may create a pulling sensation, especially while moving around, and will fade over time.  Continue to take your Tylenol and your pain medication as instructed.   Start vitamins and minerals one week after surgery or when you start stage 3/puree diet.   Anti-acid Medication as per prescription.  Other medications as indicated on the Physician Patient Discharge Instructions form given to you at the time of discharge.  Make sure that you are splitting your pill or tablet medications in  halves or fourths or even crushing them before you take them. Capsules should be opened and mixed with water or jello. You need to do this for at least 4 weeks after surgery. Eventually you will be able to take your medications the regular way as they were prescribed.   You will need to consult with your Family Doctor in regards to all your prescribed medication, particularly those for blood pressure and diabetes.  As you lose weight, medical conditions may change, requiring an alteration or elimination of the drug dose. Monitor blood pressure closely and call PCP with any concerns.   Sleeve Gastrectomy patients ONLY:  Complete full course of lovenox injections!  DO NOT TAKE BIRTH CONTROL(BC) MEDICATIONS, INSERT BC VAGINAL RINGS, OR PLACE IUD OR ANY OTHER BC METHODS UNTIL 31 DAYS FROM DAY OF DISCHARGE FROM HOSPITAL. THIS PLACES YOU AT HIGH RISK FOR A POTENTIALLY LIFE THREATENING BLOOD CLOT. Remember to always use barrier methods for birth control and speak to your GYN about using two forms of birth control to start 31 days after surgery. It is very important to avoid pregnancy until at least 18-24 months after surgery.     INCISION CARE  You may shower and get incisions wet 2 days after surgery. No soaking tub baths or swimming for 30 days after surgery. Keep abdominal area and incisions clean. Use soap and water to create a good lather and rinse off.  Do not scrub incisions.   If you have a drain, empty the drain as the nurses instructed.    FOLLOW-UP APPOINTMENT should be made for one week after discharge. Call surgeon’s office at [phone] to schedule an appointment.    CALL YOUR DOCTOR FOR:  pain not controlled by pain medications, a temperature greater than 101.5° F, any increase or change in drainage or redness from any incision, any vomiting or inability to keep liquids down, shortness of breath, shoulder pain, or bleeding

## 2024-01-09 DIAGNOSIS — R10.13 EPIGASTRIC PAIN: Primary | ICD-10-CM

## 2024-01-09 DIAGNOSIS — R10.13 EPIGASTRIC PAIN: ICD-10-CM

## 2024-01-09 DIAGNOSIS — Z98.84 BARIATRIC SURGERY STATUS: Primary | ICD-10-CM

## 2024-01-09 RX ORDER — OXYCODONE HYDROCHLORIDE AND ACETAMINOPHEN 5; 325 MG/1; MG/1
1 TABLET ORAL EVERY 8 HOURS PRN
Qty: 10 TABLET | Refills: 0 | Status: SHIPPED | OUTPATIENT
Start: 2024-01-09 | End: 2024-01-19 | Stop reason: ALTCHOICE

## 2024-01-09 RX ORDER — OXYCODONE HYDROCHLORIDE AND ACETAMINOPHEN 5; 325 MG/1; MG/1
1 TABLET ORAL EVERY 8 HOURS PRN
Qty: 10 TABLET | Refills: 0 | Status: SHIPPED | OUTPATIENT
Start: 2024-01-09 | End: 2024-01-09

## 2024-01-09 RX ORDER — OXYCODONE HYDROCHLORIDE AND ACETAMINOPHEN 5; 325 MG/1; MG/1
1 TABLET ORAL EVERY 8 HOURS PRN
Qty: 10 TABLET | Refills: 0 | Status: SHIPPED | OUTPATIENT
Start: 2024-01-09 | End: 2024-01-09 | Stop reason: SDUPTHER

## 2024-01-09 NOTE — TELEPHONE ENCOUNTER
"Reason for Disposition  • Caller wants to use a complementary or alternative medicine    Answer Assessment - Initial Assessment Questions  1. NAME of MEDICATION: \"What medicine are you calling about?\"      Roxicodone  2. QUESTION: \"What is your question?\" (e.g., medication refill, side effect)      Can my pain medication be changed. The roxicodone is giving me severe stomach spasms  3. PRESCRIBING HCP: \"Who prescribed it?\" Reason: if prescribed by specialist, call should be referred to that group.      surgeon  4. SYMPTOMS: \"Do you have any symptoms?\"      Severe stomach spasms that radiate into my back    Protocols used: Medication Question Call-ADULT-    "

## 2024-01-09 NOTE — TELEPHONE ENCOUNTER
Patient reports that she was prescribed Roxicodone after her surgery today. She notes that she took a dose and it gave her severe stomach spasms that radiate into her back and take her breath away. She notes she is allergic to morphine and codeine because they give her the same symptoms.     She notes the only medication that doesn't cause her severe stomach spasms is Percocoet. Questioning if her pain medication can be changed to percocet.     Patient aware that office will address in the morning.

## 2024-01-09 NOTE — TELEPHONE ENCOUNTER
"Regarding: Post op - possible med allergy  ----- Message from Penelope Bonner sent at 1/8/2024  7:41 PM EST -----  \" I had surgery today and was prescribed Oxycodone, it's giving me really bad stomach spasms and this is the same reaction that I get with pain medications that I am allergic to\"    "

## 2024-01-09 NOTE — PROGRESS NOTES
Patient discharged with prescription for oxycodone but reports tremors with oxycodone.  Request to switch to Percocet because she has tolerated this in the past.  Oxycodone placed on her allergy list and patient prescribed 10 tabs of Percocet.  For postoperative pain    Herber Brito MD  Bariatric Surgery

## 2024-01-09 NOTE — TELEPHONE ENCOUNTER
Patient calling, again, as she has not received a response regarding her pain medication.  Patient is requesting return call as she needs to be taking something for the pain and doesn't want to take same medication as she is having issues - such that she is allergic to medication provided.    Please review and address for new medication for the patient.    Patient Issue:  Patient reports that she was prescribed Roxicodone after her surgery today. She notes that she took a dose and it gave her severe stomach spasms that radiate into her back and take her breath away. She notes she is allergic to morphine and codeine because they give her the same symptoms.      She notes the only medication that doesn't cause her severe stomach spasms is Percocoet.     Questioning if her pain medication can be changed to percocet.

## 2024-01-15 ENCOUNTER — OFFICE VISIT (OUTPATIENT)
Dept: BARIATRICS | Facility: CLINIC | Age: 39
End: 2024-01-15

## 2024-01-15 ENCOUNTER — TELEPHONE (OUTPATIENT)
Dept: BARIATRICS | Facility: CLINIC | Age: 39
End: 2024-01-15

## 2024-01-15 VITALS
SYSTOLIC BLOOD PRESSURE: 125 MMHG | TEMPERATURE: 98.4 F | HEIGHT: 58 IN | BODY MASS INDEX: 38.62 KG/M2 | WEIGHT: 184 LBS | DIASTOLIC BLOOD PRESSURE: 80 MMHG | HEART RATE: 82 BPM

## 2024-01-15 DIAGNOSIS — Z98.84 BARIATRIC SURGERY STATUS: ICD-10-CM

## 2024-01-15 DIAGNOSIS — R10.13 EPIGASTRIC PAIN: Primary | ICD-10-CM

## 2024-01-15 PROCEDURE — 99024 POSTOP FOLLOW-UP VISIT: CPT | Performed by: SURGERY

## 2024-01-15 NOTE — PROGRESS NOTES
POST OP UP VISIT - BARIATRIC SURGERY  Alexis Reyes 38 y.o. female MRN: [id number]  Unit/Bed#:  Encounter: [id number]      HPI:  Alexis Reyes is a 38 y.o. female with history of sleeve converted to RnYGB in 2020 status post Diagnostic Laparoscopy with findings of a dense adhesive band performed by Dr. Carmelo Rausch on 1/8/24 returning to office for first post op visit since surgery. She was discharged same day as the surgery.    Tolerating diet.  Denies nausea and vomiting.  Taking PPIs. Denies fevers or chills. Having good bowel function. Reports concern for weight regain.      Review of Systems   Constitutional:  Negative for chills and fever.   HENT:  Negative for ear pain and sore throat.    Eyes:  Negative for pain and visual disturbance.   Respiratory:  Negative for cough and shortness of breath.    Cardiovascular:  Negative for chest pain and palpitations.   Gastrointestinal:  Negative for abdominal pain and vomiting.   Genitourinary:  Negative for dysuria and hematuria.   Musculoskeletal:  Negative for arthralgias and back pain.   Skin:  Negative for color change and rash.   Neurological:  Negative for seizures and syncope.   All other systems reviewed and are negative.        Historical Information   Past Medical History:   Diagnosis Date    Abnormal Pap smear of cervix     Anemia     gets iron infusions    Anxiety     Chiari I malformation (HCC)     12/28/23 msg to anesth.    Chronic pain disorder     during lupus flairs    COVID-19     Disease of thyroid gland     pt off meds    Female infertility     IVF pregnancy    Fibromyalgia     Fibromyalgia, primary     Gastric ulcer     GERD (gastroesophageal reflux disease)     today 1/8/2024 exp lap    Hiatal hernia     Lupus (HCC)     Muscle weakness     Pericardial cyst     Pituitary tumor     Pleural effusion associated with pulmonary infection     Polycystic ovary syndrome     Pulmonary edema     Pulmonary emboli (HCC)     Spinal headache     with c section     "Wears contact lenses      Past Surgical History:   Procedure Laterality Date     SECTION      x 2    CHOLECYSTECTOMY      GASTRECTOMY SLEEVE LAPAROSCOPIC      GASTRIC BYPASS      2019    POLYPECTOMY      VT LAPS ABD PRTM&OMENTUM DX W/WO SPEC BR/WA SPX N/A 2024    Procedure: LAPAROSCOPY DIAGNOSTIC, lysis of adhesions, closure of madrid's space,  intra op EGD;  Surgeon: Silver Rausch MD;  Location: [facility];  Service: Bariatrics    TONSILLECTOMY      TUBAL LIGATION       Social History   Social History     Substance and Sexual Activity   Alcohol Use Yes    Comment: occ. social rare     Social History     Substance and Sexual Activity   Drug Use Never     Social History     Tobacco Use   Smoking Status Never   Smokeless Tobacco Never     Family History: non-contributory    Meds/Allergies   all medications and allergies reviewed  Allergies   Allergen Reactions    Codeine Tremor     Also spasms    Morphine Tremor     spasms    Oxycodone Tremor     2023    Valium [Diazepam] Other (See Comments)     Spasms       Objective       Current Vitals:   Blood Pressure: 125/80 (01/15/24 0956)  Pulse: 82 (01/15/24 0956)  Temperature: 98.4 °F (36.9 °C) (01/15/24 0956)  Temp Source: Temporal (01/15/24 0956)  Height: 4' 10.2\" (147.8 cm) (01/15/24 0956)  Weight - Scale: 83.5 kg (184 lb) (01/15/24 0956)      Invasive Devices       None                   Physical Exam  Vitals reviewed.   Constitutional:       General: She is not in acute distress.     Appearance: Normal appearance. She is not ill-appearing.   HENT:      Head: Normocephalic.      Nose: Nose normal.      Mouth/Throat:      Mouth: Mucous membranes are moist.      Pharynx: Oropharynx is clear.   Eyes:      General: No scleral icterus.  Cardiovascular:      Rate and Rhythm: Normal rate.   Pulmonary:      Effort: Pulmonary effort is normal. No respiratory distress.   Abdominal:      Palpations: Abdomen is soft.      Tenderness: There is no abdominal " tenderness.      Hernia: No hernia is present.      Comments: Laparoscopic port sites clean dry and intact.   Musculoskeletal:         General: Normal range of motion.      Cervical back: Normal range of motion.   Skin:     General: Skin is warm and dry.      Capillary Refill: Capillary refill takes less than 2 seconds.   Neurological:      General: No focal deficit present.      Mental Status: She is alert. Mental status is at baseline.   Psychiatric:         Mood and Affect: Mood normal.               Assessment/PLAN:    38 y.o. female status post Diagnostic Laparoscopy for abdominal pain with finding of a dense adhesive band that was lysed done on 1/8/24 by Dr. Carmelo Rausch, doing well post op from surgery. No major issues and healing well.       Increase physical activity slowly as tolerated and instructed.  Advance diet as tolerated.  Continue PPI  Refer to weight weight management for medical options for further weight loss as her surgical options are limited.   Advised patient to try medical weight loss for 6 months then follow-up with surgical PA/APN.      Herber Brito MD  Bariatric Surgery   1/15/2024  10:24 AM      .

## 2024-01-15 NOTE — TELEPHONE ENCOUNTER
Patient needs to schedule 6 month follow-up with Annette (scheduled virtual for 1/19) but needs 6 M.

## 2024-01-19 ENCOUNTER — OFFICE VISIT (OUTPATIENT)
Dept: BARIATRICS | Facility: CLINIC | Age: 39
End: 2024-01-19
Payer: COMMERCIAL

## 2024-01-19 VITALS
SYSTOLIC BLOOD PRESSURE: 115 MMHG | HEART RATE: 86 BPM | BODY MASS INDEX: 39.04 KG/M2 | HEIGHT: 58 IN | RESPIRATION RATE: 16 BRPM | DIASTOLIC BLOOD PRESSURE: 80 MMHG | WEIGHT: 186 LBS

## 2024-01-19 DIAGNOSIS — E66.9 OBESITY, CLASS II, BMI 35-39.9: Primary | ICD-10-CM

## 2024-01-19 DIAGNOSIS — E03.9 ACQUIRED HYPOTHYROIDISM: ICD-10-CM

## 2024-01-19 DIAGNOSIS — Z98.84 BARIATRIC SURGERY STATUS: ICD-10-CM

## 2024-01-19 PROCEDURE — 99214 OFFICE O/P EST MOD 30 MIN: CPT | Performed by: NURSE PRACTITIONER

## 2024-01-19 NOTE — PROGRESS NOTES
Assessment/Plan:    Obesity, Class II, BMI 35-39.9  - S/P Vertical Sleeve Gastrectomy in 2017 in [place], then conversion to RNYGB in 2020 by Dr. Delgado in [place]. Underwent diagnostic laparoscopy with findings of a dense adhesive band performed by Dr. Carmelo Rausch on 1/8/24. Recovered well from that and no longer having epigastric pain.   - Discussed options of HealthyCORE-Intensive Lifestyle Intervention Program, Very Low Calorie Diet-VLCD, and Conservative Program and the role of weight loss medications.  - Not a candidate for VLCD due to iron def anemia.   - Explained the importance of making lifestyle changes with anti-obesity medications.  - Patient is interested in pursuing Conservative Program  - Initial weight loss goal of 5-10% weight loss for improved health  - Weight loss can improve patient's co-morbid conditions and/or prevent weight-related complications.  - Feels hungry often and struggling with weight regain. Interested in starting weight loss medication.   - FDA approved weight loss medications reviewed: Wegovy, Saxenda, Zepbound, Qsymia, Contrave, and Phentermine. Wegovy and Saxenda shortage discussed. Off label use of medications discussed.   - Phentermine in the past caused an increase in heart rate and anxiety.  - Topamax for migraines caused brain fog.  - Avoid Wellbutrin and Contrave due to pituitary microadenoma.   - Patient denies personal history of pancreatitis. Patient also denies personal and family history of thyroid cancer and multiple endocrine neoplasia type 2 (MEN 2 tumor).   - S/P tubal ligation.  - Medication contract signed.  - She made an informed decision to start Saxenda.  - Common side effects of Saxenda may include: increased heart rate (pulse), headache, low blood sugar, GI/abdominal upset, heartburn, fatigue, nausea, vomiting, diarrhea, and constipation. If severe abdominal pain develops, stop Saxenda and go to the ER, as this could be pancreatitis. Monitor heart rate  while on Saxenda and if resting heart rate greater than 100 beats per minute, please notify me.   - If you need to have surgery or another procedure, such as an upper endoscopy or colonoscopy, please contact my office as often medications like Saxenda need to be held for a certain amount of time prior to a procedure.   - Labs reviewed: CMP 1/5/2024. CBC 11/13/2023. Lipid and A1C 6/24/2023. TSH 3/11/2023. Blood work was all within acceptable range.      Goals:  Do not skip meals.  Food log (ie.) www.myfitnesspal.com,sparkpeople.com,loseit.com,calorieking.com,etc. baritastic (use skinnytaste.com, userfox or smartphone walt [employer] for recipes)  No sugary beverages. At least 64oz of water daily.  Increase physical activity by 10 minutes daily. Gradually increase physical activity to a goal of 5 days per week for 30 minutes of MODERATE intensity PLUS 2 days per week of FULL BODY resistance training (use smartphone apps [employer], Home Workout, etc.)  Start food logging, weighing, and measuring food.   5291-9146 calories per day. Sample menu given.  Increase water intake to at least 64 oz daily.  Continue 30/60 rule.  Start exercise, such as walking 2 days per week for 10 minutes and gradually increase to goal of 5 days per week for 30 minutes.     Bariatric surgery status  - S/P Vertical Sleeve Gastrectomy in 2017 in [place], then conversion to RNYGB in 2020 by Dr. Delgado in [place]. Underwent diagnostic laparoscopy with findings of a dense adhesive band performed by Dr. Carmelo Rausch on 1/8/24.     Acquired hypothyroidism  - Taking levothyroxine. Continue management with prescribing provider.          Sal was seen today for follow-up.    Diagnoses and all orders for this visit:    Obesity, Class II, BMI 35-39.9  -     liraglutide (SAXENDA) injection; Inject 0.6 mg subcutaneously once per day for the first week. Increase in weekly intervals by 0.6 mg until a dose of 3 mg is reached  -     Insulin Pen Needle 32G  X 4 MM MISC; Use in the morning    Bariatric surgery status  -     liraglutide (SAXENDA) injection; Inject 0.6 mg subcutaneously once per day for the first week. Increase in weekly intervals by 0.6 mg until a dose of 3 mg is reached  -     Insulin Pen Needle 32G X 4 MM MISC; Use in the morning    Acquired hypothyroidism        Total time spent: 40 min, with >50% face-to-face time spent counseling patient on nonsurgical interventions for the treatment of excess weight. Discussed in detail nonsurgical options including intensive lifestyle intervention program, very low-calorie diet program and conservative program.  Discussed the role of weight loss medications.  Counseled patient on diet behavior and exercise modification for weight loss.        Follow up in approximately  2 month nurse visit and 4 months  with Non-Surgical Physician/Advanced Practitioner.    Subjective:   Chief Complaint   Patient presents with    Follow-up     MWM Post-Op Wt Gain f/u; Waist-40in       Patient ID: Alexis Reyes  is a 38 y.o. female with excess weight/obesity here to pursue weight management.  Previous notes and records have been reviewed.    Past Medical History:   Diagnosis Date    Abnormal Pap smear of cervix     Anemia     gets iron infusions    Anxiety     Chiari I malformation (HCC)     12/28/23 msg to anesth.    Chronic pain disorder     during lupus flairs    COVID-19     Disease of thyroid gland     pt off meds    Female infertility     IVF pregnancy    Fibromyalgia     Fibromyalgia, primary     Gastric ulcer     GERD (gastroesophageal reflux disease)     today 1/8/2024 exp lap    Hiatal hernia     Lupus (HCC)     Muscle weakness     Pericardial cyst     Pituitary tumor     Pleural effusion associated with pulmonary infection     Polycystic ovary syndrome     Pulmonary edema     Pulmonary emboli (HCC)     Spinal headache     with c section    Wears contact lenses      Past Surgical History:   Procedure Laterality Date     " SECTION      x 2    CHOLECYSTECTOMY      GASTRECTOMY SLEEVE LAPAROSCOPIC      GASTRIC BYPASS      2019    POLYPECTOMY      TX LAPS ABD PRTM&OMENTUM DX W/WO SPEC BR/WA SPX N/A 2024    Procedure: LAPAROSCOPY DIAGNOSTIC, lysis of adhesions, closure of madrid's space,  intra op EGD;  Surgeon: Silver Rausch MD;  Location: [facility];  Service: Bariatrics    TONSILLECTOMY      TUBAL LIGATION         HPI:  Wt Readings from Last 20 Encounters:   24 84.4 kg (186 lb)   01/15/24 83.5 kg (184 lb)   24 85.5 kg (188 lb 7.9 oz)   23 83 kg (183 lb)   23 80.8 kg (178 lb 3.2 oz)   23 79.4 kg (175 lb 0.7 oz)   23 78.7 kg (173 lb 8 oz)   23 78.7 kg (173 lb 8 oz)   23 77.1 kg (170 lb)   23 77.6 kg (171 lb)   23 77 kg (169 lb 12.1 oz)   23 77.7 kg (171 lb 3.2 oz)   23 76.2 kg (168 lb)   03/10/23 75.8 kg (167 lb)   23 75.8 kg (167 lb)   23 73.5 kg (162 lb)   22 73.5 kg (162 lb)   10/18/22 74.4 kg (164 lb)   10/12/22 73.7 kg (162 lb 6.4 oz)   22 73.8 kg (162 lb 11.2 oz)     S/P Vertical Sleeve Gastrectomy in 2017 in [place], then conversion to RNYGB in  by Dr. Delgado in [place] due to severe heartburn and \"twisted esophagus.\"     Established care with our surgical program and was having epigastric pain. Underwent diagnostic laparoscopy with findings of a dense adhesive band performed by Dr. Carmelo Rausch on 24. Recovered well. Weight prior to sleeve 240 lbs, weight prior to bypass 220 lbs, ginna 158 lbs. Has been regaining weight. Referred to [facility] for weight regain.     On Phentermine in the past, helpful, but increased heart rate and increase in anxiety.     Topamax for migraines caused brain fog.     Follows 30/60 rule.     Always feels hungry.    Hydration: 40 oz water, 1 cup coffee with creamer, hot tea with honey   Alcohol: 1 drink every 2-3 months  Smoking: denies  Exercise: none - finding time is " "difficult  Occupation: [employer]  and in school for nursing  Sleep: 5 hours  Had home based sleep study in 2022 and it did not suggest sleep apnea.     Goal weight: wants to feel good, 140-145 lbs    Colonoscopy: N/A  Mammogram: N/A    The following portions of the patient's history were reviewed and updated as appropriate: allergies, current medications, past family history, past medical history, past social history, past surgical history, and problem list.    Family History   Problem Relation Age of Onset    Heart disease Mother     Hypertension Mother     Heart attack Father     No Known Problems Brother     No Known Problems Brother     No Known Problems Daughter     No Known Problems Son     Heart disease Maternal Grandmother     Hypertension Maternal Grandmother     Diabetes Maternal Grandmother     Early death Maternal Grandfather     No Known Problems Paternal Grandmother     No Known Problems Paternal Grandfather     Cancer Neg Hx         Review of Systems   HENT:  Negative for sore throat.    Respiratory:  Negative for cough and shortness of breath.    Cardiovascular:  Negative for chest pain and palpitations.   Gastrointestinal:  Negative for abdominal pain, constipation, diarrhea, nausea and vomiting.        + GERD controlled with medication   Musculoskeletal:  Negative for arthralgias and back pain.   Skin:  Negative for rash.   Psychiatric/Behavioral:  Negative for suicidal ideas (or HI).         Denies depression and anxiety       Objective:  /80   Pulse 86   Resp 16   Ht 4' 10\" (1.473 m)   Wt 84.4 kg (186 lb)   LMP 11/28/2023 (Approximate) Comment: tubal ligation  BMI 38.87 kg/m²     Physical Exam  Vitals and nursing note reviewed.        Constitutional   General appearance: Abnormal.  well developed and obese.   Eyes No conjunctival injection.   Ears, Nose, Mouth, and Throat Oral mucosa moist.   Pulmonary   Respiratory effort: No increased work of breathing or signs of " respiratory distress.     Cardiovascular     Examination of extremities for edema and/or varicosities: Normal.  no edema.   Abdomen   Abdomen: Abnormal.  The abdomen was obese.    Musculoskeletal   Normal range of motion  Neurological   Gait and station: Normal.    Psychiatric   Orientation to person, place and time: Normal.    Affect: appropriate

## 2024-01-19 NOTE — PATIENT INSTRUCTIONS
Common side effects of Saxenda may include: increased heart rate (pulse), headache, low blood sugar, GI/abdominal upset, heartburn, fatigue, nausea, vomiting, diarrhea, and constipation. If severe abdominal pain develops, stop Saxenda and go to the ER, as this could be pancreatitis. Monitor heart rate while on Saxenda and if resting heart rate greater than 100 beats per minute, please notify me.     If you need to have surgery or another procedure, such as an upper endoscopy or colonoscopy, please contact my office as often medications like Saxenda need to be held for a certain amount of time prior to a procedure.      VISIT [email or web address]  INJECT SAXENDA DAILY SUBCUTANEOUSLY.  -WEEK 1: 0.6mg daily  -if tolerated WEEK 2: 1.2mg daily  -if tolerated WEEK 3: 1.8mg daily  -if tolerated WEEK 4: 2.4mg daily  -if tolerated WEEK 5: 3mg daily  -IF NAUSEA/VOMITING DEVELOP STOP MEDICATION FOR A FEW DAYS AND DECREASE TO PREVIOUSLY TOLERATED DOSE. STAY HYDRATED.  -IF YOU DEVELOP SEVERE ABDOMINAL PAIN WHICH MAY RADIATE TO THE BACK, SOMETIMES ASSOCIATED WITH FEVER, AND VOMITING, STOP MEDICATION AND SEEK MEDICAL CARE AS THIS MAY BE A SIGN OF PANCREATITIS.      Please make sure that you are cleaning the area with alcohol wipes before each use, changing the needles before each use, and rotating the injection site. Please inject at a 90 degree angle. You can also have someone inject the Saxenda for you in the back of the upper arm.     Please visit [email or web address] for a video regarding how to inject Saxenda.

## 2024-01-21 PROBLEM — E66.9 OBESITY, CLASS II, BMI 35-39.9: Status: ACTIVE | Noted: 2024-01-21

## 2024-01-21 PROBLEM — E66.812 OBESITY, CLASS II, BMI 35-39.9: Status: ACTIVE | Noted: 2024-01-21

## 2024-01-21 NOTE — ASSESSMENT & PLAN NOTE
- S/P Vertical Sleeve Gastrectomy in 2017 in [place], then conversion to RNYGB in 2020 by Dr. Delgado in [place]. Underwent diagnostic laparoscopy with findings of a dense adhesive band performed by Dr. Carmelo Rausch on 1/8/24. Recovered well from that and no longer having epigastric pain.   - Discussed options of HealthyCORE-Intensive Lifestyle Intervention Program, Very Low Calorie Diet-VLCD, and Conservative Program and the role of weight loss medications.  - Not a candidate for VLCD due to iron def anemia.   - Explained the importance of making lifestyle changes with anti-obesity medications.  - Patient is interested in pursuing Conservative Program  - Initial weight loss goal of 5-10% weight loss for improved health  - Weight loss can improve patient's co-morbid conditions and/or prevent weight-related complications.  - Feels hungry often and struggling with weight regain. Interested in starting weight loss medication.   - FDA approved weight loss medications reviewed: Wegovy, Saxenda, Zepbound, Qsymia, Contrave, and Phentermine. Wegovy and Saxenda shortage discussed. Off label use of medications discussed.   - Phentermine in the past caused an increase in heart rate and anxiety.  - Topamax for migraines caused brain fog.  - Avoid Wellbutrin and Contrave due to pituitary microadenoma.   - Patient denies personal history of pancreatitis. Patient also denies personal and family history of thyroid cancer and multiple endocrine neoplasia type 2 (MEN 2 tumor).   - S/P tubal ligation.  - Medication contract signed.  - She made an informed decision to start Saxenda.  - Common side effects of Saxenda may include: increased heart rate (pulse), headache, low blood sugar, GI/abdominal upset, heartburn, fatigue, nausea, vomiting, diarrhea, and constipation. If severe abdominal pain develops, stop Saxenda and go to the ER, as this could be pancreatitis. Monitor heart rate while on Saxenda and if resting heart rate greater  than 100 beats per minute, please notify me.   - If you need to have surgery or another procedure, such as an upper endoscopy or colonoscopy, please contact my office as often medications like Saxenda need to be held for a certain amount of time prior to a procedure.   - Labs reviewed: CMP 1/5/2024. CBC 11/13/2023. Lipid and A1C 6/24/2023. TSH 3/11/2023. Blood work was all within acceptable range.      Goals:  Do not skip meals.  Food log (ie.) www.myfitnesspal.com,sparkpeople.com,loseit.com,calorieking.com,etc. baritastic (use skinnytaste.com, [employer] or smartphone walt [employer] for recipes)  No sugary beverages. At least 64oz of water daily.  Increase physical activity by 10 minutes daily. Gradually increase physical activity to a goal of 5 days per week for 30 minutes of MODERATE intensity PLUS 2 days per week of FULL BODY resistance training (use smartphone apps [employer], Home Workout, etc.)  Start food logging, weighing, and measuring food.   9019-8596 calories per day. Sample menu given.  Increase water intake to at least 64 oz daily.  Continue 30/60 rule.  Start exercise, such as walking 2 days per week for 10 minutes and gradually increase to goal of 5 days per week for 30 minutes.

## 2024-01-21 NOTE — ASSESSMENT & PLAN NOTE
- S/P Vertical Sleeve Gastrectomy in 2017 in [place], then conversion to RNYGB in 2020 by Dr. Delgado in [place]. Underwent diagnostic laparoscopy with findings of a dense adhesive band performed by Dr. Carmelo Rausch on 1/8/24.

## 2024-01-25 ENCOUNTER — TELEPHONE (OUTPATIENT)
Dept: BARIATRICS | Facility: CLINIC | Age: 39
End: 2024-01-25

## 2024-01-25 ENCOUNTER — PATIENT MESSAGE (OUTPATIENT)
Dept: BARIATRICS | Facility: CLINIC | Age: 39
End: 2024-01-25

## 2024-01-25 DIAGNOSIS — Z98.84 BARIATRIC SURGERY STATUS: ICD-10-CM

## 2024-01-25 DIAGNOSIS — E66.9 OBESITY, CLASS II, BMI 35-39.9: Primary | ICD-10-CM

## 2024-01-25 DIAGNOSIS — E66.9 OBESITY, CLASS II, BMI 35-39.9: ICD-10-CM

## 2024-01-26 RX ORDER — TIRZEPATIDE 2.5 MG/.5ML
2.5 INJECTION, SOLUTION SUBCUTANEOUS WEEKLY
Qty: 2 ML | Refills: 0 | Status: SHIPPED | OUTPATIENT
Start: 2024-01-26 | End: 2024-01-30 | Stop reason: ALTCHOICE

## 2024-01-29 ENCOUNTER — TELEPHONE (OUTPATIENT)
Dept: BARIATRICS | Facility: CLINIC | Age: 39
End: 2024-01-29

## 2024-01-29 ENCOUNTER — PATIENT MESSAGE (OUTPATIENT)
Dept: BARIATRICS | Facility: CLINIC | Age: 39
End: 2024-01-29

## 2024-01-30 ENCOUNTER — PATIENT MESSAGE (OUTPATIENT)
Dept: BARIATRICS | Facility: CLINIC | Age: 39
End: 2024-01-30

## 2024-01-30 DIAGNOSIS — E66.9 OBESITY, CLASS II, BMI 35-39.9: Primary | ICD-10-CM

## 2024-01-30 DIAGNOSIS — Z98.84 BARIATRIC SURGERY STATUS: ICD-10-CM

## 2024-02-03 NOTE — TELEPHONE ENCOUNTER
FRANCISCO faxed along melly/ Keely Mortensen report to MELLY MEJIA  [employer] @ [phone]  Request was marked urgent  Yes

## 2024-02-06 DIAGNOSIS — Z00.6 ENCOUNTER FOR EXAMINATION FOR NORMAL COMPARISON OR CONTROL IN CLINICAL RESEARCH PROGRAM: ICD-10-CM

## 2024-02-09 NOTE — TELEPHONE ENCOUNTER
Call [phone] when provider is available 
Does that mean we can not do peer to peer, can we send a letter of medical necessity     Jessica Nuñez
I let the pt know  Can't do peer to peer today b/c provider is not in the office today 
I tried calling the number below and they told me both the Clifford and the dexcom are excluded benefits 
Please inform pt that she needs to follow diet as per dietitian andit is very important to eat protein rich food     We will try to get dexcom G7 from insurance , but it will be hard to get covered as she did not have any low blood sugars as per diagnostic  Dexcom download     Can we do peer to peer to get it approved ,thanks     [employer]
Pt called stating she can not take the acrabose it upsets her stomach really bad  She also states the her numbers have been in the 20s and 30s  Please advise 
Lila Campo RDN, CDN, [facility]      [phone]   [email or web address]

## 2024-02-21 PROBLEM — Z01.419 ENCOUNTER FOR ANNUAL ROUTINE GYNECOLOGICAL EXAMINATION: Status: RESOLVED | Noted: 2022-07-14 | Resolved: 2024-02-21

## 2024-03-09 ENCOUNTER — APPOINTMENT (OUTPATIENT)
Dept: LAB | Facility: CLINIC | Age: 39
End: 2024-03-09
Payer: COMMERCIAL

## 2024-03-09 DIAGNOSIS — E55.9 VITAMIN D DEFICIENCY: ICD-10-CM

## 2024-03-09 DIAGNOSIS — Z00.6 ENCOUNTER FOR EXAMINATION FOR NORMAL COMPARISON OR CONTROL IN CLINICAL RESEARCH PROGRAM: ICD-10-CM

## 2024-03-09 DIAGNOSIS — Z98.84 BARIATRIC SURGERY STATUS: ICD-10-CM

## 2024-03-09 LAB — 25(OH)D3 SERPL-MCNC: 18.7 NG/ML (ref 30–100)

## 2024-03-09 PROCEDURE — 82306 VITAMIN D 25 HYDROXY: CPT

## 2024-03-09 PROCEDURE — 36415 COLL VENOUS BLD VENIPUNCTURE: CPT

## 2024-03-11 DIAGNOSIS — K91.2 POSTSURGICAL MALABSORPTION: ICD-10-CM

## 2024-03-11 DIAGNOSIS — E55.9 VITAMIN D DEFICIENCY: ICD-10-CM

## 2024-03-11 DIAGNOSIS — Z98.84 BARIATRIC SURGERY STATUS: ICD-10-CM

## 2024-03-11 DIAGNOSIS — R79.0 LOW FERRITIN: ICD-10-CM

## 2024-03-11 DIAGNOSIS — Z98.84 BARIATRIC SURGERY STATUS: Primary | ICD-10-CM

## 2024-03-11 DIAGNOSIS — D50.8 IRON DEFICIENCY ANEMIA SECONDARY TO INADEQUATE DIETARY IRON INTAKE: Primary | ICD-10-CM

## 2024-03-11 RX ORDER — ERGOCALCIFEROL 1.25 MG/1
50000 CAPSULE ORAL 2 TIMES WEEKLY
Qty: 24 CAPSULE | Refills: 0 | Status: SHIPPED | OUTPATIENT
Start: 2024-03-11

## 2024-03-22 ENCOUNTER — CLINICAL SUPPORT (OUTPATIENT)
Dept: BARIATRICS | Facility: CLINIC | Age: 39
End: 2024-03-22

## 2024-03-22 ENCOUNTER — TELEPHONE (OUTPATIENT)
Dept: BARIATRICS | Facility: CLINIC | Age: 39
End: 2024-03-22

## 2024-03-22 VITALS
SYSTOLIC BLOOD PRESSURE: 120 MMHG | HEIGHT: 58 IN | WEIGHT: 178 LBS | BODY MASS INDEX: 37.36 KG/M2 | HEART RATE: 79 BPM | DIASTOLIC BLOOD PRESSURE: 70 MMHG | TEMPERATURE: 97.9 F

## 2024-03-22 DIAGNOSIS — E66.9 OBESITY, CLASS II, BMI 35-39.9: Primary | ICD-10-CM

## 2024-03-22 DIAGNOSIS — R63.5 ABNORMAL WEIGHT GAIN: Primary | ICD-10-CM

## 2024-03-22 DIAGNOSIS — Z98.84 BARIATRIC SURGERY STATUS: ICD-10-CM

## 2024-03-22 PROCEDURE — RECHECK

## 2024-03-22 NOTE — TELEPHONE ENCOUNTER
Patient came in for a nurse visit today. Patient states that she has been off her saxenda for a week and half due to shortage and also the cost is too much for her. Patient inquiring about Zepbound as an option for her if provider is okay with that. Patient also mentioned that Zepbound is on her insurance with a PA.

## 2024-03-22 NOTE — PROGRESS NOTES
Patient last visit [id number]  Patient current visit weight: 178.0lb    If you are taking phentermine or other oral weight loss medications, are you experiencing any of the following symptoms:  Headache:   Blurred Vision:   Chest Pain:   Palpitations:  Insomnia:   SPECIFY ORAL MEDICATION AND DOSAGE:     If you are taking an injectable medication,  are you experiencing any of the following symptoms:  Bloating: NO  Nausea:NO  Vomiting: NO  Constipation: NO  Diarrhea:NO  SPECIFY INJECTABLE MEDICATION AND CURRENT DOSAGE: SAXENDA- BEEN OFF FOR A WEEK IN A HALF DUE TO SUPPLY SHORTAGE.      Vitals:    Is BP less than 100/60?NO  Is BP greater than 140/90?NO  Is HR greater than 100?NO  **If yes to any of the above, have patient relax and repeat in 5-10 minutes**    Repeat values:    Is BP less than 100/60?  Is BP greater than 140/90?  Is HR greater than 100?  **If values remain outside of ranges above, please consult provider for next steps**

## 2024-03-23 ENCOUNTER — APPOINTMENT (OUTPATIENT)
Dept: LAB | Facility: CLINIC | Age: 39
End: 2024-03-23
Payer: COMMERCIAL

## 2024-03-23 DIAGNOSIS — R79.0 LOW FERRITIN: ICD-10-CM

## 2024-03-23 DIAGNOSIS — D50.8 IRON DEFICIENCY ANEMIA SECONDARY TO INADEQUATE DIETARY IRON INTAKE: ICD-10-CM

## 2024-03-23 LAB
BASOPHILS # BLD AUTO: 0.04 THOUSANDS/ÂΜL (ref 0–0.1)
BASOPHILS NFR BLD AUTO: 1 % (ref 0–1)
EOSINOPHIL # BLD AUTO: 0.4 THOUSAND/ÂΜL (ref 0–0.61)
EOSINOPHIL NFR BLD AUTO: 7 % (ref 0–6)
ERYTHROCYTE [DISTWIDTH] IN BLOOD BY AUTOMATED COUNT: 12.2 % (ref 11.6–15.1)
FERRITIN SERPL-MCNC: 103 NG/ML (ref 11–307)
HCT VFR BLD AUTO: 39.7 % (ref 34.8–46.1)
HGB BLD-MCNC: 13.2 G/DL (ref 11.5–15.4)
IMM GRANULOCYTES # BLD AUTO: 0.01 THOUSAND/UL (ref 0–0.2)
IMM GRANULOCYTES NFR BLD AUTO: 0 % (ref 0–2)
IRON SATN MFR SERPL: 34 % (ref 15–50)
IRON SERPL-MCNC: 103 UG/DL (ref 50–212)
LYMPHOCYTES # BLD AUTO: 1.88 THOUSANDS/ÂΜL (ref 0.6–4.47)
LYMPHOCYTES NFR BLD AUTO: 33 % (ref 14–44)
MCH RBC QN AUTO: 30 PG (ref 26.8–34.3)
MCHC RBC AUTO-ENTMCNC: 33.2 G/DL (ref 31.4–37.4)
MCV RBC AUTO: 90 FL (ref 82–98)
MONOCYTES # BLD AUTO: 0.43 THOUSAND/ÂΜL (ref 0.17–1.22)
MONOCYTES NFR BLD AUTO: 8 % (ref 4–12)
NEUTROPHILS # BLD AUTO: 2.99 THOUSANDS/ÂΜL (ref 1.85–7.62)
NEUTS SEG NFR BLD AUTO: 51 % (ref 43–75)
NRBC BLD AUTO-RTO: 0 /100 WBCS
PLATELET # BLD AUTO: 334 THOUSANDS/UL (ref 149–390)
PMV BLD AUTO: 10 FL (ref 8.9–12.7)
RBC # BLD AUTO: 4.4 MILLION/UL (ref 3.81–5.12)
TIBC SERPL-MCNC: 306 UG/DL (ref 250–450)
UIBC SERPL-MCNC: 203 UG/DL (ref 155–355)
WBC # BLD AUTO: 5.75 THOUSAND/UL (ref 4.31–10.16)

## 2024-03-23 PROCEDURE — 83540 ASSAY OF IRON: CPT

## 2024-03-23 PROCEDURE — 85025 COMPLETE CBC W/AUTO DIFF WBC: CPT

## 2024-03-23 PROCEDURE — 83550 IRON BINDING TEST: CPT

## 2024-03-23 PROCEDURE — 36415 COLL VENOUS BLD VENIPUNCTURE: CPT

## 2024-03-23 PROCEDURE — 82728 ASSAY OF FERRITIN: CPT

## 2024-03-25 DIAGNOSIS — R10.13 EPIGASTRIC ABDOMINAL PAIN: ICD-10-CM

## 2024-03-25 RX ORDER — TIRZEPATIDE 2.5 MG/.5ML
2.5 INJECTION, SOLUTION SUBCUTANEOUS WEEKLY
Qty: 2 ML | Refills: 0 | Status: SHIPPED | OUTPATIENT
Start: 2024-03-25 | End: 2024-04-22

## 2024-03-25 RX ORDER — OMEPRAZOLE 40 MG/1
40 CAPSULE, DELAYED RELEASE ORAL 2 TIMES DAILY
Qty: 60 CAPSULE | Refills: 3 | Status: SHIPPED | OUTPATIENT
Start: 2024-03-25

## 2024-03-25 NOTE — TELEPHONE ENCOUNTER
Please let the patient know that Zepbound is an option for her. I have submitted it to Antoine Burton and I sent her detailed instructions via her MyChart.

## 2024-03-27 LAB
APOB+LDLR+PCSK9 GENE MUT ANL BLD/T: NOT DETECTED
BRCA1+BRCA2 DEL+DUP + FULL MUT ANL BLD/T: NOT DETECTED
MLH1+MSH2+MSH6+PMS2 GN DEL+DUP+FUL M: NOT DETECTED

## 2024-03-28 ENCOUNTER — TELEPHONE (OUTPATIENT)
Age: 39
End: 2024-03-28

## 2024-03-28 NOTE — TELEPHONE ENCOUNTER
PA for zepbound    Submitted via    []CMM-KEY    []Surescripts-Case ID #    []Faxed to plan   []Other website    [x]Phone call Case ID #  [id number]    Office notes sent, clinical questions answered. Awaiting determination    Turnaround time for your insurance to make a decision on your Prior Authorization can take 7-21 business days.              \"I been having lower back pain for 5 days. \"

## 2024-04-01 NOTE — TELEPHONE ENCOUNTER
PA for zepbound Approved   Date(s) approved 3/28/25  Case #     Patient advised by [x] [employer] Message                      [] Phone call       Pharmacy advised by [x]Fax                                     []Phone call    Approval letter scanned into Media Yes

## 2024-04-23 ENCOUNTER — TELEPHONE (OUTPATIENT)
Dept: BARIATRICS | Facility: CLINIC | Age: 39
End: 2024-04-23

## 2024-04-23 DIAGNOSIS — Z98.84 BARIATRIC SURGERY STATUS: ICD-10-CM

## 2024-04-23 DIAGNOSIS — E66.9 OBESITY, CLASS II, BMI 35-39.9: Primary | ICD-10-CM

## 2024-04-23 RX ORDER — TIRZEPATIDE 5 MG/.5ML
5 INJECTION, SOLUTION SUBCUTANEOUS WEEKLY
Qty: 2 ML | Refills: 0 | Status: SHIPPED | OUTPATIENT
Start: 2024-04-23 | End: 2024-05-21

## 2024-04-23 NOTE — TELEPHONE ENCOUNTER
----- Message from Omi Bañuelos sent at 4/23/2024 11:21 AM EDT -----  Regarding: FW: Zepbound  Contact: [phone]    ----- Message -----  From: Reyes, Alexis  Sent: 4/23/2024  10:34 AM EDT  To: [facility]  Subject: Zepbound                                         Hello happy Tuesday! Update as requested. I have taken the second dose of Zep and have had no symptoms at all. so far so good! Would the dose increase be sent to the pharmacy soon in order for them to be able to have time to order? thanks!

## 2024-05-13 ENCOUNTER — TELEPHONE (OUTPATIENT)
Dept: BARIATRICS | Facility: CLINIC | Age: 39
End: 2024-05-13

## 2024-05-23 ENCOUNTER — TELEPHONE (OUTPATIENT)
Dept: BARIATRICS | Facility: CLINIC | Age: 39
End: 2024-05-23

## 2024-05-23 DIAGNOSIS — E66.9 OBESITY, CLASS II, BMI 35-39.9: Primary | ICD-10-CM

## 2024-05-23 DIAGNOSIS — Z98.84 BARIATRIC SURGERY STATUS: ICD-10-CM

## 2024-05-23 RX ORDER — TIRZEPATIDE 7.5 MG/.5ML
7.5 INJECTION, SOLUTION SUBCUTANEOUS WEEKLY
Qty: 2 ML | Refills: 0 | Status: SHIPPED | OUTPATIENT
Start: 2024-05-23 | End: 2024-06-20

## 2024-05-23 RX ORDER — TIRZEPATIDE 10 MG/.5ML
10 INJECTION, SOLUTION SUBCUTANEOUS WEEKLY
Qty: 2 ML | Refills: 0 | Status: SHIPPED | OUTPATIENT
Start: 2024-05-23 | End: 2024-06-20

## 2024-07-31 ENCOUNTER — OFFICE VISIT (OUTPATIENT)
Age: 39
End: 2024-07-31

## 2024-07-31 VITALS
RESPIRATION RATE: 16 BRPM | BODY MASS INDEX: 33.08 KG/M2 | TEMPERATURE: 96.8 F | SYSTOLIC BLOOD PRESSURE: 100 MMHG | HEART RATE: 95 BPM | HEIGHT: 58 IN | DIASTOLIC BLOOD PRESSURE: 60 MMHG | WEIGHT: 157.6 LBS

## 2024-07-31 DIAGNOSIS — E66.9 OBESITY, CLASS II, BMI 35-39.9: Primary | ICD-10-CM

## 2024-07-31 DIAGNOSIS — Z98.84 BARIATRIC SURGERY STATUS: ICD-10-CM

## 2024-07-31 DIAGNOSIS — K21.9 GERD (GASTROESOPHAGEAL REFLUX DISEASE): ICD-10-CM

## 2024-07-31 DIAGNOSIS — E03.9 ACQUIRED HYPOTHYROIDISM: ICD-10-CM

## 2024-07-31 RX ORDER — TIRZEPATIDE 10 MG/.5ML
10 INJECTION, SOLUTION SUBCUTANEOUS WEEKLY
COMMUNITY
End: 2024-07-31 | Stop reason: SDUPTHER

## 2024-07-31 RX ORDER — TIRZEPATIDE 10 MG/.5ML
10 INJECTION, SOLUTION SUBCUTANEOUS WEEKLY
Qty: 2 ML | Refills: 0 | Status: SHIPPED | OUTPATIENT
Start: 2024-07-31

## 2024-07-31 NOTE — PROGRESS NOTES
Assessment/Plan:    1. Obesity, Class II, BMI 35-39.9  tirzepatide (Zepbound) 10 mg/0.5 mL auto-injector          Initial: 186 lbs (1/19/24)  Current: 157 lbs (7/31/24)  Change: -29 lbs (-15.5% TBW)  Goal: 135-140 lbs    - Weight not at goal  - Patient is interested in Conservative Program  - Labs reviewed: As below.    General Recommendations:  Nutrition:  Eat breakfast daily.  Do not skip meals.     Food log (ie.) www.Revel Touch.com, sparkpeople.com, loseit.com, calorieking.com, etc.    Practice mindful eating.  Be sure to set aside time to eat, eat slowly, and savor your food.    Hydration:    At least 64oz of water daily.  No sugar sweetened beverages.  No juice (eat the fruit instead).    Exercise:  Studies have shown that the ideal exercise goal is somewhere between 150 to 300 minutes of moderate intensity exercise a week.  Start with exercising 10 minutes every other day and gradually increase physical activity with a goal of at least 150 minutes of moderate intensity exercise a week, divided over at least 3 days a week.  An example of this would be exercising 30 minutes a day, 5 days a week.  Resistance training can increase muscle mass and increase our resting metabolic rate.   FULL BODY resistance training is recommended 2-3 times a week.  Do not do this on consecutive days to allow for muscle recovery.    Aim for a bare minimum 5000 steps, even on days you do not exercise.    Monitoring:   Weigh yourself daily.  If this causes undue stress, then just weigh yourself once a week.  Weigh yourself the same time of the day with the same amount of clothing on.  Preferably this should be done after waking up, before you eat, and with no clothing or minimal clothing on.    Specific Goals:  No sugary beverages. At least 64oz of water daily.  Gradually increase physical activity to a goal of 5 days per week for 30 minutes of MODERATE intensity PLUS 2 days per week of FULL BODY resistance training  Goal protein  intake of 60-80 grams per day    Calorie goal:  [id number] lashay/day    Return visit:    Calorie tracking  Physical activity goals reviewed - increase resistance training to 2 days weekly  AOM  Continue zepbound 10mg. She will message me in 2-3 weeks with her weight. If plateaued, we can increase accordingly.   RTC in 6 months     Subjective:   Chief Complaint   Patient presents with    Follow-up     MWM- 4 mo Postop; Wt gain; F/u; Waist 36in       Patient ID: Alexis Reyes  is a 38 y.o. female with excess weight/obesity here to pursue weight management.  Previous notes and records have been reviewed.    Past Medical History:   Diagnosis Date    Abnormal Pap smear of cervix     Anemia     gets iron infusions    Anxiety     Chiari I malformation (HCC)     23 msg to anesth.    Chronic pain disorder     during lupus flairs    COVID-19     Disease of thyroid gland     pt off meds    Female infertility     IVF pregnancy    Fibromyalgia     Fibromyalgia, primary     Gastric ulcer     GERD (gastroesophageal reflux disease)     today 2024 exp lap    Hiatal hernia     Lupus (HCC)     Muscle weakness     Pericardial cyst     Pituitary tumor     Pleural effusion associated with pulmonary infection     Polycystic ovary syndrome     Pulmonary edema     Pulmonary emboli (HCC)     Spinal headache     with c section    Wears contact lenses      Past Surgical History:   Procedure Laterality Date     SECTION      x 2    CHOLECYSTECTOMY      GASTRECTOMY SLEEVE LAPAROSCOPIC      GASTRIC BYPASS      2019    POLYPECTOMY      HI LAPS ABD PRTM&OMENTUM DX W/WO SPEC BR/WA SPX N/A 2024    Procedure: LAPAROSCOPY DIAGNOSTIC, lysis of adhesions, closure of madrid's space,  intra op EGD;  Surgeon: Silver Rausch MD;  Location: [facility];  Service: Bariatrics    TONSILLECTOMY      TUBAL LIGATION         HPI:  Wt Readings from Last 20 Encounters:   24 71.5 kg (157 lb 9.6 oz)   24 80.7 kg (178 lb)   24 84.4 kg  "(186 lb)   01/15/24 83.5 kg (184 lb)   01/08/24 85.5 kg (188 lb 7.9 oz)   12/13/23 83 kg (183 lb)   08/16/23 80.8 kg (178 lb 3.2 oz)   08/11/23 79.4 kg (175 lb 0.7 oz)   08/09/23 78.7 kg (173 lb 8 oz)   08/09/23 78.7 kg (173 lb 8 oz)   07/14/23 77.1 kg (170 lb)   07/12/23 77.6 kg (171 lb)   06/30/23 77 kg (169 lb 12.1 oz)   06/23/23 77.7 kg (171 lb 3.2 oz)   04/06/23 76.2 kg (168 lb)   03/10/23 75.8 kg (167 lb)   02/21/23 75.8 kg (167 lb)   01/26/23 73.5 kg (162 lb)   12/16/22 73.5 kg (162 lb)   10/18/22 74.4 kg (164 lb)     S/P Vertical Sleeve Gastrectomy in 2017 in [place], then conversion to RNYGB in 2020 by Dr. Delgado in [place] due to severe heartburn and \"twisted esophagus.\"     Established care with our surgical program and was having epigastric pain. Underwent diagnostic laparoscopy with findings of a dense adhesive band performed by Dr. Carmelo Rausch on 1/8/24. Recovered well. Weight prior to sleeve 240 lbs, weight prior to bypass 220 lbs, ginna 158 lbs. Has been regaining weight. Referred to [facility] for weight regain.     On Phentermine in the past, helpful, but increased heart rate and increase in anxiety.     Topamax for migraines caused brain fog.     Initially started on saxenda (1/2024) and then transitioned to zepbound (3/2024)  Patient is currently on zepbound 10mg. Denies any nausea, vomiting, constipation, nor diarrhea. Notices appropriate appetite suppression except it is tapering off at the end of the week. Notes earlier satiety.     Follows 30/60 rule.     Hydration: 40 oz water, 1 cup coffee with creamer, hot tea with honey   Alcohol: 1 drink every 2-3 months  Smoking: denies  Exercise: none - finding time is difficult  Occupation: [employer]  and in school for nursing  Sleep: 5 hours  Had home based sleep study in 2022 and it did not suggest sleep apnea.     Goal weight: wants to feel good, 140-145 lbs    Colonoscopy: N/A  Mammogram: N/A    The following portions of the patient's " "history were reviewed and updated as appropriate: allergies, current medications, past family history, past medical history, past social history, past surgical history, and problem list.    Family History   Problem Relation Age of Onset    Heart disease Mother     Hypertension Mother     Heart attack Father     No Known Problems Brother     No Known Problems Brother     No Known Problems Daughter     No Known Problems Son     Heart disease Maternal Grandmother     Hypertension Maternal Grandmother     Diabetes Maternal Grandmother     Early death Maternal Grandfather     No Known Problems Paternal Grandmother     No Known Problems Paternal Grandfather     Cancer Neg Hx         Review of Systems   HENT:  Negative for sore throat.    Respiratory:  Negative for cough and shortness of breath.    Cardiovascular:  Negative for chest pain and palpitations.   Gastrointestinal:  Negative for abdominal pain, constipation, diarrhea, nausea and vomiting.        + GERD controlled with medication   Musculoskeletal:  Negative for arthralgias and back pain.   Skin:  Negative for rash.   Psychiatric/Behavioral:  Negative for suicidal ideas (or HI).         Denies depression and anxiety       Objective:  /60 (BP Location: Left arm, Patient Position: Sitting)   Pulse 95   Temp (!) 96.8 °F (36 °C) (Tympanic)   Resp 16   Ht 4' 10\" (1.473 m)   Wt 71.5 kg (157 lb 9.6 oz)   BMI 32.94 kg/m²     Physical Exam  Vitals and nursing note reviewed.        Constitutional   General appearance: Abnormal.  well developed and obese.   Eyes No conjunctival injection.   Ears, Nose, Mouth, and Throat Oral mucosa moist.   Pulmonary   Respiratory effort: No increased work of breathing or signs of respiratory distress.     Cardiovascular     Examination of extremities for edema and/or varicosities: Normal.  no edema.   Abdomen   Abdomen: Abnormal.  The abdomen was obese.    Musculoskeletal   Normal range of motion  Neurological   Gait and " station: Normal.    Psychiatric   Orientation to person, place and time: Normal.    Affect: appropriate

## 2024-08-14 DIAGNOSIS — E66.9 OBESITY, CLASS II, BMI 35-39.9: Primary | ICD-10-CM

## 2024-08-14 RX ORDER — TIRZEPATIDE 12.5 MG/.5ML
12.5 INJECTION, SOLUTION SUBCUTANEOUS WEEKLY
Qty: 2 ML | Refills: 0 | Status: SHIPPED | OUTPATIENT
Start: 2024-08-14 | End: 2024-09-11

## 2024-09-18 ENCOUNTER — TELEPHONE (OUTPATIENT)
Age: 39
End: 2024-09-18

## 2024-09-23 DIAGNOSIS — E66.812 OBESITY, CLASS II, BMI 35-39.9: Primary | ICD-10-CM

## 2024-09-23 DIAGNOSIS — E66.9 OBESITY, CLASS II, BMI 35-39.9: Primary | ICD-10-CM

## 2024-09-23 RX ORDER — TIRZEPATIDE 15 MG/.5ML
15 INJECTION, SOLUTION SUBCUTANEOUS WEEKLY
Qty: 2 ML | Refills: 3 | Status: SHIPPED | OUTPATIENT
Start: 2024-09-23

## 2024-09-24 ENCOUNTER — OFFICE VISIT (OUTPATIENT)
Dept: INTERNAL MEDICINE CLINIC | Age: 39
End: 2024-09-24
Payer: COMMERCIAL

## 2024-09-24 VITALS
SYSTOLIC BLOOD PRESSURE: 106 MMHG | HEIGHT: 58 IN | HEART RATE: 76 BPM | DIASTOLIC BLOOD PRESSURE: 66 MMHG | WEIGHT: 149 LBS | TEMPERATURE: 97.1 F | BODY MASS INDEX: 31.28 KG/M2 | OXYGEN SATURATION: 99 %

## 2024-09-24 DIAGNOSIS — N39.0 URINARY TRACT INFECTION WITHOUT HEMATURIA, SITE UNSPECIFIED: Primary | ICD-10-CM

## 2024-09-24 LAB
SL AMB  POCT GLUCOSE, UA: NORMAL
SL AMB LEUKOCYTE ESTERASE,UA: NORMAL
SL AMB POCT BILIRUBIN,UA: NORMAL
SL AMB POCT BLOOD,UA: NORMAL
SL AMB POCT CLARITY,UA: CLEAR
SL AMB POCT COLOR,UA: YELLOW
SL AMB POCT KETONES,UA: 15
SL AMB POCT NITRITE,UA: NORMAL
SL AMB POCT PH,UA: 6.5
SL AMB POCT SPECIFIC GRAVITY,UA: 1.01
SL AMB POCT URINE PROTEIN: NORMAL
SL AMB POCT UROBILINOGEN: 1

## 2024-09-24 PROCEDURE — 81003 URINALYSIS AUTO W/O SCOPE: CPT

## 2024-09-24 PROCEDURE — 99213 OFFICE O/P EST LOW 20 MIN: CPT

## 2024-09-24 RX ORDER — NITROFURANTOIN MACROCRYSTAL 100 MG
100 CAPSULE ORAL 2 TIMES DAILY
Qty: 10 CAPSULE | Refills: 0 | Status: CANCELLED | OUTPATIENT
Start: 2024-09-24 | End: 2024-09-29

## 2024-09-24 RX ORDER — CEPHALEXIN 500 MG/1
500 CAPSULE ORAL EVERY 8 HOURS SCHEDULED
Qty: 9 CAPSULE | Refills: 0 | Status: SHIPPED | OUTPATIENT
Start: 2024-09-24 | End: 2024-09-27

## 2024-09-24 NOTE — PROGRESS NOTES
"  Assessment:      Acute cystitis and concern for urethritis  Patient endorses chronic history of heavy menstrual flow in the past so would warrant further gynecological evaluation if symptoms do not improve. Patient advised to continue adequate hydration, continue Azo cranberry supplementation and will start Keflex 500 mg TID for 3-day course to assess response. Patient advised to follow-up in 1-week if there is no or minimal symptomatic improvement for gynecological referral and further assessment with transvaginal ultrasound.     Plan:  Plan:      1. Medications:  Keflex 500 mg TID for 3-day course  2. Maintain adequate hydration  3. Follow up in one-week if symptoms not improving, and Azo cranberry prn.     Subjective:      Alexis Reyes is a 39 y.o. female who complains of abnormal smelling urine and suprapubic pressure for 2 days.  Patient also complains of back pain. Patient denies cough, fever, headache, sorethroat, and vaginal discharge.  Patient does not have a history of recurrent UTI.  Patient does not have a history of pyelonephritis. She additionally endorses no vaginal discharge or pain during this period. She reports having similar symptoms when she was pregnant and was found to have a UTI at the time.  The following portions of the patient's history were reviewed and updated as appropriate: allergies, current medications, past family history, past medical history, past social history, past surgical history, and problem list.    Review of Systems  Pertinent items are noted in HPI.      Objective:      /66 (BP Location: Left arm, Patient Position: Sitting, Cuff Size: Standard)   Pulse 76   Temp (!) 97.1 °F (36.2 °C) (Temporal)   Ht 4' 10\" (1.473 m)   Wt 67.6 kg (149 lb)   SpO2 99%   BMI 31.14 kg/m²   General: alert and oriented, in no acute distress   Abdomen: tenderness mild in the lower abdomen  Suprapubically   Back: CVA tenderness absent   : defer exam     Laboratory:   Urine dipstick " shows negative for all components.    Micro exam: not done.

## 2024-10-03 ENCOUNTER — TELEPHONE (OUTPATIENT)
Dept: INTERNAL MEDICINE CLINIC | Age: 39
End: 2024-10-03

## 2024-10-03 DIAGNOSIS — R10.2 PELVIC PAIN: Primary | ICD-10-CM

## 2024-10-03 NOTE — TELEPHONE ENCOUNTER
Patient was seen on 9/24    Patient came into the office today stating she is still having the lower abd pain that wraps all around her back and abd. Patient states symptoms have not changed since last Tuesday. Patient states lower abd is very tender    Patient states she was told if symptoms did not improve to contact you to order an US to be ordered.         Please advise, thank you

## 2024-10-04 ENCOUNTER — TELEPHONE (OUTPATIENT)
Dept: BARIATRICS | Facility: CLINIC | Age: 39
End: 2024-10-04

## 2024-10-11 ENCOUNTER — TELEPHONE (OUTPATIENT)
Dept: BARIATRICS | Facility: CLINIC | Age: 39
End: 2024-10-11

## 2024-10-11 ENCOUNTER — OFFICE VISIT (OUTPATIENT)
Dept: URGENT CARE | Age: 39
End: 2024-10-11
Payer: COMMERCIAL

## 2024-10-11 VITALS
RESPIRATION RATE: 18 BRPM | HEART RATE: 79 BPM | OXYGEN SATURATION: 99 % | WEIGHT: 143.2 LBS | TEMPERATURE: 96.6 F | DIASTOLIC BLOOD PRESSURE: 56 MMHG | HEIGHT: 59 IN | SYSTOLIC BLOOD PRESSURE: 97 MMHG | BODY MASS INDEX: 28.87 KG/M2

## 2024-10-11 DIAGNOSIS — J02.9 SORE THROAT: Primary | ICD-10-CM

## 2024-10-11 PROCEDURE — 87070 CULTURE OTHR SPECIMN AEROBIC: CPT

## 2024-10-11 PROCEDURE — 99214 OFFICE O/P EST MOD 30 MIN: CPT

## 2024-10-11 PROCEDURE — 87880 STREP A ASSAY W/OPTIC: CPT

## 2024-10-11 RX ORDER — AMOXICILLIN 500 MG/1
500 CAPSULE ORAL EVERY 8 HOURS SCHEDULED
Qty: 30 CAPSULE | Refills: 0 | Status: SHIPPED | OUTPATIENT
Start: 2024-10-11 | End: 2024-10-21

## 2024-10-11 NOTE — PROGRESS NOTES
[facility]        NAME: Alexis Reyes is a 39 y.o. female  : 1985    MRN: [id number]  DATE: 2024  TIME: 1:49 PM    Assessment and Plan   Sore throat [J02.9]  1. Sore throat  POCT rapid ANTIGEN strepA    amoxicillin (AMOXIL) 500 mg capsule        Pt presents for eval of sore throat. Minimal congestion/PND. Mild adenopathy. 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 Rapid negative. Discussed abx- will order, pt aware to monitor for results- may take if worsening prior to results.     Patient Instructions       Follow up with PCP in 3-5 days.  Proceed to  ER if symptoms worsen.    If tests have been performed at [facility], our office will contact you with results if changes need to be made to the care plan discussed with you at the visit.  [facility]    Chief Complaint     Chief Complaint   Patient presents with    Generalized Body Aches    Sore Throat     Patient presents with complaints of symptoms starting this morning.          History of Present Illness       Pt presents for eval of sore throat. Minimal congestion/PND. Mild adenopathy. Works for [employer] in patient facing position. Rapid negative. Discussed abx- will order, pt aware to monitor for results- may take if worsening prior to results.     Generalized Body Aches  Associated symptoms include a sore throat. Pertinent negatives include no fever, coughing or shortness of breath.   Sore Throat   Pertinent negatives include no coughing or shortness of breath.       Review of Systems   Review of Systems   Constitutional:  Negative for fever.   HENT:  Positive for postnasal drip and sore throat.    Respiratory:  Negative for cough and shortness of breath.          Current Medications       Current Outpatient Medications:     amoxicillin (AMOXIL) 500 mg capsule, Take 1 capsule (500 mg total) by mouth every 8 (eight) hours for 10 days, Disp: 30 capsule, Rfl: 0    ergocalciferol (VITAMIN  D2) 50,000 units, Take 1 capsule (50,000 Units total) by mouth 2 (two) times a week, Disp: 24 capsule, Rfl: 0    famotidine (PEPCID) 20 mg tablet, Take 1 tablet (20 mg total) by mouth 2 (two) times a day (Patient not taking: Reported on 3/22/2024), Disp: 60 tablet, Rfl: 2    levothyroxine (Synthroid) 50 mcg tablet, Take 1 tablet (50 mcg total) by mouth daily in the early morning, Disp: 90 tablet, Rfl: 3    omeprazole (PriLOSEC) 40 MG capsule, Take 1 capsule (40 mg total) by mouth 2 (two) times a day, Disp: 60 capsule, Rfl: 3    rizatriptan (MAXALT-MLT) 10 mg disintegrating tablet, Take 1 tablet (10 mg total) by mouth once as needed for migraine for up to 1 dose May repeat in 2 hours if needed (Patient taking differently: Take 10 mg by mouth if needed for migraine May repeat in 2 hours if needed), Disp: 10 tablet, Rfl: 6    tirzepatide (Zepbound) 15 mg/0.5 mL auto-injector, Inject 0.5 mL (15 mg total) under the skin once a week, Disp: 2 mL, Rfl: 3    topiramate (TOPAMAX) 50 MG tablet, Take 2 tablets (100 mg total) by mouth daily at bedtime (Patient not taking: Reported on 1/19/2024), Disp: 60 tablet, Rfl: 5    Current Allergies     Allergies as of 10/11/2024 - Reviewed 10/11/2024   Allergen Reaction Noted    Codeine Tremor 12/14/2020    Morphine Tremor 06/16/2021    Oxycodone Tremor 01/09/2024    Valium [diazepam] Other (See Comments) 08/07/2023            The following portions of the patient's history were reviewed and updated as appropriate: allergies, current medications, past family history, past medical history, past social history, past surgical history and problem list.     Past Medical History:   Diagnosis Date    Abnormal Pap smear of cervix     Anemia     gets iron infusions    Anxiety     Chiari I malformation (HCC)     12/28/23 msg to anesth.    Chronic pain disorder     during lupus flairs    COVID-19     Disease of thyroid gland     pt off meds    Female infertility     IVF pregnancy    Fibromyalgia   "   Fibromyalgia, primary     Gastric ulcer     GERD (gastroesophageal reflux disease)     today 2024 exp lap    Headache(784.0)     Hiatal hernia     Lupus     Muscle weakness     Pericardial cyst     Pituitary tumor     Pleural effusion associated with pulmonary infection     Polycystic ovary syndrome     Pulmonary edema     Pulmonary emboli (HCC)     Spinal headache     with c section    Wears contact lenses        Past Surgical History:   Procedure Laterality Date     SECTION      x 2    CHOLECYSTECTOMY      GASTRECTOMY SLEEVE LAPAROSCOPIC      GASTRIC BYPASS      2019    POLYPECTOMY      WA LAPS ABD PRTM&OMENTUM DX W/WO SPEC BR/WA SPX N/A 2024    Procedure: LAPAROSCOPY DIAGNOSTIC, lysis of adhesions, closure of madrid's space,  intra op EGD;  Surgeon: Silver Rausch MD;  Location: [facility];  Service: Bariatrics    TONSILLECTOMY      TUBAL LIGATION         Family History   Problem Relation Age of Onset    Heart disease Mother     Hypertension Mother     Arthritis Mother     Heart attack Father     No Known Problems Brother     No Known Problems Brother     No Known Problems Daughter     No Known Problems Son     Heart disease Maternal Grandmother     Hypertension Maternal Grandmother     Diabetes Maternal Grandmother     Early death Maternal Grandfather     No Known Problems Paternal Grandmother     No Known Problems Paternal Grandfather     Cancer Neg Hx          Medications have been verified.        Objective   BP 97/56   Pulse 79   Temp (!) 96.6 °F (35.9 °C) (Tympanic)   Resp 18   Ht 4' 11\" (1.499 m)   Wt 65 kg (143 lb 3.2 oz)   SpO2 99%   BMI 28.92 kg/m²   No LMP recorded.       Physical Exam     Physical Exam  Vitals reviewed.   Constitutional:       Appearance: She is well-developed.   HENT:      Right Ear: Tympanic membrane normal.      Left Ear: Tympanic membrane normal.      Mouth/Throat:      Tonsils: No tonsillar exudate.   Cardiovascular:      Rate and Rhythm: Normal rate " and regular rhythm.   Lymphadenopathy:      Cervical: Cervical adenopathy present.   Neurological:      Mental Status: She is alert.

## 2024-10-13 LAB — BACTERIA THROAT CULT: NORMAL

## 2024-10-15 DIAGNOSIS — E66.812 OBESITY, CLASS II, BMI 35-39.9: ICD-10-CM

## 2024-10-15 DIAGNOSIS — G44.89 HEADACHE SYNDROME: ICD-10-CM

## 2024-10-15 RX ORDER — TIRZEPATIDE 15 MG/.5ML
15 INJECTION, SOLUTION SUBCUTANEOUS WEEKLY
Qty: 2 ML | Refills: 3 | Status: SHIPPED | OUTPATIENT
Start: 2024-10-15

## 2024-11-13 DIAGNOSIS — E66.812 OBESITY, CLASS II, BMI 35-39.9: ICD-10-CM

## 2024-11-13 RX ORDER — TIRZEPATIDE 15 MG/.5ML
15 INJECTION, SOLUTION SUBCUTANEOUS WEEKLY
Qty: 2 ML | Refills: 3 | Status: SHIPPED | OUTPATIENT
Start: 2024-11-13

## 2024-11-21 ENCOUNTER — TELEPHONE (OUTPATIENT)
Age: 39
End: 2024-11-21

## 2024-11-21 ENCOUNTER — TELEPHONE (OUTPATIENT)
Dept: PLASTIC SURGERY | Facility: CLINIC | Age: 39
End: 2024-11-21

## 2024-11-21 NOTE — TELEPHONE ENCOUNTER
Rec'd call from patient       She had spoken to you in the pass regarding getting a Pann due to weight loss.       Since then she has lost more weight and has more extra skin hat's causing further irritation and pain her PCP has given medication for it.    Please reach out to patient

## 2024-11-21 NOTE — TELEPHONE ENCOUNTER
Had been in contact with patient in 2023 for panniculectomy and patient still needs to complete all criteria.  Emailed 2024 criteria with my direct number if she would like to reach out to me.

## 2024-12-06 DIAGNOSIS — E66.812 OBESITY, CLASS II, BMI 35-39.9: ICD-10-CM

## 2024-12-06 RX ORDER — TIRZEPATIDE 15 MG/.5ML
15 INJECTION, SOLUTION SUBCUTANEOUS WEEKLY
Qty: 2 ML | Refills: 3 | Status: SHIPPED | OUTPATIENT
Start: 2024-12-06

## 2025-01-13 DIAGNOSIS — R23.8 SCALP IRRITATION: Primary | ICD-10-CM

## 2025-01-17 ENCOUNTER — OFFICE VISIT (OUTPATIENT)
Dept: OBGYN CLINIC | Facility: CLINIC | Age: 40
End: 2025-01-17
Payer: COMMERCIAL

## 2025-01-17 VITALS — DIASTOLIC BLOOD PRESSURE: 54 MMHG | BODY MASS INDEX: 25.85 KG/M2 | WEIGHT: 128 LBS | SYSTOLIC BLOOD PRESSURE: 100 MMHG

## 2025-01-17 DIAGNOSIS — Z01.419 ENCOUNTER FOR GYNECOLOGICAL EXAMINATION (GENERAL) (ROUTINE) WITHOUT ABNORMAL FINDINGS: Primary | ICD-10-CM

## 2025-01-17 DIAGNOSIS — N93.9 ABNORMAL UTERINE BLEEDING (AUB): ICD-10-CM

## 2025-01-17 DIAGNOSIS — N94.10 DYSPAREUNIA IN FEMALE: ICD-10-CM

## 2025-01-17 PROCEDURE — G0476 HPV COMBO ASSAY CA SCREEN: HCPCS | Performed by: STUDENT IN AN ORGANIZED HEALTH CARE EDUCATION/TRAINING PROGRAM

## 2025-01-17 PROCEDURE — G0145 SCR C/V CYTO,THINLAYER,RESCR: HCPCS | Performed by: STUDENT IN AN ORGANIZED HEALTH CARE EDUCATION/TRAINING PROGRAM

## 2025-01-17 PROCEDURE — S0612 ANNUAL GYNECOLOGICAL EXAMINA: HCPCS | Performed by: STUDENT IN AN ORGANIZED HEALTH CARE EDUCATION/TRAINING PROGRAM

## 2025-01-17 PROCEDURE — 99214 OFFICE O/P EST MOD 30 MIN: CPT | Performed by: STUDENT IN AN ORGANIZED HEALTH CARE EDUCATION/TRAINING PROGRAM

## 2025-01-17 NOTE — PROGRESS NOTES
Alexis Reyes  1985    Assessment and Plan:  Yearly exam without abnormality.     -Pap collected today. We reviewed ASCCP guidelines for Pap testing today.   -Pelvic pain and dyspareunia: encouraged to undergo TVUS, previously ordered. Discussed potential contribution of adhesive disease, potentially endometriosis. Given difficulty with tampon, historical discomfort with pelvic exams, I also recommend pelvic floor physical therapy   -AUB: recommend TUVS and endometrial biopsy. Of note on chart review after appointment, pt is taking synthroid, but I do not see any recent TSH. Also has known prolactinoma, but I do not see any recent prolactin levels. Will order also.     RTO 3 mo for EMB and f/u sono      CC:  Yearly exam    S:  39 y.o. female here for yearly exam.       LMP unsure  Contraception: Tubal  Last Pap: 2021 NILM    Non-smoker, social drinker  Exercises irregularly    Doing ok overall.     Her cycles are regular, but very heavy and crampy.     Sexual activity: She is sexually active without pain, bleeding or dryness.     STD testing:  She does not want STD testing today.    Family hx of breast cancer: denies   Family hx of ovarian cancer: denies  Family hx of colon cancer: denies     Denies hot flushes, dyspareunia, abnormal uterine bleeding, urinary/fecal incontinence, changes in energy levels, mood.       Current Outpatient Medications:     ergocalciferol (VITAMIN D2) 50,000 units, Take 1 capsule (50,000 Units total) by mouth 2 (two) times a week, Disp: 24 capsule, Rfl: 0    levothyroxine (Synthroid) 50 mcg tablet, Take 1 tablet (50 mcg total) by mouth daily in the early morning, Disp: 90 tablet, Rfl: 3    omeprazole (PriLOSEC) 40 MG capsule, Take 1 capsule (40 mg total) by mouth 2 (two) times a day, Disp: 60 capsule, Rfl: 3    rizatriptan (MAXALT-MLT) 10 mg disintegrating tablet, Take 1 tablet (10 mg total) by mouth once as needed for migraine for up to 1 dose May repeat in 2 hours if needed  (Patient taking differently: Take 10 mg by mouth if needed for migraine May repeat in 2 hours if needed), Disp: 10 tablet, Rfl: 6    tirzepatide (Zepbound) 15 mg/0.5 mL auto-injector, Inject 0.5 mL (15 mg total) under the skin once a week, Disp: 2 mL, Rfl: 3    topiramate (TOPAMAX) 50 MG tablet, Take 2 tablets (100 mg total) by mouth daily at bedtime (Patient not taking: Reported on 1/19/2024), Disp: 60 tablet, Rfl: 5  Social History     Socioeconomic History    Marital status: /Civil Union     Spouse name: Not on file    Number of children: Not on file    Years of education: Not on file    Highest education level: Not on file   Occupational History    Not on file   Tobacco Use    Smoking status: Never    Smokeless tobacco: Never   Vaping Use    Vaping status: Never Used   Substance and Sexual Activity    Alcohol use: Not Currently     Comment: Occasional    Drug use: Never    Sexual activity: Yes     Partners: Male     Birth control/protection: Female Sterilization     Comment: defer   Other Topics Concern    Not on file   Social History Narrative    Not on file     Social Drivers of Health     Financial Resource Strain: Not on file   Food Insecurity: Not on file   Transportation Needs: Not on file   Physical Activity: Not on file   Stress: Not on file   Social Connections: Not on file   Intimate Partner Violence: Not on file   Housing Stability: Not on file     Family History   Problem Relation Age of Onset    Heart disease Mother     Hypertension Mother     Arthritis Mother     Heart attack Father     No Known Problems Brother     No Known Problems Brother     No Known Problems Daughter     No Known Problems Son     Heart disease Maternal Grandmother     Hypertension Maternal Grandmother     Diabetes Maternal Grandmother     Early death Maternal Grandfather     No Known Problems Paternal Grandmother     No Known Problems Paternal Grandfather     Cancer Neg Hx       Past Medical History:   Diagnosis Date     Abnormal Pap smear of cervix     Anemia     gets iron infusions    Anxiety     Chiari I malformation (HCC)     12/28/23 msg to anesth.    Chronic pain disorder     during lupus flairs    COVID-19     Disease of thyroid gland     pt off meds    Female infertility     IVF pregnancy    Fibromyalgia     Fibromyalgia, primary     Gastric ulcer     GERD (gastroesophageal reflux disease)     today 1/8/2024 exp lap    Headache(784.0)     Hiatal hernia     Lupus     Muscle weakness     Pericardial cyst     Pituitary tumor     Pleural effusion associated with pulmonary infection     Polycystic ovary syndrome     Pulmonary edema     Pulmonary emboli (HCC)     Spinal headache     with c section    Wears contact lenses         Review of Systems   Respiratory: Negative.    Cardiovascular: Negative.    Gastrointestinal: Negative for constipation and diarrhea.   Genitourinary: Negative for difficulty urinating, vaginal discharge, itching or odor.    O:  Blood pressure 100/54, weight 58.1 kg (128 lb), not currently breastfeeding.    Patient appears well and is not in distress  Neck is supple without masses  Breasts are symmetrical without mass, tenderness, nipple discharge, skin changes or adenopathy.   Abdomen is soft and nontender without masses.   External genitals are normal without lesions or rashes.  Urethral meatus and urethra are normal  Bladder is normal to palpation  Vagina is normal without discharge or bleeding.   Cervix is normal without discharge or lesion.   Uterus is normal, mobile, without palpable mass. Generalized tenderness.

## 2025-01-20 DIAGNOSIS — Z86.39 HISTORY OF OBESITY: Primary | ICD-10-CM

## 2025-01-20 DIAGNOSIS — E66.3 OVERWEIGHT: ICD-10-CM

## 2025-01-20 LAB
HPV HR 12 DNA CVX QL NAA+PROBE: NEGATIVE
HPV16 DNA CVX QL NAA+PROBE: NEGATIVE
HPV18 DNA CVX QL NAA+PROBE: NEGATIVE

## 2025-01-20 RX ORDER — TIRZEPATIDE 12.5 MG/.5ML
12.5 INJECTION, SOLUTION SUBCUTANEOUS WEEKLY
Qty: 2 ML | Refills: 0 | Status: SHIPPED | OUTPATIENT
Start: 2025-01-20 | End: 2025-02-17

## 2025-01-21 ENCOUNTER — APPOINTMENT (OUTPATIENT)
Dept: LAB | Age: 40
End: 2025-01-21
Payer: COMMERCIAL

## 2025-01-21 DIAGNOSIS — N93.9 ABNORMAL UTERINE BLEEDING (AUB): ICD-10-CM

## 2025-01-21 DIAGNOSIS — K91.2 POSTSURGICAL MALABSORPTION: ICD-10-CM

## 2025-01-21 DIAGNOSIS — Z98.84 BARIATRIC SURGERY STATUS: ICD-10-CM

## 2025-01-21 DIAGNOSIS — E55.9 VITAMIN D DEFICIENCY: ICD-10-CM

## 2025-01-21 LAB
25(OH)D3 SERPL-MCNC: 28.9 NG/ML (ref 30–100)
PROLACTIN SERPL-MCNC: 17.99 NG/ML (ref 3.34–26.72)
TSH SERPL DL<=0.05 MIU/L-ACNC: 2.72 UIU/ML (ref 0.45–4.5)

## 2025-01-21 PROCEDURE — 84146 ASSAY OF PROLACTIN: CPT

## 2025-01-21 PROCEDURE — 82306 VITAMIN D 25 HYDROXY: CPT

## 2025-01-21 PROCEDURE — 84443 ASSAY THYROID STIM HORMONE: CPT

## 2025-01-21 PROCEDURE — 36415 COLL VENOUS BLD VENIPUNCTURE: CPT

## 2025-01-22 ENCOUNTER — HOSPITAL ENCOUNTER (OUTPATIENT)
Dept: RADIOLOGY | Age: 40
Discharge: HOME | End: 2025-01-22
Payer: COMMERCIAL

## 2025-01-22 ENCOUNTER — RESULTS FOLLOW-UP (OUTPATIENT)
Dept: BARIATRICS | Facility: CLINIC | Age: 40
End: 2025-01-22

## 2025-01-22 DIAGNOSIS — E55.9 VITAMIN D DEFICIENCY: Primary | ICD-10-CM

## 2025-01-22 DIAGNOSIS — R10.2 PELVIC PAIN: ICD-10-CM

## 2025-01-22 PROCEDURE — 76830 TRANSVAGINAL US NON-OB: CPT

## 2025-01-22 PROCEDURE — 76856 US EXAM PELVIC COMPLETE: CPT

## 2025-01-23 ENCOUNTER — RESULTS FOLLOW-UP (OUTPATIENT)
Dept: OBGYN CLINIC | Facility: CLINIC | Age: 40
End: 2025-01-23

## 2025-01-23 LAB
LAB AP GYN PRIMARY INTERPRETATION: NORMAL
Lab: NORMAL

## 2025-01-24 NOTE — TELEPHONE ENCOUNTER
Patient called, states she was just speaking with Dr Colmenares and they were waiting on a final set of results. States it just resulted and is asking if provider can give her a call back.

## 2025-01-27 ENCOUNTER — TELEPHONE (OUTPATIENT)
Age: 40
End: 2025-01-27

## 2025-01-27 NOTE — TELEPHONE ENCOUNTER
Patient inquiring if EMB in April is necessary given results of the pelvic ultrasound and heavy bleeding potentially being caused by adenomyosis and potential for hysterectomy. 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 She would prefer a call or virtual visit to discuss follow up/ next steps if biopsy is not needed.    Message to Dr. Colmenares.

## 2025-01-27 NOTE — TELEPHONE ENCOUNTER
Ultrasound essentially normal. Evidence of adenomyosis, which is nothing dangerous, but can cause heavy and painful menses. We can talk about management in more detail at her upcoming visit

## 2025-01-29 ENCOUNTER — TELEPHONE (OUTPATIENT)
Dept: NEUROSURGERY | Facility: CLINIC | Age: 40
End: 2025-01-29

## 2025-01-29 ENCOUNTER — OFFICE VISIT (OUTPATIENT)
Age: 40
End: 2025-01-29
Payer: COMMERCIAL

## 2025-01-29 VITALS
DIASTOLIC BLOOD PRESSURE: 68 MMHG | HEIGHT: 58 IN | SYSTOLIC BLOOD PRESSURE: 98 MMHG | WEIGHT: 126.8 LBS | HEART RATE: 80 BPM | TEMPERATURE: 97.3 F | BODY MASS INDEX: 26.62 KG/M2

## 2025-01-29 DIAGNOSIS — D35.2 PITUITARY MICROADENOMA (HCC): Primary | ICD-10-CM

## 2025-01-29 DIAGNOSIS — Z98.84 HISTORY OF GASTRIC BYPASS: ICD-10-CM

## 2025-01-29 DIAGNOSIS — E66.812 OBESITY, CLASS II, BMI 35-39.9: Primary | ICD-10-CM

## 2025-01-29 PROCEDURE — 99213 OFFICE O/P EST LOW 20 MIN: CPT | Performed by: PHYSICIAN ASSISTANT

## 2025-01-29 RX ORDER — TIRZEPATIDE 10 MG/.5ML
10 INJECTION, SOLUTION SUBCUTANEOUS WEEKLY
Qty: 2 ML | Refills: 2 | Status: SHIPPED | OUTPATIENT
Start: 2025-01-29 | End: 2025-04-23

## 2025-01-29 NOTE — TELEPHONE ENCOUNTER
Called patient to schedule over due yearly appointment with mri jose diez cont .   Put new order in   Patient states she has sc number will schedule her imaging and call our office back to schedule clinical follow up

## 2025-01-29 NOTE — PROGRESS NOTES
Assessment/Plan:    1. Obesity, Class II, BMI 35-39.9  tirzepatide (Zepbound) 10 mg/0.5 mL auto-injector      2. History of gastric bypass              Initial: 186 lbs (1/19/24)  Last visit: 157 lbs (7/31/24)  Current: 126 lbs BMI 26.5 (1/29/25)  Change:  -60 lbs  Goal: 130-135 lbs    - Weight is at goal  - Patient is interested in Conservative Program  - Labs reviewed: As below.    General Recommendations:  Nutrition:  Eat breakfast daily.  Do not skip meals.     Food log (ie.) www.[employer].com, sparkpeople.com, loseit.com, calorieking.com, etc.    Practice mindful eating.  Be sure to set aside time to eat, eat slowly, and savor your food.    Hydration:    At least 64oz of water daily.  No sugar sweetened beverages.  No juice (eat the fruit instead).    Exercise:  Studies have shown that the ideal exercise goal is somewhere between 150 to 300 minutes of moderate intensity exercise a week.  Start with exercising 10 minutes every other day and gradually increase physical activity with a goal of at least 150 minutes of moderate intensity exercise a week, divided over at least 3 days a week.  An example of this would be exercising 30 minutes a day, 5 days a week.  Resistance training can increase muscle mass and increase our resting metabolic rate.   FULL BODY resistance training is recommended 2-3 times a week.  Do not do this on consecutive days to allow for muscle recovery.    Aim for a bare minimum 5000 steps, even on days you do not exercise.    Monitoring:   Weigh yourself daily.  If this causes undue stress, then just weigh yourself once a week.  Weigh yourself the same time of the day with the same amount of clothing on.  Preferably this should be done after waking up, before you eat, and with no clothing or minimal clothing on.    Specific Goals:  No sugary beverages. At least 64oz of water daily.  Gradually increase physical activity to a goal of 5 days per week for 30 minutes of MODERATE intensity PLUS  2 days per week of FULL BODY resistance training  Goal protein intake of 60-80 grams per day    Calorie goal:  3869-9356 lashay/day    Return visit:    Calorie tracking  Physical activity goals reviewed - increase resistance training to 2 days weekly  AOM  Continue zepbound 10mg. If unable to maintain, then decrease to 7.5mg then 5mg thereafter.   RTC in 6 months     Subjective:   Chief Complaint   Patient presents with    Follow-up     Mwm 6mth f/u       Patient ID: Alexis Reyes  is a 39 y.o. female with excess weight/obesity here to pursue weight management.  Previous notes and records have been reviewed.    Past Medical History:   Diagnosis Date    Abnormal Pap smear of cervix     Anemia     gets iron infusions    Anxiety     Chiari I malformation (HCC)     23 msg to anesth.    Chronic pain disorder     during lupus flairs    COVID-19     Disease of thyroid gland     pt off meds    Female infertility     IVF pregnancy    Fibromyalgia     Fibromyalgia, primary     Gastric ulcer     GERD (gastroesophageal reflux disease)     today 2024 exp lap    Headache(784.0)     Hiatal hernia     Lupus     Muscle weakness     Pericardial cyst     Pituitary tumor     Pleural effusion associated with pulmonary infection     Polycystic ovary syndrome     Pulmonary edema     Pulmonary emboli (HCC)     Spinal headache     with c section    Wears contact lenses      Past Surgical History:   Procedure Laterality Date    BARIATRIC SURGERY       SECTION      x 2    CHOLECYSTECTOMY      GASTRECTOMY SLEEVE LAPAROSCOPIC      GASTRIC BYPASS      2019    POLYPECTOMY      VA LAPS ABD PRTM&OMENTUM DX W/WO SPEC BR/WA SPX N/A 2024    Procedure: LAPAROSCOPY DIAGNOSTIC, lysis of adhesions, closure of madrid's space,  intra op EGD;  Surgeon: Silver Rausch MD;  Location: [facility];  Service: Bariatrics    TONSILLECTOMY      TUBAL LIGATION         HPI:  Wt Readings from Last 20 Encounters:   25 57.5 kg (126 lb 12.8 oz)  "  01/17/25 58.1 kg (128 lb)   10/11/24 65 kg (143 lb 3.2 oz)   09/24/24 67.6 kg (149 lb)   07/31/24 71.5 kg (157 lb 9.6 oz)   03/22/24 80.7 kg (178 lb)   01/19/24 84.4 kg (186 lb)   01/15/24 83.5 kg (184 lb)   01/08/24 85.5 kg (188 lb 7.9 oz)   12/13/23 83 kg (183 lb)   08/16/23 80.8 kg (178 lb 3.2 oz)   08/11/23 79.4 kg (175 lb 0.7 oz)   08/09/23 78.7 kg (173 lb 8 oz)   08/09/23 78.7 kg (173 lb 8 oz)   07/14/23 77.1 kg (170 lb)   07/12/23 77.6 kg (171 lb)   06/30/23 77 kg (169 lb 12.1 oz)   06/23/23 77.7 kg (171 lb 3.2 oz)   04/06/23 76.2 kg (168 lb)   03/10/23 75.8 kg (167 lb)     S/P Vertical Sleeve Gastrectomy in 2017 in [place], then conversion to RNYGB in 2020 by Dr. Delgado in [place] due to severe heartburn and \"twisted esophagus.\"     Established care with our surgical program and was having epigastric pain. Underwent diagnostic laparoscopy with findings of a dense adhesive band performed by Dr. Carmelo Rausch on 1/8/24. Recovered well. Weight prior to sleeve 240 lbs, weight prior to bypass 220 lbs, ginna 158 lbs. Has been regaining weight. Referred to [facility] for weight regain.     On Phentermine in the past, helpful, but increased heart rate and increase in anxiety.     Topamax for migraines caused brain fog.     Initially started on saxenda (1/2024) and then transitioned to zepbound (3/2024)  Patient is currently on zepbound 12.5mg. Denies any nausea, vomiting, constipation, nor diarrhea. Notices appropriate appetite suppression except it is tapering off at the end of the week. Notes earlier satiety. Patient denies any episodes of reactive hypoglycemia these days. Also noticing less headaches.     Follows 30/60 rule.     Hydration: 40 oz water, 1 cup coffee with creamer, hot tea with honey   Alcohol: 1 drink every 2-3 months  Smoking: denies  Exercise: none - finding time is difficult  Occupation: [employer]  and in school for nursing  Sleep: 5 hours  Had home based sleep study in 2022 and it did " "not suggest sleep apnea.          Colonoscopy: N/A  Mammogram: N/A    The following portions of the patient's history were reviewed and updated as appropriate: allergies, current medications, past family history, past medical history, past social history, past surgical history, and problem list.    Family History   Problem Relation Age of Onset    Heart disease Mother     Hypertension Mother     Arthritis Mother     Heart attack Father     No Known Problems Brother     No Known Problems Brother     No Known Problems Daughter     No Known Problems Son     Heart disease Maternal Grandmother     Hypertension Maternal Grandmother     Diabetes Maternal Grandmother     Early death Maternal Grandfather     No Known Problems Paternal Grandmother     No Known Problems Paternal Grandfather     Cancer Neg Hx         Review of Systems   HENT:  Negative for sore throat.    Respiratory:  Negative for cough and shortness of breath.    Cardiovascular:  Negative for chest pain and palpitations.   Gastrointestinal:  Negative for abdominal pain, constipation, diarrhea, nausea and vomiting.        + GERD controlled with medication   Musculoskeletal:  Negative for arthralgias and back pain.   Skin:  Negative for rash.   Psychiatric/Behavioral:  Negative for suicidal ideas (or HI).         Denies depression and anxiety       Objective:  BP 98/68 (Patient Position: Sitting, Cuff Size: Standard)   Pulse 80   Temp (!) 97.3 °F (36.3 °C) (Tympanic)   Ht 4' 10\" (1.473 m)   Wt 57.5 kg (126 lb 12.8 oz)   LMP  (LMP Unknown)   BMI 26.50 kg/m²     Physical Exam  Vitals and nursing note reviewed.        Constitutional   General appearance: Abnormal.  well developed and obese.   Eyes No conjunctival injection.   Ears, Nose, Mouth, and Throat Oral mucosa moist.   Pulmonary   Respiratory effort: No increased work of breathing or signs of respiratory distress.     Cardiovascular     Examination of extremities for edema and/or varicosities: Normal. "  no edema.   Abdomen   Abdomen: Abnormal.  The abdomen was obese.    Musculoskeletal   Normal range of motion  Neurological   Gait and station: Normal.    Psychiatric   Orientation to person, place and time: Normal.    Affect: appropriate

## 2025-02-10 ENCOUNTER — TELEPHONE (OUTPATIENT)
Age: 40
End: 2025-02-10

## 2025-02-10 NOTE — TELEPHONE ENCOUNTER
Phone call to patient, she has EMB 2/14/25 procedure in office.   Patient is concerned about pain with EMB.   Patient states Ultrasound was a lot for her. Patient is asking if procedure could be done in the hospital, under anesthesia.   Discussed possible procedure with oral sedation, patient wants to be under anesthesia in OR.    Patient is worried her pain tolerance is very low.    Patient states she did research on procedure and and she wants Dr. Colmenares to know she will need to be under anesthesia   Patient aware date for procedure will change.

## 2025-02-10 NOTE — TELEPHONE ENCOUNTER
Patient has a procedure on 2/14/25 and has some concerns she would like to discuss. Please follow up with the patient

## 2025-02-11 NOTE — TELEPHONE ENCOUNTER
Spoke to patient and relayed that she can discuss Friday    Patient was not understanding why she needed to keep the appt if she already discussed the procedure at her last visit    Asked why she cannot sign consents at  ...     Explained to patient we typically need an ovs to go over procedure for the OR and sign the consents in the visit.     Patient understands and will be at appt on 2/14

## 2025-02-14 ENCOUNTER — PROCEDURE VISIT (OUTPATIENT)
Dept: OBGYN CLINIC | Facility: CLINIC | Age: 40
End: 2025-02-14
Payer: COMMERCIAL

## 2025-02-14 VITALS — BODY MASS INDEX: 25.92 KG/M2 | DIASTOLIC BLOOD PRESSURE: 72 MMHG | WEIGHT: 124 LBS | SYSTOLIC BLOOD PRESSURE: 100 MMHG

## 2025-02-14 DIAGNOSIS — N94.10 DYSPAREUNIA IN FEMALE: ICD-10-CM

## 2025-02-14 DIAGNOSIS — N93.9 ABNORMAL UTERINE BLEEDING (AUB): Primary | ICD-10-CM

## 2025-02-14 PROCEDURE — 99213 OFFICE O/P EST LOW 20 MIN: CPT | Performed by: STUDENT IN AN ORGANIZED HEALTH CARE EDUCATION/TRAINING PROGRAM

## 2025-02-14 PROCEDURE — 88305 TISSUE EXAM BY PATHOLOGIST: CPT | Performed by: PATHOLOGY

## 2025-02-14 PROCEDURE — 58100 BIOPSY OF UTERUS LINING: CPT | Performed by: STUDENT IN AN ORGANIZED HEALTH CARE EDUCATION/TRAINING PROGRAM

## 2025-02-14 NOTE — PROGRESS NOTES
Name: Alexis Reyes      : 1985      MRN: [id number]  Encounter Provider: Elen Colmenares MD  Encounter Date: 2025   Encounter department: [facility] [place]  :  Assessment & Plan  Abnormal uterine bleeding (AUB)  Again reviewed treatment options to include medical and surgical. She is not interested in medical management, would prefer surgical management. Discussed need for sampling of endometrium and she agrees to this. Unsure if she could tolerate EMB today, but willing to try. Attempted today, though not well tolerated and procedure discontinued. Small amount of tissue, possibly sufficient. Will follow up after resulted to ascertain whether D&C necessary for tissue sampling prior to TLH.      Orders:    Tissue Exam    Dyspareunia in female             History of Present Illness   HPI  Alexis Reyes is a 39 y.o. female who presents for follow up after ultrasound, for further discussion regarding her excessively heavy menstrual cycles. Ultrasound with evidence of adenomyosis.         Review of Systems as above       Objective   /72 (BP Location: Left arm, Patient Position: Sitting, Cuff Size: Standard)   Wt 56.2 kg (124 lb)   LMP 2025 (Exact Date)   BMI 25.92 kg/m²      Physical Exam  Vitals and nursing note reviewed.   Constitutional:       General: She is not in acute distress.     Appearance: She is well-developed.   HENT:      Head: Normocephalic and atraumatic.   Cardiovascular:      Rate and Rhythm: Normal rate.   Pulmonary:      Effort: Pulmonary effort is normal. No respiratory distress.   Abdominal:      Palpations: Abdomen is soft.   Genitourinary:     General: Normal vulva.      Comments: Uterus small and mobile   Musculoskeletal:         General: No swelling, tenderness or deformity. Normal range of motion.   Skin:     General: Skin is warm and dry.      Capillary Refill: Capillary refill takes less than 2 seconds.   Neurological:       "Mental Status: She is alert.   Psychiatric:         Mood and Affect: Mood normal.         Endometrial biopsy    Date/Time: 2/14/2025 1:00 PM    Performed by: Elen Colmenares MD  Authorized by: Elen Colmenares MD  Universal Protocol:  procedure performed by consultantConsent: Verbal consent obtained. Written consent not obtained.  Risks and benefits: risks, benefits and alternatives were discussed  Consent given by: patient  Time out: Immediately prior to procedure a \"time out\" was called to verify the correct patient, procedure, equipment, support staff and site/side marked as required.  Patient understanding: patient states understanding of the procedure being performed  Patient consent: the patient's understanding of the procedure matches consent given  Procedure consent: procedure consent matches procedure scheduled  Test results: test results available and properly labeled  Site marked: the operative site was not marked  Patient identity confirmed: verbally with patient    Indication:     Indications: Other disorder of menstruation and other abnormal bleeding from female genital tract    Pre-procedure:     Premeds:  Ibuprofen  Procedure:     Procedure: endometrial biopsy with Pipelle      A bivalve speculum was placed in the vagina: yes      Cervix cleaned and prepped: yes      The cervix was dilated: no      Uterus sounded: no      Specimen collected: specimen collected and sent to pathology      Patient tolerated procedure well with no complications: no      Unable to perform due to: pain    Findings:     Uterus size:  6-8 weeks    Cervix: normal      Adnexa: normal    Comments:     Procedure comments:  Procedure discontinued without curettage at pt request, however some sample in pipelle        "

## 2025-02-20 ENCOUNTER — RESULTS FOLLOW-UP (OUTPATIENT)
Dept: OBGYN CLINIC | Facility: CLINIC | Age: 40
End: 2025-02-20

## 2025-02-20 PROCEDURE — 88305 TISSUE EXAM BY PATHOLOGIST: CPT | Performed by: PATHOLOGY

## 2025-02-24 PROBLEM — N92.6 IRREGULAR PERIODS: Status: RESOLVED | Noted: 2022-03-11 | Resolved: 2025-02-24

## 2025-02-24 PROBLEM — N91.4 SECONDARY OLIGOMENORRHEA: Status: RESOLVED | Noted: 2022-07-15 | Resolved: 2025-02-24

## 2025-02-24 PROBLEM — Z01.818 PRE-OP EXAMINATION: Status: RESOLVED | Noted: 2023-08-16 | Resolved: 2025-02-24

## 2025-02-28 ENCOUNTER — CONSULT (OUTPATIENT)
Dept: OBGYN CLINIC | Facility: CLINIC | Age: 40
End: 2025-02-28
Payer: COMMERCIAL

## 2025-02-28 VITALS
DIASTOLIC BLOOD PRESSURE: 60 MMHG | WEIGHT: 123 LBS | SYSTOLIC BLOOD PRESSURE: 94 MMHG | BODY MASS INDEX: 25.82 KG/M2 | HEIGHT: 58 IN

## 2025-02-28 DIAGNOSIS — N94.6 DYSMENORRHEA: ICD-10-CM

## 2025-02-28 DIAGNOSIS — N92.1 MENORRHAGIA WITH IRREGULAR CYCLE: Primary | ICD-10-CM

## 2025-02-28 PROCEDURE — 99213 OFFICE O/P EST LOW 20 MIN: CPT | Performed by: STUDENT IN AN ORGANIZED HEALTH CARE EDUCATION/TRAINING PROGRAM

## 2025-02-28 NOTE — PROGRESS NOTES
"Name: Alexis Reyes      : 1985      MRN: [id number]  Encounter Provider: Elen Colmenares MD  Encounter Date: 2025   Encounter department: [facility] [place]  :  Assessment & Plan  Menorrhagia with irregular cycle  Reviewed management briefly. She is only interested in TLH at this point. Reviewed procedure, recovery. 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          Dysmenorrhea             History of Present Illness   HPI  Alexis Reyes is a 39 y.o. female who presents for discussion regarding hysterectomy for management of AUB and dysmenorrhea. 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         Review of Systems as above       Objective   BP 94/60 (BP Location: Left arm, Patient Position: Sitting, Cuff Size: Standard)   Ht 4' 10\" (1.473 m)   Wt 55.8 kg (123 lb)   LMP 2025 (Exact Date)   BMI 25.71 kg/m²      Physical Exam  Constitutional:       Appearance: She is well-developed.   HENT:      Head: Normocephalic and atraumatic.   Eyes:      Pupils: Pupils are equal, round, and reactive to light.   Pulmonary:      Effort: Pulmonary effort is normal.   Musculoskeletal:      Cervical back: Normal range of motion.   Neurological:      Mental Status: She is alert and oriented to person, place, and time.   Psychiatric:         Behavior: Behavior normal.           "

## 2025-02-28 NOTE — ASSESSMENT & PLAN NOTE
Reviewed management briefly. She is only interested in TLH at this point. Reviewed procedure, recovery. 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

## 2025-03-01 ENCOUNTER — APPOINTMENT (OUTPATIENT)
Dept: URGENT CARE | Age: 40
End: 2025-03-01

## 2025-03-01 ENCOUNTER — APPOINTMENT (OUTPATIENT)
Dept: LAB | Age: 40
End: 2025-03-01

## 2025-03-01 DIAGNOSIS — Z02.1 PHYSICAL EXAM, PRE-EMPLOYMENT: Primary | ICD-10-CM

## 2025-03-01 DIAGNOSIS — Z02.1 PHYSICAL EXAM, PRE-EMPLOYMENT: ICD-10-CM

## 2025-03-01 PROCEDURE — 36415 COLL VENOUS BLD VENIPUNCTURE: CPT

## 2025-03-01 PROCEDURE — 86480 TB TEST CELL IMMUN MEASURE: CPT

## 2025-03-02 LAB
GAMMA INTERFERON BACKGROUND BLD IA-ACNC: 0.03 IU/ML
M TB IFN-G BLD-IMP: NEGATIVE
M TB IFN-G CD4+ BCKGRND COR BLD-ACNC: 0.05 IU/ML
M TB IFN-G CD4+ BCKGRND COR BLD-ACNC: 0.06 IU/ML
MITOGEN IGNF BCKGRD COR BLD-ACNC: 9.97 IU/ML

## 2025-03-03 ENCOUNTER — OFFICE VISIT (OUTPATIENT)
Dept: INTERNAL MEDICINE CLINIC | Age: 40
End: 2025-03-03
Payer: COMMERCIAL

## 2025-03-03 VITALS
OXYGEN SATURATION: 99 % | HEIGHT: 58 IN | HEART RATE: 80 BPM | BODY MASS INDEX: 25.82 KG/M2 | WEIGHT: 123 LBS | TEMPERATURE: 97.2 F | SYSTOLIC BLOOD PRESSURE: 104 MMHG | DIASTOLIC BLOOD PRESSURE: 70 MMHG

## 2025-03-03 DIAGNOSIS — R10.12 LEFT UPPER QUADRANT ABDOMINAL PAIN: ICD-10-CM

## 2025-03-03 DIAGNOSIS — F41.9 ANXIETY: Primary | ICD-10-CM

## 2025-03-03 DIAGNOSIS — M54.6 ACUTE LEFT-SIDED THORACIC BACK PAIN: ICD-10-CM

## 2025-03-03 PROCEDURE — 99214 OFFICE O/P EST MOD 30 MIN: CPT | Performed by: INTERNAL MEDICINE

## 2025-03-03 RX ORDER — ESCITALOPRAM OXALATE 5 MG/1
5 TABLET ORAL DAILY
Qty: 30 TABLET | Refills: 5 | Status: SHIPPED | OUTPATIENT
Start: 2025-03-03

## 2025-03-03 NOTE — PROGRESS NOTES
Name: Alexis Reyes      : 1985      MRN: [id number]  Encounter Provider: Mariah Samuel MD  Encounter Date: 3/3/2025   Encounter department: [facility]  :  Assessment & Plan  Anxiety    Orders:    escitalopram (LEXAPRO) 5 mg tablet; Take 1 tablet (5 mg total) by mouth daily    Left upper quadrant abdominal pain    Orders:    US abdomen complete; Future    Acute left-sided thoracic back pain    Orders:    XR spine thoracic 3 vw; Future           History of Present Illness   Complaining of pain in the left upper quadrant and the left side of the left thoracic area in the back may be radiated to the upper quadrant some nausea no vomiting patient is on Zepbound for weight reduction and also she has a history of gastric bypass she denying any other or chills no headaches no sore throat.  Pain is increased with the bending or twisting and turning mostly looks like a muscular.  Not distention of abdomen she does get some heartburns I think her nausea is secondary to Zepbound she is working on tapering it down the medication and it could be muscular in the back I will get the x-ray of the thoracic spine and also ultrasound and we will follow-up      Review of Systems   Constitutional:  Negative for chills and fatigue.   HENT:  Negative for congestion, ear pain, hearing loss, postnasal drip, sinus pressure, sore throat and voice change.    Eyes:  Negative for pain, discharge and visual disturbance.   Respiratory:  Negative for cough, chest tightness and shortness of breath.    Cardiovascular:  Positive for chest pain (Lower left chest pain). Negative for palpitations and leg swelling.   Gastrointestinal:  Positive for abdominal pain (Left upper quadrant abdominal pain). Negative for blood in stool, diarrhea, nausea and rectal pain.   Genitourinary:  Negative for difficulty urinating, dysuria and urgency.   Musculoskeletal:  Positive for back pain (Thoracic area pain). Negative for  "arthralgias and joint swelling.   Skin:  Negative for rash.   Allergic/Immunologic: Negative for environmental allergies and food allergies.   Neurological:  Negative for dizziness, tremors, weakness, numbness and headaches.   Hematological:  Negative for adenopathy.   Psychiatric/Behavioral:  Positive for decreased concentration. Negative for behavioral problems and hallucinations. The patient is nervous/anxious.        Objective   /70 (BP Location: Left arm, Patient Position: Sitting, Cuff Size: Standard)   Pulse 80   Temp (!) 97.2 °F (36.2 °C) (Temporal)   Ht 4' 10\" (1.473 m)   Wt 55.8 kg (123 lb)   LMP 02/03/2025 (Exact Date)   SpO2 99%   BMI 25.71 kg/m²      Physical Exam  HENT:      Head: Normocephalic.   Eyes:      Pupils: Pupils are equal, round, and reactive to light.   Cardiovascular:      Rate and Rhythm: Normal rate and regular rhythm.      Heart sounds: No murmur heard.  Pulmonary:      Breath sounds: Normal breath sounds.   Chest:          Comments: Lower chest wall tenderness especially in the lower ribs area no significant pain in the left upper quadrant but the pain in the lower thoracic costochondral area  Abdominal:      General: Bowel sounds are normal.      Palpations: Abdomen is soft.   Musculoskeletal:      Cervical back: Normal range of motion.      Thoracic back: Spasms and tenderness present. Decreased range of motion.   Skin:     General: Skin is warm.   Neurological:      Mental Status: She is alert and oriented to person, place, and time.   Psychiatric:         Behavior: Behavior normal.         Thought Content: Thought content normal.         "

## 2025-03-07 ENCOUNTER — APPOINTMENT (OUTPATIENT)
Dept: RADIOLOGY | Age: 40
End: 2025-03-07
Payer: COMMERCIAL

## 2025-03-07 DIAGNOSIS — M54.6 ACUTE LEFT-SIDED THORACIC BACK PAIN: ICD-10-CM

## 2025-03-07 PROCEDURE — 72072 X-RAY EXAM THORAC SPINE 3VWS: CPT

## 2025-03-08 ENCOUNTER — HOSPITAL ENCOUNTER (OUTPATIENT)
Dept: RADIOLOGY | Facility: HOSPITAL | Age: 40
Discharge: HOME | End: 2025-03-08
Payer: COMMERCIAL

## 2025-03-08 DIAGNOSIS — R10.12 LEFT UPPER QUADRANT ABDOMINAL PAIN: ICD-10-CM

## 2025-03-08 PROCEDURE — 76700 US EXAM ABDOM COMPLETE: CPT

## 2025-03-13 ENCOUNTER — RESULTS FOLLOW-UP (OUTPATIENT)
Dept: INTERNAL MEDICINE CLINIC | Age: 40
End: 2025-03-13

## 2025-03-13 ENCOUNTER — TELEPHONE (OUTPATIENT)
Dept: INTERNAL MEDICINE CLINIC | Age: 40
End: 2025-03-13

## 2025-03-13 DIAGNOSIS — K76.9 LESION OF LIVER: Primary | ICD-10-CM

## 2025-03-13 DIAGNOSIS — R10.12 LEFT UPPER QUADRANT ABDOMINAL PAIN: ICD-10-CM

## 2025-03-13 NOTE — TELEPHONE ENCOUNTER
Pt had an Abdominal ultrasound last week and would like to go over the results with you could you please give her a call thank you

## 2025-03-14 DIAGNOSIS — E66.3 OVERWEIGHT: Primary | ICD-10-CM

## 2025-03-14 RX ORDER — TIRZEPATIDE 7.5 MG/.5ML
7.5 INJECTION, SOLUTION SUBCUTANEOUS WEEKLY
Qty: 2 ML | Refills: 0 | Status: SHIPPED | OUTPATIENT
Start: 2025-03-14 | End: 2025-04-11

## 2025-03-14 NOTE — TELEPHONE ENCOUNTER
Ultrasound reviewed.  Discussed liver lesions unlikely to be source of patient's pain.  Patient requesting to pursue follow-up MRI which is ordered  Patient also requesting to follow-up with GI, referral placed today  Recommend following up in office if symptoms continue

## 2025-03-14 NOTE — TELEPHONE ENCOUNTER
Patient came in she would like a call from any provider about her test results of her ultrasound of the abdomin , not a Ma

## 2025-04-05 ENCOUNTER — HOSPITAL ENCOUNTER (OUTPATIENT)
Dept: MRI IMAGING | Facility: HOSPITAL | Age: 40
Discharge: HOME | End: 2025-04-05
Attending: NEUROLOGICAL SURGERY
Payer: COMMERCIAL

## 2025-04-05 DIAGNOSIS — D35.2 PITUITARY MICROADENOMA (HCC): ICD-10-CM

## 2025-04-05 PROCEDURE — 70553 MRI BRAIN STEM W/O & W/DYE: CPT

## 2025-04-05 PROCEDURE — A9585 GADOBUTROL INJECTION: HCPCS | Performed by: NEUROLOGICAL SURGERY

## 2025-04-05 RX ORDER — GADOBUTROL 604.72 MG/ML
5 INJECTION INTRAVENOUS
Status: COMPLETED | OUTPATIENT
Start: 2025-04-05 | End: 2025-04-05

## 2025-04-05 RX ADMIN — GADOBUTROL 5 ML: 604.72 INJECTION INTRAVENOUS at 23:59

## 2025-04-09 ENCOUNTER — HOSPITAL ENCOUNTER (OUTPATIENT)
Dept: MRI IMAGING | Facility: HOSPITAL | Age: 40
Discharge: HOME | End: 2025-04-09
Payer: COMMERCIAL

## 2025-04-09 DIAGNOSIS — K76.9 LESION OF LIVER: ICD-10-CM

## 2025-04-09 PROBLEM — Z87.59 HISTORY OF MATERNAL PULMONARY EMBOLUS: Status: ACTIVE | Noted: 2025-04-09

## 2025-04-09 PROBLEM — Z86.711 HISTORY OF MATERNAL PULMONARY EMBOLUS: Status: ACTIVE | Noted: 2025-04-09

## 2025-04-09 PROCEDURE — A9585 GADOBUTROL INJECTION: HCPCS | Performed by: INTERNAL MEDICINE

## 2025-04-09 PROCEDURE — 74183 MRI ABD W/O CNTR FLWD CNTR: CPT

## 2025-04-09 RX ORDER — GADOBUTROL 604.72 MG/ML
6 INJECTION INTRAVENOUS
Status: COMPLETED | OUTPATIENT
Start: 2025-04-09 | End: 2025-04-09

## 2025-04-09 RX ADMIN — GADOBUTROL 6 ML: 604.72 INJECTION INTRAVENOUS at 19:03

## 2025-04-10 ENCOUNTER — RESULTS FOLLOW-UP (OUTPATIENT)
Dept: NEUROSURGERY | Facility: CLINIC | Age: 40
End: 2025-04-10

## 2025-04-23 DIAGNOSIS — E66.3 OVERWEIGHT: Primary | ICD-10-CM

## 2025-04-23 DIAGNOSIS — Z86.39 HISTORY OF OBESITY: ICD-10-CM

## 2025-04-23 RX ORDER — TIRZEPATIDE 5 MG/.5ML
5 INJECTION, SOLUTION SUBCUTANEOUS WEEKLY
Qty: 2 ML | Refills: 2 | Status: SHIPPED | OUTPATIENT
Start: 2025-04-23 | End: 2025-07-16

## 2025-04-24 ENCOUNTER — TELEPHONE (OUTPATIENT)
Age: 40
End: 2025-04-24

## 2025-04-24 ENCOUNTER — OFFICE VISIT (OUTPATIENT)
Dept: INTERNAL MEDICINE CLINIC | Age: 40
End: 2025-04-24
Payer: COMMERCIAL

## 2025-04-24 VITALS
OXYGEN SATURATION: 99 % | SYSTOLIC BLOOD PRESSURE: 110 MMHG | HEIGHT: 58 IN | HEART RATE: 92 BPM | DIASTOLIC BLOOD PRESSURE: 68 MMHG | TEMPERATURE: 98.8 F | WEIGHT: 121 LBS | BODY MASS INDEX: 25.4 KG/M2

## 2025-04-24 DIAGNOSIS — Z86.69 HISTORY OF CHIARI MALFORMATION: ICD-10-CM

## 2025-04-24 DIAGNOSIS — E66.812 OBESITY, CLASS II, BMI 35-39.9: ICD-10-CM

## 2025-04-24 DIAGNOSIS — G93.5 CHIARI I MALFORMATION (HCC): ICD-10-CM

## 2025-04-24 DIAGNOSIS — D18.1 LYMPHANGIOMA: ICD-10-CM

## 2025-04-24 DIAGNOSIS — D35.2 PITUITARY MICROADENOMA (HCC): ICD-10-CM

## 2025-04-24 DIAGNOSIS — F41.9 ANXIETY: Primary | ICD-10-CM

## 2025-04-24 DIAGNOSIS — E06.3 HYPOTHYROIDISM DUE TO HASHIMOTO THYROIDITIS: ICD-10-CM

## 2025-04-24 PROCEDURE — 99213 OFFICE O/P EST LOW 20 MIN: CPT | Performed by: INTERNAL MEDICINE

## 2025-04-24 RX ORDER — HYDROXYZINE HYDROCHLORIDE 25 MG/1
25 TABLET, FILM COATED ORAL DAILY PRN
Qty: 30 TABLET | Refills: 0 | Status: SHIPPED | OUTPATIENT
Start: 2025-04-24 | End: 2025-05-24

## 2025-04-24 NOTE — ASSESSMENT & PLAN NOTE
S/p Bhavya-en-Y gastric bypass surgery  Follows with weight management currently on Zepbound maintenances 5 mg  Starting weight 188 current weight 121 which is at goal for patient will be maintained on maintenance dose

## 2025-04-24 NOTE — PROGRESS NOTES
Name: Alexis Reyes      : 1985      MRN: [id number]  Encounter Provider: Mariah Samuel MD  Encounter Date: 2025   Encounter department: [facility]  :  Assessment & Plan  Anxiety  Tried SSRI but did not tolerate   Will try atarax prn  Orders:    hydrOXYzine HCL (ATARAX) 25 mg tablet; Take 1 tablet (25 mg total) by mouth daily as needed for anxiety    Obesity, Class II, BMI 35-39.9  S/p Bhavya-en-Y gastric bypass surgery  Follows with weight management currently on Zepbound maintenances 5 mg  Starting weight 188 current weight 121 which is at goal for patient will be maintained on maintenance dose       Lymphangioma  P had RUQ abdominal pain had RUQ US which showed concern for hepatic hemangiomata but f/up MRI without liver lesions but did show a 0.7 cm right lower quadrant nonenhancing mesenteric rounded cystic lesion, which may represent a small lymphangioma.        History of Chiari malformation    Orders:    Ambulatory Referral to Neurology; Future    Pituitary microadenoma (HCC)    Orders:    Ambulatory Referral to Neurology; Future    Chiari I malformation (HCC)    Orders:    Ambulatory Referral to Neurology; Future    Hypothyroidism due to Hashimoto thyroiditis    Orders:    TSH, 3rd generation with Free T4 reflex; Future           History of Present Illness   Patient is a 39-year-old female past medical history of hypothyroidism, adenoma from pituitary microadenoma here for f/up MRI results. Pt had RUQ pain that is intermittent in nature not related to food had RUQ US which showed 2 hyperechoic nodules ?hemangiomata ,absent gallbladder and CBD normal. F/up abdomen MRI without any liver lesions and US results likey 2/2 artifact. Pt notes she has intermittent RUQ pain 1-2x per week. Episodes last several minutes and described as sharp/stabbing that self resolve. At previous appointment pt was started on lexapro for anxiety but she took one dose of lexapro and had  "chest pain.       Review of Systems   Constitutional:  Negative for chills and fever.   HENT:  Negative for ear pain and sore throat.    Eyes:  Negative for pain and visual disturbance.   Respiratory:  Negative for cough and shortness of breath.    Cardiovascular:  Negative for chest pain and palpitations.   Gastrointestinal:  Negative for abdominal pain and vomiting.   Genitourinary:  Negative for dysuria and hematuria.   Musculoskeletal:  Negative for arthralgias and back pain.   Skin:  Negative for color change and rash.   Neurological:  Negative for seizures and syncope.   All other systems reviewed and are negative.      Objective   /68 (BP Location: Left arm, Patient Position: Sitting, Cuff Size: Standard)   Pulse 92   Temp 98.8 °F (37.1 °C) (Temporal)   Ht 4' 10\" (1.473 m)   Wt 54.9 kg (121 lb)   SpO2 99%   BMI 25.29 kg/m²      Physical Exam  Vitals and nursing note reviewed.   Constitutional:       General: She is not in acute distress.     Appearance: She is well-developed.   HENT:      Head: Normocephalic and atraumatic.   Eyes:      Conjunctiva/sclera: Conjunctivae normal.   Cardiovascular:      Rate and Rhythm: Normal rate and regular rhythm.      Heart sounds: No murmur heard.  Pulmonary:      Effort: Pulmonary effort is normal. No respiratory distress.      Breath sounds: Normal breath sounds.   Abdominal:      Palpations: Abdomen is soft.      Tenderness: There is no abdominal tenderness.   Musculoskeletal:         General: No swelling.      Cervical back: Neck supple.   Skin:     General: Skin is warm and dry.      Capillary Refill: Capillary refill takes less than 2 seconds.   Neurological:      Mental Status: She is alert.   Psychiatric:         Mood and Affect: Mood normal.         "

## 2025-04-24 NOTE — TELEPHONE ENCOUNTER
PA for Zepbound SUBMITTED to [employer]    via    [x]CMM-KEY: [id number]  []Surescripts-Case ID #   []Availity-Auth ID # NDC #   []Faxed to plan   []Other website   []Phone call Case ID #     []PA sent as URGENT    All office notes, labs and other pertaining documents and studies sent. Clinical questions answered. Awaiting determination from insurance company.     Turnaround time for your insurance to make a decision on your Prior Authorization can take 7-21 business days.

## 2025-04-25 NOTE — TELEPHONE ENCOUNTER
PA for Zepbound APPROVED     Date(s) approved 4/24-4/24/2026    Case #    Patient advised by          [x]MyChart Message  []Phone call   []LMOM  []L/M to call office as no active Communication consent on file  []Unable to leave detailed message as VM not approved on Communication consent       Pharmacy advised by    []Fax  []Phone call  []Secure Chat    Specialty Pharmacy    []     Approval letter scanned into Media Yes

## 2025-06-19 DIAGNOSIS — Z86.39 HISTORY OF OBESITY: ICD-10-CM

## 2025-06-19 DIAGNOSIS — E66.3 OVERWEIGHT: ICD-10-CM

## 2025-06-19 RX ORDER — TIRZEPATIDE 5 MG/.5ML
5 INJECTION, SOLUTION SUBCUTANEOUS WEEKLY
Qty: 2 ML | Refills: 2 | Status: SHIPPED | OUTPATIENT
Start: 2025-06-19 | End: 2025-09-11

## 2025-07-24 DIAGNOSIS — E66.3 OVERWEIGHT: ICD-10-CM

## 2025-07-24 DIAGNOSIS — Z86.39 HISTORY OF OBESITY: ICD-10-CM

## 2025-07-24 RX ORDER — TIRZEPATIDE 5 MG/.5ML
5 INJECTION, SOLUTION SUBCUTANEOUS WEEKLY
Qty: 2 ML | Refills: 0 | OUTPATIENT
Start: 2025-07-24 | End: 2025-10-16

## 2025-08-06 ENCOUNTER — TELEMEDICINE (OUTPATIENT)
Age: 40
End: 2025-08-06
Payer: COMMERCIAL

## 2025-08-06 VITALS — BODY MASS INDEX: 26.99 KG/M2 | WEIGHT: 128.6 LBS | HEIGHT: 58 IN

## 2025-08-06 DIAGNOSIS — E66.3 OVERWEIGHT: ICD-10-CM

## 2025-08-06 DIAGNOSIS — Z86.39 HISTORY OF OBESITY: ICD-10-CM

## 2025-08-06 PROCEDURE — 99213 OFFICE O/P EST LOW 20 MIN: CPT | Performed by: PHYSICIAN ASSISTANT

## 2025-08-06 RX ORDER — TIRZEPATIDE 5 MG/.5ML
5 INJECTION, SOLUTION SUBCUTANEOUS WEEKLY
Qty: 2 ML | Refills: 5 | Status: SHIPPED | OUTPATIENT
Start: 2025-08-06 | End: 2026-01-21

## 2025-08-08 ENCOUNTER — TELEPHONE (OUTPATIENT)
Age: 40
End: 2025-08-08

## 2025-08-11 ENCOUNTER — OFFICE VISIT (OUTPATIENT)
Dept: NEUROSURGERY | Facility: CLINIC | Age: 40
End: 2025-08-11
Payer: COMMERCIAL

## (undated) DEVICE — DECANTER: Brand: UNBRANDED

## (undated) DEVICE — [HIGH FLOW INSUFFLATOR,  DO NOT USE IF PACKAGE IS DAMAGED,  KEEP DRY,  KEEP AWAY FROM SUNLIGHT,  PROTECT FROM HEAT AND RADIOACTIVE SOURCES.]: Brand: PNEUMOSURE

## (undated) DEVICE — PBT NON ABSORBABLE WOUND CLOSURE DEVICE: Brand: V-LOC

## (undated) DEVICE — TROCAR: Brand: KII FIOS FIRST ENTRY

## (undated) DEVICE — WEBRIL 6 IN UNSTERILE

## (undated) DEVICE — GLOVE SRG BIOGEL 8

## (undated) DEVICE — TROCAR: Brand: KII® SLEEVE

## (undated) DEVICE — GLOVE SRG BIOGEL 7.5

## (undated) DEVICE — 5 MM CURVED DISSECTORS WITH MONOPOLAR CAUTERY: Brand: ENDOPATH

## (undated) DEVICE — VIOLET BRAIDED (POLYGLACTIN 910), SYNTHETIC ABSORBABLE SUTURE: Brand: COATED VICRYL

## (undated) DEVICE — BLUE HEAT SCOPE WARMER

## (undated) DEVICE — SINGLE PORT MANIFOLD: Brand: NEPTUNE 2

## (undated) DEVICE — SURGICAL GOWN, XL SMARTSLEEVE: Brand: CONVERTORS

## (undated) DEVICE — HARMONIC 1100 SHEARS, 36CM SHAFT LENGTH: Brand: HARMONIC

## (undated) DEVICE — SPINAL NEEDLE 22 G X 2 1/2

## (undated) DEVICE — SYRINGE 30ML LL

## (undated) DEVICE — SYRINGE 20ML LL

## (undated) DEVICE — ADHESIVE SKIN HIGH VISCOSITY EXOFIN 1ML

## (undated) DEVICE — PMI DISPOSABLE PUNCTURE CLOSURE DEVICE / SUTURE GRASPER: Brand: PMI

## (undated) DEVICE — TUBING SMOKE EVAC W/FILTRATION DEVICE PLUMEPORT ACTIV

## (undated) DEVICE — PACK PBDS LAP CHOLE RF

## (undated) DEVICE — METZENBAUM ADTEC SINGLE USE DISSECTING SCISSORS, SHAFT ONLY, MONOPOLAR, CURVED TO LEFT, WORKING LENGTH: 12 1/4", (310 MM), DIAM. 5 MM, INSULATED, DOUBLE ACTION, STERILE, DISPOSABLE, PACKAGE OF 10 PIECES: Brand: AESCULAP

## (undated) DEVICE — NEEDLE 25G X 1 1/2

## (undated) DEVICE — CHLORAPREP HI-LITE 26ML ORANGE

## (undated) DEVICE — 4-PORT MANIFOLD: Brand: NEPTUNE 2

## (undated) DEVICE — INTENDED FOR TISSUE SEPARATION, AND OTHER PROCEDURES THAT REQUIRE A SHARP SURGICAL BLADE TO PUNCTURE OR CUT.: Brand: BARD-PARKER SAFETY BLADES SIZE 15, STERILE

## (undated) DEVICE — NEEDLE SPINAL18G X 3.5 IN QUINCKE

## (undated) DEVICE — SCD SEQUENTIAL COMPRESSION COMFORT SLEEVE MEDIUM KNEE LENGTH: Brand: KENDALL SCD

## (undated) DEVICE — DISPOSABLE OR TOWEL: Brand: CARDINAL HEALTH

## (undated) DEVICE — TIBURON LAPAROSCOPIC ABDOMINAL DRAPE: Brand: CONVERTORS

## (undated) DEVICE — SUT MONOCRYL 4-0 PS-2 27 IN Y426H

## (undated) DEVICE — ENDOPATH PNEUMONEEDLE INSUFFLATION NEEDLES WITH LUER LOCK CONNECTORS 120MM: Brand: ENDOPATH